# Patient Record
Sex: FEMALE | Race: BLACK OR AFRICAN AMERICAN | Employment: OTHER | URBAN - METROPOLITAN AREA
[De-identification: names, ages, dates, MRNs, and addresses within clinical notes are randomized per-mention and may not be internally consistent; named-entity substitution may affect disease eponyms.]

---

## 2017-02-28 ENCOUNTER — HOSPITAL ENCOUNTER (OUTPATIENT)
Dept: CT IMAGING | Age: 57
Discharge: HOME | End: 2017-02-28
Attending: NURSE PRACTITIONER | Admitting: NURSE PRACTITIONER

## 2017-02-28 DIAGNOSIS — C85.90 NON-HODGKIN LYMPHOMA (HCC): ICD-10-CM

## 2017-02-28 DIAGNOSIS — C83.36 DIFFUSE LARGE B-CELL LYMPHOMA OF INTRAPELVIC LYMPH NODES (HCC): ICD-10-CM

## 2017-04-13 PROBLEM — D80.1 HYPOGAMMAGLOBULINEMIA (HCC): Status: ACTIVE | Noted: 2017-04-13

## 2017-06-15 PROBLEM — T45.1X5A ANEMIA DUE TO ANTINEOPLASTIC CHEMOTHERAPY: Status: ACTIVE | Noted: 2017-06-15

## 2017-06-15 PROBLEM — D64.81 ANEMIA DUE TO ANTINEOPLASTIC CHEMOTHERAPY: Status: ACTIVE | Noted: 2017-06-15

## 2017-08-18 ENCOUNTER — HOSPITAL ENCOUNTER (OUTPATIENT)
Dept: CT IMAGING | Age: 57
Discharge: HOME | End: 2017-08-18
Attending: INTERNAL MEDICINE | Admitting: INTERNAL MEDICINE

## 2017-08-18 DIAGNOSIS — C85.90 NON-HODGKIN LYMPHOMA (HCC): ICD-10-CM

## 2017-08-18 DIAGNOSIS — C83.36 DIFFUSE LARGE B-CELL LYMPHOMA OF INTRAPELVIC LYMPH NODES (HCC): ICD-10-CM

## 2017-08-18 RX ORDER — HEPARIN SODIUM (PORCINE) LOCK FLUSH IV SOLN 100 UNIT/ML 100 UNIT/ML
500 SOLUTION INTRAVENOUS ONCE
Status: COMPLETED | OUTPATIENT
Start: 2017-08-18 | End: 2017-08-18

## 2017-08-18 RX ADMIN — HEPARIN SODIUM (PORCINE) LOCK FLUSH IV SOLN 100 UNIT/ML 500 UNITS: 100 SOLUTION at 16:23

## 2017-11-06 RX ORDER — FLUTICASONE PROPIONATE 50 MCG
SPRAY, SUSPENSION (ML) NASAL
Qty: 1 BOTTLE | Refills: 3 | Status: SHIPPED | OUTPATIENT
Start: 2017-11-06 | End: 2019-11-27 | Stop reason: SDUPTHER

## 2017-12-13 PROBLEM — R10.9 ABDOMINAL PAIN: Status: ACTIVE | Noted: 2017-12-13

## 2018-02-05 ENCOUNTER — HOSPITAL ENCOUNTER (OUTPATIENT)
Dept: NON INVASIVE DIAGNOSTICS | Age: 58
Discharge: HOME | End: 2018-02-05
Attending: INTERNAL MEDICINE | Admitting: INTERNAL MEDICINE

## 2018-02-05 DIAGNOSIS — R55 SYNCOPE AND COLLAPSE: ICD-10-CM

## 2018-02-05 LAB
LV EF: 50 %
LVEF MODALITY: NORMAL

## 2018-02-20 ENCOUNTER — HOSPITAL ENCOUNTER (OUTPATIENT)
Dept: OTHER | Age: 58
Discharge: HOME | End: 2018-02-20
Attending: PHYSICIAN ASSISTANT | Admitting: PHYSICIAN ASSISTANT

## 2018-02-20 DIAGNOSIS — R05.9 COUGH: ICD-10-CM

## 2018-02-22 ENCOUNTER — HOSPITAL ENCOUNTER (OUTPATIENT)
Dept: OTHER | Age: 58
Discharge: HOME | End: 2018-02-28
Attending: INTERNAL MEDICINE | Admitting: INTERNAL MEDICINE

## 2018-02-22 VITALS
DIASTOLIC BLOOD PRESSURE: 70 MMHG | HEART RATE: 95 BPM | RESPIRATION RATE: 16 BRPM | SYSTOLIC BLOOD PRESSURE: 96 MMHG | TEMPERATURE: 100 F

## 2018-02-22 LAB
RAPID INFLUENZA  B AGN: POSITIVE
RAPID INFLUENZA A AGN: NEGATIVE

## 2018-02-22 NOTE — PROGRESS NOTES
1010 - Rapid flu swab obtained per policy and sent to lab. Patient was educated and tolerated procedure well.

## 2018-02-22 NOTE — PROGRESS NOTES
Patient presented to outpatient clinic for rapid flu testing. Vital signs were taken; pt febrile. Respiratory performed flu swab. Results were negative and reported to SCAR MANUEL. Patient was notified that a prescription would be called into the pharmacy and educated on symptom management and supportive therapy. Discharge ambulatory to home with brother.

## 2018-03-01 ENCOUNTER — HOSPITAL ENCOUNTER (OUTPATIENT)
Dept: ONCOLOGY | Age: 58
Discharge: HOME | End: 2018-03-31
Attending: INTERNAL MEDICINE | Admitting: INTERNAL MEDICINE

## 2018-03-20 ENCOUNTER — TELEPHONE (OUTPATIENT)
Dept: PULMONOLOGY | Age: 58
End: 2018-03-20

## 2018-08-01 ENCOUNTER — OFFICE VISIT (OUTPATIENT)
Dept: PULMONOLOGY | Age: 58
End: 2018-08-01

## 2018-08-01 VITALS
OXYGEN SATURATION: 98 % | DIASTOLIC BLOOD PRESSURE: 78 MMHG | BODY MASS INDEX: 24.41 KG/M2 | SYSTOLIC BLOOD PRESSURE: 106 MMHG | HEIGHT: 64 IN | HEART RATE: 80 BPM | WEIGHT: 143 LBS

## 2018-08-01 DIAGNOSIS — Z94.81 BONE MARROW TRANSPLANT STATUS (HCC): ICD-10-CM

## 2018-08-01 DIAGNOSIS — R06.09 DOE (DYSPNEA ON EXERTION): Primary | ICD-10-CM

## 2018-08-01 DIAGNOSIS — R05.3 CHRONIC COUGH: ICD-10-CM

## 2018-08-01 PROCEDURE — 99214 OFFICE O/P EST MOD 30 MIN: CPT | Performed by: INTERNAL MEDICINE

## 2018-08-01 PROCEDURE — G8427 DOCREV CUR MEDS BY ELIG CLIN: HCPCS | Performed by: INTERNAL MEDICINE

## 2018-08-01 PROCEDURE — G8420 CALC BMI NORM PARAMETERS: HCPCS | Performed by: INTERNAL MEDICINE

## 2018-08-01 PROCEDURE — 1036F TOBACCO NON-USER: CPT | Performed by: INTERNAL MEDICINE

## 2018-08-01 PROCEDURE — 3017F COLORECTAL CA SCREEN DOC REV: CPT | Performed by: INTERNAL MEDICINE

## 2018-08-01 RX ORDER — ALBUTEROL SULFATE 90 UG/1
2 AEROSOL, METERED RESPIRATORY (INHALATION)
COMMUNITY
Start: 2018-04-11 | End: 2020-01-18

## 2018-08-01 NOTE — PROGRESS NOTES
[facility]    Outpatient Follow Up Note    Subjective:   CHIEF COMPLAINT / HPI:     The patient is 61 y.o. female with past medical history of lymphoma status post bone marrow transplant and UACS on Flonase and Zyrtec presents for evaluation of worsening dyspnea on exertion to the point she gets winded going up 1 flight of stairs. She has a cough that she describes as both dry but also bringing up some yellow phlegm. She has no associated fevers, chills, anorexia, chest pain, or weight loss. .     Past Medical History:    Past Medical History:   Diagnosis Date    Arthritis     DLBCL (diffuse large B cell lymphoma) ([facility] [address] 2/2010    non-hodgkins lymphoma    GERD (gastroesophageal reflux disease)     MDRO (multiple drug resistant organisms) resistance 5/15/16    E.coli MDRO in urine    Migraines     Peripheral neuropathy (HCC)     PONV (postoperative nausea and vomiting)        Social History:    History   Smoking Status    Never Smoker   Smokeless Tobacco    Never Used       Current Medications:  Current Outpatient Prescriptions on File Prior to Visit   Medication Sig Dispense Refill    cetirizine (ZYRTEC) 10 MG tablet TAKE 1 CAPSULE BY MOUTH DAILY 90 tablet 3    ondansetron (ZOFRAN) 4 MG tablet Take 4 mg by mouth every 8 hours as needed for Nausea or Vomiting      fluticasone (FLONASE) 50 MCG/ACT nasal spray SHAKE LIQUID AND USE 2 SPRAYS IN EACH NOSTRIL DAILY 1 Bottle 3    penicillin v potassium (VEETID) 500 MG tablet TAKE 1 TABLET BY MOUTH TWICE DAILY 60 tablet 2    conjugated estrogens (PREMARIN) 0.625 MG/GM vaginal cream insert . 5 gram vaginally three times a week at bedtime. 1 Tube 3    tacrolimus (PROTOPIC) 0.1 % ointment Apply topically 1times daily to affected area.  1 Tube 2    prochlorperazine (COMPAZINE) 10 MG tablet Take 1 tablet by mouth every 6 hours as needed (nausea) 60 tablet 3    zolpidem (AMBIEN) 5 MG tablet Take one tablet nightly as needed for

## 2018-08-21 ENCOUNTER — HOSPITAL ENCOUNTER (OUTPATIENT)
Dept: PULMONOLOGY | Age: 58
Discharge: HOME | End: 2018-08-21
Payer: MEDICARE

## 2018-08-21 ENCOUNTER — HOSPITAL ENCOUNTER (OUTPATIENT)
Dept: CT IMAGING | Age: 58
Discharge: HOME | End: 2018-08-21
Payer: MEDICARE

## 2018-08-21 DIAGNOSIS — R06.09 DOE (DYSPNEA ON EXERTION): ICD-10-CM

## 2018-08-21 DIAGNOSIS — R05.3 CHRONIC COUGH: ICD-10-CM

## 2018-08-21 DIAGNOSIS — Z94.81 BONE MARROW TRANSPLANT STATUS (HCC): ICD-10-CM

## 2018-08-21 PROCEDURE — 94640 AIRWAY INHALATION TREATMENT: CPT

## 2018-08-21 PROCEDURE — 94726 PLETHYSMOGRAPHY LUNG VOLUMES: CPT

## 2018-08-21 PROCEDURE — 94761 N-INVAS EAR/PLS OXIMETRY MLT: CPT

## 2018-08-21 PROCEDURE — 94729 DIFFUSING CAPACITY: CPT

## 2018-08-21 PROCEDURE — 94664 DEMO&/EVAL PT USE INHALER: CPT

## 2018-08-21 PROCEDURE — 94060 EVALUATION OF WHEEZING: CPT

## 2018-08-21 PROCEDURE — 6360000002 HC RX W HCPCS: Performed by: INTERNAL MEDICINE

## 2018-08-21 PROCEDURE — 94070 EVALUATION OF WHEEZING: CPT

## 2018-08-21 PROCEDURE — 71250 CT THORAX DX C-: CPT

## 2018-08-21 RX ORDER — ALBUTEROL SULFATE 2.5 MG/3ML
2.5 SOLUTION RESPIRATORY (INHALATION) ONCE
Status: COMPLETED | OUTPATIENT
Start: 2018-08-21 | End: 2018-08-21

## 2018-08-21 RX ADMIN — ALBUTEROL SULFATE 2.5 MG: 2.5 SOLUTION RESPIRATORY (INHALATION) at 13:40

## 2018-08-21 RX ADMIN — ALBUTEROL SULFATE 2.5 MG: 2.5 SOLUTION RESPIRATORY (INHALATION) at 08:01

## 2018-08-21 RX ADMIN — METHACHOLINE CHLORIDE 100 MG: 100 POWDER, FOR SOLUTION RESPIRATORY (INHALATION) at 13:29

## 2018-08-23 ENCOUNTER — OFFICE VISIT (OUTPATIENT)
Dept: PULMONOLOGY | Age: 58
End: 2018-08-23

## 2018-08-23 VITALS
SYSTOLIC BLOOD PRESSURE: 100 MMHG | HEIGHT: 64 IN | WEIGHT: 143.4 LBS | OXYGEN SATURATION: 97 % | DIASTOLIC BLOOD PRESSURE: 64 MMHG | RESPIRATION RATE: 18 BRPM | BODY MASS INDEX: 24.48 KG/M2 | HEART RATE: 80 BPM

## 2018-08-23 DIAGNOSIS — Z94.84 H/O STEM CELL TRANSPLANT (HCC): ICD-10-CM

## 2018-08-23 DIAGNOSIS — R06.09 DOE (DYSPNEA ON EXERTION): Primary | ICD-10-CM

## 2018-08-23 DIAGNOSIS — R93.89 ABNORMAL CT OF THE CHEST: ICD-10-CM

## 2018-08-23 DIAGNOSIS — D89.813 GVHD (GRAFT VERSUS HOST DISEASE) (HCC): ICD-10-CM

## 2018-08-23 DIAGNOSIS — R05.9 COUGH: ICD-10-CM

## 2018-08-23 PROCEDURE — G8427 DOCREV CUR MEDS BY ELIG CLIN: HCPCS | Performed by: INTERNAL MEDICINE

## 2018-08-23 PROCEDURE — 1036F TOBACCO NON-USER: CPT | Performed by: INTERNAL MEDICINE

## 2018-08-23 PROCEDURE — 3017F COLORECTAL CA SCREEN DOC REV: CPT | Performed by: INTERNAL MEDICINE

## 2018-08-23 PROCEDURE — G8420 CALC BMI NORM PARAMETERS: HCPCS | Performed by: INTERNAL MEDICINE

## 2018-08-23 PROCEDURE — 99214 OFFICE O/P EST MOD 30 MIN: CPT | Performed by: INTERNAL MEDICINE

## 2018-08-24 ENCOUNTER — TELEPHONE (OUTPATIENT)
Dept: PULMONOLOGY | Age: 58
End: 2018-08-24

## 2018-08-24 NOTE — TELEPHONE ENCOUNTER
PT has been scheduled for a bronch w/ Bal on 8/30/18. Pt has medicare so no precert is needed at this time. Pt has confirmed appt.

## 2018-08-25 NOTE — PROCEDURES
[address] [facility]                 [address]                                PULMONARY FUNCTION    PATIENT NAME: Aylin Goss                    :        1960  MED REC NO:   [id number]                          ROOM:  ACCOUNT NO:   [de-identified]                           ADMIT DATE: 2018  PROVIDER:     Vivian Cabrera MD    DATE OF PROCEDURE:  2018    Forced vital capacity and FEV1 normal, FEV1 to FVC ratio moderately  reduced. No change after bronchodilator. Lung volumes normal.   Single-breath diffusion capacity moderately reduced. IMPRESSION:  Moderate obstructive ventilatory defect. Bronchodilator  response not seen. Findings may be consistent with chronic obstructive  airflow disease with a component of emphysema.         Christina Pineda MD    D: 2018 19:23:22       T: 2018 19:24:29     [email or web address]  Job#: [id number]     Doc#: [id number]    CC:

## 2018-08-27 NOTE — PROGRESS NOTES
[facility] Pulmonary and Critical Care    Outpatient Follow Up Note    Subjective:   CHIEF COMPLAINT / HPI:     The patient is 61 y.o. female with past medical history of lymphoma status post bone marrow transplant and UACS ptesents for two-week follow-up of dyspnea on exertion and cough. Since last visit she's had a CT chest and PFTs. She has no change in her dyspnea on exertion and cough. She continues to have no fevers, chills, anorexia, chest pain, or weight loss    Previous visit 8/1/2018   The patient is 61 y.o. female with past medical history of lymphoma status post bone marrow transplant and UACS on Flonase and Zyrtec presents for evaluation of worsening dyspnea on exertion to the point she gets winded going up 1 flight of stairs. She has a cough that she describes as both dry but also bringing up some yellow phlegm. She has no associated fevers, chills, anorexia, chest pain, or weight loss. .     Past Medical History:    Past Medical History:   Diagnosis Date    Arthritis     DLBCL (diffuse large B cell lymphoma) ([facility] [address] 2/2010    non-hodgkins lymphoma    GERD (gastroesophageal reflux disease)     MDRO (multiple drug resistant organisms) resistance 5/15/16    E.coli MDRO in urine    Migraines     Peripheral neuropathy     PONV (postoperative nausea and vomiting)        Social History:    History   Smoking Status    Never Smoker   Smokeless Tobacco    Never Used       Current Medications:  Current Outpatient Prescriptions on File Prior to Visit   Medication Sig Dispense Refill    albuterol sulfate  (90 Base) MCG/ACT inhaler Inhale 2 puffs into the lungs      cetirizine (ZYRTEC) 10 MG tablet TAKE 1 CAPSULE BY MOUTH DAILY 90 tablet 3    ondansetron (ZOFRAN) 4 MG tablet Take 4 mg by mouth every 8 hours as needed for Nausea or Vomiting      famotidine (PEPCID) 20 MG tablet Take 1 tablet by mouth every morning (before breakfast) 60 tablet 1    fluticasone (FLONASE) 50 MCG/ACT

## 2018-08-30 ENCOUNTER — HOSPITAL ENCOUNTER (OUTPATIENT)
Age: 58
Setting detail: OUTPATIENT SURGERY
Discharge: HOME | End: 2018-08-30
Attending: INTERNAL MEDICINE | Admitting: INTERNAL MEDICINE
Payer: MEDICARE

## 2018-08-30 VITALS
BODY MASS INDEX: 24.41 KG/M2 | OXYGEN SATURATION: 100 % | DIASTOLIC BLOOD PRESSURE: 92 MMHG | HEART RATE: 77 BPM | WEIGHT: 143 LBS | RESPIRATION RATE: 17 BRPM | SYSTOLIC BLOOD PRESSURE: 113 MMHG | HEIGHT: 64 IN | TEMPERATURE: 98.1 F

## 2018-08-30 LAB
APPEARANCE BAL (LAVAGE): ABNORMAL
CLOT EVALUATION BAL: ABNORMAL
COLOR LAVAGE: YELLOW
EPITHELIAL CELLS FLUID: 3 %
LYMPHOCYTES, BAL: 6 % (ref 5–10)
MACROPHAGES, BAL: 6 % (ref 90–95)
MONOCYTES, BAL: 5 %
NUMBER OF CELLS COUNTED BAL (LAVAGE): 200
RBC, BAL: 8209 /CUMM
SEGMENTED NEUTROPHILS, BAL: 80 % (ref 5–10)
WBC/EPI CELLS BAL: 1364 /CUMM

## 2018-08-30 PROCEDURE — 87798 DETECT AGENT NOS DNA AMP: CPT

## 2018-08-30 PROCEDURE — 87116 MYCOBACTERIA CULTURE: CPT

## 2018-08-30 PROCEDURE — 2709999900 HC NON-CHARGEABLE SUPPLY: Performed by: INTERNAL MEDICINE

## 2018-08-30 PROCEDURE — 88312 SPECIAL STAINS GROUP 1: CPT

## 2018-08-30 PROCEDURE — 87206 SMEAR FLUORESCENT/ACID STAI: CPT

## 2018-08-30 PROCEDURE — 87541 LEGION PNEUMO DNA AMP PROB: CPT

## 2018-08-30 PROCEDURE — 99153 MOD SED SAME PHYS/QHP EA: CPT | Performed by: INTERNAL MEDICINE

## 2018-08-30 PROCEDURE — 87102 FUNGUS ISOLATION CULTURE: CPT

## 2018-08-30 PROCEDURE — 87253 VIRUS INOCULATE TISSUE ADDL: CPT

## 2018-08-30 PROCEDURE — 88112 CYTOPATH CELL ENHANCE TECH: CPT

## 2018-08-30 PROCEDURE — 99152 MOD SED SAME PHYS/QHP 5/>YRS: CPT | Performed by: INTERNAL MEDICINE

## 2018-08-30 PROCEDURE — 87503 INFLUENZA DNA AMP PROB ADDL: CPT

## 2018-08-30 PROCEDURE — 3609010800 HC BRONCHOSCOPY ALVEOLAR LAVAGE: Performed by: INTERNAL MEDICINE

## 2018-08-30 PROCEDURE — 87205 SMEAR GRAM STAIN: CPT

## 2018-08-30 PROCEDURE — 6360000002 HC RX W HCPCS: Performed by: INTERNAL MEDICINE

## 2018-08-30 PROCEDURE — 87015 SPECIMEN INFECT AGNT CONCNTJ: CPT

## 2018-08-30 PROCEDURE — 88305 TISSUE EXAM BY PATHOLOGIST: CPT

## 2018-08-30 PROCEDURE — 87081 CULTURE SCREEN ONLY: CPT

## 2018-08-30 PROCEDURE — 7100000011 HC PHASE II RECOVERY - ADDTL 15 MIN: Performed by: INTERNAL MEDICINE

## 2018-08-30 PROCEDURE — 87254 VIRUS INOCULATION SHELL VIA: CPT

## 2018-08-30 PROCEDURE — 87070 CULTURE OTHR SPECIMN AEROBIC: CPT

## 2018-08-30 PROCEDURE — 31645 BRNCHSC W/THER ASPIR 1ST: CPT | Performed by: INTERNAL MEDICINE

## 2018-08-30 PROCEDURE — 3609010900 HC BRONCHOSCOPY THERAPUTIC ASPIRATION INITIAL: Performed by: INTERNAL MEDICINE

## 2018-08-30 PROCEDURE — 31624 DX BRONCHOSCOPE/LAVAGE: CPT | Performed by: INTERNAL MEDICINE

## 2018-08-30 PROCEDURE — 87305 ASPERGILLUS AG IA: CPT

## 2018-08-30 PROCEDURE — 87252 VIRUS INOCULATION TISSUE: CPT

## 2018-08-30 PROCEDURE — 7100000010 HC PHASE II RECOVERY - FIRST 15 MIN: Performed by: INTERNAL MEDICINE

## 2018-08-30 PROCEDURE — 89051 BODY FLUID CELL COUNT: CPT

## 2018-08-30 RX ORDER — DIPHENHYDRAMINE HYDROCHLORIDE 50 MG/ML
INJECTION INTRAMUSCULAR; INTRAVENOUS PRN
Status: DISCONTINUED | OUTPATIENT
Start: 2018-08-30 | End: 2018-08-30 | Stop reason: HOSPADM

## 2018-08-30 RX ORDER — MIDAZOLAM HYDROCHLORIDE 1 MG/ML
INJECTION INTRAMUSCULAR; INTRAVENOUS PRN
Status: DISCONTINUED | OUTPATIENT
Start: 2018-08-30 | End: 2018-08-30 | Stop reason: HOSPADM

## 2018-08-30 RX ORDER — FENTANYL CITRATE 50 UG/ML
INJECTION, SOLUTION INTRAMUSCULAR; INTRAVENOUS PRN
Status: DISCONTINUED | OUTPATIENT
Start: 2018-08-30 | End: 2018-08-30 | Stop reason: HOSPADM

## 2018-08-30 ASSESSMENT — PAIN SCALES - GENERAL: PAINLEVEL_OUTOF10: 0

## 2018-08-30 NOTE — H&P
H&P  PROCEDURE:  BRONCHOSCOPY WITH BRONCHOALVEOLAR LAVAGE    HPI: The patient is 61 y. o. female with past medical history of lymphoma status post bone marrow transplant and UACS presents bronchoscopy with BAL for evaluation of abnormal CT chest in the setting of dyspnea on exertion and cough. She has no change in her dyspnea on exertion and cough. She continues to have no fevers, chills, anorexia, chest pain, or weight loss.       Previous visit 8/1/2018              The patient is 61 y.o. female with past medical history of lymphoma status post bone marrow transplant and UACS on Flonase and Zyrtec presents for evaluation of worsening dyspnea on exertion to the point she gets winded going up 1 flight of stairs. She has a cough that she describes as both dry but also bringing up some yellow phlegm. She has no associated fevers, chills, anorexia, chest pain, or weight loss. .     Allergies and medications have been reviewed    HENT: Airway patent and reviewed  Cardiovascular: Normal rate, regular rhythm, normal heart sounds. Pulmonary/Chest: No wheezes. No rhonchi. No rales. Abdominal: Soft. Bowel sounds are normal. No distension. ASA CLASS      II. Mild systemic disease    Sedation plan: Moderate       Post Procedure Plan   Return to same level of care     The risks and benefits as well as alternatives to the procedure have been discussed with the patient. The patient understands and agrees to proceed. A/P    Possible atypical lung infection in the setting of an immunocompromised patient.   I will proceed with bronchoscopy of BAL to obtain cultures to further evaluate abnormal CT chest

## 2018-09-01 LAB
ASPERGILLUS GALACTO AG: NEGATIVE
ASPERGILLUS GALACTO INDEX: 0.08
CULTURE, RESPIRATORY: NORMAL
CULTURE, RESPIRATORY: NORMAL
GRAM STAIN RESULT: NORMAL
GRAM STAIN RESULT: NORMAL

## 2018-09-02 LAB
FINAL REPORT: NORMAL
PRELIMINARY: NORMAL

## 2018-09-06 LAB
FINAL REPORT: NORMAL
Lab: NORMAL
PRELIMINARY: NORMAL
REPORT: NORMAL
THIS TEST SENT TO: NORMAL

## 2018-09-07 LAB
FINAL REPORT: NORMAL
PRELIMINARY: NORMAL

## 2018-09-10 LAB
FINAL REPORT: NORMAL
PRELIMINARY: NORMAL

## 2018-09-27 ENCOUNTER — OFFICE VISIT (OUTPATIENT)
Dept: PULMONOLOGY | Age: 58
End: 2018-09-27
Payer: MEDICARE

## 2018-09-27 VITALS
DIASTOLIC BLOOD PRESSURE: 74 MMHG | SYSTOLIC BLOOD PRESSURE: 112 MMHG | HEART RATE: 86 BPM | WEIGHT: 144 LBS | HEIGHT: 64 IN | RESPIRATION RATE: 16 BRPM | OXYGEN SATURATION: 99 % | BODY MASS INDEX: 24.59 KG/M2

## 2018-09-27 DIAGNOSIS — Z94.84 H/O STEM CELL TRANSPLANT (HCC): ICD-10-CM

## 2018-09-27 DIAGNOSIS — R05.9 COUGH: ICD-10-CM

## 2018-09-27 DIAGNOSIS — B49 FUNGAL PNEUMONIA: Primary | ICD-10-CM

## 2018-09-27 DIAGNOSIS — R06.09 DOE (DYSPNEA ON EXERTION): ICD-10-CM

## 2018-09-27 DIAGNOSIS — D89.813 GVHD (GRAFT VERSUS HOST DISEASE) (HCC): ICD-10-CM

## 2018-09-27 DIAGNOSIS — J16.8 FUNGAL PNEUMONIA: Primary | ICD-10-CM

## 2018-09-27 DIAGNOSIS — R93.89 ABNORMAL CT OF THE CHEST: ICD-10-CM

## 2018-09-27 PROCEDURE — 1036F TOBACCO NON-USER: CPT | Performed by: INTERNAL MEDICINE

## 2018-09-27 PROCEDURE — 3017F COLORECTAL CA SCREEN DOC REV: CPT | Performed by: INTERNAL MEDICINE

## 2018-09-27 PROCEDURE — G8420 CALC BMI NORM PARAMETERS: HCPCS | Performed by: INTERNAL MEDICINE

## 2018-09-27 PROCEDURE — G8427 DOCREV CUR MEDS BY ELIG CLIN: HCPCS | Performed by: INTERNAL MEDICINE

## 2018-09-27 PROCEDURE — 99214 OFFICE O/P EST MOD 30 MIN: CPT | Performed by: INTERNAL MEDICINE

## 2018-09-28 NOTE — PROGRESS NOTES
[facility] Pulmonary and Critical Care    Outpatient Follow Up Note    Subjective:   CHIEF COMPLAINT / HPI:     The patient is 61 y.o. female with past medical history of lymphoma status post bone marrow transplant and UACS Is here for follow-up of bronchoscopy performed August 30, 2018. The procedure showed thick purulent secretions with plugs seen in the medial basal segment of the right lower lobe and proximal segment of the left lower lobe. Therapeutic aspiration was performed and cultures were obtained. Mold has been isolated on fungus culture with identification still in progress. Patient states that she feels a little bit better after the bronchoscopy but overall does not have a significant change in cough and dyspnea on exertion    Previous visit August 23, 2018  Fernande Opitz is here for two-week follow-up of dyspnea on exertion and cough. Since last visit she's had a CT chest and PFTs. She has no change in her dyspnea on exertion and cough. She continues to have no fevers, chills, anorexia, chest pain, or weight loss    Previous visit 8/1/2018   The patient is 61 y.o. female with past medical history of lymphoma status post bone marrow transplant and UACS on Flonase and Zyrtec presents for evaluation of worsening dyspnea on exertion to the point she gets winded going up 1 flight of stairs. She has a cough that she describes as both dry but also bringing up some yellow phlegm. She has no associated fevers, chills, anorexia, chest pain, or weight loss. .     Past Medical History:    Past Medical History:   Diagnosis Date    Arthritis     DLBCL (diffuse large B cell lymphoma) ([facility] [address] 2/2010    non-hodgkins lymphoma    GERD (gastroesophageal reflux disease)     Hx of non-Hodgkin's lymphoma     MDRO (multiple drug resistant organisms) resistance 5/15/16    E.coli MDRO in urine    Migraines     Peripheral neuropathy     PONV (postoperative nausea and vomiting)        Social History:    History patient is infection including including bacterial pneumonia,    opportunistic infection including Mycobacterium avium complex or fungal pneumonia. Less likely considerations are drug induced pneumonitis or peopj-mjavaz-nvud reaction.       No findings of air trapping.       No adenopathy or mass in mediastinum, ari or axillae. Pulmonary function test - dated August 21, 2018  Mild obstructive defect without bronchodilator response  Positive bronchial challenge  Normal lung volumes  Moderate decrease in diffusion capacity    Assessment:      Diagnosis Orders   1. Fungal pneumonia  ASPERGILLUS GALACTOMANNAN AG ASSAY    MISCELLANEOUS LAB TEST #1    HISTOPLASMA ANTIGEN, SERUM    Histoplasma Antibodies by CF    Histoplasma Antigen, Urine    BLASTOMYCES ANTIBODIES    MISCELLANEOUS LAB TEST #1   2. MCDONALD (dyspnea on exertion)     3. Abnormal CT of the chest     4. H/O stem cell transplant ([facility] [address]     5. GVHD (graft versus host disease) ([address]     6. Cough         Plan:     1. Await identification of mold on fungal culture  2. Send Aspergillus, beta-1 3-D glucan, histoplasmosis serologies, blastomycosis serologies, and this serologies  3.   Follow-up in 4 weeks or sooner if needed

## 2018-09-29 LAB
HISTOPLASMA ANTIGEN URINE INTERP: NOT DETECTED
HISTOPLASMA ANTIGEN URINE: NOT DETECTED NG/ML

## 2018-10-08 LAB
FUNGUS (MYCOLOGY) CULTURE: ABNORMAL
FUNGUS STAIN: ABNORMAL
FUNGUS STAIN: ABNORMAL
ORGANISM: ABNORMAL
ORGANISM: ABNORMAL

## 2018-10-16 LAB
AFB CULTURE (MYCOBACTERIA): NORMAL
AFB SMEAR: NORMAL

## 2018-10-25 ENCOUNTER — OFFICE VISIT (OUTPATIENT)
Dept: PULMONOLOGY | Age: 58
End: 2018-10-25
Payer: MEDICARE

## 2018-10-25 VITALS
DIASTOLIC BLOOD PRESSURE: 70 MMHG | SYSTOLIC BLOOD PRESSURE: 90 MMHG | WEIGHT: 145.6 LBS | HEART RATE: 95 BPM | HEIGHT: 64 IN | OXYGEN SATURATION: 97 % | BODY MASS INDEX: 24.86 KG/M2

## 2018-10-25 DIAGNOSIS — Z85.72 HX OF LYMPHOMA: ICD-10-CM

## 2018-10-25 DIAGNOSIS — B49 FUNGAL PNEUMONIA: Primary | ICD-10-CM

## 2018-10-25 DIAGNOSIS — Z94.84 H/O STEM CELL TRANSPLANT (HCC): ICD-10-CM

## 2018-10-25 DIAGNOSIS — J16.8 FUNGAL PNEUMONIA: Primary | ICD-10-CM

## 2018-10-25 PROCEDURE — G8427 DOCREV CUR MEDS BY ELIG CLIN: HCPCS | Performed by: INTERNAL MEDICINE

## 2018-10-25 PROCEDURE — G8420 CALC BMI NORM PARAMETERS: HCPCS | Performed by: INTERNAL MEDICINE

## 2018-10-25 PROCEDURE — 3017F COLORECTAL CA SCREEN DOC REV: CPT | Performed by: INTERNAL MEDICINE

## 2018-10-25 PROCEDURE — 99214 OFFICE O/P EST MOD 30 MIN: CPT | Performed by: INTERNAL MEDICINE

## 2018-10-25 PROCEDURE — 1036F TOBACCO NON-USER: CPT | Performed by: INTERNAL MEDICINE

## 2018-10-25 PROCEDURE — G8484 FLU IMMUNIZE NO ADMIN: HCPCS | Performed by: INTERNAL MEDICINE

## 2018-10-25 NOTE — PROGRESS NOTES
[facility] Pulmonary and Critical Care    Outpatient Follow Up Note    Subjective:   CHIEF COMPLAINT / HPI:     The patient is 61 y.o. female with past medical history of lymphoma status post bone marrow transplant and UACS Is here for follow-up of bronchoscopy performed August 30, 2018. The procedure showed thick purulent secretions with plugs seen in the medial basal segment of the right lower lobe and proximal segment of the left lower lobe. Therapeutic aspiration was performed and cultures were obtained. 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 Patient continues to have a producitve cough and dyspnea on exertion but no fevers, chills, night sweats, malaise, anorexia or weight loss    Previous visit August 23, 2018  Geraldo Espinoza is here for two-week follow-up of dyspnea on exertion and cough. Since last visit she's had a CT chest and PFTs. She has no change in her dyspnea on exertion and cough. She continues to have no fevers, chills, anorexia, chest pain, or weight loss    Previous visit 8/1/2018   The patient is 61 y.o. female with past medical history of lymphoma status post bone marrow transplant and UACS on Flonase and Zyrtec presents for evaluation of worsening dyspnea on exertion to the point she gets winded going up 1 flight of stairs. She has a cough that she describes as both dry but also bringing up some yellow phlegm. She has no associated fevers, chills, anorexia, chest pain, or weight loss. .     Past Medical History:    Past Medical History:   Diagnosis Date    Arthritis     DLBCL (diffuse large B cell lymphoma) ([facility] [address] 2/2010    non-hodgkins lymphoma    GERD (gastroesophageal reflux disease)     Hx of non-Hodgkin's lymphoma     MDRO (multiple drug resistant organisms) resistance 5/15/16    E.coli MDRO in urine    Migraines     Peripheral neuropathy     PONV (postoperative nausea and vomiting)        Social History:    History   Smoking Status    Never Smoker groundglass opacity anterior segment left upper lobe (axial series 4 image 17). Minimal nodular opacity lateral    aspect left upper lobe (axial series 4 image 19. Mild diffuse groundglass opacity inferior aspect anterior segment left upper lobe and medial segment left upper lobe (both axial series 4 images 25-28). Mild groundglass opacity with tiny nodular opacities    superior segment left lower lobe (axial series 4 images 27-32). 1 mm x 1 mm tiny nodule periphery superior segment left lower lobe (axial series 4 image 34. Focal groundglass opacity-small consolidation medial left lower lobe (axial series 4 images    32-34). Moderate diffuse groundglass opacity posterior inferior left lower lobe (axial series 4 images 44-48). 2 mm x 2 mm nodular opacity left lateral costophrenic angle (axial series 4 image 45). All findings more prominent on expiratory phase    high-resolution images series 5       No pleural effusions. No findings of air trapping on expiratory phase high-resolution images.       No bony lesion or injury.           Impression       Most likely consideration for the multifocal groundglass opacities, very small nodules and nodular opacities and a few areas of consolidation in both lungs in apparent immune compromised patient is infection including including bacterial pneumonia,    opportunistic infection including Mycobacterium avium complex or fungal pneumonia. Less likely considerations are drug induced pneumonitis or [id number]-[id number] reaction.       No findings of air trapping.       No adenopathy or mass in mediastinum, ari or axillae. Pulmonary function test - dated August 21, 2018  Mild obstructive defect without bronchodilator response  Positive bronchial challenge  Normal lung volumes  Moderate decrease in diffusion capacity    Assessment:      Diagnosis Orders   1. Fungal pneumonia  Rubina Bob MD, Infectious Disease, [facility]   2. Hx of lymphoma     3.  H/O stem

## 2018-11-05 ENCOUNTER — OFFICE VISIT (OUTPATIENT)
Dept: INFECTIOUS DISEASES | Age: 58
End: 2018-11-05
Payer: MEDICARE

## 2018-11-05 VITALS
BODY MASS INDEX: 24.75 KG/M2 | DIASTOLIC BLOOD PRESSURE: 66 MMHG | WEIGHT: 145 LBS | SYSTOLIC BLOOD PRESSURE: 106 MMHG | TEMPERATURE: 98.5 F | HEIGHT: 64 IN

## 2018-11-05 DIAGNOSIS — Z94.81 S/P ALLOGENEIC BONE MARROW TRANSPLANT (HCC): ICD-10-CM

## 2018-11-05 DIAGNOSIS — C83.36 DIFFUSE LARGE B-CELL LYMPHOMA OF INTRAPELVIC LYMPH NODES (HCC): ICD-10-CM

## 2018-11-05 DIAGNOSIS — B49 FUNGAL INFECTION OF LUNG: Primary | ICD-10-CM

## 2018-11-05 PROCEDURE — 99205 OFFICE O/P NEW HI 60 MIN: CPT | Performed by: INTERNAL MEDICINE

## 2018-11-05 PROCEDURE — 1036F TOBACCO NON-USER: CPT | Performed by: INTERNAL MEDICINE

## 2018-11-05 PROCEDURE — 3017F COLORECTAL CA SCREEN DOC REV: CPT | Performed by: INTERNAL MEDICINE

## 2018-11-05 PROCEDURE — G8484 FLU IMMUNIZE NO ADMIN: HCPCS | Performed by: INTERNAL MEDICINE

## 2018-11-05 PROCEDURE — G8420 CALC BMI NORM PARAMETERS: HCPCS | Performed by: INTERNAL MEDICINE

## 2018-11-05 PROCEDURE — G8427 DOCREV CUR MEDS BY ELIG CLIN: HCPCS | Performed by: INTERNAL MEDICINE

## 2018-12-03 ENCOUNTER — OFFICE VISIT (OUTPATIENT)
Dept: INFECTIOUS DISEASES | Age: 58
End: 2018-12-03
Payer: MEDICARE

## 2018-12-03 VITALS
HEIGHT: 64 IN | TEMPERATURE: 98.4 F | BODY MASS INDEX: 24.92 KG/M2 | SYSTOLIC BLOOD PRESSURE: 104 MMHG | DIASTOLIC BLOOD PRESSURE: 66 MMHG | WEIGHT: 146 LBS

## 2018-12-03 DIAGNOSIS — B49 FUNGAL INFECTION OF LUNG: Primary | ICD-10-CM

## 2018-12-03 DIAGNOSIS — Z94.81 S/P ALLOGENEIC BONE MARROW TRANSPLANT (HCC): ICD-10-CM

## 2018-12-03 DIAGNOSIS — C83.36 DIFFUSE LARGE B-CELL LYMPHOMA OF INTRAPELVIC LYMPH NODES (HCC): ICD-10-CM

## 2018-12-03 PROCEDURE — G8484 FLU IMMUNIZE NO ADMIN: HCPCS | Performed by: INTERNAL MEDICINE

## 2018-12-03 PROCEDURE — 3017F COLORECTAL CA SCREEN DOC REV: CPT | Performed by: INTERNAL MEDICINE

## 2018-12-03 PROCEDURE — 99214 OFFICE O/P EST MOD 30 MIN: CPT | Performed by: INTERNAL MEDICINE

## 2018-12-03 PROCEDURE — G8419 CALC BMI OUT NRM PARAM NOF/U: HCPCS | Performed by: INTERNAL MEDICINE

## 2018-12-03 PROCEDURE — G8427 DOCREV CUR MEDS BY ELIG CLIN: HCPCS | Performed by: INTERNAL MEDICINE

## 2018-12-03 PROCEDURE — 1036F TOBACCO NON-USER: CPT | Performed by: INTERNAL MEDICINE

## 2018-12-03 NOTE — PROGRESS NOTES
weakness, sensory change or other neurologic symptom    Denies new / worse depression, psychiatric symptoms  Denies any Endocrine or Hematologic symptoms    PHYSICAL EXAM:      Vitals:    /66   Temp 98.4 °F (36.9 °C) (Oral)   Ht 5' 4\" (1.626 m)   Wt 146 lb (66.2 kg)   LMP 2008   BMI 25.06 kg/m²     GENERAL: No apparent distress. HEENT: Membranes moist, no oral lesion  NECK:  Supple  LUNGS: Clear b/l, no rales, no dullness  CARDIAC: RRR, no murmur appreciated  ABD:  + BS, soft / NT  EXT:  No rash, no edema, no lesions  NEURO: No focal neurologic findings  PSYCH: Orientation, sensorium, mood normal  LINES:  Peripheral iv    DATA:    Lab Results   Component Value Date    WBC 7.6 2018    HGB 14.7 2018    HCT 44.5 2018    MCV 84.7 2018     2018     Lab Results   Component Value Date    CREATININE 1.9 (H) 2018    BUN 26 (H) 2018     2018    K 3.6 2018     2018    CO2 25 2018       Hepatic Function Panel:   Lab Results   Component Value Date    ALKPHOS 93 12/15/2017    ALT 20 12/15/2017    AST 20 12/15/2017    PROT 5.7 12/15/2017    PROT 7.3 08/10/2017    BILITOT 0.3 12/15/2017    BILIDIR <0.2 12/15/2017    IBILI see below 12/15/2017    LABALBU 3.6 12/15/2017       Micro:   Bronch / BAL:  BS 2+WBC, 1+GNR; culture - normal resp josselin  Legionella culture negative  AFB cult - no growth at 6 weeks. Viral / HSV / CMV (by early antigen) culture negative  BAL galactomannan negative  Fungal culture - 'sterile mycelium' isolated. Non-sporulating isolate.  (BAL, 'plug')  Cytology: no maligant cells, GMS neg for fungal or Pneumocystis    Imagin/21 High resolution chest CT:  Most likely consideration for the multifocal groundglass opacities, very small nodules and nodular opacities and a few areas of consolidation in both lungs in apparent immune compromised patient is infection including including bacterial pneumonia, CT    Lower resp tract fungal culture with sterile mycelia - vegetative part of fungus, role in decomposing organic material.  Not human pathogen, no treatment indicated    RECOMMENDATIONS:    No antifungal rx  Repeat Chest CT - will have Pulmonary - Dr Iza Michaud order at pt's next visit with him  F/u Pul - 12/13  F/u with me as needed    Discussed with pt  Severa Graham, MD

## 2018-12-13 ENCOUNTER — OFFICE VISIT (OUTPATIENT)
Dept: PULMONOLOGY | Age: 58
End: 2018-12-13
Payer: MEDICARE

## 2018-12-13 VITALS
SYSTOLIC BLOOD PRESSURE: 100 MMHG | DIASTOLIC BLOOD PRESSURE: 68 MMHG | HEART RATE: 88 BPM | RESPIRATION RATE: 16 BRPM | BODY MASS INDEX: 25.1 KG/M2 | HEIGHT: 64 IN | WEIGHT: 147 LBS | OXYGEN SATURATION: 97 %

## 2018-12-13 DIAGNOSIS — Z85.72 HX OF LYMPHOMA: ICD-10-CM

## 2018-12-13 DIAGNOSIS — Z94.84 H/O STEM CELL TRANSPLANT (HCC): ICD-10-CM

## 2018-12-13 DIAGNOSIS — R93.89 ABNORMAL CT OF THE CHEST: Primary | ICD-10-CM

## 2018-12-13 PROCEDURE — 3017F COLORECTAL CA SCREEN DOC REV: CPT | Performed by: INTERNAL MEDICINE

## 2018-12-13 PROCEDURE — G8427 DOCREV CUR MEDS BY ELIG CLIN: HCPCS | Performed by: INTERNAL MEDICINE

## 2018-12-13 PROCEDURE — 1036F TOBACCO NON-USER: CPT | Performed by: INTERNAL MEDICINE

## 2018-12-13 PROCEDURE — G8419 CALC BMI OUT NRM PARAM NOF/U: HCPCS | Performed by: INTERNAL MEDICINE

## 2018-12-13 PROCEDURE — G8484 FLU IMMUNIZE NO ADMIN: HCPCS | Performed by: INTERNAL MEDICINE

## 2018-12-13 PROCEDURE — 99213 OFFICE O/P EST LOW 20 MIN: CPT | Performed by: INTERNAL MEDICINE

## 2018-12-13 NOTE — PROGRESS NOTES
express diagnostic    characters under the conditions provided.    No further workup. Serology  Results for Brandon Bolden (MRN [id number]) as of 10/25/2018 15:31   Ref. Range 9/27/2018 14:38   Aspergillus Galacto AG Latest Ref Range: Negative  Negative   Aspergillus Galacto Index Unknown 0.09   (1,3)-Beta-D-Glucan (Fungitell) Interpretation Latest Ref Range: Negative  Negative   (1,3)-Beta-D-Glucan (fungitell) Latest Units: pg/mL <31   Histoplasma Antigen Urine Latest Units: ng/mL Not Detected   Coccidioides Ab,ID Latest Ref Range: None Detected  None Detected   Histoplasma Ab Mycelial CF Latest Ref Range: <1:8  <1:8   Histoplasma Ab Yeast CF Latest Ref Range: <1:8  <1:8   Histoplasma Antigen, Serum Unknown See Note   Histoplasma Ag Interp Latest Ref Range: Not Detected  Not Detected   HISTOPLASMA INTERPETATION Unknown See Note       Radiology    CT chest 8/21/2018 - images and report personally reviewed by myself and the presence of the patient:  CT HIGH RESOLUTION CHEST (WITHOUT CONTRAST)       HISTORY: Dyspnea on exertion, chronic cough, diffuse large B-cell lymphoma, bone marrow transplant patient       Thin axial reconstructed high-resolution images from pulmonary apices through diaphragm without IV contrast and with both inspiratory and expiratory phase imaging performed.  Comparison is PA lateral chest 2/20/2018 and CT chest 2/28/2017       Left chest Port-A-Cath reservoir with left internal jugular vein Port-A-Cath extending into distal SVC.       No change in a few normal size superior mediastinal nodes. No adenopathy or mass in mediastinum, ari or axillae.       Right lung:   Minimal groundglass opacity and 2 mm x 4 mm nodular opacity anterior inferior aspect anterior segment right upper lobe (axial series 4 images 25-26). Mild groundglass opacity anterior medial basal right lower lobe (series 4 images 38-39).  Minimal    groundglass opacity posterior lateral right lower lobe (axial series 4 image

## 2019-01-16 ENCOUNTER — HOSPITAL ENCOUNTER (OUTPATIENT)
Dept: CT IMAGING | Age: 59
Discharge: HOME | End: 2019-01-16
Payer: COMMERCIAL

## 2019-01-16 DIAGNOSIS — R93.89 ABNORMAL CT OF THE CHEST: ICD-10-CM

## 2019-01-16 PROCEDURE — 71250 CT THORAX DX C-: CPT

## 2019-01-31 ENCOUNTER — OFFICE VISIT (OUTPATIENT)
Dept: PULMONOLOGY | Age: 59
End: 2019-01-31
Payer: COMMERCIAL

## 2019-01-31 VITALS
OXYGEN SATURATION: 96 % | RESPIRATION RATE: 16 BRPM | DIASTOLIC BLOOD PRESSURE: 60 MMHG | HEIGHT: 64 IN | HEART RATE: 82 BPM | SYSTOLIC BLOOD PRESSURE: 95 MMHG | WEIGHT: 147 LBS | BODY MASS INDEX: 25.1 KG/M2

## 2019-01-31 DIAGNOSIS — R93.89 ABNORMAL CT OF THE CHEST: Primary | ICD-10-CM

## 2019-01-31 DIAGNOSIS — Z85.72 HX OF LYMPHOMA: ICD-10-CM

## 2019-01-31 DIAGNOSIS — Z94.84 H/O STEM CELL TRANSPLANT (HCC): ICD-10-CM

## 2019-01-31 PROCEDURE — 99214 OFFICE O/P EST MOD 30 MIN: CPT | Performed by: INTERNAL MEDICINE

## 2019-02-21 ENCOUNTER — HOSPITAL ENCOUNTER (OUTPATIENT)
Dept: VASCULAR LAB | Age: 59
Discharge: HOME | End: 2019-02-21
Payer: COMMERCIAL

## 2019-02-21 PROCEDURE — 93971 EXTREMITY STUDY: CPT

## 2019-03-08 ENCOUNTER — HOSPITAL ENCOUNTER (OUTPATIENT)
Dept: PHYSICAL THERAPY | Age: 59
Setting detail: THERAPIES SERIES
Discharge: HOME | End: 2019-03-08
Payer: COMMERCIAL

## 2019-03-08 PROCEDURE — 97161 PT EVAL LOW COMPLEX 20 MIN: CPT

## 2019-03-08 PROCEDURE — 97110 THERAPEUTIC EXERCISES: CPT

## 2019-03-11 ENCOUNTER — HOSPITAL ENCOUNTER (OUTPATIENT)
Dept: ONCOLOGY | Age: 59
Setting detail: INFUSION SERIES
Discharge: HOME | End: 2019-03-11
Payer: COMMERCIAL

## 2019-03-11 VITALS — RESPIRATION RATE: 20 BRPM

## 2019-03-11 LAB
RAPID INFLUENZA  B AGN: NEGATIVE
RAPID INFLUENZA A AGN: POSITIVE

## 2019-03-11 PROCEDURE — 94760 N-INVAS EAR/PLS OXIMETRY 1: CPT

## 2019-03-11 PROCEDURE — 94770 HC ETCO2 MONITOR DAILY: CPT

## 2019-03-11 PROCEDURE — 89220 SPUTUM SPECIMEN COLLECTION: CPT

## 2019-03-11 PROCEDURE — 99211 OFF/OP EST MAY X REQ PHY/QHP: CPT | Performed by: NURSE PRACTITIONER

## 2019-03-11 PROCEDURE — 87804 INFLUENZA ASSAY W/OPTIC: CPT

## 2019-03-11 PROCEDURE — 94664 DEMO&/EVAL PT USE INHALER: CPT

## 2019-03-19 ENCOUNTER — HOSPITAL ENCOUNTER (OUTPATIENT)
Dept: PHYSICAL THERAPY | Age: 59
Setting detail: THERAPIES SERIES
Discharge: HOME | End: 2019-03-19
Payer: COMMERCIAL

## 2019-03-21 ENCOUNTER — HOSPITAL ENCOUNTER (OUTPATIENT)
Dept: PHYSICAL THERAPY | Age: 59
Setting detail: THERAPIES SERIES
Discharge: HOME | End: 2019-03-21
Payer: COMMERCIAL

## 2019-03-21 PROCEDURE — 97110 THERAPEUTIC EXERCISES: CPT

## 2019-04-01 ENCOUNTER — APPOINTMENT (OUTPATIENT)
Dept: CT IMAGING | Age: 59
End: 2019-04-01
Payer: COMMERCIAL

## 2019-04-01 ENCOUNTER — HOSPITAL ENCOUNTER (EMERGENCY)
Age: 59
Discharge: HOME | End: 2019-04-01
Attending: EMERGENCY MEDICINE
Payer: COMMERCIAL

## 2019-04-01 ENCOUNTER — APPOINTMENT (OUTPATIENT)
Dept: GENERAL RADIOLOGY | Age: 59
End: 2019-04-01
Payer: COMMERCIAL

## 2019-04-01 VITALS
RESPIRATION RATE: 20 BRPM | SYSTOLIC BLOOD PRESSURE: 114 MMHG | DIASTOLIC BLOOD PRESSURE: 79 MMHG | OXYGEN SATURATION: 96 % | HEART RATE: 101 BPM | TEMPERATURE: 99 F

## 2019-04-01 DIAGNOSIS — J01.00 ACUTE NON-RECURRENT MAXILLARY SINUSITIS: Primary | ICD-10-CM

## 2019-04-01 LAB
ALBUMIN SERPL-MCNC: 4.1 G/DL (ref 3.4–5)
ALP BLD-CCNC: 94 U/L (ref 40–129)
ALT SERPL-CCNC: 24 U/L (ref 10–40)
ANION GAP SERPL CALCULATED.3IONS-SCNC: 13 MMOL/L (ref 3–16)
AST SERPL-CCNC: 23 U/L (ref 15–37)
BASE EXCESS VENOUS: 1 (ref -3–3)
BASOPHILS ABSOLUTE: 0.1 K/UL (ref 0–0.2)
BASOPHILS RELATIVE PERCENT: 0.9 %
BILIRUB SERPL-MCNC: 0.7 MG/DL (ref 0–1)
BILIRUBIN DIRECT: <0.2 MG/DL (ref 0–0.3)
BILIRUBIN, INDIRECT: NORMAL MG/DL (ref 0–1)
BUN BLDV-MCNC: 23 MG/DL (ref 7–20)
CALCIUM SERPL-MCNC: 9.6 MG/DL (ref 8.3–10.6)
CHLORIDE BLD-SCNC: 102 MMOL/L (ref 99–110)
CO2: 23 MMOL/L (ref 21–32)
CREAT SERPL-MCNC: 2.1 MG/DL (ref 0.6–1.1)
EOSINOPHILS ABSOLUTE: 0 K/UL (ref 0–0.6)
EOSINOPHILS RELATIVE PERCENT: 0.4 %
GFR AFRICAN AMERICAN: 29
GFR NON-AFRICAN AMERICAN: 24
GLUCOSE BLD-MCNC: 117 MG/DL (ref 70–99)
HCO3 VENOUS: 24.5 MMOL/L (ref 23–29)
HCT VFR BLD CALC: 46.5 % (ref 36–48)
HEMOGLOBIN: 15.4 G/DL (ref 12–16)
LACTATE DEHYDROGENASE: 208 U/L (ref 100–190)
LACTATE: 1.3 MMOL/L (ref 0.4–2)
LYMPHOCYTES ABSOLUTE: 2.5 K/UL (ref 1–5.1)
LYMPHOCYTES RELATIVE PERCENT: 27.9 %
MAGNESIUM: 1.8 MG/DL (ref 1.8–2.4)
MCH RBC QN AUTO: 28.5 PG (ref 26–34)
MCHC RBC AUTO-ENTMCNC: 33.1 G/DL (ref 31–36)
MCV RBC AUTO: 86.1 FL (ref 80–100)
MONOCYTES ABSOLUTE: 0.8 K/UL (ref 0–1.3)
MONOCYTES RELATIVE PERCENT: 8.7 %
NEUTROPHILS ABSOLUTE: 5.6 K/UL (ref 1.7–7.7)
NEUTROPHILS RELATIVE PERCENT: 62.1 %
O2 SAT, VEN: 68 %
PCO2, VEN: 31.3 MM HG (ref 40–50)
PDW BLD-RTO: 16.6 % (ref 12.4–15.4)
PERFORMED ON: ABNORMAL
PH VENOUS: 7.5 (ref 7.35–7.45)
PLATELET # BLD: 136 K/UL (ref 135–450)
PMV BLD AUTO: 8.3 FL (ref 5–10.5)
PO2, VEN: 32 MM HG
POC SAMPLE TYPE: ABNORMAL
POTASSIUM SERPL-SCNC: 4.3 MMOL/L (ref 3.5–5.1)
RAPID INFLUENZA  B AGN: NEGATIVE
RAPID INFLUENZA A AGN: NEGATIVE
RBC # BLD: 5.4 M/UL (ref 4–5.2)
SODIUM BLD-SCNC: 138 MMOL/L (ref 136–145)
TCO2 CALC VENOUS: 25 MMOL/L
TOTAL PROTEIN: 6.8 G/DL (ref 6.4–8.2)
URIC ACID, SERUM: 6 MG/DL (ref 2.6–6)
WBC # BLD: 9 K/UL (ref 4–11)

## 2019-04-01 PROCEDURE — 2580000003 HC RX 258: Performed by: EMERGENCY MEDICINE

## 2019-04-01 PROCEDURE — 96366 THER/PROPH/DIAG IV INF ADDON: CPT

## 2019-04-01 PROCEDURE — 87205 SMEAR GRAM STAIN: CPT

## 2019-04-01 PROCEDURE — 83735 ASSAY OF MAGNESIUM: CPT

## 2019-04-01 PROCEDURE — 85025 COMPLETE CBC W/AUTO DIFF WBC: CPT

## 2019-04-01 PROCEDURE — 83615 LACTATE (LD) (LDH) ENZYME: CPT

## 2019-04-01 PROCEDURE — 99285 EMERGENCY DEPT VISIT HI MDM: CPT

## 2019-04-01 PROCEDURE — 96375 TX/PRO/DX INJ NEW DRUG ADDON: CPT

## 2019-04-01 PROCEDURE — 96365 THER/PROPH/DIAG IV INF INIT: CPT

## 2019-04-01 PROCEDURE — 71046 X-RAY EXAM CHEST 2 VIEWS: CPT

## 2019-04-01 PROCEDURE — 87102 FUNGUS ISOLATION CULTURE: CPT

## 2019-04-01 PROCEDURE — 6370000000 HC RX 637 (ALT 250 FOR IP): Performed by: EMERGENCY MEDICINE

## 2019-04-01 PROCEDURE — 80076 HEPATIC FUNCTION PANEL: CPT

## 2019-04-01 PROCEDURE — 87070 CULTURE OTHR SPECIMN AEROBIC: CPT

## 2019-04-01 PROCEDURE — 87253 VIRUS INOCULATE TISSUE ADDL: CPT

## 2019-04-01 PROCEDURE — 6360000002 HC RX W HCPCS: Performed by: EMERGENCY MEDICINE

## 2019-04-01 PROCEDURE — 70450 CT HEAD/BRAIN W/O DYE: CPT

## 2019-04-01 PROCEDURE — 87103 BLOOD FUNGUS CULTURE: CPT

## 2019-04-01 PROCEDURE — 36415 COLL VENOUS BLD VENIPUNCTURE: CPT

## 2019-04-01 PROCEDURE — 84550 ASSAY OF BLOOD/URIC ACID: CPT

## 2019-04-01 PROCEDURE — 82803 BLOOD GASES ANY COMBINATION: CPT

## 2019-04-01 PROCEDURE — 83605 ASSAY OF LACTIC ACID: CPT

## 2019-04-01 PROCEDURE — 87040 BLOOD CULTURE FOR BACTERIA: CPT

## 2019-04-01 PROCEDURE — 80048 BASIC METABOLIC PNL TOTAL CA: CPT

## 2019-04-01 PROCEDURE — 87252 VIRUS INOCULATION TISSUE: CPT

## 2019-04-01 PROCEDURE — 87804 INFLUENZA ASSAY W/OPTIC: CPT

## 2019-04-01 RX ORDER — DIPHENHYDRAMINE HYDROCHLORIDE 50 MG/ML
12.5 INJECTION INTRAMUSCULAR; INTRAVENOUS ONCE
Status: COMPLETED | OUTPATIENT
Start: 2019-04-01 | End: 2019-04-01

## 2019-04-01 RX ORDER — ACETAMINOPHEN 500 MG
1000 TABLET ORAL ONCE
Status: COMPLETED | OUTPATIENT
Start: 2019-04-01 | End: 2019-04-01

## 2019-04-01 RX ORDER — AMOXICILLIN AND CLAVULANATE POTASSIUM 875; 125 MG/1; MG/1
1 TABLET, FILM COATED ORAL ONCE
Status: COMPLETED | OUTPATIENT
Start: 2019-04-01 | End: 2019-04-01

## 2019-04-01 RX ORDER — AMOXICILLIN AND CLAVULANATE POTASSIUM 875; 125 MG/1; MG/1
1 TABLET, FILM COATED ORAL 2 TIMES DAILY
Qty: 28 TABLET | Refills: 0 | Status: SHIPPED | OUTPATIENT
Start: 2019-04-01 | End: 2019-04-15

## 2019-04-01 RX ORDER — 0.9 % SODIUM CHLORIDE 0.9 %
1000 INTRAVENOUS SOLUTION INTRAVENOUS ONCE
Status: COMPLETED | OUTPATIENT
Start: 2019-04-01 | End: 2019-04-01

## 2019-04-01 RX ADMIN — PIPERACILLIN SODIUM,TAZOBACTAM SODIUM 4.5 G: 4; .5 INJECTION, POWDER, FOR SOLUTION INTRAVENOUS at 05:06

## 2019-04-01 RX ADMIN — ACETAMINOPHEN 1000 MG: 500 TABLET ORAL at 08:37

## 2019-04-01 RX ADMIN — AMOXICILLIN AND CLAVULANATE POTASSIUM 1 TABLET: 875; 125 TABLET, FILM COATED ORAL at 08:37

## 2019-04-01 RX ADMIN — DIPHENHYDRAMINE HYDROCHLORIDE 12.5 MG: 50 INJECTION, SOLUTION INTRAMUSCULAR; INTRAVENOUS at 05:04

## 2019-04-01 RX ADMIN — PROCHLORPERAZINE EDISYLATE 10 MG: 5 INJECTION INTRAMUSCULAR; INTRAVENOUS at 05:05

## 2019-04-01 RX ADMIN — SODIUM CHLORIDE 1000 ML: 9 INJECTION, SOLUTION INTRAVENOUS at 05:04

## 2019-04-01 ASSESSMENT — ENCOUNTER SYMPTOMS
ABDOMINAL PAIN: 0
EYES NEGATIVE: 1
SHORTNESS OF BREATH: 1
NAUSEA: 1
VOMITING: 0
COUGH: 1

## 2019-04-01 ASSESSMENT — PAIN SCALES - GENERAL: PAINLEVEL_OUTOF10: 9

## 2019-04-01 NOTE — ED PROVIDER NOTES
[address]          ATTENDING PHYSICIAN NOTE       Date of evaluation: 4/1/2019    Chief Complaint     Headache and Neck Pain      History of Present Illness     Bhavna Harmon is a 60 y.o. female with history of diffuse large B-cell lymphoma status post autologous bone marrow transplant who presents to the emergency department complaining of headache, fever, and cough. The patient states that she was feeling in her normal state of health during the day 2 days ago but then yesterday woke up in the morning with headache. She describes it as a pounding headache located in the front of her head. She states she does have pain on the sides of her neck as well that she describes as a tension sensation. She states the headache is constant and does not note any inciting factors to it. She states she did take some Tylenol with some improvement of her headache but it did not resolve completely. She states she did have a fever during the day yesterday of 100.8. She did contact the on-call provider for her oncologist who recommended she come to the emergency department for evaluation. Patient does note nausea but denies any vomiting. She denies any shortness of breath. She denies any urinary symptoms. She does have a port but it has not been accessed in the last 6 weeks and she denies any pain to the site. That she was diagnosed with influenza 3 weeks ago. Review of Systems     Review of Systems   Constitutional: Positive for fever. HENT: Negative. Eyes: Negative. Respiratory: Positive for cough and shortness of breath. Gastrointestinal: Positive for nausea. Negative for abdominal pain and vomiting. Musculoskeletal: Positive for neck pain. Negative for neck stiffness. Neurological: Positive for headaches. All other systems reviewed and are negative.       Past Medical, Surgical, Family, and Social History     She has a past medical history of Arthritis, DLBCL (diffuse large B cell lymphoma) ([facility] [address] GERD (gastroesophageal reflux disease), Hx of non-Hodgkin's lymphoma, MDRO (multiple drug resistant organisms) resistance, Migraines, Peripheral neuropathy, and PONV (postoperative nausea and vomiting). She has a past surgical history that includes Tonsillectomy; Hysterectomy (2010); Colonoscopy; other surgical history (10/08/2012); lymph node biopsy (2011); lymph node biopsy (2012);  section; other surgical history (Right, 05/10/2016); bone marrow transplant (2016); pr [facility] w/brncl alveolar lavage (N/A, 2018); and bronchoscopy (2018). Her family history includes Arthritis in her sister and another family member; Birth Defects in her grandchild; Cancer in her brother and mother; Coronary Art Dis in her brother and father; Diabetes in her brother and sister; Heart Disease in her father; High Blood Pressure in her father; High Cholesterol in her brother; Hypertension in her brother, mother, and sister; Kidney Disease in an other family member; Rheum Arthritis in her brother and sister; Stroke in her brother. She reports that she has never smoked. She has never used smokeless tobacco. She reports that she drinks about 3.0 oz of alcohol per week. She reports that she does not use drugs. Medications     Previous Medications    ALBUTEROL SULFATE  (90 BASE) MCG/ACT INHALER    Inhale 2 puffs into the lungs    CETIRIZINE (ZYRTEC) 10 MG TABLET    TAKE 1 CAPSULE BY MOUTH DAILY    CONJUGATED ESTROGENS (PREMARIN) 0.625 MG/GM VAGINAL CREAM    insert . 5 gram vaginally three times a week at bedtime.     FAMOTIDINE (PEPCID) 20 MG TABLET    Take 1 tablet by mouth every morning (before breakfast)    FLUTICASONE (FLONASE) 50 MCG/ACT NASAL SPRAY    SHAKE LIQUID AND USE 2 SPRAYS IN EACH NOSTRIL DAILY    ONDANSETRON (ZOFRAN) 4 MG TABLET    Take 4 mg by mouth every 8 hours as needed for Nausea or Vomiting    PENICILLIN V POTASSIUM (VEETID) 500 MG ethmoid sinus disease. Fluid level within left maxillary    sinus. Correlate for acute sinusitis. XR CHEST STANDARD (2 VW)   Final Result     No acute findings.               LABS:   Results for orders placed or performed during the hospital encounter of 04/01/19   Rapid Flu Swab   Result Value Ref Range    Rapid Influenza A Ag Negative Negative    Rapid Influenza B Ag Negative Negative   CBC auto differential   Result Value Ref Range    WBC 9.0 4.0 - 11.0 K/uL    RBC 5.40 (H) 4.00 - 5.20 M/uL    Hemoglobin 15.4 12.0 - 16.0 g/dL    Hematocrit 46.5 36.0 - 48.0 %    MCV 86.1 80.0 - 100.0 fL    MCH 28.5 26.0 - 34.0 pg    MCHC 33.1 31.0 - 36.0 g/dL    RDW 16.6 (H) 12.4 - 15.4 %    Platelets 130 714 - 579 K/uL    MPV 8.3 5.0 - 10.5 fL    Neutrophils % 62.1 %    Lymphocytes % 27.9 %    Monocytes % 8.7 %    Eosinophils % 0.4 %    Basophils % 0.9 %    Neutrophils # 5.6 1.7 - 7.7 K/uL    Lymphocytes # 2.5 1.0 - 5.1 K/uL    Monocytes # 0.8 0.0 - 1.3 K/uL    Eosinophils # 0.0 0.0 - 0.6 K/uL    Basophils # 0.1 0.0 - 0.2 K/uL   Basic Metabolic Panel   Result Value Ref Range    Sodium 138 136 - 145 mmol/L    Potassium 4.3 3.5 - 5.1 mmol/L    Chloride 102 99 - 110 mmol/L    CO2 23 21 - 32 mmol/L    Anion Gap 13 3 - 16    Glucose 117 (H) 70 - 99 mg/dL    BUN 23 (H) 7 - 20 mg/dL    CREATININE 2.1 (H) 0.6 - 1.1 mg/dL    GFR Non-African American 24 (A) >60    GFR  29 (A) >60    Calcium 9.6 8.3 - 10.6 mg/dL   Hepatic function panel   Result Value Ref Range    Total Protein 6.8 6.4 - 8.2 g/dL    Alb 4.1 3.4 - 5.0 g/dL    Alkaline Phosphatase 94 40 - 129 U/L    ALT 24 10 - 40 U/L    AST 23 15 - 37 U/L    Total Bilirubin 0.7 0.0 - 1.0 mg/dL    Bilirubin, Direct <0.2 0.0 - 0.3 mg/dL    Bilirubin, Indirect see below 0.0 - 1.0 mg/dL   Magnesium   Result Value Ref Range    Magnesium 1.80 1.80 - 2.40 mg/dL   Lactate dehydrogenase   Result Value Ref Range     (H) 100 - 190 U/L   Uric acid   Result Value Ref Range Uric Acid, Serum 6.0 2.6 - 6.0 mg/dL   POCT Venous   Result Value Ref Range    pH, Bren 7.502 (H) 7.350 - 7.450    pCO2, Bren 31.3 (L) 40.0 - 50.0 mm Hg    pO2, Bren 32 Not Established mm Hg    HCO3, Venous 24.5 23.0 - 29.0 mmol/L    Base Excess, Bren 1 -3 - 3    O2 Sat, Bren 68 Not Established %    TC02 (Calc), Bren 25 Not Established mmol/L    Lactate 1.30 0.40 - 2.00 mmol/L    Sample Type BREN     Performed on SEE BELOW      RECENT VITALS:  BP: (!) 121/94,Temp: 99 °F (37.2 °C), Pulse: 86, Resp: 19, SpO2: 95 %     Procedures     N/A    ED Course     Nursing Notes, Past Medical Hx, Past Surgical Hx, Social Hx,Allergies, and Family Hx were reviewed. The patient was given the following medications:  Orders Placed This Encounter   Medications    0.9 % sodium chloride bolus    piperacillin-tazobactam (ZOSYN) 4.5 g in dextrose 5 % 100 mL IVPB (mini-bag)    diphenhydrAMINE (BENADRYL) injection 12.5 mg    prochlorperazine (COMPAZINE) injection 10 mg    amoxicillin-clavulanate (AUGMENTIN) 875-125 MG per tablet     Sig: Take 1 tablet by mouth 2 times daily for 14 days     Dispense:  28 tablet     Refill:  0    amoxicillin-clavulanate (AUGMENTIN) 875-125 MG per tablet 1 tablet       CONSULTS:  IP CONSULT TO ONCOLOGY    MEDICAL DECISIONMAKING / ASSESSMENT / Treva Leader is a 60 y.o. female with history of diffuse large B-cell lymphoma status post bone marrow transplant presents complaining of headache for the last 36 hours. Patient has no focal neurologic deficits on exam.  Patient did report fever at home but is afebrile here. Laboratory studies show normal white blood cell count and ANC. Renal panel shows a creatinine of 2.1 which is above the patient's baseline of 1.6 to 1.8. Rapid influenza swab was negative. Chest x-ray shows no evidence of pneumonia. CT scan shows no acute intracranial abnormalities but does show evidence of maxillary and ethmoid sinusitis.   I discussed the case with Dr. Evelene Galeazzi who was on call for the patient's oncologist Dr. Larry Aguilar who asked that the patient be started on Augmentin and they would follow the patient  In clinic. Clinical Impression     1.  Acute non-recurrent maxillary sinusitis        Disposition     PATIENT REFERRED TO:  Emmanuel Abarca MD  [address] [place] [address]  [phone]            DISCHARGE MEDICATIONS:  New Prescriptions    AMOXICILLIN-CLAVULANATE (AUGMENTIN) 875-125 MG PER TABLET    Take 1 tablet by mouth 2 times daily for 14 days       Maria Antonia Lamar MD  04/01/19 [id number]

## 2019-04-01 NOTE — ED TRIAGE NOTES
Pt started having headache around 0700 yesterday, at around 1800 she took tylenol and zyrtec with no relief. Pt called her pcp tonight and was told to come to ed. Pt c/o productive cough.  Denies chest pain or sob

## 2019-04-03 LAB
FINAL REPORT: NORMAL
PRELIMINARY: NORMAL

## 2019-04-04 LAB — THROAT CULTURE: NORMAL

## 2019-04-06 LAB
BLOOD CULTURE, ROUTINE: NORMAL
CULTURE, BLOOD 2: NORMAL

## 2019-04-10 ENCOUNTER — HOSPITAL ENCOUNTER (OUTPATIENT)
Dept: PHYSICAL THERAPY | Age: 59
Setting detail: THERAPIES SERIES
Discharge: HOME | End: 2019-04-10
Payer: COMMERCIAL

## 2019-04-10 PROCEDURE — 97110 THERAPEUTIC EXERCISES: CPT

## 2019-04-10 NOTE — PROGRESS NOTES
Outpatient Physical Therapy  Phone: [phone] Fax: [phone]    To: Dr. Gloria Mckay  From: Tucker Jones, PT, DPT [id number]   Date: 4/10/2019  Patient: Caryle Martens     : 1960 MRN: [id number]  Medical Diagnosis:  sciatica M54.31  Treatment Diagnosis:   LBP and R LE pain     Physical Therapy Progress Note    Time Period for Report:  19 - 04/10/19    Total Visits to date:  3  Cancels/No-shows to date:      Plan of Care/Treatment to date:  [x] Therapeutic Exercise (Review/Progress HEP and provide verbal/tactile cueing for activities related to strengthening, flexibility,  endurance, ROM.)       [] Therapeutic Activity (Provide verbal/tactile cueing for dynamic activities to promote functional tasks.)          [] Gait Training (Provide verbal/tactile/visual cueing for facilitation of normalized gait pattern without or with the least restrictive AD to decrease pain and/or risk for falling.)          [] Neuromuscular Re-education (Review/Progress HEP and provide verbal/tactile cueing for activities related to improving balance, coordination, kinesthetic sense, posture, motor skill, proprioception.)         [] Manual Therapy (Provide manual therapy to mobilize soft tissue/joints for the purpose of modulating pain, promoting relaxation, increasing ROM, reducing/eliminating soft tissue swelling/inflammation/tightness, improving soft tissue extensibility)                [] Modalities (For modulating pain/tenderness/paresthesias, reducing swelling/inflammation/tightness, improving soft tissue extensibility, and/or to increase muscle tone/strength):     [] Ultrasound  [] Electrical Stimulation        [] Cervical Traction [] Lumbar Traction    ? [] Cold/hotpack [] Iontophoresis   Other:      []          []     Significant Findings At Last Visit/Comments:    Pain currently 5-6/10. Has been ~50% better since injection.   Pain still present just not as intense, only 5-6/10 at worst.  R HS 90-90 (-) 38 deg, R PROM hip 32 deg ER. Progress towards goals:    Short term goals  Time Frame for Short term goals: 3 weeks  Short term goal 1: Pt will demo good understanding and knowledge of initial HEP MET  Short term goal 2: Decreased pain 5/10 at worst to allow for pt to sit evenly nearly met  Short term goal 3: R HS 90-90 (-) 30 deg, R hip ER 35 deg to allow for improved functional mobility nearly met    Frequency/Duration:  # Days per week: [] 1 day # Weeks: [] 1 week [] 4 weeks      [x] 2 days? [] 2 weeks [x] 5 weeks      [] 3 days   [] 3 weeks [] 6 weeks     Rehab Potential: [] Excellent [x] Good [] Fair  [] Poor     Goal Status:  [] Achieved [x] Partially Achieved  [] Not Achieved     Patient Status: [x] Continue per initial plan of Care, pt has 7 visits remaining on initial POC. Pt with improving pain levels and flexibility but still with pain present and flexibility deficits limiting N functional mobility. Further improvement expected with additional therapy progressing towards LTGs. [] Patient now discharged     [] Additional visits requested, Please re-certify for additional visits:      Requested frequency/duration:  X/week for weeks    Electronically signed by: Lorena Marr, PT, DPT [id number]    If you have any questions or concerns, please don't hesitate to call.   Thank you for your referral.    Physician Signature:________________________________Date:__________________  By signing above, therapists plan is approved by physician

## 2019-04-10 NOTE — FLOWSHEET NOTE
Physical Therapy Daily Treatment Note  Date:  4/10/2019    Patient Name:  Luda Segura    :  1960  MRN: [id number]  Restrictions/Precautions:  Hx non-hodgkins lymphoma s/p BMT 2016  Medical/Treatment Diagnosis Information:  · Diagnosis: sciatica M54.31  · Treatment Diagnosis: LBP and R LE pain  Insurance/Certification information:  PT Insurance Information: [employer], [address] Medicaid, $20 copay, BMN, PA required  Physician Information:  Referring Practitioner: Dr. Neisha Tarango MD Follow-up: 3 months  Plan of care signed (Y/N): Y  Visit# / total visits:  3/10, No PA required  Pain level: 6/10     Progress Note: []  Yes  [x]  No  Next due by: Visit #10      Subjective: 19: Pt reports about 19 started to feel pain in R posterior thigh without known cause. No prior history of thigh pain (only LBP ~ but got cortizone injection and then resolved). Pt reports pain to R posterior thigh from gluteal crease to knee and L LB 5-10/10, currently 7/10 that is aching, at times tingling/numbness. Pain is worse with sitting, driving (due to sitting), vacuuming and sleeping is affected. Pain is better with laying flat on back. Pt reports hasn't been able to do yoga since pain started. Did a round of steroids without relief. 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 PLOF: no pain or limitations      19: Pt got an injection in back on Tuesday which has helped a little. Feels stretches are helping her loosen up tight muscles and feels relief for about an hour after stretches. 04/10/19: Has been ~50% better since injection.   Pain still present just not as intense, only 5-6/10 at worst.         Objective:19  Observation:    Palpation: no tenderness with palpation  Observation: stands with pelvis symmetrical, mild antalgia with gait, tends to sit with weight off R buttock/thigh    Test measurements:     PROM RLE (degrees)  RLE General PROM: hip flexion WFLs, ER 25 deg with pain, IR 20 deg with pain  PROM LLE (degrees)  LLE General PROM: hip flexion WFLs, ER 45 deg, IR 25 deg  Spine  Lumbar: flexion 8 inches fingertips to floor with pain, R SB 20 deg, L SB 10 deg with L LBP, ext 50% with pain     Strength RLE  Comment: hip flexion 4-/5, knee 5/5, DF 5/5, hip abd 3+/5, hip ext 3+/5 with pain with hip MMT  Strength LLE  Comment: hip flexion/abd/ext, knee, DF 5/5  Additional Measures  Flexibility: L HS 90-90 (-) 30 deg, R (-) 45 deg  Special Tests: (-) L SLR, R (+) for pain in thigh  Other: modified oswestry = 30% impaired    04/10/19: R HS 90-90 (-) 38 deg, R PROM hip 32 deg ER      Exercises:  Exercise/Equipment Resistance/Repetitions Other comments        R sciatic n glide X 10 sitting EOB Cues for technique   SKT opp shoulder 20\" x 3 each    TrA with glut set 3-5\" x 10    Clamshell with TrA 3\" x 10 each    TrA with bridge 3\" x 10                               Other Therapeutic Activities:  NA    Home Exercise Program:   Pt. demonstrated good understanding and knowledge of HEP. Written instructions provided. 03/08/19: SKTC, supine HS and piriformis stretches   03/21/19:   TrA with glut set, sitting sciatic n glides R  04/10/19: SKT opp shoulder, bridges, clamshell    Manual Treatments: NA     Modalities:  NA    Timed Code Treatment Minutes: TE: 30    Total Treatment Minutes: 30     Treatment/Activity Tolerance:  [x] Patient tolerated treatment well [] Patient limited by fatigue  [] Patient limited by pain  [] Patient limited by other medical complications  [] Other:     Assessment: Good tolerance to treatment today with decreased pain today    Prognosis: [x] Good [] Fair  [] Poor    Patient Requires Follow-up: [x] Yes  [] No    Goals:  Short term goals  Time Frame for Short term goals: 3 weeks  Short term goal 1: Pt will demo good understanding and knowledge of initial HEP MET  Short term goal 2: Decreased pain 5/10 at worst to allow for pt to sit evenly nearly met  Short term goal 3: R HS 90-90 (-) 30 deg, R hip ER 35 deg to allow for improved functional mobility nearly met  Long term goals  Time Frame for Long term goals : 5 weeks  Long term goal 1: Pt will demo good understanding and knowledge of HEP progressions  Long term goal 2: Decreased pain 1/10 at worst to allow for sleep and sit/drive with rare complaints  Long term goal 3: Trunk AROM WFLs, R hip PROM = L and B HS 90-90 (-) 15 deg to allow for N gait pattern and pt to return to yoga as previous  Long term goal 4: Strength R hip 5/5 to allow for vacuuming without increased pain  Long term goal 5: Decreased impairment per modified oswestry <5% impaired    Plan:   [x] Continue per plan of care [] Alter current plan (see comments)  [] Plan of care initiated [] Hold pending MD visit [] Discharge    Plan for Next Session:  Review HEP, add exercises as tolerated    Electronically signed by:   Conor Rojas, EDERT [id number]

## 2019-04-12 ENCOUNTER — HOSPITAL ENCOUNTER (OUTPATIENT)
Dept: PHYSICAL THERAPY | Age: 59
Setting detail: THERAPIES SERIES
Discharge: HOME | End: 2019-04-12
Payer: COMMERCIAL

## 2019-04-12 PROCEDURE — 97110 THERAPEUTIC EXERCISES: CPT

## 2019-04-12 NOTE — FLOWSHEET NOTE
Physical Therapy Daily Treatment Note  Date:  2019    Patient Name:  Megan Fields    :  1960  MRN: [id number]  Restrictions/Precautions:  Hx non-hodgkins lymphoma s/p BMT 2016  Medical/Treatment Diagnosis Information:  · Diagnosis: sciatica M54.31  · Treatment Diagnosis: LBP and R LE pain  Insurance/Certification information:  PT Insurance Information: [employer], [address] Medicaid, $20 copay, BMN, PA required  Physician Information:  Referring Practitioner: Dr. Richard Zarate MD Follow-up: 3 months  Plan of care signed (Y/N): Y  Visit# / total visits:  4/10, No PA required  Pain level: 6/10     Progress Note: []  Yes  [x]  No  Next due by: Visit #10      Subjective: 19: Pt reports about 19 started to feel pain in R posterior thigh without known cause. No prior history of thigh pain (only LBP ~ but got cortizone injection and then resolved). Pt reports pain to R posterior thigh from gluteal crease to knee and L LB 5-10/10, currently 7/10 that is aching, at times tingling/numbness. Pain is worse with sitting, driving (due to sitting), vacuuming and sleeping is affected. Pain is better with laying flat on back. Pt reports hasn't been able to do yoga since pain started. Did a round of steroids without relief. 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 PLOF: no pain or limitations      19: Pt got an injection in back on Tuesday which has helped a little. Feels stretches are helping her loosen up tight muscles and feels relief for about an hour after stretches. 04/10/19: Has been ~50% better since injection. Pain still present just not as intense, only 5-6/10 at worst.      19: Similar pain R LE, does also feel soreness L LE from marcy horse she had after last visit.          Objective:19  Observation:    Palpation: no tenderness with palpation  Observation: stands with pelvis symmetrical, mild antalgia with gait, tends to sit with weight off R buttock/thigh    Test measurements:     PROM RLE (degrees)  RLE General PROM: hip flexion WFLs, ER 25 deg with pain, IR 20 deg with pain  PROM LLE (degrees)  LLE General PROM: hip flexion WFLs, ER 45 deg, IR 25 deg  Spine  Lumbar: flexion 8 inches fingertips to floor with pain, R SB 20 deg, L SB 10 deg with L LBP, ext 50% with pain     Strength RLE  Comment: hip flexion 4-/5, knee 5/5, DF 5/5, hip abd 3+/5, hip ext 3+/5 with pain with hip MMT  Strength LLE  Comment: hip flexion/abd/ext, knee, DF 5/5  Additional Measures  Flexibility: L HS 90-90 (-) 30 deg, R (-) 45 deg  Special Tests: (-) L SLR, R (+) for pain in thigh  Other: modified oswestry = 30% impaired    04/10/19: R HS 90-90 (-) 38 deg, R PROM hip 32 deg ER      Exercises:  Exercise/Equipment Resistance/Repetitions Other comments   Supine fig 4 piriformis stretch 20\" x 3 each Just get in position        R sciatic n glide X 10 sitting EOB    SKT opp shoulder 20\" x 3 each       Clamshell with TrA 3\" x 10 each    TrA with bridge 3-5\" x 10    TrA with SLR 3\" x 5 each                          Other Therapeutic Activities:  NA    Home Exercise Program:   Pt. demonstrated good understanding and knowledge of HEP. Written instructions provided. 03/08/19: SKTC, supine HS and piriformis stretches   03/21/19:   TrA with glut set, sitting sciatic n glides R  04/10/19: SKT opp shoulder, bridges, clamshell    Manual Treatments: gentle R LE caudal pull with good stretch noted     Modalities:  NA    Timed Code Treatment Minutes: TE: 24, MT: 4    Total Treatment Minutes: 28     Treatment/Activity Tolerance:  [x] Patient tolerated treatment well [] Patient limited by fatigue  [] Patient limited by pain  [] Patient limited by other medical complications  [] Other:     Assessment: decreased pain 3/10 after exercises and manual    Prognosis: [x] Good [] Fair  [] Poor    Patient Requires Follow-up: [x] Yes  []

## 2019-04-15 ENCOUNTER — HOSPITAL ENCOUNTER (OUTPATIENT)
Dept: PHYSICAL THERAPY | Age: 59
Setting detail: THERAPIES SERIES
Discharge: HOME | End: 2019-04-15
Payer: COMMERCIAL

## 2019-04-15 PROCEDURE — 97110 THERAPEUTIC EXERCISES: CPT

## 2019-04-15 NOTE — FLOWSHEET NOTE
Physical Therapy Daily Treatment Note  Date:  4/15/2019    Patient Name:  Pardeep Ambrose    :  1960  MRN: [id number]  Restrictions/Precautions:  Hx non-hodgkins lymphoma s/p BMT 2016  Medical/Treatment Diagnosis Information:  · Diagnosis: sciatica M54.31  · Treatment Diagnosis: LBP and R LE pain  Insurance/Certification information:  PT Insurance Information: [employer], [address] Medicaid, $20 copay, BMN, PA required  Physician Information:  Referring Practitioner: Dr. Maggie Peterson MD Follow-up: 3 months  Plan of care signed (Y/N): Y  Visit# / total visits:  5/10, No PA required  Pain level: 6/10     Progress Note: []  Yes  [x]  No  Next due by: Visit #10      Subjective: 19: Pt reports about 19 started to feel pain in R posterior thigh without known cause. No prior history of thigh pain (only LBP ~ but got cortizone injection and then resolved). Pt reports pain to R posterior thigh from gluteal crease to knee and L LB 5-10/10, currently 7/10 that is aching, at times tingling/numbness. Pain is worse with sitting, driving (due to sitting), vacuuming and sleeping is affected. Pain is better with laying flat on back. Pt reports hasn't been able to do yoga since pain started. Did a round of steroids without relief. 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 PLOF: no pain or limitations      19: Pt got an injection in back on Tuesday which has helped a little. Feels stretches are helping her loosen up tight muscles and feels relief for about an hour after stretches. 04/10/19: Has been ~50% better since injection. Pain still present just not as intense, only 5-6/10 at worst.      19: Similar pain R LE, does also feel soreness L LE from marcy horse she had after last visit. 04/15/19: Pt notes that this AM she was more sore, maybe from colder weather.          Objective:19  Observation: Palpation: no tenderness with palpation  Observation: stands with pelvis symmetrical, mild antalgia with gait, tends to sit with weight off R buttock/thigh    Test measurements:     PROM RLE (degrees)  RLE General PROM: hip flexion WFLs, ER 25 deg with pain, IR 20 deg with pain  PROM LLE (degrees)  LLE General PROM: hip flexion WFLs, ER 45 deg, IR 25 deg  Spine  Lumbar: flexion 8 inches fingertips to floor with pain, R SB 20 deg, L SB 10 deg with L LBP, ext 50% with pain     Strength RLE  Comment: hip flexion 4-/5, knee 5/5, DF 5/5, hip abd 3+/5, hip ext 3+/5 with pain with hip MMT  Strength LLE  Comment: hip flexion/abd/ext, knee, DF 5/5  Additional Measures  Flexibility: L HS 90-90 (-) 30 deg, R (-) 45 deg  Special Tests: (-) L SLR, R (+) for pain in thigh  Other: modified oswestry = 30% impaired    04/10/19: R HS 90-90 (-) 38 deg, R PROM hip 32 deg ER      Exercises:  Exercise/Equipment Resistance/Repetitions Other comments   Supine fig 4 piriformis stretch 20\" x 3 each Just get in position        R sciatic n glide X 10 sitting EOB    SKT opp shoulder 20\" x 3 each       Clamshell with TrA 3\" x 10 each    TrA with bridge 3-5\" x 10    TrA with SLR 3\" x 7 each                          Other Therapeutic Activities:  NA    Home Exercise Program:   Pt. demonstrated good understanding and knowledge of HEP. Written instructions provided. 03/08/19: SKTC, supine HS and piriformis stretches   03/21/19: TrA with glut set, sitting sciatic n glides R  04/10/19: SKT opp shoulder, bridges, clamshell  04/15/19:  TrA with SLR    Manual Treatments: gentle R LE caudal pull with good stretch noted     Modalities:  NA    Timed Code Treatment Minutes: TE: 24, MT: 4    Total Treatment Minutes: 28     Treatment/Activity Tolerance:  [x] Patient tolerated treatment well [] Patient limited by fatigue  [] Patient limited by pain  [] Patient limited by other medical complications  [] Other:     Assessment: good tolerance to exercises    Prognosis: [x] Good [] Fair  [] Poor    Patient Requires Follow-up: [x] Yes  [] No    Goals:  Short term goals  Time Frame for Short term goals: 3 weeks  Short term goal 1: Pt will demo good understanding and knowledge of initial HEP MET  Short term goal 2: Decreased pain 5/10 at worst to allow for pt to sit evenly nearly met  Short term goal 3: R HS 90-90 (-) 30 deg, R hip ER 35 deg to allow for improved functional mobility nearly met  Long term goals  Time Frame for Long term goals : 5 weeks  Long term goal 1: Pt will demo good understanding and knowledge of HEP progressions  Long term goal 2: Decreased pain 1/10 at worst to allow for sleep and sit/drive with rare complaints  Long term goal 3: Trunk AROM WFLs, R hip PROM = L and B HS 90-90 (-) 15 deg to allow for N gait pattern and pt to return to yoga as previous  Long term goal 4: Strength R hip 5/5 to allow for vacuuming without increased pain  Long term goal 5: Decreased impairment per modified oswestry <5% impaired    Plan:   [x] Continue per plan of care [] Alter current plan (see comments)  [] Plan of care initiated [] Hold pending MD visit [] Discharge    Plan for Next Session:  Review HEP, add exercises as tolerated    Electronically signed by:   Bhavna Mauricio DPT [id number]

## 2019-04-15 NOTE — PRE-PROCEDURE INSTRUCTIONS
Left voicemail regarding their procedure tomorrow. Patient given these instructions:    1. Arrive at 0730 for your 0900 procedure. 2.  DO NOT eat or drink anything after midnight the night before your procedure. 3.  Take all of your usual morning medications the day of the procedure with just a sip of water as directed by your physician. 4.  Bring a current list of all of your medications with you. 5. Please have a responsible adult drive you home after your procedure. Reviewed all pre-procedure instructions with patient/family member via telephone.       Angeline Henriquez RN

## 2019-04-16 ENCOUNTER — HOSPITAL ENCOUNTER (OUTPATIENT)
Dept: INTERVENTIONAL RADIOLOGY/VASCULAR | Age: 59
Discharge: HOME | End: 2019-04-16
Attending: RADIOLOGY | Admitting: RADIOLOGY
Payer: COMMERCIAL

## 2019-04-16 LAB
HCT VFR BLD CALC: 43.5 % (ref 36–48)
HEMOGLOBIN: 14 G/DL (ref 12–16)
INR BLD: 0.99 (ref 0.86–1.14)
MCH RBC QN AUTO: 28.4 PG (ref 26–34)
MCHC RBC AUTO-ENTMCNC: 32.2 G/DL (ref 31–36)
MCV RBC AUTO: 88.1 FL (ref 80–100)
PDW BLD-RTO: 17.3 % (ref 12.4–15.4)
PLATELET # BLD: 159 K/UL (ref 135–450)
PMV BLD AUTO: 7.9 FL (ref 5–10.5)
PROTHROMBIN TIME: 11.3 SEC (ref 9.8–13)
RBC # BLD: 4.94 M/UL (ref 4–5.2)
WBC # BLD: 4.3 K/UL (ref 4–11)

## 2019-04-16 PROCEDURE — 2709999900 HC NON-CHARGEABLE SUPPLY

## 2019-04-16 PROCEDURE — 6360000002 HC RX W HCPCS

## 2019-04-16 PROCEDURE — 85027 COMPLETE CBC AUTOMATED: CPT

## 2019-04-16 PROCEDURE — 36590 REMOVAL TUNNELED CV CATH: CPT | Performed by: RADIOLOGY

## 2019-04-16 PROCEDURE — 2500000003 HC RX 250 WO HCPCS

## 2019-04-16 PROCEDURE — 85610 PROTHROMBIN TIME: CPT

## 2019-04-16 NOTE — H&P
Patient:  Yamilet Lamas   :   1960      Relevant clinical history, particularly as it involves the pending procedure, was reviewed and discussed. The procedure including risks and benefits was discussed at length with the patient (or designated family member) and all questions were answered. Informed consent to proceed with the procedure was given. Vital signs were monitored and documented by the Radiology nurse. Targeted physical examination  Heart : regular rate and rhythm  Lungs : clear, breathing easily  Condition : stable    [facility] nurses notes reviewed and agreed. Past Medical History:        Diagnosis Date    Arthritis     DLBCL (diffuse large B cell lymphoma) ([facility] [address] 2010    non-hodgkins lymphoma    GERD (gastroesophageal reflux disease)     Hx of non-Hodgkin's lymphoma     MDRO (multiple drug resistant organisms) resistance 5/15/16    E.coli MDRO in urine    Migraines     Peripheral neuropathy     PONV (postoperative nausea and vomiting)        Past Surgical History:           Procedure Laterality Date    BONE MARROW TRANSPLANT  2016    BRONCHOSCOPY  2018    BRONCHOSCOPY THERAPUTIC ASPIRATION INITIAL WITH MUCUS PLUG SENT FOR BACTERIAL CULTURE performed by Mei Chandler MD at [address] [place] [address]      x 5    COLONOSCOPY      HYSTERECTOMY  2010    with oophorectomy    LYMPH NODE BIOPSY  2011    R external iliac node -non diagnostic    LYMPH NODE BIOPSY  2012    R external node - Dr Antonella Linton - [facility]    OTHER SURGICAL HISTORY  10/08/2012    INSERTION NEOSTAR CATHETER and REMOVAL        OTHER SURGICAL HISTORY Right 05/10/2016    INSERTION TRIPLE LUMEN DORANTES CENTRAL LINE    MD [address] W/BRNCL ALVEOLAR LAVAGE N/A 2018    BRONCHOSCOPY WITH BRONCHIAL WASHINGS AND LLL BAL.  performed by Mei Chandler MD at [facility]:  No known allergies    Medications:   Home Meds  No current facility-administered medications on file prior to encounter. Current Outpatient Medications on File Prior to Encounter   Medication Sig Dispense Refill    albuterol sulfate  (90 Base) MCG/ACT inhaler Inhale 2 puffs into the lungs      cetirizine (ZYRTEC) 10 MG tablet TAKE 1 CAPSULE BY MOUTH DAILY 90 tablet 3    ondansetron (ZOFRAN) 4 MG tablet Take 4 mg by mouth every 8 hours as needed for Nausea or Vomiting      fluticasone (FLONASE) 50 MCG/ACT nasal spray SHAKE LIQUID AND USE 2 SPRAYS IN EACH NOSTRIL DAILY 1 Bottle 3    penicillin v potassium (VEETID) 500 MG tablet TAKE 1 TABLET BY MOUTH TWICE DAILY 60 tablet 2    conjugated estrogens (PREMARIN) 0.625 MG/GM vaginal cream insert . 5 gram vaginally three times a week at bedtime. 1 Tube 3    tacrolimus (PROTOPIC) 0.1 % ointment Apply topically 1times daily to affected area. 1 Tube 2    triamcinolone (KENALOG) 0.1 % cream Apply topically 2 times daily Apply topically 2 times daily.       famotidine (PEPCID) 20 MG tablet Take 1 tablet by mouth every morning (before breakfast) 60 tablet 1    prochlorperazine (COMPAZINE) 10 MG tablet Take 1 tablet by mouth every 6 hours as needed (nausea) 60 tablet 3    zolpidem (AMBIEN) 5 MG tablet Take one tablet nightly as needed for sleep 30 tablet 0       Current Meds    ceFAZolin (ANCEF) 1 g in dextrose 5 % 50 mL IVPB (mini-bag) Once         ASA 2 - Patient with mild systemic disease with no functional limitations    II (soft palate, uvula, fauces visible)    Activity:  2 - Able to move 4 extremities voluntarily on command  Respiration:  2 - Able to breathe deeply and cough freely  Circulation:  2 - BP+/- 20mmHg of normal  Consciousness:  2 - Fully awake  Oxygen Saturation (color):  2 - Able to maintain oxygen saturation >92% on room air    Sedation : Moderate sedation planned

## 2019-04-16 NOTE — PROCEDURES
IR Brief Postoperative Note    Joseph Fischer  YOB: 1960  [id number]    Pre-operative Diagnosis: hx of cancer    Post-operative Diagnosis: Same    Procedure: left chest port removal    Anesthesia: moderate    Surgeons/Assistants: flor    Estimated Blood Loss: Minimal    Complications: none    Specimens: were not obtained    See full procedure dictation to follow      Db Larsen MD  4/16/2019

## 2019-04-17 ENCOUNTER — HOSPITAL ENCOUNTER (OUTPATIENT)
Dept: PHYSICAL THERAPY | Age: 59
Setting detail: THERAPIES SERIES
Discharge: HOME | End: 2019-04-17
Payer: COMMERCIAL

## 2019-04-17 NOTE — CARE COORDINATION
Physical Therapy  Cancellation/No-show Note  Patient Name:  Fred Ybarra  :  1960   Date:  2019  MRN: [id number]  Cancelled visits to date: 2  19  No-shows to date: 0    For today's appointment patient:  [x]  Cancelled  []  Rescheduled appointment  []  No-show     Reason given by patient:  []  Patient ill  []  Conflicting appointment  []  No transportation    []  Conflict with work  []  No reason given  [x]  Other:     Comments: had surgery yesterday and isn't feeling 100%    Electronically signed by:   Clayton Number, [id number] [facility], DPT [id number]

## 2019-04-22 ENCOUNTER — HOSPITAL ENCOUNTER (OUTPATIENT)
Dept: PHYSICAL THERAPY | Age: 59
Setting detail: THERAPIES SERIES
Discharge: HOME | End: 2019-04-22
Payer: COMMERCIAL

## 2019-04-22 PROCEDURE — 97110 THERAPEUTIC EXERCISES: CPT

## 2019-04-22 NOTE — FLOWSHEET NOTE
Physical Therapy Daily Treatment Note  Date:  2019    Patient Name:  Claudine Dos Santos    :  1960  MRN: [id number]  Restrictions/Precautions:  Hx non-hodgkins lymphoma s/p BMT 2016  Medical/Treatment Diagnosis Information:  · Diagnosis: sciatica M54.31  · Treatment Diagnosis: LBP and R LE pain  Insurance/Certification information:  PT Insurance Information: [employer], [address] Medicaid, $20 copay, BMN, PA required  Physician Information:  Referring Practitioner: Dr. Cheri Mendez MD Follow-up: 3 months  Plan of care signed (Y/N): Y  Visit# / total visits:  6/10, No PA required  Pain level: 4/10     Progress Note: []  Yes  [x]  No  Next due by: Visit #10      Subjective: 19: Pt reports about 19 started to feel pain in R posterior thigh without known cause. No prior history of thigh pain (only LBP ~ but got cortizone injection and then resolved). Pt reports pain to R posterior thigh from gluteal crease to knee and L LB 5-10/10, currently 7/10 that is aching, at times tingling/numbness. Pain is worse with sitting, driving (due to sitting), vacuuming and sleeping is affected. Pain is better with laying flat on back. Pt reports hasn't been able to do yoga since pain started. Did a round of steroids without relief. 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 PLOF: no pain or limitations      19: Pt got an injection in back on Tuesday which has helped a little. Feels stretches are helping her loosen up tight muscles and feels relief for about an hour after stretches. 04/10/19: Has been ~50% better since injection. Pain still present just not as intense, only 5-6/10 at worst.      19: Similar pain R LE, does also feel soreness L LE from marcy horse she had after last visit. 04/15/19: Pt notes that this AM she was more sore, maybe from colder weather.       19: Feels the stretches and therapy is relieving the tightness/pain. Objective:03/08/19  Observation:    Palpation: no tenderness with palpation  Observation: stands with pelvis symmetrical, mild antalgia with gait, tends to sit with weight off R buttock/thigh    Test measurements:     PROM RLE (degrees)  RLE General PROM: hip flexion WFLs, ER 25 deg with pain, IR 20 deg with pain  PROM LLE (degrees)  LLE General PROM: hip flexion WFLs, ER 45 deg, IR 25 deg  Spine  Lumbar: flexion 8 inches fingertips to floor with pain, R SB 20 deg, L SB 10 deg with L LBP, ext 50% with pain     Strength RLE  Comment: hip flexion 4-/5, knee 5/5, DF 5/5, hip abd 3+/5, hip ext 3+/5 with pain with hip MMT  Strength LLE  Comment: hip flexion/abd/ext, knee, DF 5/5  Additional Measures  Flexibility: L HS 90-90 (-) 30 deg, R (-) 45 deg  Special Tests: (-) L SLR, R (+) for pain in thigh  Other: modified oswestry = 30% impaired    04/10/19: R HS 90-90 (-) 38 deg, R PROM hip 32 deg ER      Exercises:  Exercise/Equipment Resistance/Repetitions Other comments   Supine fig 4 piriformis stretch 20\" x 3 each Just get in position        R sciatic n glide X 10 sitting EOB    SKT opp shoulder 20\" x 3 each       Clamshell with TrA 3\" x 10 each, yellow    TrA with bridge 3-5\" x 10    TrA with SLR 3\" x 10 each                          Other Therapeutic Activities:  NA    Home Exercise Program:   Pt. demonstrated good understanding and knowledge of HEP. Written instructions provided. 03/08/19: SKTC, supine HS and piriformis stretches   03/21/19: TrA with glut set, sitting sciatic n glides R  04/10/19: SKT opp shoulder, bridges, clamshell  04/15/19:  TrA with SLR  04/22/19: yellow band with clamshell    Manual Treatments: gentle R LE caudal pull with good stretch noted     Modalities:  NA    Timed Code Treatment Minutes: TE: 23, MT: 5    Total Treatment Minutes: 28     Treatment/Activity Tolerance:  [x] Patient tolerated treatment well [] Patient limited by fatigue  [] Patient limited by pain  [] Patient limited by other medical complications  [] Other:     Assessment: good tolerance to exercises with less overall pain    Prognosis: [x] Good [] Fair  [] Poor    Patient Requires Follow-up: [x] Yes  [] No    Goals:  Short term goals  Time Frame for Short term goals: 3 weeks  Short term goal 1: Pt will demo good understanding and knowledge of initial HEP MET  Short term goal 2: Decreased pain 5/10 at worst to allow for pt to sit evenly nearly met  Short term goal 3: R HS 90-90 (-) 30 deg, R hip ER 35 deg to allow for improved functional mobility nearly met  Long term goals  Time Frame for Long term goals : 5 weeks  Long term goal 1: Pt will demo good understanding and knowledge of HEP progressions  Long term goal 2: Decreased pain 1/10 at worst to allow for sleep and sit/drive with rare complaints  Long term goal 3: Trunk AROM WFLs, R hip PROM = L and B HS 90-90 (-) 15 deg to allow for N gait pattern and pt to return to yoga as previous  Long term goal 4: Strength R hip 5/5 to allow for vacuuming without increased pain  Long term goal 5: Decreased impairment per modified oswestry <5% impaired    Plan:   [x] Continue per plan of care [] Alter current plan (see comments)  [] Plan of care initiated [] Hold pending MD visit [] Discharge    Plan for Next Session:  Review HEP, add exercises as tolerated    Electronically signed by:   Betzy Martin DPT [id number]

## 2019-04-24 ENCOUNTER — HOSPITAL ENCOUNTER (OUTPATIENT)
Dept: PHYSICAL THERAPY | Age: 59
Setting detail: THERAPIES SERIES
End: 2019-04-24
Payer: COMMERCIAL

## 2019-05-06 ENCOUNTER — HOSPITAL ENCOUNTER (OUTPATIENT)
Dept: PHYSICAL THERAPY | Age: 59
Setting detail: THERAPIES SERIES
Discharge: HOME | End: 2019-05-06
Payer: COMMERCIAL

## 2019-05-06 LAB
CULTURE, FUNGUS BLOOD: NORMAL
CULTURE, FUNGUS BLOOD: NORMAL
FUNGUS (MYCOLOGY) CULTURE: NORMAL
FUNGUS STAIN: NORMAL

## 2019-05-06 PROCEDURE — 97110 THERAPEUTIC EXERCISES: CPT

## 2019-05-06 NOTE — FLOWSHEET NOTE
Physical Therapy Daily Treatment Note  Date:  2019    Patient Name:  Omar Trejo    :  1960  MRN: [id number]  Restrictions/Precautions:  Hx non-hodgkins lymphoma s/p BMT   Medical/Treatment Diagnosis Information:  · Diagnosis: sciatica M54.31  · Treatment Diagnosis: LBP and R LE pain  Insurance/Certification information:  PT Insurance Information: [employer], [address] Medicaid, $20 copay, BMN, PA required  Physician Information:  Referring Practitioner: Dr. Minta Nageotte MD Follow-up: 3 months  Plan of care signed (Y/N): Y  Visit# / total visits:  7/10, No PA required  Pain level: 4/10     Progress Note: []  Yes  [x]  No  Next due by: Visit #10      Subjective: 19: Pt reports about 19 started to feel pain in R posterior thigh without known cause. No prior history of thigh pain (only LBP ~ but got cortizone injection and then resolved). Pt reports pain to R posterior thigh from gluteal crease to knee and L LB 5-10/10, currently 7/10 that is aching, at times tingling/numbness. Pain is worse with sitting, driving (due to sitting), vacuuming and sleeping is affected. Pain is better with laying flat on back. Pt reports hasn't been able to do yoga since pain started. Did a round of steroids without relief. 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 PLOF: no pain or limitations      19: Pt got an injection in back on Tuesday which has helped a little. Feels stretches are helping her loosen up tight muscles and feels relief for about an hour after stretches. 04/10/19: Has been ~50% better since injection. Pain still present just not as intense, only 5-6/10 at worst.      19: Similar pain R LE, does also feel soreness L LE from marcy horse she had after last visit. 04/15/19: Pt notes that this AM she was more sore, maybe from colder weather.       19: Feels the stretches and therapy is relieving the tightness/pain. 05/06/19: Pt reports good tolerance after last visit. Has not had high pain levels in awhile, 5/10 at most recently. Objective:03/08/19  Observation:    Palpation: no tenderness with palpation  Observation: stands with pelvis symmetrical, mild antalgia with gait, tends to sit with weight off R buttock/thigh    Test measurements:     PROM RLE (degrees)  RLE General PROM: hip flexion WFLs, ER 25 deg with pain, IR 20 deg with pain  PROM LLE (degrees)  LLE General PROM: hip flexion WFLs, ER 45 deg, IR 25 deg  Spine  Lumbar: flexion 8 inches fingertips to floor with pain, R SB 20 deg, L SB 10 deg with L LBP, ext 50% with pain     Strength RLE  Comment: hip flexion 4-/5, knee 5/5, DF 5/5, hip abd 3+/5, hip ext 3+/5 with pain with hip MMT  Strength LLE  Comment: hip flexion/abd/ext, knee, DF 5/5  Additional Measures  Flexibility: L HS 90-90 (-) 30 deg, R (-) 45 deg  Special Tests: (-) L SLR, R (+) for pain in thigh  Other: modified oswestry = 30% impaired    04/10/19: R HS 90-90 (-) 38 deg, R PROM hip 32 deg ER      Exercises:  Exercise/Equipment Resistance/Repetitions Other comments   Supine fig 4 piriformis stretch 20\" x 3 each Just get in position        R sciatic n glide X 10 sitting EOB          Clamshell with TrA 3\" x 10 each, yellow       TrA with SLR 3\" x 10 each    Quadruped with neutral spine UE reach x 5 each  LE x 5 each                     Other Therapeutic Activities:  NA    Home Exercise Program:   Pt. demonstrated good understanding and knowledge of HEP. Written instructions provided. 03/08/19: SKTC, supine HS and piriformis stretches   03/21/19: TrA with glut set, sitting sciatic n glides R  04/10/19: SKT opp shoulder, bridges, clamshell  04/15/19:  TrA with SLR  04/22/19: yellow band with clamshell  05/06/19: quadruped UE, LE    Manual Treatments: gentle R LE caudal pull with good stretch noted     Modalities:  NA    Timed Code Treatment Minutes: TE: 23, MT: 5    Total Treatment Minutes: 28     Treatment/Activity Tolerance:  [x] Patient tolerated treatment well [] Patient limited by fatigue  [] Patient limited by pain  [] Patient limited by other medical complications  [] Other:     Assessment: good tolerance to new exercises    Prognosis: [x] Good [] Fair  [] Poor    Patient Requires Follow-up: [x] Yes  [] No    Goals:  Short term goals  Time Frame for Short term goals: 3 weeks  Short term goal 1: Pt will demo good understanding and knowledge of initial HEP MET  Short term goal 2: Decreased pain 5/10 at worst to allow for pt to sit evenly nearly met  Short term goal 3: R HS 90-90 (-) 30 deg, R hip ER 35 deg to allow for improved functional mobility nearly met  Long term goals  Time Frame for Long term goals : 5 weeks  Long term goal 1: Pt will demo good understanding and knowledge of HEP progressions  Long term goal 2: Decreased pain 1/10 at worst to allow for sleep and sit/drive with rare complaints  Long term goal 3: Trunk AROM WFLs, R hip PROM = L and B HS 90-90 (-) 15 deg to allow for N gait pattern and pt to return to yoga as previous  Long term goal 4: Strength R hip 5/5 to allow for vacuuming without increased pain  Long term goal 5: Decreased impairment per modified oswestry <5% impaired    Plan:   [x] Continue per plan of care [] Alter current plan (see comments)  [] Plan of care initiated [] Hold pending MD visit [] Discharge    Plan for Next Session:  Review HEP, add exercises as tolerated    Electronically signed by:   Doreen Chavez DPT [id number]

## 2019-06-04 ENCOUNTER — HOSPITAL ENCOUNTER (OUTPATIENT)
Dept: PHYSICAL THERAPY | Age: 59
Setting detail: THERAPIES SERIES
Discharge: HOME | End: 2019-06-04
Payer: COMMERCIAL

## 2019-06-06 ENCOUNTER — CARE COORDINATION (OUTPATIENT)
Dept: CARE COORDINATION | Age: 59
End: 2019-06-06

## 2019-06-06 ENCOUNTER — HOSPITAL ENCOUNTER (OUTPATIENT)
Dept: PHYSICAL THERAPY | Age: 59
Setting detail: THERAPIES SERIES
Discharge: HOME | End: 2019-06-06
Payer: COMMERCIAL

## 2019-06-06 PROCEDURE — 97110 THERAPEUTIC EXERCISES: CPT

## 2019-06-06 NOTE — FLOWSHEET NOTE
Physical Therapy Daily Treatment Note  Date:  2019    Patient Name:  Fanny Nissen    :  1960  MRN: [id number]  Restrictions/Precautions:  Hx non-hodgkins lymphoma s/p BMT 2016  Medical/Treatment Diagnosis Information:  · Diagnosis: sciatica M54.31  · Treatment Diagnosis: LBP and R LE pain  Insurance/Certification information:  PT Insurance Information: [facility] Medicare Advantage, [employer] [facility] Medicaid, $20 copay, BMN, PA required  Physician Information:  Referring Practitioner: Dr. Chano Kowalski MD Follow-up: 3 months  Plan of care signed (Y/N): Y  Visit# / total visits:  8/10, No PA required  Pain level: 5/10     Progress Note: []  Yes  [x]  No  Next due by: Visit #10      Subjective: 19: Pt reports about 19 started to feel pain in R posterior thigh without known cause. No prior history of thigh pain (only LBP ~ but got cortizone injection and then resolved). Pt reports pain to R posterior thigh from gluteal crease to knee and L LB 5-10/10, currently 7/10 that is aching, at times tingling/numbness. Pain is worse with sitting, driving (due to sitting), vacuuming and sleeping is affected. Pain is better with laying flat on back. Pt reports hasn't been able to do yoga since pain started. Did a round of steroids without relief. 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 PLOF: no pain or limitations      19: Pt got an injection in back on Tuesday which has helped a little. Feels stretches are helping her loosen up tight muscles and feels relief for about an hour after stretches. 04/10/19: Has been ~50% better since injection. Pain still present just not as intense, only 5-6/10 at worst.      19: Similar pain R LE, does also feel soreness L LE from marcy horse she had after last visit. 04/15/19: Pt notes that this AM she was more sore, maybe from colder weather.       19: Feels the stretches and therapy is relieving the tightness/pain. 05/06/19: Pt reports good tolerance after last visit. Has not had high pain levels in awhile, 5/10 at most recently. 06/06/19: Overall pain has been 2/10 on average but after flight back from [place] pain was increased but did improve with HEP. She may decide to get another injection. Objective:03/08/19  Observation:    Palpation: no tenderness with palpation  Observation: stands with pelvis symmetrical, mild antalgia with gait, tends to sit with weight off R buttock/thigh    Test measurements:     PROM RLE (degrees)  RLE General PROM: hip flexion WFLs, ER 25 deg with pain, IR 20 deg with pain  PROM LLE (degrees)  LLE General PROM: hip flexion WFLs, ER 45 deg, IR 25 deg  Spine  Lumbar: flexion 8 inches fingertips to floor with pain, R SB 20 deg, L SB 10 deg with L LBP, ext 50% with pain     Strength RLE  Comment: hip flexion 4-/5, knee 5/5, DF 5/5, hip abd 3+/5, hip ext 3+/5 with pain with hip MMT  Strength LLE  Comment: hip flexion/abd/ext, knee, DF 5/5  Additional Measures  Flexibility: L HS 90-90 (-) 30 deg, R (-) 45 deg  Special Tests: (-) L SLR, R (+) for pain in thigh  Other: modified oswestry = 30% impaired    04/10/19: R HS 90-90 (-) 38 deg, R PROM hip 32 deg ER      Exercises:  Exercise/Equipment Resistance/Repetitions Other comments   Supine fig 4 piriformis stretch 20\" x 3 each Just get in position        R sciatic n glide X 10 sitting EOB          Clamshell with TrA 3\" x 10 each, yellow Cues for hold time      TrA with SLR 3\" x 10 each Cues for technique   Quadruped with neutral spine UE reach 2 x 5 each  LE 2 x 5 each Cues for alternating                    Other Therapeutic Activities:  NA    Home Exercise Program:   Pt. demonstrated good understanding and knowledge of HEP. Written instructions provided. 03/08/19: SKTC, supine HS and piriformis stretches   03/21/19:   TrA with glut set, sitting sciatic n glides R  04/10/19: SKT opp shoulder,

## 2019-06-06 NOTE — CARE COORDINATION
Care Coordination call placed to patient. Unable to reach patient by phone. VM full. [facility]. Call placed to discuss.

## 2019-06-11 ENCOUNTER — CARE COORDINATION (OUTPATIENT)
Dept: CARE COORDINATION | Age: 59
End: 2019-06-11

## 2019-06-11 NOTE — CARE COORDINATION
Unsuccessful attempt to reach pt. VM is full and unable to leave message. Call placed to set up assigned PCP- [place]. Will attempt at a later date.

## 2019-06-13 ENCOUNTER — CARE COORDINATION (OUTPATIENT)
Dept: CARE COORDINATION | Age: 59
End: 2019-06-13

## 2019-07-23 ENCOUNTER — OFFICE VISIT (OUTPATIENT)
Dept: INTERNAL MEDICINE CLINIC | Age: 59
End: 2019-07-23
Payer: COMMERCIAL

## 2019-07-23 VITALS
BODY MASS INDEX: 25.37 KG/M2 | DIASTOLIC BLOOD PRESSURE: 70 MMHG | HEART RATE: 90 BPM | OXYGEN SATURATION: 94 % | WEIGHT: 148.6 LBS | TEMPERATURE: 98.3 F | SYSTOLIC BLOOD PRESSURE: 110 MMHG | HEIGHT: 64 IN

## 2019-07-23 DIAGNOSIS — R05.3 PERSISTENT COUGH: ICD-10-CM

## 2019-07-23 DIAGNOSIS — R31.29 MICROSCOPIC HEMATURIA: ICD-10-CM

## 2019-07-23 DIAGNOSIS — N18.9 CHRONIC KIDNEY DISEASE, UNSPECIFIED CKD STAGE: ICD-10-CM

## 2019-07-23 DIAGNOSIS — J30.89 OTHER ALLERGIC RHINITIS: ICD-10-CM

## 2019-07-23 DIAGNOSIS — C85.80 OTHER SPECIFIED TYPE OF NON-HODGKIN LYMPHOMA, UNSPECIFIED BODY REGION (HCC): ICD-10-CM

## 2019-07-23 DIAGNOSIS — K21.9 GASTROESOPHAGEAL REFLUX DISEASE WITHOUT ESOPHAGITIS: ICD-10-CM

## 2019-07-23 DIAGNOSIS — M54.50 LEFT-SIDED LOW BACK PAIN WITHOUT SCIATICA, UNSPECIFIED CHRONICITY: Primary | ICD-10-CM

## 2019-07-23 DIAGNOSIS — Z13.9 SCREENING FOR CONDITION: ICD-10-CM

## 2019-07-23 LAB
BILIRUBIN URINE: NEGATIVE
BILIRUBIN, POC: NORMAL
BLOOD URINE, POC: NORMAL
BLOOD, URINE: NEGATIVE
CLARITY, POC: NORMAL
CLARITY: CLEAR
COLOR, POC: YELLOW
COLOR: YELLOW
CREATININE URINE: 90.7 MG/DL (ref 28–259)
EPITHELIAL CELLS, UA: NORMAL /HPF
GLUCOSE URINE, POC: NORMAL
GLUCOSE URINE: NEGATIVE MG/DL
KETONES, POC: NORMAL
KETONES, URINE: NEGATIVE MG/DL
LEUKOCYTE EST, POC: NORMAL
LEUKOCYTE ESTERASE, URINE: NEGATIVE
MICROALBUMIN UR-MCNC: 3.1 MG/DL
MICROALBUMIN/CREAT UR-RTO: 34.2 MG/G (ref 0–30)
MICROSCOPIC EXAMINATION: YES
NITRITE, POC: NORMAL
NITRITE, URINE: NEGATIVE
PH UA: 6 (ref 5–8)
PH, POC: 5
PROTEIN UA: ABNORMAL MG/DL
PROTEIN, POC: 30
RBC UA: NORMAL /HPF (ref 0–2)
SPECIFIC GRAVITY UA: 1.02 (ref 1–1.03)
SPECIFIC GRAVITY, POC: 1.02
URINE TYPE: ABNORMAL
UROBILINOGEN, POC: 0.2
UROBILINOGEN, URINE: 0.2 E.U./DL
WBC UA: NORMAL /HPF (ref 0–5)

## 2019-07-23 PROCEDURE — 81002 URINALYSIS NONAUTO W/O SCOPE: CPT | Performed by: INTERNAL MEDICINE

## 2019-07-23 PROCEDURE — 99204 OFFICE O/P NEW MOD 45 MIN: CPT | Performed by: INTERNAL MEDICINE

## 2019-07-23 RX ORDER — LIDOCAINE 50 MG/G
1 PATCH TOPICAL DAILY PRN
Qty: 30 PATCH | Refills: 1 | Status: SHIPPED | OUTPATIENT
Start: 2019-07-23 | End: 2019-09-24 | Stop reason: CLARIF

## 2019-07-23 ASSESSMENT — PATIENT HEALTH QUESTIONNAIRE - PHQ9
SUM OF ALL RESPONSES TO PHQ9 QUESTIONS 1 & 2: 0
1. LITTLE INTEREST OR PLEASURE IN DOING THINGS: 0
2. FEELING DOWN, DEPRESSED OR HOPELESS: 0
SUM OF ALL RESPONSES TO PHQ QUESTIONS 1-9: 0
SUM OF ALL RESPONSES TO PHQ QUESTIONS 1-9: 0

## 2019-07-26 ENCOUNTER — TELEPHONE (OUTPATIENT)
Dept: INTERNAL MEDICINE CLINIC | Age: 59
End: 2019-07-26

## 2019-07-26 DIAGNOSIS — C85.80 OTHER SPECIFIED TYPE OF NON-HODGKIN LYMPHOMA, UNSPECIFIED BODY REGION (HCC): ICD-10-CM

## 2019-07-26 DIAGNOSIS — K21.9 GASTROESOPHAGEAL REFLUX DISEASE WITHOUT ESOPHAGITIS: ICD-10-CM

## 2019-07-26 DIAGNOSIS — Z13.9 SCREENING FOR CONDITION: ICD-10-CM

## 2019-07-26 DIAGNOSIS — N18.9 CHRONIC KIDNEY DISEASE, UNSPECIFIED CKD STAGE: ICD-10-CM

## 2019-07-26 LAB
A/G RATIO: 2.2 (ref 1.1–2.2)
ALBUMIN SERPL-MCNC: 4.6 G/DL (ref 3.4–5)
ALP BLD-CCNC: 92 U/L (ref 40–129)
ALT SERPL-CCNC: 17 U/L (ref 10–40)
ANION GAP SERPL CALCULATED.3IONS-SCNC: 14 MMOL/L (ref 3–16)
AST SERPL-CCNC: 21 U/L (ref 15–37)
BASOPHILS ABSOLUTE: 0 K/UL (ref 0–0.2)
BASOPHILS RELATIVE PERCENT: 0.9 %
BILIRUB SERPL-MCNC: 0.3 MG/DL (ref 0–1)
BUN BLDV-MCNC: 26 MG/DL (ref 7–20)
CALCIUM SERPL-MCNC: 9.9 MG/DL (ref 8.3–10.6)
CHLORIDE BLD-SCNC: 106 MMOL/L (ref 99–110)
CHOLESTEROL, TOTAL: 234 MG/DL (ref 0–199)
CO2: 23 MMOL/L (ref 21–32)
CREAT SERPL-MCNC: 2 MG/DL (ref 0.6–1.1)
EOSINOPHILS ABSOLUTE: 0.1 K/UL (ref 0–0.6)
EOSINOPHILS RELATIVE PERCENT: 3.2 %
GFR AFRICAN AMERICAN: 31
GFR NON-AFRICAN AMERICAN: 25
GLOBULIN: 2.1 G/DL
GLUCOSE BLD-MCNC: 103 MG/DL (ref 70–99)
HCT VFR BLD CALC: 47.3 % (ref 36–48)
HDLC SERPL-MCNC: 97 MG/DL (ref 40–60)
HEMOGLOBIN: 15.4 G/DL (ref 12–16)
LDL CHOLESTEROL CALCULATED: 124 MG/DL
LYMPHOCYTES ABSOLUTE: 2.1 K/UL (ref 1–5.1)
LYMPHOCYTES RELATIVE PERCENT: 47.4 %
MAGNESIUM: 1.9 MG/DL (ref 1.8–2.4)
MCH RBC QN AUTO: 29.1 PG (ref 26–34)
MCHC RBC AUTO-ENTMCNC: 32.6 G/DL (ref 31–36)
MCV RBC AUTO: 89.2 FL (ref 80–100)
MONOCYTES ABSOLUTE: 0.4 K/UL (ref 0–1.3)
MONOCYTES RELATIVE PERCENT: 9.7 %
NEUTROPHILS ABSOLUTE: 1.7 K/UL (ref 1.7–7.7)
NEUTROPHILS RELATIVE PERCENT: 38.8 %
PDW BLD-RTO: 14.2 % (ref 12.4–15.4)
PLATELET # BLD: 137 K/UL (ref 135–450)
PMV BLD AUTO: 9.5 FL (ref 5–10.5)
POTASSIUM SERPL-SCNC: 4.1 MMOL/L (ref 3.5–5.1)
RBC # BLD: 5.31 M/UL (ref 4–5.2)
SODIUM BLD-SCNC: 143 MMOL/L (ref 136–145)
T4 FREE: 0.9 NG/DL (ref 0.9–1.8)
TOTAL PROTEIN: 6.7 G/DL (ref 6.4–8.2)
TRIGL SERPL-MCNC: 67 MG/DL (ref 0–150)
TSH REFLEX: 7.26 UIU/ML (ref 0.27–4.2)
VITAMIN D 25-HYDROXY: 24.6 NG/ML
VLDLC SERPL CALC-MCNC: 13 MG/DL
WBC # BLD: 4.5 K/UL (ref 4–11)

## 2019-07-26 NOTE — TELEPHONE ENCOUNTER
CALLED PT AND DISCUSSED ABNORMAL LABS WITH PT      HYPOTHYROIDISM - NEEDS MED - READDRESS  HLP- ADVISED DIET / EXERCISE  VIT D DEF - NEEDS SUPPLEMENT - READDRESS  CKD - AVOID NSAIDS     ADVISED MAKE APPT  PT VERBALIZED UNDERSTANDING

## 2019-07-27 LAB
ESTIMATED AVERAGE GLUCOSE: 119.8 MG/DL
HBA1C MFR BLD: 5.8 %

## 2019-07-29 ENCOUNTER — HOSPITAL ENCOUNTER (OUTPATIENT)
Dept: PHYSICAL THERAPY | Age: 59
Setting detail: THERAPIES SERIES
Discharge: HOME | End: 2019-07-29
Payer: COMMERCIAL

## 2019-07-30 ENCOUNTER — HOSPITAL ENCOUNTER (OUTPATIENT)
Dept: GENERAL RADIOLOGY | Age: 59
Discharge: HOME | End: 2019-07-30
Payer: COMMERCIAL

## 2019-07-30 ENCOUNTER — HOSPITAL ENCOUNTER (OUTPATIENT)
Age: 59
Discharge: HOME | End: 2019-07-30
Payer: COMMERCIAL

## 2019-07-30 DIAGNOSIS — M54.50 LEFT-SIDED LOW BACK PAIN WITHOUT SCIATICA, UNSPECIFIED CHRONICITY: ICD-10-CM

## 2019-07-30 PROCEDURE — 72114 X-RAY EXAM L-S SPINE BENDING: CPT

## 2019-08-02 ENCOUNTER — TELEPHONE (OUTPATIENT)
Dept: INTERNAL MEDICINE CLINIC | Age: 59
End: 2019-08-02

## 2019-08-08 ENCOUNTER — TELEPHONE (OUTPATIENT)
Dept: PAIN MANAGEMENT | Age: 59
End: 2019-08-08

## 2019-08-09 ENCOUNTER — TELEPHONE (OUTPATIENT)
Dept: INTERNAL MEDICINE CLINIC | Age: 59
End: 2019-08-09

## 2019-08-16 ENCOUNTER — HOSPITAL ENCOUNTER (OUTPATIENT)
Dept: ONCOLOGY | Age: 59
Setting detail: INFUSION SERIES
Discharge: HOME | End: 2019-08-16
Payer: COMMERCIAL

## 2019-08-16 VITALS
DIASTOLIC BLOOD PRESSURE: 75 MMHG | HEART RATE: 90 BPM | RESPIRATION RATE: 16 BRPM | TEMPERATURE: 98.8 F | SYSTOLIC BLOOD PRESSURE: 101 MMHG

## 2019-08-16 LAB
RAPID INFLUENZA  B AGN: NEGATIVE
RAPID INFLUENZA A AGN: NEGATIVE

## 2019-08-16 PROCEDURE — 89220 SPUTUM SPECIMEN COLLECTION: CPT

## 2019-08-16 PROCEDURE — 99213 OFFICE O/P EST LOW 20 MIN: CPT | Performed by: NURSE PRACTITIONER

## 2019-08-16 PROCEDURE — 87804 INFLUENZA ASSAY W/OPTIC: CPT

## 2019-08-16 NOTE — PROGRESS NOTES
Patient seen in OPO for rapid flu swab, results came back negative, MD Gerald Bucio aware, into see patient. Pt DC'ed, ambulatory, without questions or concerns.

## 2019-08-20 ENCOUNTER — HOSPITAL ENCOUNTER (OUTPATIENT)
Age: 59
Discharge: HOME | End: 2019-08-20
Payer: COMMERCIAL

## 2019-08-20 ENCOUNTER — HOSPITAL ENCOUNTER (OUTPATIENT)
Dept: ONCOLOGY | Age: 59
Setting detail: INFUSION SERIES
Discharge: HOME | End: 2019-08-20
Payer: COMMERCIAL

## 2019-08-20 ENCOUNTER — HOSPITAL ENCOUNTER (OUTPATIENT)
Dept: GENERAL RADIOLOGY | Age: 59
Discharge: HOME | End: 2019-08-20
Payer: COMMERCIAL

## 2019-08-20 DIAGNOSIS — R05.9 COUGH: ICD-10-CM

## 2019-08-20 LAB
REPORT: NORMAL
RESPIRATORY PANEL PCR: NORMAL

## 2019-08-20 PROCEDURE — 87798 DETECT AGENT NOS DNA AMP: CPT

## 2019-08-20 PROCEDURE — 87486 CHLMYD PNEUM DNA AMP PROBE: CPT

## 2019-08-20 PROCEDURE — 87633 RESP VIRUS 12-25 TARGETS: CPT

## 2019-08-20 PROCEDURE — 99211 OFF/OP EST MAY X REQ PHY/QHP: CPT

## 2019-08-20 PROCEDURE — 87581 M.PNEUMON DNA AMP PROBE: CPT

## 2019-08-20 PROCEDURE — 71046 X-RAY EXAM CHEST 2 VIEWS: CPT

## 2019-08-21 NOTE — PROGRESS NOTES
Patient seen in OPO for Respiratory Viral panel per MD order. Test completed by RT. Results to be forwarded to [place], [place]. No new orders received. Patient verbalized understanding of d/c instructions. D/C'd ambulatory.

## 2019-09-06 ENCOUNTER — HOSPITAL ENCOUNTER (OUTPATIENT)
Dept: CT IMAGING | Age: 59
Discharge: HOME | End: 2019-09-06
Payer: COMMERCIAL

## 2019-09-06 DIAGNOSIS — C83.36 DIFFUSE LARGE B-CELL LYMPHOMA OF INTRAPELVIC LYMPH NODES (HCC): ICD-10-CM

## 2019-09-06 DIAGNOSIS — R05.9 COUGH: ICD-10-CM

## 2019-09-06 PROCEDURE — 70486 CT MAXILLOFACIAL W/O DYE: CPT

## 2019-09-06 PROCEDURE — 71250 CT THORAX DX C-: CPT

## 2019-09-24 ENCOUNTER — OFFICE VISIT (OUTPATIENT)
Dept: INTERNAL MEDICINE CLINIC | Age: 59
End: 2019-09-24
Payer: COMMERCIAL

## 2019-09-24 VITALS
DIASTOLIC BLOOD PRESSURE: 70 MMHG | OXYGEN SATURATION: 97 % | SYSTOLIC BLOOD PRESSURE: 110 MMHG | TEMPERATURE: 97.5 F | HEART RATE: 84 BPM | WEIGHT: 146 LBS | BODY MASS INDEX: 24.92 KG/M2 | HEIGHT: 64 IN

## 2019-09-24 DIAGNOSIS — R73.03 PREDIABETES: ICD-10-CM

## 2019-09-24 DIAGNOSIS — C85.80 OTHER SPECIFIED TYPE OF NON-HODGKIN LYMPHOMA, UNSPECIFIED BODY REGION (HCC): ICD-10-CM

## 2019-09-24 DIAGNOSIS — Z87.01 HISTORY OF RECENT PNEUMONIA: ICD-10-CM

## 2019-09-24 DIAGNOSIS — E03.9 ACQUIRED HYPOTHYROIDISM: Primary | ICD-10-CM

## 2019-09-24 DIAGNOSIS — E55.9 VITAMIN D DEFICIENCY: ICD-10-CM

## 2019-09-24 DIAGNOSIS — N18.30 CKD (CHRONIC KIDNEY DISEASE) STAGE 3, GFR 30-59 ML/MIN (HCC): ICD-10-CM

## 2019-09-24 DIAGNOSIS — E78.2 MIXED HYPERLIPIDEMIA: ICD-10-CM

## 2019-09-24 DIAGNOSIS — M54.50 LEFT-SIDED LOW BACK PAIN WITHOUT SCIATICA, UNSPECIFIED CHRONICITY: ICD-10-CM

## 2019-09-24 DIAGNOSIS — J30.89 OTHER ALLERGIC RHINITIS: ICD-10-CM

## 2019-09-24 DIAGNOSIS — K21.9 GASTROESOPHAGEAL REFLUX DISEASE WITHOUT ESOPHAGITIS: ICD-10-CM

## 2019-09-24 LAB
T4 FREE: 1.1 NG/DL (ref 0.9–1.8)
TSH REFLEX: 6.03 UIU/ML (ref 0.27–4.2)

## 2019-09-24 PROCEDURE — 99214 OFFICE O/P EST MOD 30 MIN: CPT | Performed by: INTERNAL MEDICINE

## 2019-09-24 RX ORDER — LIDOCAINE 50 MG/G
OINTMENT TOPICAL
Qty: 1 TUBE | Refills: 0 | Status: SHIPPED | OUTPATIENT
Start: 2019-09-24 | End: 2019-12-31 | Stop reason: CLARIF

## 2019-09-24 RX ORDER — LEVOTHYROXINE SODIUM 0.05 MG/1
50 TABLET ORAL EVERY MORNING
Qty: 30 TABLET | Refills: 3 | Status: SHIPPED | OUTPATIENT
Start: 2019-09-24 | End: 2019-12-17 | Stop reason: SDUPTHER

## 2019-09-24 RX ORDER — CHOLECALCIFEROL (VITAMIN D3) 25 MCG
1 CAPSULE ORAL DAILY
Qty: 30 CAPSULE | Refills: 3 | Status: SHIPPED | OUTPATIENT
Start: 2019-09-24 | End: 2019-12-31 | Stop reason: SDUPTHER

## 2019-09-24 ASSESSMENT — PATIENT HEALTH QUESTIONNAIRE - PHQ9
SUM OF ALL RESPONSES TO PHQ QUESTIONS 1-9: 0
1. LITTLE INTEREST OR PLEASURE IN DOING THINGS: 0
2. FEELING DOWN, DEPRESSED OR HOPELESS: 0
SUM OF ALL RESPONSES TO PHQ9 QUESTIONS 1 & 2: 0
SUM OF ALL RESPONSES TO PHQ QUESTIONS 1-9: 0

## 2019-09-26 ENCOUNTER — TELEPHONE (OUTPATIENT)
Dept: PAIN MANAGEMENT | Age: 59
End: 2019-09-26

## 2019-09-26 NOTE — TELEPHONE ENCOUNTER
Submitted PA for Lidocaine 5% ointment, Key: [id number] - PA Case ID: [id number] - Rx #: [id number]  Via CMM STATUS: PENDING

## 2019-09-27 ENCOUNTER — TELEPHONE (OUTPATIENT)
Dept: INTERNAL MEDICINE CLINIC | Age: 59
End: 2019-09-27

## 2019-09-27 NOTE — TELEPHONE ENCOUNTER
I SPOKE TO PT AND LET HER KNOW PA WAS DENIED AND SHE WOULD NEED TO CALL HER INSURANCE TO SEE WHY IT WAS DENIED

## 2019-10-14 ENCOUNTER — TELEPHONE (OUTPATIENT)
Dept: INTERNAL MEDICINE CLINIC | Age: 59
End: 2019-10-14

## 2019-10-16 ENCOUNTER — OFFICE VISIT (OUTPATIENT)
Dept: PULMONOLOGY | Age: 59
End: 2019-10-16
Payer: COMMERCIAL

## 2019-10-16 VITALS
SYSTOLIC BLOOD PRESSURE: 110 MMHG | WEIGHT: 152 LBS | HEART RATE: 93 BPM | HEIGHT: 64 IN | BODY MASS INDEX: 25.95 KG/M2 | DIASTOLIC BLOOD PRESSURE: 80 MMHG | RESPIRATION RATE: 16 BRPM | OXYGEN SATURATION: 96 %

## 2019-10-16 DIAGNOSIS — Z94.84 H/O STEM CELL TRANSPLANT (HCC): ICD-10-CM

## 2019-10-16 DIAGNOSIS — J18.9 PNEUMONIA OF LEFT LOWER LOBE DUE TO INFECTIOUS ORGANISM: Primary | ICD-10-CM

## 2019-10-16 DIAGNOSIS — Z85.72 HX OF LYMPHOMA: ICD-10-CM

## 2019-10-16 PROCEDURE — 99214 OFFICE O/P EST MOD 30 MIN: CPT | Performed by: INTERNAL MEDICINE

## 2019-10-16 RX ORDER — LEVOFLOXACIN 500 MG/1
250 TABLET, FILM COATED ORAL DAILY
Qty: 14 TABLET | Refills: 0 | Status: SHIPPED | OUTPATIENT
Start: 2019-10-16 | End: 2019-10-30

## 2019-10-16 RX ORDER — PROMETHAZINE HYDROCHLORIDE AND CODEINE PHOSPHATE 6.25; 1 MG/5ML; MG/5ML
5 SYRUP ORAL NIGHTLY PRN
Qty: 180 ML | Refills: 0 | Status: SHIPPED | OUTPATIENT
Start: 2019-10-16 | End: 2020-10-15

## 2019-11-20 ENCOUNTER — HOSPITAL ENCOUNTER (OUTPATIENT)
Age: 59
Discharge: HOME | End: 2019-11-20
Payer: COMMERCIAL

## 2019-11-20 ENCOUNTER — HOSPITAL ENCOUNTER (OUTPATIENT)
Dept: CT IMAGING | Age: 59
Discharge: HOME | End: 2019-11-20
Payer: COMMERCIAL

## 2019-11-20 DIAGNOSIS — J18.9 PNEUMONIA OF LEFT LOWER LOBE DUE TO INFECTIOUS ORGANISM: ICD-10-CM

## 2019-11-20 PROCEDURE — 71250 CT THORAX DX C-: CPT

## 2019-11-26 ENCOUNTER — OFFICE VISIT (OUTPATIENT)
Dept: PULMONOLOGY | Age: 59
End: 2019-11-26
Payer: COMMERCIAL

## 2019-11-26 VITALS
DIASTOLIC BLOOD PRESSURE: 66 MMHG | HEIGHT: 64 IN | HEART RATE: 90 BPM | SYSTOLIC BLOOD PRESSURE: 100 MMHG | BODY MASS INDEX: 25.88 KG/M2 | WEIGHT: 151.6 LBS | OXYGEN SATURATION: 96 %

## 2019-11-26 DIAGNOSIS — J18.9 PNEUMONIA OF LEFT LOWER LOBE DUE TO INFECTIOUS ORGANISM: Primary | ICD-10-CM

## 2019-11-26 DIAGNOSIS — Z94.84 H/O STEM CELL TRANSPLANT (HCC): ICD-10-CM

## 2019-11-26 DIAGNOSIS — Z85.72 HX OF LYMPHOMA: ICD-10-CM

## 2019-11-26 PROCEDURE — 99214 OFFICE O/P EST MOD 30 MIN: CPT | Performed by: INTERNAL MEDICINE

## 2019-11-27 RX ORDER — FLUTICASONE PROPIONATE 50 MCG
SPRAY, SUSPENSION (ML) NASAL
Qty: 1 BOTTLE | Refills: 0 | Status: SHIPPED | OUTPATIENT
Start: 2019-11-27 | End: 2020-02-06 | Stop reason: SDUPTHER

## 2019-12-17 DIAGNOSIS — E03.9 ACQUIRED HYPOTHYROIDISM: ICD-10-CM

## 2019-12-18 RX ORDER — LEVOTHYROXINE SODIUM 0.05 MG/1
TABLET ORAL
Qty: 30 TABLET | Refills: 0 | Status: SHIPPED | OUTPATIENT
Start: 2019-12-18 | End: 2019-12-31 | Stop reason: SDUPTHER

## 2019-12-31 ENCOUNTER — OFFICE VISIT (OUTPATIENT)
Dept: INTERNAL MEDICINE CLINIC | Age: 59
End: 2019-12-31
Payer: COMMERCIAL

## 2019-12-31 VITALS
BODY MASS INDEX: 24.41 KG/M2 | OXYGEN SATURATION: 98 % | SYSTOLIC BLOOD PRESSURE: 110 MMHG | DIASTOLIC BLOOD PRESSURE: 80 MMHG | HEART RATE: 84 BPM | TEMPERATURE: 97.9 F | WEIGHT: 143 LBS | HEIGHT: 64 IN

## 2019-12-31 DIAGNOSIS — N18.30 CKD (CHRONIC KIDNEY DISEASE) STAGE 3, GFR 30-59 ML/MIN (HCC): ICD-10-CM

## 2019-12-31 DIAGNOSIS — M54.50 LEFT-SIDED LOW BACK PAIN WITHOUT SCIATICA, UNSPECIFIED CHRONICITY: ICD-10-CM

## 2019-12-31 DIAGNOSIS — E03.9 ACQUIRED HYPOTHYROIDISM: ICD-10-CM

## 2019-12-31 DIAGNOSIS — E55.9 VITAMIN D DEFICIENCY: ICD-10-CM

## 2019-12-31 DIAGNOSIS — Z23 NEED FOR IMMUNIZATION AGAINST INFLUENZA: ICD-10-CM

## 2019-12-31 DIAGNOSIS — C85.80 OTHER SPECIFIED TYPE OF NON-HODGKIN LYMPHOMA, UNSPECIFIED BODY REGION (HCC): ICD-10-CM

## 2019-12-31 DIAGNOSIS — K21.9 GASTROESOPHAGEAL REFLUX DISEASE WITHOUT ESOPHAGITIS: ICD-10-CM

## 2019-12-31 DIAGNOSIS — J30.89 OTHER ALLERGIC RHINITIS: ICD-10-CM

## 2019-12-31 DIAGNOSIS — R73.03 PREDIABETES: ICD-10-CM

## 2019-12-31 DIAGNOSIS — Z23 NEED FOR VACCINATION FOR PNEUMOCOCCUS: ICD-10-CM

## 2019-12-31 DIAGNOSIS — E78.2 MIXED HYPERLIPIDEMIA: Primary | ICD-10-CM

## 2019-12-31 PROCEDURE — G0008 ADMIN INFLUENZA VIRUS VAC: HCPCS | Performed by: INTERNAL MEDICINE

## 2019-12-31 PROCEDURE — 90686 IIV4 VACC NO PRSV 0.5 ML IM: CPT | Performed by: INTERNAL MEDICINE

## 2019-12-31 PROCEDURE — G0009 ADMIN PNEUMOCOCCAL VACCINE: HCPCS | Performed by: INTERNAL MEDICINE

## 2019-12-31 PROCEDURE — 99214 OFFICE O/P EST MOD 30 MIN: CPT | Performed by: INTERNAL MEDICINE

## 2019-12-31 PROCEDURE — 90670 PCV13 VACCINE IM: CPT | Performed by: INTERNAL MEDICINE

## 2019-12-31 RX ORDER — CHOLECALCIFEROL (VITAMIN D3) 25 MCG
1 CAPSULE ORAL DAILY
Qty: 30 CAPSULE | Refills: 3 | Status: SHIPPED | OUTPATIENT
Start: 2019-12-31 | End: 2020-02-06 | Stop reason: SDUPTHER

## 2019-12-31 RX ORDER — LEVOTHYROXINE SODIUM 0.05 MG/1
TABLET ORAL
Qty: 30 TABLET | Refills: 1 | Status: SHIPPED | OUTPATIENT
Start: 2019-12-31 | End: 2020-02-06 | Stop reason: SDUPTHER

## 2019-12-31 RX ORDER — BACLOFEN 10 MG/1
10 TABLET ORAL 3 TIMES DAILY PRN
Qty: 30 TABLET | Refills: 0 | Status: SHIPPED | OUTPATIENT
Start: 2019-12-31 | End: 2021-01-14 | Stop reason: SDUPTHER

## 2020-01-03 DIAGNOSIS — E55.9 VITAMIN D DEFICIENCY: ICD-10-CM

## 2020-01-03 DIAGNOSIS — N18.30 CKD (CHRONIC KIDNEY DISEASE) STAGE 3, GFR 30-59 ML/MIN (HCC): ICD-10-CM

## 2020-01-03 DIAGNOSIS — E03.9 ACQUIRED HYPOTHYROIDISM: ICD-10-CM

## 2020-01-03 DIAGNOSIS — R73.03 PREDIABETES: ICD-10-CM

## 2020-01-03 DIAGNOSIS — E78.2 MIXED HYPERLIPIDEMIA: ICD-10-CM

## 2020-01-03 LAB
A/G RATIO: 1.6 (ref 1.1–2.2)
ALBUMIN SERPL-MCNC: 4 G/DL (ref 3.4–5)
ALP BLD-CCNC: 88 U/L (ref 40–129)
ALT SERPL-CCNC: 14 U/L (ref 10–40)
ANION GAP SERPL CALCULATED.3IONS-SCNC: 16 MMOL/L (ref 3–16)
AST SERPL-CCNC: 16 U/L (ref 15–37)
BILIRUB SERPL-MCNC: 0.3 MG/DL (ref 0–1)
BUN BLDV-MCNC: 30 MG/DL (ref 7–20)
CALCIUM SERPL-MCNC: 9.9 MG/DL (ref 8.3–10.6)
CHLORIDE BLD-SCNC: 103 MMOL/L (ref 99–110)
CHOLESTEROL, TOTAL: 221 MG/DL (ref 0–199)
CO2: 21 MMOL/L (ref 21–32)
CREAT SERPL-MCNC: 2.4 MG/DL (ref 0.6–1.2)
GFR AFRICAN AMERICAN: 25
GFR NON-AFRICAN AMERICAN: 21
GLOBULIN: 2.5 G/DL
GLUCOSE BLD-MCNC: 101 MG/DL (ref 70–99)
HDLC SERPL-MCNC: 87 MG/DL (ref 40–60)
LDL CHOLESTEROL CALCULATED: 119 MG/DL
POTASSIUM SERPL-SCNC: 4 MMOL/L (ref 3.5–5.1)
SODIUM BLD-SCNC: 140 MMOL/L (ref 136–145)
TOTAL PROTEIN: 6.5 G/DL (ref 6.4–8.2)
TRIGL SERPL-MCNC: 76 MG/DL (ref 0–150)
TSH REFLEX: 2.34 UIU/ML (ref 0.27–4.2)
VITAMIN D 25-HYDROXY: 43.7 NG/ML
VLDLC SERPL CALC-MCNC: 15 MG/DL

## 2020-01-04 LAB
ESTIMATED AVERAGE GLUCOSE: 116.9 MG/DL
HBA1C MFR BLD: 5.7 %

## 2020-01-17 ENCOUNTER — TELEPHONE (OUTPATIENT)
Dept: INTERNAL MEDICINE CLINIC | Age: 60
End: 2020-01-17

## 2020-01-18 ENCOUNTER — APPOINTMENT (OUTPATIENT)
Dept: GENERAL RADIOLOGY | Age: 60
End: 2020-01-18
Payer: COMMERCIAL

## 2020-01-18 ENCOUNTER — HOSPITAL ENCOUNTER (EMERGENCY)
Age: 60
Discharge: HOME | End: 2020-01-18
Attending: EMERGENCY MEDICINE
Payer: COMMERCIAL

## 2020-01-18 VITALS
OXYGEN SATURATION: 100 % | TEMPERATURE: 98 F | HEART RATE: 111 BPM | HEIGHT: 64 IN | WEIGHT: 152.8 LBS | BODY MASS INDEX: 26.09 KG/M2 | DIASTOLIC BLOOD PRESSURE: 72 MMHG | RESPIRATION RATE: 16 BRPM | SYSTOLIC BLOOD PRESSURE: 123 MMHG

## 2020-01-18 LAB
RAPID INFLUENZA  B AGN: NEGATIVE
RAPID INFLUENZA A AGN: NEGATIVE

## 2020-01-18 PROCEDURE — 6370000000 HC RX 637 (ALT 250 FOR IP): Performed by: EMERGENCY MEDICINE

## 2020-01-18 PROCEDURE — 87804 INFLUENZA ASSAY W/OPTIC: CPT

## 2020-01-18 PROCEDURE — 99283 EMERGENCY DEPT VISIT LOW MDM: CPT

## 2020-01-18 PROCEDURE — 94640 AIRWAY INHALATION TREATMENT: CPT

## 2020-01-18 PROCEDURE — 94761 N-INVAS EAR/PLS OXIMETRY MLT: CPT

## 2020-01-18 PROCEDURE — 71046 X-RAY EXAM CHEST 2 VIEWS: CPT

## 2020-01-18 RX ORDER — PREDNISONE 20 MG/1
40 TABLET ORAL ONCE
Status: COMPLETED | OUTPATIENT
Start: 2020-01-18 | End: 2020-01-18

## 2020-01-18 RX ORDER — PSEUDOEPHEDRINE HCL 120 MG/1
120 TABLET, FILM COATED, EXTENDED RELEASE ORAL EVERY 12 HOURS PRN
Qty: 20 TABLET | Refills: 1 | Status: SHIPPED | OUTPATIENT
Start: 2020-01-18 | End: 2020-01-25

## 2020-01-18 RX ORDER — PREDNISONE 20 MG/1
40 TABLET ORAL DAILY
Qty: 10 TABLET | Refills: 0 | Status: SHIPPED | OUTPATIENT
Start: 2020-01-18 | End: 2020-01-23

## 2020-01-18 RX ORDER — IPRATROPIUM BROMIDE AND ALBUTEROL SULFATE 2.5; .5 MG/3ML; MG/3ML
1 SOLUTION RESPIRATORY (INHALATION) ONCE
Status: COMPLETED | OUTPATIENT
Start: 2020-01-18 | End: 2020-01-18

## 2020-01-18 RX ORDER — ALBUTEROL SULFATE 90 UG/1
2 AEROSOL, METERED RESPIRATORY (INHALATION) EVERY 4 HOURS PRN
Qty: 1 INHALER | Refills: 0 | Status: SHIPPED | OUTPATIENT
Start: 2020-01-18 | End: 2021-10-27 | Stop reason: SDUPTHER

## 2020-01-18 RX ORDER — GUAIFENESIN AND DEXTROMETHORPHAN HYDROBROMIDE 1200; 60 MG/1; MG/1
1 TABLET, EXTENDED RELEASE ORAL EVERY 12 HOURS PRN
Qty: 20 TABLET | Refills: 0 | Status: SHIPPED | OUTPATIENT
Start: 2020-01-18 | End: 2020-10-07 | Stop reason: ALTCHOICE

## 2020-01-18 RX ORDER — OXYMETAZOLINE HYDROCHLORIDE 0.05 G/100ML
2 SPRAY NASAL 2 TIMES DAILY PRN
Qty: 15 ML | Refills: 0 | Status: SHIPPED | OUTPATIENT
Start: 2020-01-18 | End: 2020-02-17

## 2020-01-18 RX ADMIN — PREDNISONE 40 MG: 20 TABLET ORAL at 12:58

## 2020-01-18 RX ADMIN — IPRATROPIUM BROMIDE AND ALBUTEROL SULFATE 1 AMPULE: .5; 3 SOLUTION RESPIRATORY (INHALATION) at 12:28

## 2020-01-18 NOTE — ED PROVIDER NOTES
discharge  Pharynx shows normal non-significantly enlarged tonsils without exudates. No pharyngeal lesions or uvular deviation  Eyes: EOM  Neck: No tracheal deviation or stridor  Lungs: mild bilateral wheezes,  No sob  Cardiac: Regular rate and rhythm without gallops, murmurs, or rubs  Skin: no pallor, erythema, lesions or other abnormalities on exposed skin of face and arms  Extremities: Normal ROM of bilateral upper extremities at shoulders, elbows, wrists; normal ROM of bilateral LE at hips and knees. Neurologic: Alert, oriented x 3. No focal deficits upon moving arms and legs  Psychiatric: Appropriate demeanor without agitation or internal stimulation      MEDICAL DECISION MAKING  Well appearing patient here with viral appearing upper respiratory complaints of sore throat, nasal symptoms, and cough. Throat exam is reassuring without evidence of invasive disease, Lungs mildly wheezy, sats 93%  Patient is well appearing. Admin duoneb, steroids    CXR neg  Flu neg    Plan for dc with supportive therapies, and return instructions given for worsening symptoms. DISPOSITION  Home    IMPRESSION:  Viral upper respiratory illness  wheezing  Pharyngitis  Cough  Bronchitis      This medical chart used with aid of transcription software. As such, there may be inadvertent errors in transcription of spellings and words despite physician's attempts to correct all possible errors.          Kirt Mera MD  01/18/20 [id number]

## 2020-01-18 NOTE — ED NOTES
Patient given prescription, discharge instructions verbal and written, patient verbalized understanding. Alert/oriented X4, Clear speech.   Patient exhibits no distress, ambulates with steady gait per self leaving unit, no further request.     Saw Johnston RN  01/18/20 [id number]

## 2020-01-21 ENCOUNTER — CARE COORDINATION (OUTPATIENT)
Dept: CARE COORDINATION | Age: 60
End: 2020-01-21

## 2020-01-21 NOTE — CARE COORDINATION
Care Coordination call placed to patient. Unable to reach patient by phone. Message left regarding the purpose of the call. Requested call back. Provided call back number.

## 2020-01-27 ENCOUNTER — TELEPHONE (OUTPATIENT)
Dept: ADMINISTRATIVE | Age: 60
End: 2020-01-27

## 2020-01-27 NOTE — TELEPHONE ENCOUNTER
Pt is requesting an MRI or CT scan of her right shoulder/arm. She states it has been hurting her for a few months now and is not getting better. She has not had an x-ray of it. Please contact pt with further instructions.

## 2020-02-06 ENCOUNTER — OFFICE VISIT (OUTPATIENT)
Dept: INTERNAL MEDICINE CLINIC | Age: 60
End: 2020-02-06
Payer: COMMERCIAL

## 2020-02-06 VITALS
BODY MASS INDEX: 24.41 KG/M2 | WEIGHT: 143 LBS | TEMPERATURE: 97.9 F | OXYGEN SATURATION: 93 % | DIASTOLIC BLOOD PRESSURE: 80 MMHG | HEIGHT: 64 IN | SYSTOLIC BLOOD PRESSURE: 118 MMHG | HEART RATE: 90 BPM

## 2020-02-06 LAB
CHP ED QC CHECK: NORMAL
GLUCOSE BLD-MCNC: 71 MG/DL

## 2020-02-06 PROCEDURE — 99214 OFFICE O/P EST MOD 30 MIN: CPT | Performed by: INTERNAL MEDICINE

## 2020-02-06 PROCEDURE — 82962 GLUCOSE BLOOD TEST: CPT | Performed by: INTERNAL MEDICINE

## 2020-02-06 PROCEDURE — 96372 THER/PROPH/DIAG INJ SC/IM: CPT | Performed by: INTERNAL MEDICINE

## 2020-02-06 RX ORDER — CETIRIZINE HYDROCHLORIDE 10 MG/1
TABLET ORAL
Qty: 90 TABLET | Refills: 0 | Status: SHIPPED | OUTPATIENT
Start: 2020-02-06 | End: 2020-05-01 | Stop reason: SDUPTHER

## 2020-02-06 RX ORDER — FLUTICASONE PROPIONATE 50 MCG
SPRAY, SUSPENSION (ML) NASAL
Qty: 1 BOTTLE | Refills: 1 | Status: SHIPPED | OUTPATIENT
Start: 2020-02-06 | End: 2020-05-01 | Stop reason: SDUPTHER

## 2020-02-06 RX ORDER — LEVOTHYROXINE SODIUM 0.05 MG/1
TABLET ORAL
Qty: 90 TABLET | Refills: 0 | Status: SHIPPED | OUTPATIENT
Start: 2020-02-06 | End: 2020-05-01 | Stop reason: SDUPTHER

## 2020-02-06 RX ORDER — CHOLECALCIFEROL (VITAMIN D3) 25 MCG
1 CAPSULE ORAL DAILY
Qty: 90 CAPSULE | Refills: 0 | Status: SHIPPED | OUTPATIENT
Start: 2020-02-06 | End: 2020-05-01 | Stop reason: SDUPTHER

## 2020-02-06 RX ORDER — TRIAMCINOLONE ACETONIDE 40 MG/ML
40 INJECTION, SUSPENSION INTRA-ARTICULAR; INTRAMUSCULAR ONCE
Status: COMPLETED | OUTPATIENT
Start: 2020-02-06 | End: 2020-02-06

## 2020-02-06 RX ADMIN — TRIAMCINOLONE ACETONIDE 40 MG: 40 INJECTION, SUSPENSION INTRA-ARTICULAR; INTRAMUSCULAR at 14:10

## 2020-02-06 NOTE — PROGRESS NOTES
SUBJECTIVE:  Albert Kirby is a 60 y.o. female [address] [place]   Chief Complaint   Patient presents with    Follow-up    Hyperlipidemia      PT HERE FOR EVAL    C/O RT SHOULDER PAIN  PAST 3 MONTHS, ? SHARP PAIN. INCREASED WITH MVT, + RAD TO RT ARM,  PAIN SCALE 6/10. DENIES TRAUMA, NO NUMBNESS / NO TINGLING, NO RASH    HLP - ? DIET / EXERCISE COMPLIANCE. RECENT LAB D/W PT  HYPOTHYROIDISM - TAKING MED. DENIES FATIGUE. NO COLD / NO HEAT  INTOLERANCE. IMPROVED - RECENT LAB D/W PT  VIT D DEF - TAKING MED. RECENT LAB D/W PT  PREDIABETES - LAB D/W PT. DIET / EXERCISE REVIEWED  CKD - LABS D/W PT.  AVOIDING NSAIDS  GERD - ON NEXIUM. NO HEARTBURN, NO BELCHING                      NON HODGKIN'S LYMPHOMA -  S/P SURGERY, S/P CHEMOX. PT S/P BONE MARROW TRANSPLANT.  FOLLOWING WITH HEM/ONC  ALLERGIC RHINITIS - OCC NASAL CONGESTION, OCC POSTNASAL DRAINAGE , NO SINUS PRESSURE, NO HA, + SNEEZING, + OCC WATERY ITCHY EYES. DENIES CP, ? SOB, No PALPITATIONS, OCC COUGH - ? COLOR OF PHLEGM, NO HEMOPTYSIS  No ABD PAIN, No N/V, No DIARRHEA, No CONSTIPATION, No MELENA, No HEMATOCHEZIA. No DYSURIA, No FREQ, No URGENCY, No HEMATURIA    PMH: REVIEWED AND UPDATED TODAY    PSH: REVIEWED AND UPDATED TODAY    SOCIAL HX: REVIEWED AND UPDATED TODAY    FAMILY HX: REVIEWED AND UPDATED TODAY    ALLERGY:  No known allergies    MEDS: REVIEWED  Prior to Visit Medications    Medication Sig Taking? Authorizing Provider   albuterol sulfate HFA (PROVENTIL HFA) 108 (90 Base) MCG/ACT inhaler Inhale 2 puffs into the lungs every 4 hours as needed for Wheezing or Shortness of Breath (Space out to every 6 hours as symptoms improve) Space out to every 6 hours as symptoms improve.  Yes Dorothy Blevins MD   Dextromethorphan-guaiFENesin (MUCINEX DM MAXIMUM STRENGTH)  MG TB12 Take 1 tablet by mouth every 12 hours as needed (for cough) Yes Dorothy Blevins MD   oxymetazoline (12 HOUR NASAL SPRAY) 0.05 % nasal spray 2 sprays by Nasal route 2 times daily as needed for SUPPLE, TRACHEA MIDLINE, NT, NO JVD, NO CB, NO LA, NO TM, NO STIFFNESS  CHEST: RESPY EFFORT NL, GOOD AE, NO W/R/C - CTA  HEART: S1S2+ REG, NO M/G/R  ABD: SOFT, NT, NO HSM, BS+  EXT: NO EDEMA, NT, PULSES +. JIMMY'S -VE  NEURO: ALERT AND ORIENTED X 3, NO MENINGEAL SIGNS, NO TREMORS, NL GAIT, NO FOCAL DEFICITS  PSYCH: FAIRLY GOOD AFFECT  BACK: NT, NO ROM, NO CVA TENDERNESS  SHOULDER: + TENDERNESS RT, + PAIN WITH MVT - RT, + ROM - RT      PREVIOUS LABS / X RAY REVIEWED AND D/W PT     ACCUCHECK: 71    ASSESSMENT / PLAN:     Diagnosis Orders   1. Acute pain of right shoulder  COUNSELLED. ? STRAIN. R/O OA. KENALOG 40 MG  IM GIVEN FOR ACUTE PAIN - PT TOLERATED. EXERCISES. ANALGESICS PRN. MONITOR. TYLENOL PRN  X RAY TO EVAL  ADVISED ON HOME EXERCISES  MAKE CHANGES AS NEEDED. 2. Mixed hyperlipidemia  COUNSELLED. ADVISED LOW FAT / CHOL DIET/ EXERCISE.  MONITOR. GOALS D/W PT.  MAKE CHANGES AS NEEDED. 3. Acquired hypothyroidism  COUNSELLED. STABLE. CONTINUE SYNTHROID 50 MCG. MONITOR  MAKE CHANGES AS NEEDED. 4. Vitamin D deficiency  COUNSELLED. ADVISED CONTINUE VIT D 1000 U DAILY. MONITOR AND MAKE CHANGES AS NEEDED. 5. Prediabetes  COUNSELLED. ADVISED ON DIET / EXERCISE  ADVISED RISK FACTOR MODIFICATION. MONITOR  MAKE CHANGES AS NEEDED. 6. CKD (chronic kidney disease) stage 3, GFR 30-59 ml/min ([facility])  COUNSELLED. AVOID NSAIDS. MONITOR. MAINTAIN HYDRATION. MONITOR. MAKE CHANGES AS NEEDED. 7. Gastroesophageal reflux disease without esophagitis  COUNSELLED. CONTINUE MGT. ANTIREFLUX PRECAUTIONS ADVISED. MONITOR. MAKE CHANGES AS NEEDED. 8. Other specified type of non-Hodgkin lymphoma, unspecified body region [facility])  COUNSELLED. F/U ONCOLOGY. MONITOR  MAKE CHANGES AS NEEDED. 9. Other allergic rhinitis  COUNSELLED. ADVISED ZYRTEC 10 MG PRN, FLONASE PRN. MONITOR  MAKE CHANGES AS NEEDED.              Andreina Seymour received counseling on the following healthy behaviors: nutrition, exercise

## 2020-02-10 ENCOUNTER — HOSPITAL ENCOUNTER (OUTPATIENT)
Age: 60
End: 2020-02-10
Payer: COMMERCIAL

## 2020-02-10 ENCOUNTER — HOSPITAL ENCOUNTER (OUTPATIENT)
Dept: GENERAL RADIOLOGY | Age: 60
Discharge: HOME | End: 2020-02-10
Payer: COMMERCIAL

## 2020-02-10 PROCEDURE — 73030 X-RAY EXAM OF SHOULDER: CPT

## 2020-02-12 ENCOUNTER — OFFICE VISIT (OUTPATIENT)
Dept: PULMONOLOGY | Age: 60
End: 2020-02-12
Payer: COMMERCIAL

## 2020-02-12 VITALS
BODY MASS INDEX: 25.95 KG/M2 | HEART RATE: 109 BPM | HEIGHT: 64 IN | DIASTOLIC BLOOD PRESSURE: 84 MMHG | OXYGEN SATURATION: 94 % | WEIGHT: 152 LBS | SYSTOLIC BLOOD PRESSURE: 110 MMHG | RESPIRATION RATE: 16 BRPM

## 2020-02-12 PROCEDURE — 99214 OFFICE O/P EST MOD 30 MIN: CPT | Performed by: INTERNAL MEDICINE

## 2020-02-12 RX ORDER — AZELASTINE 1 MG/ML
2 SPRAY, METERED NASAL 2 TIMES DAILY
Qty: 2 BOTTLE | Refills: 5 | Status: SHIPPED | OUTPATIENT
Start: 2020-02-12 | End: 2020-06-01 | Stop reason: SDUPTHER

## 2020-02-12 RX ORDER — BENZONATATE 100 MG/1
100 CAPSULE ORAL 3 TIMES DAILY PRN
Qty: 90 CAPSULE | Refills: 3 | Status: SHIPPED | OUTPATIENT
Start: 2020-02-12 | End: 2020-02-19

## 2020-02-12 RX ORDER — PREDNISONE 20 MG/1
40 TABLET ORAL DAILY
Qty: 14 TABLET | Refills: 0 | Status: SHIPPED | OUTPATIENT
Start: 2020-02-12 | End: 2020-08-21 | Stop reason: ALTCHOICE

## 2020-02-13 ENCOUNTER — TELEPHONE (OUTPATIENT)
Dept: INTERNAL MEDICINE CLINIC | Age: 60
End: 2020-02-13

## 2020-02-13 NOTE — PROGRESS NOTES
[facility]    Outpatient Follow Up Note    Subjective:   CHIEF COMPLAINT / HPI:     The patient is 61 y.o. female with history of lymphoma status post bone marrow transplant here for follow-up of chronic cough thought to be secondary to upper airway cough syndrome. Last visited I gave her Zyrtec and if needed Flonase which she did start. Cough is some better but she still has a daily dry cough with postnasal drip and a tickle in her throat. No fevers, chills, night sweats, anorexia, weight loss, hemoptysis, dyspnea, wheezing, or chest tightness. November 26, 2019  Scott Gibson is here for follow-up of a cough with purulent sputum. When I saw her in October I gave her 2 weeks of Levaquin and repeat his CT chest approximately a week ago. Left lower lobe pneumonia has resolved. Her fatigue and dyspnea on exertion have resolved as well. Her cough while improving is still present. She has postnasal drip and throat clearing. No fevers, chills, anorexia, or weight loss. Phlegm production is clear to yellow. October 16, 2019  Scott Gibson has a past medical history of lymphoma status post bone marrow transplant is here for evaluation of a cough productive of purulent sputum and abnormal CT chest since mid August.  She was initially given a Z-Tim without improvement. Her oncologist Dr Otis Meraz ordered a CT chest September 6 and then gave her a week course of Levaquin. She states that her fatigue I will bit better but her cough with purulent sputum persists. She has no fevers, chills, anorexia, night sweats, weight loss, hemoptysis, or chest pain. She has some mild dyspnea on occasion    January 2019  Scott Gibsno is here for follow-up of abnormal CT chest.  She is doing well and denies any fevers, chills, night sweats, weight loss, anorexia, malaise, cough, wheezing, or shortness of breath.      12/13/2018  Scott Gibson Is here for follow-up of abnormal CT chest with BAL cultures positive for sterile mycelium. Initially she had dyspnea on exertion and cough which prompted a CT chest which showed groundglass opacities that prompted a bronchoscopy. Since last visit she saw Dr. Cari Feldman from infectious disease and his opinion was that this did not represent a pathogen. She was not treated and has done very well. Her cough and dyspnea on exertion are much improved. His no fevers, chills, night sweats, anorexia, weight loss, or malaise. Previous Visit 10/25/2018  Rick Rahman has a  past medical history of lymphoma status post bone marrow transplant and [facility] Is here for follow-up of bronchoscopy performed August 30, 2018. The procedure showed thick purulent secretions with plugs seen in the medial basal segment of the right lower lobe and proximal segment of the left lower lobe. Therapeutic aspiration was performed and cultures were obtained. 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 Patient continues to have a producitve cough and dyspnea on exertion but no fevers, chills, night sweats, malaise, anorexia or weight loss    Previous visit August 23, 2018  Rick Rahman is here for two-week follow-up of dyspnea on exertion and cough. Since last visit she's had a CT chest and PFTs. She has no change in her dyspnea on exertion and cough. She continues to have no fevers, chills, anorexia, chest pain, or weight loss    Previous visit 8/1/2018   The patient is 60 y.o. female with past medical history of lymphoma status post bone marrow transplant and UACS on Flonase and Zyrtec presents for evaluation of worsening dyspnea on exertion to the point she gets winded going up 1 flight of stairs. She has a cough that she describes as both dry but also bringing up some yellow phlegm. She has no associated fevers, chills, anorexia, chest pain, or weight loss. .     Past Medical History:    Past Medical History:   Diagnosis Date    Allergic rhinitis     Arthritis     CKD (chronic kidney disease)     DLBCL (diffuse large (VEETID) 500 MG tablet TAKE 1 TABLET BY MOUTH TWICE DAILY 60 tablet 2    conjugated estrogens (PREMARIN) 0.625 MG/GM vaginal cream insert . 5 gram vaginally three times a week at bedtime. 1 Tube 3    zolpidem (AMBIEN) 5 MG tablet Take one tablet nightly as needed for sleep 30 tablet 0     No current facility-administered medications on file prior to visit. REVIEW OF SYSTEMS:    CONSTITUTIONAL: Negative for fevers and chills  HEENT: Negative for oropharyngeal exudate, post nasal drip, sinus pain / pressure, nasal congestion, ear pain  RESPIRATORY:  See HPI  CARDIOVASCULAR: Negative for chest pain, palpitations, edema  GASTROINTESTINAL: Negative for nausea, vomiting, diarrhea, constipation and abdominal pain  HEMATOLOGICAL: Negative for adenopathy  SKIN: Negative for clubbing, cyanosis, skin lesions  EXTREMITIES: Negative for weakness, decreased ROM  NEUROLOGICAL: Negative for unilateral weakness, speech or gait abnormalities    Objective:   PHYSICAL EXAM:        VITALS:    /84 (Site: Left Upper Arm, Position: Sitting, Cuff Size: Medium Adult)   Pulse 109   Resp 16   Ht 5' 4\" (1.626 m)   Wt 152 lb (68.9 kg)   LMP 12/26/2008   SpO2 94%   Breastfeeding No   BMI 26.09 kg/m²     CONSTITUTIONAL:  Awake, alert, cooperative, no apparent distress, and appears stated age  HEENT: No oropharyngeal exudate, PERRL, no cervical adenopathy, no tracheal deviation, thyroid size normal  LUNGS:  No increased work of breathing and clear to auscultation, no crackles or wheezing  CARDIOVASCULAR:  normal S1 and S2 and no JVD  ABDOMEN:  Normal bowel sounds, non-distended and non-tender to palpation  EXT: No edema, no calf tenderness. Pulses are present bilaterally. NEUROLOGIC:  Mental Status Exam:  Level of Alertness:   awake  Orientation:   person, place, time.   SKIN:  normal skin color, texture, turgor, no redness, warmth, or swelling     Microbiology  Fungus (Mycology) Culture 08/30/2018  8:00 AM [facility]   Fungus Stain 08/30/2018  8:00 AM [facility]   No Fungal elements seen    Organism  (Abnormal) 08/30/2018  8:00 AM [facility]   Sterile Mycelium     Fungus (Mycology) Culture 08/30/2018  8:00 AM [facility]   Isolated    This is a non-sporulating isolate.    These isolates have the inability to express diagnostic    characters under the conditions provided.    No further workup. Serology  Results for Contreras Bell (MRN [id number]) as of 10/25/2018 15:31   Ref. Range 9/27/2018 14:38   Aspergillus Galacto AG Latest Ref Range: Negative  Negative   Aspergillus Galacto Index Unknown 0.09   (1,3)-Beta-D-Glucan (Fungitell) Interpretation Latest Ref Range: Negative  Negative   (1,3)-Beta-D-Glucan (fungitell) Latest Units: pg/mL <31   Histoplasma Antigen Urine Latest Units: ng/mL Not Detected   Coccidioides Ab,ID Latest Ref Range: None Detected  None Detected   Histoplasma Ab Mycelial CF Latest Ref Range: <1:8  <1:8   Histoplasma Ab Yeast CF Latest Ref Range: <1:8  <1:8   Histoplasma Antigen, Serum Unknown See Note   Histoplasma Ag Interp Latest Ref Range: Not Detected  Not Detected   HISTOPLASMA INTERPETATION Unknown See Note       Radiology    CT chest 11/20/2019 - images and report personally reviewed by myself and the presence of the patient:     COMMENTS:        Mild degenerative changes in the spine with scoliosis. Upper abdomen is unremarkable. Mediastinal structures are unremarkable without lymphadenopathy. Previously seen consolidation in the left lower lobe has resolved. 2 mm right upper lobe pulmonary    nodule series 604 image 35 is stable since 8/18/2017.  The lungs are otherwise clear.           Impression       Resolution of left lower lobe pneumonia.         Pulmonary function test - dated August 21, 2018  Mild obstructive defect without bronchodilator response  Positive bronchial challenge  Normal lung volumes  Moderate decrease in diffusion

## 2020-02-14 NOTE — TELEPHONE ENCOUNTER
FINDINGS:       No radiographic evidence of acute fracture or subluxation.  Mild degenerative changes at the right glenohumeral joint.         MILD DJD - X RAY D/W PT  PT VERBALIZED UNDERSTANDING

## 2020-05-01 ENCOUNTER — VIRTUAL VISIT (OUTPATIENT)
Dept: INTERNAL MEDICINE CLINIC | Age: 60
End: 2020-05-01
Payer: COMMERCIAL

## 2020-05-01 PROCEDURE — 99214 OFFICE O/P EST MOD 30 MIN: CPT | Performed by: INTERNAL MEDICINE

## 2020-05-01 RX ORDER — CETIRIZINE HYDROCHLORIDE 10 MG/1
TABLET ORAL
Qty: 90 TABLET | Refills: 0 | Status: SHIPPED | OUTPATIENT
Start: 2020-05-01 | End: 2020-06-01

## 2020-05-01 RX ORDER — LIDOCAINE 50 MG/G
OINTMENT TOPICAL
Qty: 1 TUBE | Refills: 0 | Status: SHIPPED | OUTPATIENT
Start: 2020-05-01 | End: 2020-08-21 | Stop reason: SDUPTHER

## 2020-05-01 RX ORDER — LEVOTHYROXINE SODIUM 0.05 MG/1
TABLET ORAL
Qty: 90 TABLET | Refills: 0 | Status: SHIPPED | OUTPATIENT
Start: 2020-05-01 | End: 2020-08-04

## 2020-05-01 RX ORDER — CHOLECALCIFEROL (VITAMIN D3) 25 MCG
1 CAPSULE ORAL DAILY
Qty: 90 CAPSULE | Refills: 0 | Status: SHIPPED | OUTPATIENT
Start: 2020-05-01 | End: 2020-08-21 | Stop reason: SDUPTHER

## 2020-05-01 RX ORDER — FLUTICASONE PROPIONATE 50 MCG
SPRAY, SUSPENSION (ML) NASAL
Qty: 1 BOTTLE | Refills: 1 | Status: SHIPPED | OUTPATIENT
Start: 2020-05-01 | End: 2021-04-19 | Stop reason: SDUPTHER

## 2020-05-05 ENCOUNTER — CARE COORDINATION (OUTPATIENT)
Dept: CARE COORDINATION | Age: 60
End: 2020-05-05

## 2020-06-01 ENCOUNTER — VIRTUAL VISIT (OUTPATIENT)
Dept: PULMONOLOGY | Age: 60
End: 2020-06-01
Payer: COMMERCIAL

## 2020-06-01 PROCEDURE — 99214 OFFICE O/P EST MOD 30 MIN: CPT | Performed by: INTERNAL MEDICINE

## 2020-06-01 RX ORDER — FLUTICASONE FUROATE AND VILANTEROL TRIFENATATE 100; 25 UG/1; UG/1
1 POWDER RESPIRATORY (INHALATION) DAILY
Qty: 1 EACH | Refills: 11 | Status: SHIPPED | OUTPATIENT
Start: 2020-06-01 | End: 2021-08-18

## 2020-06-01 RX ORDER — CETIRIZINE HYDROCHLORIDE 10 MG/1
TABLET ORAL
Qty: 90 TABLET | Refills: 3 | Status: SHIPPED | OUTPATIENT
Start: 2020-06-01 | End: 2021-09-21

## 2020-06-01 RX ORDER — BENZONATATE 100 MG/1
100 CAPSULE ORAL 3 TIMES DAILY PRN
Qty: 60 CAPSULE | Refills: 11 | Status: SHIPPED | OUTPATIENT
Start: 2020-06-01 | End: 2020-06-08

## 2020-06-01 RX ORDER — AZELASTINE 1 MG/ML
2 SPRAY, METERED NASAL 2 TIMES DAILY
Qty: 3 BOTTLE | Refills: 3 | Status: SHIPPED | OUTPATIENT
Start: 2020-06-01 | End: 2021-11-29 | Stop reason: SDUPTHER

## 2020-06-01 NOTE — PROGRESS NOTES
[facility]    Outpatient Follow Up Note    Subjective:   CHIEF COMPLAINT / HPI:     The patient is 61 y.o. female with has requested a virtual visit for follow-up of chronic cough due to upper airway cough syndrome and asthma. She has a history of lymphoma status post a bone marrow transplant. Last visit I added Astelin to her chronic regimen of Flonase, Zyrtec, and Breo. She states with that she is doing quite well and has no significant cough, wheezing, or chest tightness    Feb 13,2020  Saw Cummings has a history of lymphoma status post bone marrow transplant here for follow-up of chronic cough thought to be secondary to upper airway cough syndrome. Last visited I gave her Zyrtec and if needed Flonase which she did start. Cough is some better but she still has a daily dry cough with postnasal drip and a tickle in her throat. No fevers, chills, night sweats, anorexia, weight loss, hemoptysis, dyspnea, wheezing, or chest tightness. November 26, 2019  Saw Cummings is here for follow-up of a cough with purulent sputum. When I saw her in October I gave her 2 weeks of Levaquin and repeat his CT chest approximately a week ago. Left lower lobe pneumonia has resolved. Her fatigue and dyspnea on exertion have resolved as well. Her cough while improving is still present. She has postnasal drip and throat clearing. No fevers, chills, anorexia, or weight loss. Phlegm production is clear to yellow. October 16, 2019  Saw Cummings has a past medical history of lymphoma status post bone marrow transplant is here for evaluation of a cough productive of purulent sputum and abnormal CT chest since mid August.  She was initially given a Z-Tim without improvement. Her oncologist Dr Xiao Kelly ordered a CT chest September 6 and then gave her a week course of Levaquin. She states that her fatigue I will bit better but her cough with purulent sputum persists.   She has no fevers, chills, anorexia, night color, texture, turgor, no redness, warmth, or swelling     Microbiology  Fungus (Mycology) Culture 08/30/2018  8:00 AM [facility]   Fungus Stain 08/30/2018  8:00 AM [facility]   No Fungal elements seen    Organism  (Abnormal) 08/30/2018  8:00 AM [facility]   Sterile Mycelium     Fungus (Mycology) Culture 08/30/2018  8:00 AM [facility]   Isolated    This is a non-sporulating isolate.    These isolates have the inability to express diagnostic    characters under the conditions provided.    No further workup. Serology  Results for Crystal Daugherty (MRN [id number]) as of 10/25/2018 15:31   Ref. Range 9/27/2018 14:38   Aspergillus Galacto AG Latest Ref Range: Negative  Negative   Aspergillus Galacto Index Unknown 0.09   (1,3)-Beta-D-Glucan (Fungitell) Interpretation Latest Ref Range: Negative  Negative   (1,3)-Beta-D-Glucan (fungitell) Latest Units: pg/mL <31   Histoplasma Antigen Urine Latest Units: ng/mL Not Detected   Coccidioides Ab,ID Latest Ref Range: None Detected  None Detected   Histoplasma Ab Mycelial CF Latest Ref Range: <1:8  <1:8   Histoplasma Ab Yeast CF Latest Ref Range: <1:8  <1:8   Histoplasma Antigen, Serum Unknown See Note   Histoplasma Ag Interp Latest Ref Range: Not Detected  Not Detected   HISTOPLASMA INTERPETATION Unknown See Note       Radiology    CT chest 11/20/2019 - images and report personally reviewed by myself and the presence of the patient:     COMMENTS:        Mild degenerative changes in the spine with scoliosis. Upper abdomen is unremarkable. Mediastinal structures are unremarkable without lymphadenopathy. Previously seen consolidation in the left lower lobe has resolved. 2 mm right upper lobe pulmonary    nodule series 604 image 35 is stable since 8/18/2017.  The lungs are otherwise clear.           Impression       Resolution of left lower lobe pneumonia.         Pulmonary function test - dated August 21, 2018  Mild obstructive defect without bronchodilator response  Positive bronchial challenge  Normal lung volumes  Moderate decrease in diffusion capacity    Assessment:      Diagnosis Orders   1. Chronic cough     2. Other allergic rhinitis  cetirizine (ZYRTEC) 10 MG tablet   3. Hx of lymphoma     4. [facility]         Plan:     1. Cough is well controlled. Due to upper airway cough syndrome/asthma. Continue Zyrtec, Flonase, Breo 100, Astelin,  and Tessalon Perles as needed. Refills sent to pharmacy  2.   Return to my office for reevaluation in 6 months or sooner if needed

## 2020-08-04 RX ORDER — LEVOTHYROXINE SODIUM 0.05 MG/1
TABLET ORAL
Qty: 90 TABLET | Refills: 0 | Status: SHIPPED | OUTPATIENT
Start: 2020-08-04 | End: 2020-10-07 | Stop reason: SDUPTHER

## 2020-08-20 ENCOUNTER — TELEPHONE (OUTPATIENT)
Dept: INTERNAL MEDICINE CLINIC | Age: 60
End: 2020-08-20

## 2020-08-20 NOTE — TELEPHONE ENCOUNTER
----- Message from Zeny Obrien sent at 8/20/2020  9:15 AM EDT -----  Subject: Message to Provider    QUESTIONS  Information for Provider? Pt right arm and shoulder is not getting any   better. She wants to get a scan   MRI or be referred to a specialist.   ---------------------------------------------------------------------------  --------------  [address]  What is the best way for the office to contact you? OK to leave message on   voicemail  Preferred Call Back Phone Number? [phone]  ---------------------------------------------------------------------------  --------------  SCRIPT ANSWERS  Relationship to Patient?  Self

## 2020-08-21 ENCOUNTER — VIRTUAL VISIT (OUTPATIENT)
Dept: INTERNAL MEDICINE CLINIC | Age: 60
End: 2020-08-21
Payer: COMMERCIAL

## 2020-08-21 PROCEDURE — 99214 OFFICE O/P EST MOD 30 MIN: CPT | Performed by: INTERNAL MEDICINE

## 2020-08-21 RX ORDER — LIDOCAINE 50 MG/G
OINTMENT TOPICAL
Qty: 1 TUBE | Refills: 0 | Status: SHIPPED | OUTPATIENT
Start: 2020-08-21 | End: 2021-01-14 | Stop reason: ALTCHOICE

## 2020-08-21 RX ORDER — CHOLECALCIFEROL (VITAMIN D3) 25 MCG
1 CAPSULE ORAL DAILY
Qty: 90 CAPSULE | Refills: 0 | Status: SHIPPED | OUTPATIENT
Start: 2020-08-21 | End: 2020-10-07 | Stop reason: SDUPTHER

## 2020-08-21 NOTE — PROGRESS NOTES
2020    TELEHEALTH EVALUATION -- Audio/Visual (During MMYHZ-46 public health emergency)    PLACE OF SERVICE: PATIENT'S HOME    HPI: SEE BELOW    Gold Barr (:  1960) has requested an audio/video evaluation for the following concern(s):    RT SHOULDER PAIN  - PERSISTENT,  SHARP PAIN. INCREASED WITH MVT, + RAD TO RT ARM,  PAIN SCALE 10/10 @ MAX. Any [employer]aggers DENIES TRAUMA, NO NUMBNESS / NO TINGLING,  HYPOTHYROIDISM - TAKING MED. Volney Londonderry. NO COLD / NO HEAT INTOLERANCE    HLP - ? DIET / Sharren Mon D/W PT  VIT D DEF - TAKING MED.  RECENT LAB D/W PT  PREDIABETES -  DIET / EXERCISE REVIEWED. GERD - ON NEXIUM. NO HEARTBURN, NO BELCHING              CKD - AVOIDING NSAIDS. LAB D/W PT.  ALLERGIC RHINITIS - + NASAL CONGESTION, OCC POSTNASAL DRAINAGE , NO SINUS PRESSURE, NO HA, ? SNEEZING, + OCC WATERY ITCHY EYES.         NON HODGKIN'S LYMPHOMA -  S/P SURGERY, S/P CHEMOX. PT S/P BONE MARROW TRANSPLANT.  FOLLOWING WITH HEM/ONC    DENIES CP, NO SOB, No PALPITATIONS, [address] - IMPROVED, NO HEMOPTYSIS, NO F/C  No ABD PAIN, No N/V, No DIARRHEA, No CONSTIPATION, No MELENA, No HEMATOCHEZIA. No DYSURIA, No FREQ, No URGENCY, No HEMATURIA        Allergies   Allergen Reactions    No Known Allergies          Prior to Visit Medications    Medication Sig Taking? Authorizing Provider   levothyroxine (SYNTHROID) 50 MCG tablet TAKE 1 TABLET BY MOUTH EVERY MORNING FOR THYROID Yes Augusta Koch MD   azelastine (ASTELIN) 0.1 % nasal spray 2 sprays by Nasal route 2 times daily Use in each nostril as directed Yes Daisy Baez MD   fluticasone-vilanterol (BREO ELLIPTA) 100-25 MCG/INH AEPB inhaler Inhale 1 puff into the lungs daily Yes Daisy Baez MD   cetirizine (ZYRTEC) 10 MG tablet TAKE 1 CAPSULE BY MOUTH DAILY Yes Daisy Baez MD   lidocaine (XYLOCAINE) 5 % ointment Apply topically a TID/ s needed.  Yes Augusta Koch MD   fluticasone (FLONASE) 50 MCG/ACT nasal spray INSTILL 1 SPRAY IN [facility] NSOTRIL DAILY Yes Stephanie Childers MD   Cholecalciferol (VITAMIN D-3) 25 MCG (1000 UT) CAPS Take 1 capsule by mouth daily Yes Stephanie Childers MD   predniSONE (DELTASONE) 20 MG tablet Take 2 tablets by mouth daily Yes Latasha Ryan MD   albuterol sulfate HFA (PROVENTIL HFA) 108 (90 Base) MCG/ACT inhaler Inhale 2 puffs into the lungs every 4 hours as needed for Wheezing or Shortness of Breath (Space out to every 6 hours as symptoms improve) Space out to every 6 hours as symptoms improve. Yes Ivone Mackey MD   Dextromethorphan-guaiFENesin (MUCINEX DM MAXIMUM STRENGTH)  MG TB12 Take 1 tablet by mouth every 12 hours as needed (for cough) Yes vIone Mackey MD   baclofen (LIORESAL) 10 MG tablet Take 1 tablet by mouth 3 times daily as needed (PRN) Yes Stephanie Childers MD   promethazine-codeine (PHENERGAN WITH CODEINE) 6.25-10 MG/5ML syrup Take 5 mLs by mouth nightly as needed for Cough. Yes Latasha Ryan MD   Esomeprazole Magnesium (NEXIUM PO) Take by mouth Yes Historical Provider, MD   ondansetron (ZOFRAN) 4 MG tablet Take 4 mg by mouth every 8 hours as needed for Nausea or Vomiting Yes Historical Provider, MD   penicillin v potassium (VEETID) 500 MG tablet TAKE 1 TABLET BY MOUTH TWICE DAILY Yes DWIGHT Gonzalez CNP   conjugated estrogens (PREMARIN) 0.625 MG/GM vaginal cream insert . 5 gram vaginally three times a week at bedtime. Yes DWIGHT Hammond CNP   zolpidem (AMBIEN) 5 MG tablet Take one tablet nightly as needed for sleep Yes DWIGHT Gonzalez CNP       Social History     Tobacco Use    Smoking status: Never Smoker    Smokeless tobacco: Never Used   Substance Use Topics    Alcohol use:  Yes     Alcohol/week: 5.0 standard drinks     Types: 2 Glasses of wine, 3 Shots of liquor per week     Comment: DAILY    Drug use: No        ROS: COMPREHENSIVE ROS AS IN HX, REST -VE  History obtained from chart review and the patient    PHYSICAL EXAMINATION:  [ INSTRUCTIONS:  \"[x]\" Indicates a positive item  \"[]\" Indicates a negative item  -- DELETE ALL ITEMS NOT EXAMINED]  Vital Signs: (As obtained by patient/caregiver or practitioner observation)    Blood pressure-  Heart rate-    Respiratory rate-    Temperature-  Pulse oximetry-   PT UNABLE TO CHECK BP / VITALS    Constitutional: [x] Appears well-developed and well-nourished [x] No apparent distress      [] Abnormal-   Mental status  [x] Alert and awake  [x] Oriented to person/place/time [x]Able to follow commands      Eyes:  EOM    [x]  Normal  [] Abnormal-  Sclera  [x]  Normal  [] Abnormal -         Discharge [x]  None visible  [] Abnormal -    HENT:   [x] Normocephalic, atraumatic. [] Abnormal   [x] Mouth/Throat: Mucous membranes are moist.     External Ears [x] Normal  [] Abnormal-     Neck: [x] No visualized mass     Pulmonary/Chest: [x] Respiratory effort normal.  [x] No visualized signs of difficulty breathing or respiratory distress        [] Abnormal-      Musculoskeletal:   [x] Normal gait with no signs of ataxia         [x] Normal range of motion of neck        [] Abnormal-       Neurological:        [x] No Facial Asymmetry (Cranial nerve 7 motor function) (limited exam to video visit)          [x] No gaze palsy        [] Abnormal-         Skin:        [x] No significant exanthematous lesions or discoloration noted on facial skin         [] Abnormal-            Psychiatric:       [x] Normal Affect [x] No Hallucinations        [] Abnormal-     Other pertinent observable physical exam findings-  SHOULDER: + TENDERNESS RT, + PAIN WITH MVT - RT, NO ROM      PREVIOUS LABS / X RAY REVIEWED AND D/W PT     ASSESSMENT/PLAN:     Diagnosis Orders   1. Radicular pain of shoulder  COUNSELLED. PERSISTENT. EXERCISES. ANALGESICS PRN. MONITOR. LIDOCAINE OINT PRN  MRI TO EVAL  PT DEFERRED ORTHO EVAL  MAKE CHANGES AS NEEDED. 2. Acquired hypothyroidism  COUNSELLED. MONITOR ON SYNTHROID. TSH TO EVAL  MAKE CHANGES AS NEEDED       3.  Mixed hyperlipidemia  COUNSELLED. ADVISED LOW FAT / CHOL DIET/ EXERCISE.  MONITOR. F/U LABS  GOALS D/W PT.  MAKE CHANGES AS NEEDED. 4. Vitamin D deficiency  COUNSELLED. MONITOR ON VIT D SUPPLEMENT. MAKE CHANGES AS NEEDED. 5. Prediabetes  COUNSELLED. ADVISED ON DIET / EXERCISE  ADVISED RISK FACTOR MODIFICATION. F/U LABS TO [facility]. MONITOR  MAKE CHANGES AS NEEDED. 6. Gastroesophageal reflux disease without esophagitis  COUNSELLED. CONTINUE MGT. ANTIREFLUX PRECAUTIONS ADVISED. MONITOR MAG LEVEL. MAKE CHANGES AS NEEDED. 7. CKD (chronic kidney disease) stage 3, GFR 30-59 ml/min ([facility])  COUNSELLED. ADVISED AVOID NSAIDS. MAINTAIN HYDRATION. MONITOR. MAKE CHANGES AS NEEDED. 8. Other allergic rhinitis  COUNSELLED. MED PRN. MONITOR  MAKE CHANGES AS NEEDED. 9. Other specified type of non-Hodgkin lymphoma, unspecified body region [facility])  COUNSELLED. F/U  ONCOLOGY. MONITOR  MAKE CHANGES AS NEEDED. MEDICATION SIDE EFFECTS D/W PATIENT     PT STABLE AT TIME OF D/C.     RETURN TO CLINIC WITHIN  6 WEEKS / PRN    FOLLOW UP FOR FASTING LABS, MRI     Due to this being a TeleHealth encounter (During [facility] public health emergency), evaluation of the following organ systems was limited: Vitals/Constitutional/EENT/Resp/CV/GI//MS/Neuro/Skin/Heme-Lymph-Imm. Pursuant to the emergency declaration under the [facility], [address] [place] [address] authority and the [employer] Act, this Virtual Visit was conducted with patient's (and/or legal guardian's) consent, to reduce the patient's risk of exposure to COVID-19 and provide necessary medical care. The patient (and/or legal guardian) has also been advised to contact this office for worsening conditions or problems, and seek emergency medical treatment and/or call 911 if deemed necessary.      Patient identification was verified at the start of the visit: Yes      Services were provided through a video synchronous discussion virtually to substitute for in-person clinic visit. Patient and provider were located at their individual homes. --Veronika Rojas MD on 8/21/2020 at 1:52 PM    An electronic signature was used to authenticate this note.

## 2020-08-25 ENCOUNTER — TELEPHONE (OUTPATIENT)
Dept: INTERNAL MEDICINE CLINIC | Age: 60
End: 2020-08-25

## 2020-08-29 ENCOUNTER — HOSPITAL ENCOUNTER (OUTPATIENT)
Dept: MRI IMAGING | Age: 60
Discharge: HOME | End: 2020-08-29
Payer: COMMERCIAL

## 2020-08-29 PROCEDURE — 73221 MRI JOINT UPR EXTREM W/O DYE: CPT

## 2020-09-08 ENCOUNTER — TELEPHONE (OUTPATIENT)
Dept: INTERNAL MEDICINE CLINIC | Age: 60
End: 2020-09-08

## 2020-09-08 RX ORDER — ESOMEPRAZOLE MAGNESIUM 40 MG/1
40 CAPSULE, DELAYED RELEASE ORAL DAILY
Qty: 30 CAPSULE | Refills: 2 | Status: SHIPPED | OUTPATIENT
Start: 2020-09-08 | End: 2021-02-08

## 2020-09-08 NOTE — TELEPHONE ENCOUNTER
Impression    1. Glenohumeral osteoarthritis with chondral malacia and osteophyte formation. 2. Diffuse labral degeneration with tear of the posterior inferior labrum and tear of the superior labrum extending anteriorly to posteriorly. 3. Mild supraspinatus tendinopathy. 3. Trace fluid in the subacromial subdeltoid bursa which could reflect a mild bursitis in the proper clinical setting.      ABN MRI D/W PT  REFER ORTHO    PT REQUESTING REFILL OF NEXIUM 40 MG FOR GERD - MED REFILLED    F/U APPT  PT VERBALIZED UNDERSTANDING

## 2020-09-08 NOTE — TELEPHONE ENCOUNTER
Patient is requesting MRI results along a medication refill. Please refill esometrazole magnesium 40 mg, 1 caps. a day.

## 2020-09-14 NOTE — CARE COORDINATION
Physical Therapy  Cancellation/No-show Note  Patient Name:  Collins Rangel  :  1960   Date:  2019  MRN: [id number]  Cancelled visits to date: 3  06/04/19  04/17/19  03/19/19  No-shows to date: 19    For today's appointment patient:  [x]  Cancelled  []  Rescheduled appointment  []  No-show     Reason given by patient:  []  Patient ill  [x]  Conflicting appointment  []  No transportation    []  Conflict with work  []  No reason given  []  Other:     Comments:     Electronically signed by:   Cecilio Cabrera, [address] [facility], DPT [id number] within normal limits

## 2020-09-21 DIAGNOSIS — M54.12 RADICULAR PAIN OF SHOULDER: ICD-10-CM

## 2020-09-21 DIAGNOSIS — N18.30 CKD (CHRONIC KIDNEY DISEASE) STAGE 3, GFR 30-59 ML/MIN (HCC): ICD-10-CM

## 2020-09-21 DIAGNOSIS — E55.9 VITAMIN D DEFICIENCY: ICD-10-CM

## 2020-09-21 DIAGNOSIS — R73.03 PREDIABETES: ICD-10-CM

## 2020-09-21 DIAGNOSIS — C85.80 OTHER SPECIFIED TYPE OF NON-HODGKIN LYMPHOMA, UNSPECIFIED BODY REGION (HCC): ICD-10-CM

## 2020-09-21 DIAGNOSIS — E03.9 ACQUIRED HYPOTHYROIDISM: ICD-10-CM

## 2020-09-21 DIAGNOSIS — K21.9 GASTROESOPHAGEAL REFLUX DISEASE WITHOUT ESOPHAGITIS: ICD-10-CM

## 2020-09-21 DIAGNOSIS — E78.2 MIXED HYPERLIPIDEMIA: ICD-10-CM

## 2020-09-21 LAB
A/G RATIO: 2 (ref 1.1–2.2)
ALBUMIN SERPL-MCNC: 4.4 G/DL (ref 3.4–5)
ALP BLD-CCNC: 132 U/L (ref 40–129)
ALT SERPL-CCNC: 24 U/L (ref 10–40)
ANION GAP SERPL CALCULATED.3IONS-SCNC: 12 MMOL/L (ref 3–16)
AST SERPL-CCNC: 30 U/L (ref 15–37)
BASOPHILS ABSOLUTE: 0.1 K/UL (ref 0–0.2)
BASOPHILS RELATIVE PERCENT: 0.7 %
BILIRUB SERPL-MCNC: 0.3 MG/DL (ref 0–1)
BUN BLDV-MCNC: 28 MG/DL (ref 7–20)
CALCIUM SERPL-MCNC: 9.9 MG/DL (ref 8.3–10.6)
CHLORIDE BLD-SCNC: 105 MMOL/L (ref 99–110)
CHOLESTEROL, TOTAL: 218 MG/DL (ref 0–199)
CO2: 25 MMOL/L (ref 21–32)
CREAT SERPL-MCNC: 2.4 MG/DL (ref 0.6–1.2)
EOSINOPHILS ABSOLUTE: 0.1 K/UL (ref 0–0.6)
EOSINOPHILS RELATIVE PERCENT: 2.1 %
GFR AFRICAN AMERICAN: 25
GFR NON-AFRICAN AMERICAN: 21
GLOBULIN: 2.2 G/DL
GLUCOSE BLD-MCNC: 112 MG/DL (ref 70–99)
HCT VFR BLD CALC: 49.5 % (ref 36–48)
HDLC SERPL-MCNC: 92 MG/DL (ref 40–60)
HEMOGLOBIN: 15.9 G/DL (ref 12–16)
LDL CHOLESTEROL CALCULATED: 103 MG/DL
LYMPHOCYTES ABSOLUTE: 2.1 K/UL (ref 1–5.1)
LYMPHOCYTES RELATIVE PERCENT: 30.3 %
MAGNESIUM: 2 MG/DL (ref 1.8–2.4)
MCH RBC QN AUTO: 27.2 PG (ref 26–34)
MCHC RBC AUTO-ENTMCNC: 32.2 G/DL (ref 31–36)
MCV RBC AUTO: 84.5 FL (ref 80–100)
MONOCYTES ABSOLUTE: 0.6 K/UL (ref 0–1.3)
MONOCYTES RELATIVE PERCENT: 8.6 %
NEUTROPHILS ABSOLUTE: 4.1 K/UL (ref 1.7–7.7)
NEUTROPHILS RELATIVE PERCENT: 58.3 %
PDW BLD-RTO: 16.6 % (ref 12.4–15.4)
PLATELET # BLD: 210 K/UL (ref 135–450)
PLATELET SLIDE REVIEW: ADEQUATE
PMV BLD AUTO: 8.1 FL (ref 5–10.5)
POTASSIUM SERPL-SCNC: 4.4 MMOL/L (ref 3.5–5.1)
RBC # BLD: 5.87 M/UL (ref 4–5.2)
SODIUM BLD-SCNC: 142 MMOL/L (ref 136–145)
TOTAL PROTEIN: 6.6 G/DL (ref 6.4–8.2)
TRIGL SERPL-MCNC: 115 MG/DL (ref 0–150)
TSH REFLEX: 1.72 UIU/ML (ref 0.27–4.2)
VITAMIN D 25-HYDROXY: 55.8 NG/ML
VLDLC SERPL CALC-MCNC: 23 MG/DL
WBC # BLD: 7.1 K/UL (ref 4–11)

## 2020-09-22 LAB
ALBUMIN SERPL-MCNC: 4.5 G/DL (ref 3.4–5)
ANION GAP SERPL CALCULATED.3IONS-SCNC: 20 MMOL/L (ref 3–16)
BUN BLDV-MCNC: 27 MG/DL (ref 7–20)
CALCIUM SERPL-MCNC: 9.9 MG/DL (ref 8.3–10.6)
CHLORIDE BLD-SCNC: 101 MMOL/L (ref 99–110)
CO2: 18 MMOL/L (ref 21–32)
CREAT SERPL-MCNC: 2.3 MG/DL (ref 0.6–1.2)
ESTIMATED AVERAGE GLUCOSE: 122.6 MG/DL
GFR AFRICAN AMERICAN: 26
GFR NON-AFRICAN AMERICAN: 22
GLUCOSE BLD-MCNC: 80 MG/DL (ref 70–99)
HBA1C MFR BLD: 5.9 %
PARATHYROID HORMONE INTACT: 41.9 PG/ML (ref 14–72)
PHOSPHORUS: 2.4 MG/DL (ref 2.5–4.9)
POTASSIUM SERPL-SCNC: 7.3 MMOL/L (ref 3.5–5.1)
SODIUM BLD-SCNC: 139 MMOL/L (ref 136–145)
TOTAL CK: 334 U/L (ref 26–192)
URIC ACID, SERUM: 6.1 MG/DL (ref 2.6–6)

## 2020-09-30 ENCOUNTER — OFFICE VISIT (OUTPATIENT)
Dept: ORTHOPEDIC SURGERY | Age: 60
End: 2020-09-30
Payer: COMMERCIAL

## 2020-09-30 VITALS — BODY MASS INDEX: 25.93 KG/M2 | TEMPERATURE: 97.2 F | HEIGHT: 64 IN | WEIGHT: 151.9 LBS

## 2020-09-30 PROCEDURE — 20610 DRAIN/INJ JOINT/BURSA W/O US: CPT | Performed by: ORTHOPAEDIC SURGERY

## 2020-09-30 PROCEDURE — 99203 OFFICE O/P NEW LOW 30 MIN: CPT | Performed by: ORTHOPAEDIC SURGERY

## 2020-09-30 RX ORDER — METHYLPREDNISOLONE ACETATE 40 MG/ML
40 INJECTION, SUSPENSION INTRA-ARTICULAR; INTRALESIONAL; INTRAMUSCULAR; SOFT TISSUE ONCE
Status: COMPLETED | OUTPATIENT
Start: 2020-09-30 | End: 2020-09-30

## 2020-09-30 RX ORDER — LIDOCAINE HYDROCHLORIDE 10 MG/ML
20 INJECTION, SOLUTION INFILTRATION; PERINEURAL ONCE
Status: COMPLETED | OUTPATIENT
Start: 2020-09-30 | End: 2020-09-30

## 2020-09-30 RX ADMIN — METHYLPREDNISOLONE ACETATE 40 MG: 40 INJECTION, SUSPENSION INTRA-ARTICULAR; INTRALESIONAL; INTRAMUSCULAR; SOFT TISSUE at 11:51

## 2020-09-30 RX ADMIN — LIDOCAINE HYDROCHLORIDE 20 ML: 10 INJECTION, SOLUTION INFILTRATION; PERINEURAL at 11:50

## 2020-09-30 ASSESSMENT — ENCOUNTER SYMPTOMS: BACK PAIN: 1

## 2020-09-30 NOTE — PROGRESS NOTES
Review of Systems   HENT:        Thyroid disease    Genitourinary: Positive for frequency. Kidney stones / kidney disease    Musculoskeletal: Positive for back pain and neck pain. Neurological:        Neuropathy    All other systems reviewed and are negative.

## 2020-09-30 NOTE — PROGRESS NOTES
[facility] [address] [facility]  Office Visit  New Patient  Date:  2020    Name:  Jaclyn Huff  Address:  [address] [address]    :  1960      Age:   60 y.o.    SSN:  [id number]      Medical Record Number:  <[id number]>    Chief Complaint:    Right shoulder pain    HPI:   Jaclyn Huff is a 60 y.o. female who presents for evaluation of right shoulder pain. Patient states she has had pain for 1 year. Denies injury at the onset. States is an aching pain she notices at the lateral shoulder that can radiate down towards the elbow region. States she saw her primary care physician who recommended exercises. She is unable to take anti-inflammatory medication secondary to kidney disease. She did not have much more in the way of treatment as the COVID-19 pandemic did begin. She recently did have an MRI performed and she was referred to our office today for further evaluation. She states the pain is aching and that she has difficulty with range of motion. She has to use her other arm to help with certain activities. 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 She has been recently doing Pilates and effort to work the shoulder. She denies any new numbness, tingling, fevers, chills, chest pain, shortness of breath, or any other new significant symptoms. Pain Assessment  Location of Pain: Shoulder  Location Modifiers: Right  Severity of Pain: 8  Quality of Pain: Aching  Duration of Pain: Persistent  Frequency of Pain: Constant  Aggravating Factors: Other (Comment)(reaching up)  Relieving Factors:  Other (Comment)(nothing)  Result of Injury: No  Work-Related Injury: No  Are there other pain locations you wish to document?: No    Past History:  Past Medical History:   Diagnosis Date    Allergic rhinitis     Arthritis     CKD (chronic kidney disease)     [facility] 2/2010    non-hodgkins lymphoma    GERD (gastroesophageal reflux disease)     Hx of non-Hodgkin's lymphoma     MDRO (multiple drug resistant organisms) resistance 5/15/16    E.coli MDRO in urine    Migraines     Non Hodgkin's lymphoma ([facility]     Peripheral neuropathy     PONV (postoperative nausea and vomiting)        Past Surgical History:   Procedure Laterality Date    BONE MARROW TRANSPLANT  05/16/2016    BONE MARROW TRANSPLANT      BRONCHOSCOPY  8/30/2018    BRONCHOSCOPY THERAPUTIC ASPIRATION INITIAL WITH MUCUS PLUG SENT FOR BACTERIAL CULTURE performed by Leilani Ibrahim MD at [address] [place] [address]      x 5    COLONOSCOPY      HYSTERECTOMY  2/2010    with oophorectomy    LYMPH NODE BIOPSY  6/2011    R external iliac node -non diagnostic    LYMPH NODE BIOPSY  5/2012    R external node - Dr Annie Moore - [facility]    OTHER SURGICAL HISTORY  10/08/2012    INSERTION NEOSTAR CATHETER and REMOVAL        OTHER SURGICAL HISTORY Right 05/10/2016    INSERTION TRIPLE LUMEN DORANTES CENTRAL LINE    MD [address] W/BRNCL ALVEOLAR LAVAGE N/A 8/30/2018    BRONCHOSCOPY WITH BRONCHIAL WASHINGS AND LLL BAL. performed by Leilani Ibrahim MD at [place] [address]  2010    TONSILLECTOMY         Social History     Tobacco Use    Smoking status: Never Smoker    Smokeless tobacco: Never Used   Substance Use Topics    Alcohol use: Yes     Alcohol/week: 5.0 standard drinks     Types: 2 Glasses of wine, 3 Shots of liquor per week     Comment: DAILY    Drug use: No        Family History:  family history includes Arthritis in her sister and another family member; Birth Defects in her grandchild; Cancer in her brother and mother; Coronary Art Dis in her brother and father; Diabetes in her brother and sister;  Heart Disease in her father; High Blood Pressure in her father; High Cholesterol in her brother; Hypertension in her brother, mother, and sister; Kidney Disease in an other family member; Rheum Arthritis in her brother and sister; Stroke in her brother. Current Outpatient Medications:     esomeprazole (NEXIUM) 40 MG delayed release capsule, Take 1 capsule by mouth daily, Disp: 30 capsule, Rfl: 2    Cholecalciferol (VITAMIN D-3) 25 MCG (1000 UT) CAPS, Take 1 capsule by mouth daily, Disp: 90 capsule, Rfl: 0    lidocaine (XYLOCAINE) 5 % ointment, Apply topically a TID/ s needed. , Disp: 1 Tube, Rfl: 0    levothyroxine (SYNTHROID) 50 MCG tablet, TAKE 1 TABLET BY MOUTH EVERY MORNING FOR THYROID, Disp: 90 tablet, Rfl: 0    azelastine (ASTELIN) 0.1 % nasal spray, 2 sprays by Nasal route 2 times daily Use in each nostril as directed, Disp: 3 Bottle, Rfl: 3    fluticasone-vilanterol (BREO ELLIPTA) 100-25 MCG/INH AEPB inhaler, Inhale 1 puff into the lungs daily, Disp: 1 each, Rfl: 11    cetirizine (ZYRTEC) 10 MG tablet, TAKE 1 CAPSULE BY MOUTH DAILY, Disp: 90 tablet, Rfl: 3    fluticasone (FLONASE) 50 MCG/ACT nasal spray, INSTILL 1 SPRAY IN EACH NSOTRIL DAILY, Disp: 1 Bottle, Rfl: 1    albuterol sulfate HFA (PROVENTIL HFA) 108 (90 Base) MCG/ACT inhaler, Inhale 2 puffs into the lungs every 4 hours as needed for Wheezing or Shortness of Breath (Space out to every 6 hours as symptoms improve) Space out to every 6 hours as symptoms improve., Disp: 1 Inhaler, Rfl: 0    Dextromethorphan-guaiFENesin (MUCINEX DM MAXIMUM STRENGTH)  MG TB12, Take 1 tablet by mouth every 12 hours as needed (for cough), Disp: 20 tablet, Rfl: 0    baclofen (LIORESAL) 10 MG tablet, Take 1 tablet by mouth 3 times daily as needed (PRN), Disp: 30 tablet, Rfl: 0    promethazine-codeine (PHENERGAN WITH CODEINE) 6.25-10 MG/5ML syrup, Take 5 mLs by mouth nightly as needed for Cough. , Disp: 180 mL, Rfl: 0    ondansetron (ZOFRAN) 4 MG tablet, Take 4 mg by mouth every 8 hours as needed for Nausea or Vomiting, Disp: , Rfl:     penicillin v potassium (VEETID) 500 MG tablet, TAKE 1 TABLET BY MOUTH TWICE DAILY, Disp: 60 tablet, Rfl: 2    conjugated estrogens (PREMARIN) 0.625 MG/GM vaginal cream, insert . 5 gram vaginally three times a week at bedtime. , Disp: 1 Tube, Rfl: 3    zolpidem (AMBIEN) 5 MG tablet, Take one tablet nightly as needed for sleep, Disp: 30 tablet, Rfl: 0      Allergies   Allergen Reactions    No Known Allergies          Review of Systems: A 14 point review of systems available in the scanned medical record as documented by the patient on 9/30/20. Today's review pertinent items are noted in HPI. Review of Systems   HENT:        Thyroid disease    Genitourinary: Positive for frequency. Kidney stones / kidney disease    Musculoskeletal: Positive for back pain and neck pain. Neurological:        Neuropathy    All other systems reviewed and are negative. Physical Exam:  Temp 97.2 °F (36.2 °C) (Infrared)   Ht 5' 4.02\" (1.626 m)   Wt 151 lb 14.4 oz (68.9 kg)   LMP 12/26/2008   BMI 26.06 kg/m²         General: No acute distress, well nourished  CV: No obvious peripheral edema. Normal peripheral pulses  Neuro: Alert & oriented x 3  Psych: Normal mood and affect    Right Shoulder Exam    Patient has active forward flexion to approximately 100 degrees, 170 on the left  Active shoulder abduction to approximately 90 degrees, 160 on the left  Passive range of motion with forward flexion and abduction on the right to 160 degrees, 130 degrees respectively  Internal rotation to lumbar spine, T7 on the left  Tenderness palpation over the anterior rotator cuff, minimal tenderness palpation of the biceps region  4-/5 strength with shoulder abduction, external rotation. Internal rotation 5/5  Pain with Shyanne Rubio toast  Negative crossarm  Positive Neer's, positive Blount      Radiographic:  No new imaging today however prior x-rays were reviewed.   Patient does have a well aligned glenohumeral joint, she does have some inferior osteophytes indicative of glenohumeral joint arthritis and some cystic regions. MRI was reviewed does show rotator cuff tendinosis and glenohumeral joint arthritis. Assessment:  Jo-Ann Ureña is a 60 y.o. female with:  1. Right rotator cuff tendinitis  2. Right glenohumeral joint arthritis    Impression:  Encounter Diagnoses   Name Primary?  Tendinitis of right rotator cuff Yes    Arthritis of right glenohumeral joint        Office Procedures:  Orders Placed This Encounter   Procedures    [facility]     Referral Priority:   Routine     Referral Type:   Eval and Treat     Referral Reason:   Specialty Services Required     Requested Specialty:   Physical Therapy     Number of Visits Requested:   1    [place] ARTHROCENTESIS ASPIR&/INJ MAJOR JT/BURSA W/O US       Plan:     We discussed with the patient that given her imaging and history and physical exam we feel she has rotator cuff tendinitis as well as glenohumeral joint arthritis. She does have stiffness in her shoulder today along with significant weakness. We would like her to begin physical therapy at this time to work on these issues. This referral was given to the patient. She is unable to take anti-inflammatory medication secondary to chronic kidney disease but we did recommend taking topical Voltaren as long as okay with her primary care physician. We also recommended an intra-articular and subacromial steroid injection today to help with her pain. Patient was agreeable to this, see procedure note. We like to see her back in 4 weeks for repeat clinical exam    After discussing the risks and benefits of a corticosteroid injection, Jenna Way did state an understanding and gave verbal consent to proceed. After cleansing the injection site with Chlora-prep and using aseptic techniques,  2 CC of Depo Medrol 40mg/ml and 8 CC of 1% lidocaine were injected in the right subacromial/intra-articular region of the shoulder.  She  tolerated the procedure well with no immediate adverse sequelae after

## 2020-09-30 NOTE — LETTER
Shoulder Elbow Rehabilitation Referral    Patient Name: Maren Riojas      YOB: 1960    Diagnosis:   1. Tendinitis of right rotator cuff    2. Arthritis of right glenohumeral joint        Precautions: N/a    Post Op Instructions:  [] Continuous passive motion (CPM)  [] Elbow range of motion  [] Exercise in plane of scapula   []  Strengthening     [] Pulley and instruction    [x] Home exercise program (copy to patient)   [] Sling when arm at risk  [] Sling or brace at all times   [] AAROM: Forward elevation to             [] AAROM: External rotation to     [] Isometric external rotator strengthening [] AAROM: internal rotation: up the back  [] Isometric abductor strengthening  [] AAROM: Internal abduction     [] Isometric internal rotator strengthening [] AAROM: cross-body adduction             Stretching:     Strengthening:  [x] Four quadrant (FE, ER, IR, CBA)  [x] Rotator cuff (ER, IR, Abd)  [x] Forward Elevation    [x] External Rotators     [x] External Rotation    [x] Internal Rotators  [x] Internal Rotation: up/back   [x] Abductors     [x] Internal Rotation: supine in abduction  [x] Flexors  [x] Cross-body abduction    [x] Extensors  [x] Pendulum (FE, Abd/Add, cw/ccw)  [x] Scapular Stabilizers   [x] Wall-walking (FE, Abd)    [x] Shoulder shrugs     [x] Table slides      [x] Rhomboid pinch  [] Elbow (flex, ext, pron, sup)    [] Lat.  Pull downs     [] Medial epicondylitis program    [] Forward punch   [] Lateral epicondylitis program    [] Internal rotators     [] Progressive resistive exercises  [] Bench Press        [] Bench press plus  Activities:     [] Lateral pull-downs  [] Rowing     [] Progressive two-hand supine press  [] Stepper/Exercise bike   [] Biceps: curls/supination  [] Swimming  [] Water exercises    Modalities: PRN    Return to Sport:  [] Ultrasound     [] Plyometrics  [] Iontophoresis     [] Rhythmic stabilization  [] Moist heat     [] Core strengthening [] Massage     [] Sports specific program:   [x] Cryotherapy      [] Electrical stimulation     [] Paraffin  [] Whirlpool  [] TENS    [x] Home exercise program (copy to patient). Perform exercises for:   15     minutes    2-3      times/day  [x] Supervised physical therapy  Frequency: [x]  1x week  [x] 2x week  [] 3x week  [] Other:   Duration: [] 2 weeks   [x] 4 weeks  [x] 6 weeks  [] Other:     Additional Instructions:           Derick English MD, PhD

## 2020-10-01 ENCOUNTER — HOSPITAL ENCOUNTER (OUTPATIENT)
Dept: PHYSICAL THERAPY | Age: 60
Setting detail: THERAPIES SERIES
Discharge: HOME | End: 2020-10-01
Payer: COMMERCIAL

## 2020-10-01 PROCEDURE — 97140 MANUAL THERAPY 1/> REGIONS: CPT | Performed by: PHYSICAL THERAPIST

## 2020-10-01 PROCEDURE — 97110 THERAPEUTIC EXERCISES: CPT | Performed by: PHYSICAL THERAPIST

## 2020-10-01 PROCEDURE — 97161 PT EVAL LOW COMPLEX 20 MIN: CPT | Performed by: PHYSICAL THERAPIST

## 2020-10-01 NOTE — PLAN OF CARE
The [facility] and [facility]  [address], [place], [address] [facility]  Phone: [phone]   Fax:     [phone]                                                       Physical Therapy Certification    Dear Referring Practitioner: Dr Melina Gutierrez,    We had the pleasure of evaluating the following patient for physical therapy services at [address] [place] [address]. A summary of our findings can be found in the initial assessment below. This includes our plan of care. If you have any questions or concerns regarding these findings, please do not hesitate to contact me at the office phone number checked above. Thank you for the referral.       Physician Signature:_______________________________Date:__________________  By signing above (or electronic signature), therapists plan is approved by physician      Patient: Rita Shelley   : 1960   MRN: [id number]  Referring Physician: Referring Practitioner: Dr Melina Gutierrez      Evaluation Date: 10/1/2020      Medical Diagnosis Information:  Diagnosis: Right shoulder tendonitis, G-H arthritis  M75.81   Treatment Diagnosis: PT treatment diagnosis:  right shoulder pain  M25.511                                         Insurance information: PT Insurance Information: [facility]  visits BMN, $20 copay    Precautions/ Contra-indications: Hx of non-Hodgkins lymphoma  Latex Allergy:  [x]NO      []YES  Preferred Language for Healthcare:   [x]English       []other:    SUBJECTIVE: Patient stated complaint:  Right shoulder pain that has been ongoing for one year. No known MACY. 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 Right hand dominant. MRI: (+) G-H arthritis, RC tendonitis. Difficulty with reaching overhead, behind the back. Cortisone injection on 20 which has helped a little.       Relevant Medical History:see above  Functional Disability Index:Quickdash: 40%      Pain Scale: 8/10  Easing factors: Tyelnol  Provocative factors:  Cleaning, washing back     Type: []Constant   []Intermittent  []Radiating []Localized []other:     Numbness/Tingling: none    Functional Limitations/Impairments: [x]Lifting/reaching []Grooming [x]Carrying    [x]ADL's []Driving []Sports/Recreations   []Other:    Occupation/School: retired    Living Status/Prior Level of Function: Independent with ADLs and IADLs,  (insert highest prior level of function)      OBJECTIVE:     CERV ROM     Cervical Flexion     Cervical Extension     Cervical SB     Cervical rotation          ROM Left Right  Act/pass   Shoulder Flex 160 100/130   Shoulder Abd 160 75/110   Shoulder ER 90/T4 C7/60   Shoulder IR 50/T7 L4/40   Elbow Flex     Elbow Ext      Strength (Dynomomiter)           Strength  Left Right   Shoulder Flex  3+   Shoulder Abd  3   Shoulder ER  4-   Shoulder IR  4   Shoulder Prone Scap     Shoulder Prone Ext      Shoulder Prone HAB       Reflexes/Sensation (myotomes/dermatomes):   [x]Dermatomes/Myotomes intact    [x]Reflexes equal and normal bilaterally   []Other:    Joint mobility: G-H   []Normal    [x]Hypo   []Hyper    Palpation: supraspinatus, infraspinatus, sub-acromial space    Functional Mobility/Transfers: WNL    Posture: mild rounded shoulders    Bandages/Dressings/Incisions: n/a    Gait: (include devices/WB status)WNL     Orthopedic Special Tests: (-)drop arm                       [x] Patient history, allergies, meds reviewed. Medical chart reviewed. See intake form. Review Of Systems (ROS):  [x]Performed Review of systems (Integumentary, CardioPulmonary, Neurological) by intake and observation. Intake form has been scanned into medical record. Patient has been instructed to contact their primary care physician regarding ROS issues if not already being addressed at this time.         Co-morbidities/Complexities (which will affect course of rehabilitation):   []None           Arthritic conditions   []Rheumatoid arthritis (M05.9)  [x]Osteoarthritis (M19.91)   Cardiovascular conditions   []Hypertension (I10)  []Hyperlipidemia (E78.5)  []Angina pectoris (I20)  []Atherosclerosis (I70)   Musculoskeletal conditions   []Disc pathology   []Congenital spine pathologies   []Prior surgical intervention  []Osteoporosis (M81.8)  []Osteopenia (M85.8)   Endocrine conditions   []Hypothyroid (E03.9)  []Hyperthyroid Gastrointestinal conditions   []Constipation (M45.04)   Metabolic conditions   []Morbid obesity (E66.01)  []Diabetes type 1(E10.65) or 2 (E11.65)   []Neuropathy (G60.9)     Pulmonary conditions   []Asthma (J45)  []Coughing   []COPD (J44.9)   Psychological Disorders  []Anxiety (F41.9)  []Depression (F32.9)   []Other:   [x]Other:   Hx of cancer         Barriers to/and or personal factors that will affect rehab potential:              []Age  []Sex              []Motivation/Lack of Motivation                        [x]Co-Morbidities              []Cognitive Function, education/learning barriers              []Environmental, home barriers              []profession/work barriers  []past PT/medical experience  []other:  Justification: OA is degenerative    PACEMAKER:  - Denied having a pacemaker that would contraindicate the use of electrical modalities. METAL IMPLANTS:  - Denied metal implants that would contraindicate the use of thermal modalities. CANCER HISTORY:  - Has a history of cancer that would make the use of thermal modalities contraindicated. Falls Risk Assessment (30 days):   [x] Falls Risk assessed and no intervention required.   [] Falls Risk assessed and Patient requires intervention due to being higher risk   TUG score (>12s at risk):     [] Falls education provided, including           ASSESSMENT:   Functional Impairments   [x]Noted spinal or UE joint hypomobility   []Noted spinal or UE joint hypermobility   [x]Decreased UE functional ROM   [x]Decreased UE functional of evaluation/treatment:    []Excellent   [x]Good    []Fair   []Poor    Physical Therapy Evaluation Complexity Justification  [x] A history of present problem with:  [] no personal factors and/or comorbidities that impact the plan of care;  [x]1-2 personal factors and/or comorbidities that impact the plan of care  []3 personal factors and/or comorbidities that impact the plan of care  [x] An examination of body systems using standardized tests and measures addressing any of the following: body structures and functions (impairments), activity limitations, and/or participation restrictions;:  [] a total of 1-2 or more elements   [] a total of 3 or more elements   [x] a total of 4 or more elements   [x] A clinical presentation with:  [x] stable and/or uncomplicated characteristics   [] evolving clinical presentation with changing characteristics  [] unstable and unpredictable characteristics;   [x] Clinical decision making of [x] low, [] moderate, [] high complexity using standardized patient assessment instrument and/or measurable assessment of functional outcome. [x] EVAL (LOW) 32319 (typically 20 minutes face-to-face)  [] EVAL (MOD) 08217 (typically 30 minutes face-to-face)  [] EVAL (HIGH) 88610 (typically 45 minutes face-to-face)  [] RE-EVAL     PLAN:  Frequency/Duration:  2 days per week for 6 Weeks:  INTERVENTIONS:  1. Therapeutic exercise including: strength training, ROM, NMR and proprioception for the scapula, core and Upper extremity  2. Manual therapy as indicated including Dry Needling/IASTM, STM, PROM, Gr I-IV mobilizations, spinal mobilization/manipulation. 3. Modalities as needed including: thermal agents, E-stim, US, iontophoresis as indicated. 4. Patient education on joint protection, activity modification, progression of HEP. HEP instruction: See Medial File (see scanned forms)    GOALS:  Patient stated goal: decrease pain      Therapist goals for Patient:   Short Term Goals:  To be achieved in: 2 weeks  1. Independent in HEP and progression per patient tolerance, in order to prevent re-injury. [] Progressing: [] Met: [] Not Met: [] Adjusted  2. Patient will have a decrease in pain to facilitate improvement in movement, function, and ADLs as indicated by Functional Deficits. [] Progressing: [] Met: [] Not Met: [] Adjusted    Long Term Goals: To be achieved in: 6 weeks  1. Disability index score of 20% or less for the Quickdash to assist with reaching prior level of function. [] Progressing: [] Met: [] Not Met: [] Adjusted  2. Patient will demonstrate increased AROM to 160 R shoulder flex/abd, 90 ER, 50 IR to allow for proper joint functioning as indicated by patients Functional Deficits. [] Progressing: [] Met: [] Not Met: [] Adjusted  3. Patient will demonstrate an increase in Strength to 4+/5 R shoulder flex/abd/ER to allow for proper functional mobility as indicated by patients Functional Deficits. [] Progressing: [] Met: [] Not Met: [] Adjusted  4. Patient will return to reaching behind back to wash without increased symptoms or restriction.    [] Progressing: [] Met: [] Not Met: [] Adjusted      Electronically signed by: Neeta Mott PT, OMT-C

## 2020-10-01 NOTE — FLOWSHEET NOTE
[facility] - [facility], [place]    Physical Therapy Treatment Note/ Progress Report:   Date:  10/1/2020    Patient Name:  Celeste Hoover    :  1960  MRN: [id number]  Restrictions/Precautions:    Medical/Treatment Diagnosis Information:  · Diagnosis: Right shoulder tendonitis, G-H arthritis  M75.81  · Treatment Diagnosis: PT treatment diagnosis:  right shoulder pain  R95.640  Insurance/Certification information:  PT Insurance Information: [facility]  visits BMN, $20 copay  Physician Information:  Referring Practitioner: Dr Dimple Habermann of care signed (Y/N):     Date of Patient follow up with Physician:     Is this a Progress Report:     []  Yes  [x]  No        If Yes:  Date Range for reporting period:  Beginning  Ending    Progress report will be due (10 Rx or 30 days whichever is less): 07/3/10       Recertification will be due (POC Duration  / 90 days whichever is less): 11/15/20     Date of Surgery:        Visit # Insurance Allowable Auth Required   1 BMN []  Yes []  No        Functional Scale:     Date assessed:        Latex Allergy:  [x]NO      []YES  Preferred Language for Healthcare:   [x]English       []other    Pain level:  8/10     SUBJECTIVE:  See eval    Type: []Constant   []Intermitment  []Radiating []Localized  []other:     Functional Limitations: []Lifting/reaching []Grooming  []Carrying     []ADL's  []Driving []Sports/Recreations   []Other:      OBJECTIVE:     ROM Current (R) Current (L)                       Strength                           Gait:     Joint mobility:    []Normal    []Hypo   []Hyper    Palpation:     Orthopedic Tests:     RESTRICTIONS/PRECAUTIONS: Hx of non-Hodgkins-lymphoma, OA    Exercises/Interventions:       Stretching/AAROM  Repetitions/Resistance Notes/Last Progression   Pulley/Wallslides     Cane Flex/ ER-seated 10x10''    IR Strap     Sleeper Stretch     Tableslide Flex/ ER/ Abd 10x10''    Bear Hug Stretch     Cross Arm Stretch Upper Trap Stretch     Levator Scapula Stretch     Corner Stretch                  Exercises     Shrugs/ Shoulder Blade Squeeze     Shoulder Isometrics 10x10'' each Flex, IR, ER   SA Punch/ ABC     Supine Short Lever Flex     Supine Flexion     SL ER/ SL Abd     Prone Row/ Ext/ HAB/ Scap     TB Row/ Ext/ LTD     TB ER/ IR     No Money/ HAB     Finisher                      Manual       Oscillations-Mobs:  G-I, II, III, IV (PA's, Inf., Post.) 5'    PROM 10'    Cervical PA's Grade-     Thoracic PA's Grade-     STM-               Access Code: [address]   URL: [email or web address]   Date: 10/01/2020   Prepared by: Arleen Devoid     Exercises   Seated Shoulder Flexion Towel Slide at Table Top - 10 reps - 10 seconds hold - 2x daily - 7x weekly   Seated Shoulder External Rotation AAROM with Cane and Hand in Neutral - 10 reps - 10 seconds hold - 2x daily - 7x weekly   Isometric Shoulder Flexion at Wall - 10 reps - 10 seconds hold - 2x daily - 7x weekly   Standing Isometric Shoulder External Rotation with Doorway - 10 reps - 10 seconds hold - 2x daily - 7x weekly   Standing Isometric Shoulder Internal Rotation at Doorway - 10 reps - 10 seconds hold - 2x daily - 7x weekly       Therapeutic Exercise and NMR EXR  [] (19780) Provided verbal/tactile cueing for activities related to strengthening, flexibility, endurance, ROM  for improvements in scapular, scapulothoracic and UE control with self care, reaching, carrying, lifting, house/yardwork, driving/computer work.    [] (64232) Provided verbal/tactile cueing for activities related to improving balance, coordination, kinesthetic sense, posture, motor skill, proprioception  to assist with  scapular, scapulothoracic and UE control with self care, reaching, carrying, lifting, house/yardwork, driving/computer work.     Therapeutic Activities:    [] (19827 or 32162) Provided verbal/tactile cueing for activities related to improving balance, coordination, kinesthetic sense, pain to facilitate improvement in movement, function, and ADLs as indicated by Functional Deficits. [] Progressing: [] Met: [] Not Met: [] Adjusted    Long Term Goals: To be achieved in: 6 weeks  1. Disability index score of 20% or less for the Quickdash to assist with reaching prior level of function. [] Progressing: [] Met: [] Not Met: [] Adjusted  2. Patient will demonstrate increased AROM to 160 R shoulder flex/abd, 90 ER, 50 IR to allow for proper joint functioning as indicated by patients Functional Deficits. [] Progressing: [] Met: [] Not Met: [] Adjusted  3. Patient will demonstrate an increase in Strength to 4+/5 R shoulder flex/abd/ER to allow for proper functional mobility as indicated by patients Functional Deficits. [] Progressing: [] Met: [] Not Met: [] Adjusted  4. Patient will return to reaching behind back to wash without increased symptoms or restriction. [] Progressing: [] Met: [] Not Met: [] Adjusted      Overall Progression Towards Functional goals/ Treatment Progress Update:  [] Patient is progressing as expected towards functional goals listed. [] Progression is slowed due to complexities/Impairments listed. [] Progression has been slowed due to co-morbidities.   [x] Plan just implemented, too soon to assess goals progression <30days   [] Goals require adjustment due to lack of progress  [] Patient is not progressing as expected and requires additional follow up with physician  [] Other    ASSESSMENT:  See eval    Treatment/Activity Tolerance:  [x] Patient tolerated treatment well [] Patient limited by fatique  [] Patient limited by pain  [] Patient limited by other medical complications  [] Other:     Prognosis: [x] Good [] Fair  [] Poor    Patient Requires Follow-up: [x] Yes  [] No    PLAN: See eval  [] Continue per plan of care [] Alter current plan (see comments)  [x] Plan of care initiated [] Hold pending MD visit [] Discharge        Electronically signed by: Kelly Amin PT, OMT-C        Note: If patient does not return for scheduled/ recommended follow up visits, this note will serve as a discharge from care along with most recent update on progress.

## 2020-10-02 ENCOUNTER — CARE COORDINATION (OUTPATIENT)
Dept: CARE COORDINATION | Age: 60
End: 2020-10-02

## 2020-10-05 ENCOUNTER — HOSPITAL ENCOUNTER (OUTPATIENT)
Dept: PHYSICAL THERAPY | Age: 60
Setting detail: THERAPIES SERIES
Discharge: HOME | End: 2020-10-05
Payer: COMMERCIAL

## 2020-10-05 PROCEDURE — 97110 THERAPEUTIC EXERCISES: CPT | Performed by: PHYSICAL THERAPIST

## 2020-10-05 PROCEDURE — 97140 MANUAL THERAPY 1/> REGIONS: CPT | Performed by: PHYSICAL THERAPIST

## 2020-10-05 NOTE — FLOWSHEET NOTE
[facility] - [facility]opa[facility]ics and [facility] Rehabilitation, [place]    Physical Therapy Treatment Note/ Progress Report:   Date:  10/5/2020    Patient Name:  Rosa M Mccord    :  1960  MRN: [id number]  Restrictions/Precautions:    Medical/Treatment Diagnosis Information:  · Diagnosis: Right shoulder tendonitis, G-H arthritis  M75.81  · Treatment Diagnosis: PT treatment diagnosis:  right shoulder pain  S34.351  Insurance/Certification information:  PT Insurance Information: [facility]  visits BMN, $20 copay  Physician Information:  Referring Practitioner: Dr China Villalobos of care signed (Y/N):     Date of Patient follow up with Physician:     Is this a Progress Report:     []  Yes  [x]  No        If Yes:  Date Range for reporting period:  Beginning  Ending    Progress report will be due (10 Rx or 30 days whichever is less): 57/3/33       Recertification will be due (POC Duration  / 90 days whichever is less): 11/15/20     Date of Surgery:        Visit # Insurance Allowable Auth Required   2 BMN []  Yes []  No        Functional Scale:     Date assessed:        Latex Allergy:  [x]NO      []YES  Preferred Language for Healthcare:   [x]English       []other    Pain level:  8/10     SUBJECTIVE:  Reports using Voltaren gel on the shoulder a couple of times per day which has helped with pain symptoms.      Type: []Constant   []Intermitment  []Radiating []Localized  []other:     Functional Limitations: []Lifting/reaching []Grooming  []Carrying     []ADL's  []Driving []Sports/Recreations   []Other:      OBJECTIVE:     ROM Current (R) Current (L)                       Strength                           Gait:     Joint mobility:    []Normal    []Hypo   []Hyper    Palpation:     Orthopedic Tests:     RESTRICTIONS/PRECAUTIONS: Hx of non-Hodgkins-lymphoma, OA    Exercises/Interventions:       Stretching/AAROM  Repetitions/Resistance Notes/Last Progression   Pulley/Wallslides 3'/--    Cane Flex/ ER-seated 10x10''    IR Strap     Sleeper Stretch     Tableslide Flex/ ER/ Abd 10x10''    Bear Hug Stretch     Cross Arm Stretch     Upper Trap Stretch     Levator Scapula Stretch     Corner Stretch                  Exercises     Shrugs/ Shoulder Blade Squeeze     Shoulder Isometrics 10x10'' each Flex, IR, ER   SA Punch/ ABC 2x15/1x    Supine Short Lever Flex 3x10    Supine Flexion     SL ER/ SL Abd 3x10/--    Prone Row/ Ext/ HAB/ Scap 3x10    TB Row/ Ext/ LTD     TB ER/ IR     No Money/ HAB     Finisher                      Manual       Oscillations-Mobs:  G-I, II, III, IV (PA's, Inf., Post.) 5'    PROM 10'    Cervical PA's Grade-     Thoracic PA's Grade-     STM-               Access Code: [address]   URL: [email or web address]   Date: 10/01/2020   Prepared by: Carmel Garcia     Exercises   Seated Shoulder Flexion Towel Slide at Table Top - 10 reps - 10 seconds hold - 2x daily - 7x weekly   Seated Shoulder External Rotation AAROM with Cane and Hand in Neutral - 10 reps - 10 seconds hold - 2x daily - 7x weekly   Isometric Shoulder Flexion at Wall - 10 reps - 10 seconds hold - 2x daily - 7x weekly   Standing Isometric Shoulder External Rotation with Doorway - 10 reps - 10 seconds hold - 2x daily - 7x weekly   Standing Isometric Shoulder Internal Rotation at Doorway - 10 reps - 10 seconds hold - 2x daily - 7x weekly       Therapeutic Exercise and NMR EXR  [x] (98388) Provided verbal/tactile cueing for activities related to strengthening, flexibility, endurance, ROM  for improvements in scapular, scapulothoracic and UE control with self care, reaching, carrying, lifting, house/yardwork, driving/computer work.    [] (74406) Provided verbal/tactile cueing for activities related to improving balance, coordination, kinesthetic sense, posture, motor skill, proprioception  to assist with  scapular, scapulothoracic and UE control with self care, reaching, carrying, lifting, house/yardwork, driving/computer work.     Therapeutic Activities: [] (21118 or 83888) Provided verbal/tactile cueing for activities related to improving balance, coordination, kinesthetic sense, posture, motor skill, proprioception and motor activation to allow for proper function of scapular, scapulothoracic and UE control with self care, carrying, lifting, driving/computer work. Home Exercise Program:    [x] (25284) Reviewed/Progressed HEP activities related to strengthening, flexibility, endurance, ROM of scapular, scapulothoracic and UE control with self care, reaching, carrying, lifting, house/yardwork, driving/computer work  [] (42810) Reviewed/Progressed HEP activities related to improving balance, coordination, kinesthetic sense, posture, motor skill, proprioception of scapular, scapulothoracic and UE control with self care, reaching, carrying, lifting, house/yardwork, driving/computer work      Manual Treatments:  PROM / STM / Oscillations-Mobs:  G-I, II, III, IV (PA's, Inf., Post.)  [x] (14844) Provided manual therapy to mobilize soft tissue/joints of cervical/CT, scapular GHJ and UE for the purpose of modulating pain, promoting relaxation,  increasing ROM, reducing/eliminating soft tissue swelling/inflammation/restriction, improving soft tissue extensibility and allowing for proper ROM for normal function with self care, reaching, carrying, lifting, house/yardwork, driving/computer work    Modalities:  CP x 10'    Charges:  Timed Code Treatment Minutes: 45   Total Treatment Minutes: 55     [] EVAL (LOW) 25146 (typically 20 minutes face-to-face)  [] EVAL (MOD) 92207 (typically 30 minutes face-to-face)  [] EVAL (HIGH) 71649 (typically 45 minutes face-to-face)  [] RE-EVAL     [x] QP(29041) x 2     [] IONTO  [] NMR (59200) x     [] VASO  [x] Manual (96409) x 1      [] Other:  [] TA x      [] Mech Traction (67530)  [] ES(attended) (98229)      [] ES (un) (01695):     GOALS:  Short Term Goals: To be achieved in: 2 weeks  1.  Independent in HEP and progression per patient tolerance, in order to prevent re-injury. [] Progressing: [] Met: [] Not Met: [] Adjusted  2. Patient will have a decrease in pain to facilitate improvement in movement, function, and ADLs as indicated by Functional Deficits. [] Progressing: [] Met: [] Not Met: [] Adjusted    Long Term Goals: To be achieved in: 6 weeks  1. Disability index score of 20% or less for the Quickdash to assist with reaching prior level of function. [] Progressing: [] Met: [] Not Met: [] Adjusted  2. Patient will demonstrate increased AROM to 160 R shoulder flex/abd, 90 ER, 50 IR to allow for proper joint functioning as indicated by patients Functional Deficits. [] Progressing: [] Met: [] Not Met: [] Adjusted  3. Patient will demonstrate an increase in Strength to 4+/5 R shoulder flex/abd/ER to allow for proper functional mobility as indicated by patients Functional Deficits. [] Progressing: [] Met: [] Not Met: [] Adjusted  4. Patient will return to reaching behind back to wash without increased symptoms or restriction. [] Progressing: [] Met: [] Not Met: [] Adjusted      Overall Progression Towards Functional goals/ Treatment Progress Update:  [] Patient is progressing as expected towards functional goals listed. [] Progression is slowed due to complexities/Impairments listed. [] Progression has been slowed due to co-morbidities. [x] Plan just implemented, too soon to assess goals progression <30days   [] Goals require adjustment due to lack of progress  [] Patient is not progressing as expected and requires additional follow up with physician  [] Other    ASSESSMENT:  Tolerated progression to AROM well with no increase in pain. Did fatigue easily with progression.      Treatment/Activity Tolerance:  [x] Patient tolerated treatment well [] Patient limited by fatique  [] Patient limited by pain  [] Patient limited by other medical complications  [] Other:     Prognosis: [x] Good [] Fair  [] Poor    Patient Requires Follow-up: [x] Yes  [] No    PLAN: See eval  [x] Continue per plan of care [] Alter current plan (see comments)  [] Plan of care initiated [] Hold pending MD visit [] Discharge        Electronically signed by: Neeta Mott PT, OMT-C        Note: If patient does not return for scheduled/ recommended follow up visits, this note will serve as a discharge from care along with most recent update on progress.

## 2020-10-07 ENCOUNTER — OFFICE VISIT (OUTPATIENT)
Dept: INTERNAL MEDICINE CLINIC | Age: 60
End: 2020-10-07
Payer: COMMERCIAL

## 2020-10-07 ENCOUNTER — HOSPITAL ENCOUNTER (OUTPATIENT)
Dept: PHYSICAL THERAPY | Age: 60
Setting detail: THERAPIES SERIES
Discharge: HOME | End: 2020-10-07
Payer: COMMERCIAL

## 2020-10-07 VITALS
DIASTOLIC BLOOD PRESSURE: 78 MMHG | HEIGHT: 64 IN | OXYGEN SATURATION: 96 % | SYSTOLIC BLOOD PRESSURE: 110 MMHG | TEMPERATURE: 97.7 F | HEART RATE: 87 BPM | BODY MASS INDEX: 26.63 KG/M2 | WEIGHT: 156 LBS

## 2020-10-07 PROCEDURE — 97110 THERAPEUTIC EXERCISES: CPT | Performed by: PHYSICAL THERAPIST

## 2020-10-07 PROCEDURE — G0438 PPPS, INITIAL VISIT: HCPCS | Performed by: INTERNAL MEDICINE

## 2020-10-07 PROCEDURE — G0008 ADMIN INFLUENZA VIRUS VAC: HCPCS | Performed by: INTERNAL MEDICINE

## 2020-10-07 PROCEDURE — 90686 IIV4 VACC NO PRSV 0.5 ML IM: CPT | Performed by: INTERNAL MEDICINE

## 2020-10-07 PROCEDURE — 97140 MANUAL THERAPY 1/> REGIONS: CPT | Performed by: PHYSICAL THERAPIST

## 2020-10-07 RX ORDER — CHOLECALCIFEROL (VITAMIN D3) 25 MCG
1 CAPSULE ORAL DAILY
Qty: 90 CAPSULE | Refills: 0 | Status: SHIPPED | OUTPATIENT
Start: 2020-10-07 | End: 2021-04-06

## 2020-10-07 RX ORDER — BROMPHENIRAMINE MALEATE, PSEUDOEPHEDRINE HYDROCHLORIDE, AND DEXTROMETHORPHAN HYDROBROMIDE 2; 30; 10 MG/5ML; MG/5ML; MG/5ML
5 SYRUP ORAL 4 TIMES DAILY PRN
Qty: 240 ML | Refills: 0 | Status: SHIPPED | OUTPATIENT
Start: 2020-10-07 | End: 2020-12-22 | Stop reason: SDUPTHER

## 2020-10-07 RX ORDER — LEVOTHYROXINE SODIUM 0.05 MG/1
50 TABLET ORAL EVERY MORNING
Qty: 90 TABLET | Refills: 0 | Status: SHIPPED | OUTPATIENT
Start: 2020-10-07 | End: 2020-12-22 | Stop reason: SDUPTHER

## 2020-10-07 ASSESSMENT — PATIENT HEALTH QUESTIONNAIRE - PHQ9
2. FEELING DOWN, DEPRESSED OR HOPELESS: 0
SUM OF ALL RESPONSES TO PHQ9 QUESTIONS 1 & 2: 0
SUM OF ALL RESPONSES TO PHQ QUESTIONS 1-9: 0
1. LITTLE INTEREST OR PLEASURE IN DOING THINGS: 0
SUM OF ALL RESPONSES TO PHQ QUESTIONS 1-9: 0

## 2020-10-07 ASSESSMENT — LIFESTYLE VARIABLES
HOW OFTEN DO YOU HAVE A DRINK CONTAINING ALCOHOL: 2
AUDIT TOTAL SCORE: 3
HAVE YOU OR SOMEONE ELSE BEEN INJURED AS A RESULT OF YOUR DRINKING: 0
HAS A RELATIVE, FRIEND, DOCTOR, OR ANOTHER HEALTH PROFESSIONAL EXPRESSED CONCERN ABOUT YOUR DRINKING OR SUGGESTED YOU CUT DOWN: 0
HOW OFTEN DURING THE LAST YEAR HAVE YOU FAILED TO DO WHAT WAS NORMALLY EXPECTED FROM YOU BECAUSE OF DRINKING: 0
HOW OFTEN DURING THE LAST YEAR HAVE YOU BEEN UNABLE TO REMEMBER WHAT HAPPENED THE NIGHT BEFORE BECAUSE YOU HAD BEEN DRINKING: 0
HOW OFTEN DURING THE LAST YEAR HAVE YOU HAD A FEELING OF GUILT OR REMORSE AFTER DRINKING: 0
HOW OFTEN DURING THE LAST YEAR HAVE YOU FOUND THAT YOU WERE NOT ABLE TO STOP DRINKING ONCE YOU HAD STARTED: 0
HOW MANY STANDARD DRINKS CONTAINING ALCOHOL DO YOU HAVE ON A TYPICAL DAY: 0
HOW OFTEN DURING THE LAST YEAR HAVE YOU NEEDED AN ALCOHOLIC DRINK FIRST THING IN THE MORNING TO GET YOURSELF GOING AFTER A NIGHT OF HEAVY DRINKING: 0
AUDIT-C TOTAL SCORE: 3
HOW OFTEN DO YOU HAVE SIX OR MORE DRINKS ON ONE OCCASION: 1

## 2020-10-07 NOTE — FLOWSHEET NOTE
[facility] - Orthopaedics and Sports Rehabilitation, [facility]    Physical Therapy Treatment Note/ Progress Report:   Date:  10/7/2020    Patient Name:  Michelle Dial    :  1960  MRN: [id number]  Restrictions/Precautions:    Medical/Treatment Diagnosis Information:  · Diagnosis: Right shoulder tendonitis, G-H arthritis  M75.81  · Treatment Diagnosis: PT treatment diagnosis:  right shoulder pain  W33.258  Insurance/Certification information:  PT Insurance Information: [facility]  visits BMN, $20 copay  Physician Information:  Referring Practitioner: Dr Neel Conner of care signed (Y/N):     Date of Patient follow up with Physician:     Is this a Progress Report:     []  Yes  [x]  No        If Yes:  Date Range for reporting period:  Beginning  Ending    Progress report will be due (10 Rx or 30 days whichever is less):        Recertification will be due (POC Duration  / 90 days whichever is less): 11/15/20     Date of Surgery:        Visit # Insurance Allowable Auth Required   3 BMN []  Yes []  No        Functional Scale:     Date assessed:        Latex Allergy:  [x]NO      []YES  Preferred Language for Healthcare:   [x]English       []other    Pain level:  8/10     SUBJECTIVE:  States the shoulder is feeling a little better overall. Had some increased muscular soreness after the last visit but no sharp pain.       Type: []Constant   []Intermitment  []Radiating []Localized  []other:     Functional Limitations: []Lifting/reaching []Grooming  []Carrying     []ADL's  []Driving []Sports/Recreations   []Other:      OBJECTIVE:     ROM Current (R) Current (L)                       Strength                           Gait:     Joint mobility:    []Normal    []Hypo   []Hyper    Palpation:     Orthopedic Tests:     RESTRICTIONS/PRECAUTIONS: Hx of non-Hodgkins-lymphoma, OA    Exercises/Interventions:       Stretching/AAROM  Repetitions/Resistance Notes/Last Progression   Pulley/Sara 3'/--    Dustin Yee reaching, carrying, lifting, house/yardwork, driving/computer work.    [] (32544) Provided verbal/tactile cueing for activities related to improving balance, coordination, kinesthetic sense, posture, motor skill, proprioception  to assist with  scapular, scapulothoracic and UE control with self care, reaching, carrying, lifting, house/yardwork, driving/computer work. Therapeutic Activities:    [] (49190 or 19455) Provided verbal/tactile cueing for activities related to improving balance, coordination, kinesthetic sense, posture, motor skill, proprioception and motor activation to allow for proper function of scapular, scapulothoracic and UE control with self care, carrying, lifting, driving/computer work.      Home Exercise Program:    [x] (52849) Reviewed/Progressed HEP activities related to strengthening, flexibility, endurance, ROM of scapular, scapulothoracic and UE control with self care, reaching, carrying, lifting, house/yardwork, driving/computer work  [] (10098) Reviewed/Progressed HEP activities related to improving balance, coordination, kinesthetic sense, posture, motor skill, proprioception of scapular, scapulothoracic and UE control with self care, reaching, carrying, lifting, house/yardwork, driving/computer work      Manual Treatments:  PROM / STM / Oscillations-Mobs:  G-I, II, III, IV (PA's, Inf., Post.)  [x] (10999) Provided manual therapy to mobilize soft tissue/joints of cervical/CT, scapular GHJ and UE for the purpose of modulating pain, promoting relaxation,  increasing ROM, reducing/eliminating soft tissue swelling/inflammation/restriction, improving soft tissue extensibility and allowing for proper ROM for normal function with self care, reaching, carrying, lifting, house/yardwork, driving/computer work    Modalities:  CP x 10'    Charges:  Timed Code Treatment Minutes: 45   Total Treatment Minutes: 55     [] EVAL (LOW) 71972 (typically 20 minutes face-to-face)  [] EVAL (MOD) 57815 (typically 30 minutes face-to-face)  [] EVAL (HIGH) 12529 (typically 45 minutes face-to-face)  [] RE-EVAL     [x] PH(15130) x 2     [] IONTO  [] NMR (59736) x     [] VASO  [x] Manual (67194) x 1      [] Other:  [] TA x      [] Mech Traction (68556)  [] ES(attended) (26637)      [] ES (un) (89290):     GOALS:  Short Term Goals: To be achieved in: 2 weeks  1. Independent in HEP and progression per patient tolerance, in order to prevent re-injury. [] Progressing: [x] Met: [] Not Met: [] Adjusted  2. Patient will have a decrease in pain to facilitate improvement in movement, function, and ADLs as indicated by Functional Deficits. [x] Progressing: [] Met: [] Not Met: [] Adjusted    Long Term Goals: To be achieved in: 6 weeks  1. Disability index score of 20% or less for the Quickdash to assist with reaching prior level of function. [] Progressing: [] Met: [] Not Met: [] Adjusted  2. Patient will demonstrate increased AROM to 160 R shoulder flex/abd, 90 ER, 50 IR to allow for proper joint functioning as indicated by patients Functional Deficits. [] Progressing: [] Met: [] Not Met: [] Adjusted  3. Patient will demonstrate an increase in Strength to 4+/5 R shoulder flex/abd/ER to allow for proper functional mobility as indicated by patients Functional Deficits. [] Progressing: [] Met: [] Not Met: [] Adjusted  4. Patient will return to reaching behind back to wash without increased symptoms or restriction. [] Progressing: [] Met: [] Not Met: [] Adjusted      Overall Progression Towards Functional goals/ Treatment Progress Update:  [] Patient is progressing as expected towards functional goals listed. [] Progression is slowed due to complexities/Impairments listed. [] Progression has been slowed due to co-morbidities.   [x] Plan just implemented, too soon to assess goals progression <30days   [] Goals require adjustment due to lack of progress  [] Patient is not progressing as expected and requires additional follow up with physician  [] Other    ASSESSMENT:  Moderate discomfort noted with passive forward flexion. No increase in symptoms with passive abd or ER. Fatigued easily with progression of shoulder strengthening. Treatment/Activity Tolerance:  [x] Patient tolerated treatment well [] Patient limited by fatique  [] Patient limited by pain  [] Patient limited by other medical complications  [] Other:     Prognosis: [x] Good [] Fair  [] Poor    Patient Requires Follow-up: [x] Yes  [] No    PLAN: See eval  [x] Continue per plan of care [] Alter current plan (see comments)  [] Plan of care initiated [] Hold pending MD visit [] Discharge        Electronically signed by: Doroteo Garcia PT, OMT-C        Note: If patient does not return for scheduled/ recommended follow up visits, this note will serve as a discharge from care along with most recent update on progress.

## 2020-10-07 NOTE — PATIENT INSTRUCTIONS
TAKE MED AS ADVISED    DIET/ EXERCISE. FOLLOW UP WITHIN 3 MONTHS / AS NEEDED    Personalized Preventive Plan for Rita Shelley - 10/7/2020  Medicare offers a range of preventive health benefits. Some of the tests and screenings are paid in full while other may be subject to a deductible, co-insurance, and/or copay. Some of these benefits include a comprehensive review of your medical history including lifestyle, illnesses that may run in your family, and various assessments and screenings as appropriate. After reviewing your medical record and screening and assessments performed today your provider may have ordered immunizations, labs, imaging, and/or referrals for you. A list of these orders (if applicable) as well as your Preventive Care list are included within your After Visit Summary for your review. Other Preventive Recommendations:    · A preventive eye exam performed by an eye specialist is recommended every 1-2 years to screen for glaucoma; cataracts, macular degeneration, and other eye disorders. · A preventive dental visit is recommended every 6 months. · Try to get at least 150 minutes of exercise per week or 10,000 steps per day on a pedometer . · Order or download the FREE \"Exercise & Physical Activity: Your Everyday Guide\" from [employer]. Call 9-630.776.7394 or search [employer] online. · You need 4665-9815 mg of calcium and 0876-6152 IU of vitamin D per day. It is possible to meet your calcium requirement with diet alone, but a vitamin D supplement is usually necessary to meet this goal.  · When exposed to the sun, use a sunscreen that protects against both UVA and UVB radiation with an SPF of 30 or greater. Reapply every 2 to 3 hours or after sweating, drying off with a towel, or swimming. · Always wear a seat belt when traveling in a car. Always wear a helmet when riding a bicycle or motorcycle.

## 2020-10-07 NOTE — PROGRESS NOTES
Medicare Annual Wellness Visit  Name: Jessica Cortez Date: 10/7/2020   MRN: <[id number]> Sex: Female   Age: 60 y.o. Ethnicity: Non-/Non    : 1960 Race: Black      [place] Pineda is here for Medicare AWV and Other    LAST PHYSICAL > 1 YR  HYPOTHYROIDISM - TAKING MED. Iam Mata. NO COLD / NO HEAT INTOLERANCE    [facility] - ? DIET / Margarita Savoy D/W PT  RT RADICULAR  SHOULDER PAIN  -   SHARP PAIN. INCREASED WITH MVT, + RAD TO RT ARM,  PAIN SCALE 6/10 @ [facility]. DENIES TRAUMA, NO NUMBNESS / NO TINGLING. ABN MRI. S/P ORTHO EVAL. S/P INJECTION AND PT IN PHYSICAL THERAPY - HELPING SOME  VIT D DEF - TAKING MED.  RECENT LAB D/W PT  PREDIABETES -  DIET / EXERCISE REVIEWED. LABS D/W PT  GERD - ON NEXIUM. NO HEARTBURN, NO BELCHING              CKD - AVOIDING NSAIDS.  RECENT LAB D/W PT.  ALLERGIC RHINITIS - + NASAL CONGESTION, OCC POSTNASAL DRAINAGE , NO SINUS PRESSURE, NO HA, ? SNEEZING, + OCC WATERY ITCHY EYES.         NON HODGKIN'S LYMPHOMA -  S/P SURGERY, S/P CHEMOX. S/P BONE MARROW TRANSPLANT.  FOLLOWING WITH HEM/ONC   NEEDS FLU VACCINE      DENIES CP, NO SOB, No PALPITATIONS,  COUGH - OLD - OCC CLEAR PHLEGM, NO HEMOPTYSIS, NO F/C  No ABD PAIN, No N/V, No DIARRHEA, No CONSTIPATION, No MELENA, No HEMATOCHEZIA. No DYSURIA, No FREQ, No URGENCY, No HEMATURIA       Screenings for behavioral, psychosocial and functional/safety risks, and cognitive dysfunction are all negative except as indicated below. These results, as well as other patient data from the [address] form, are documented in Flowsheets linked to this Encounter. Allergies   Allergen Reactions    No Known Allergies          Prior to Visit Medications    Medication Sig Taking?  Authorizing Provider   esomeprazole (NEXIUM) 40 MG delayed release capsule Take 1 capsule by mouth daily  Kristy Goins MD   Cholecalciferol (VITAMIN D-3) 25 MCG (1000 UT) CAPS Take 1 capsule by mouth daily  Kristy Goins MD 04/13/2017, 06/15/2017    Hepatitis B 06/03/2009    Hepatitis B (Engerix-B) 11/10/2016, 01/19/2017, 04/13/2017    Hepatitis B Ped/Adol (Engerix-B, Recombivax HB) 06/03/2009    Influenza Vaccine, unspecified formulation 12/01/2017    Influenza Whole 12/13/2010, 09/21/2011    Influenza, Quadv, IM, PF (6 mo and older Fluzone, Flulaval, Fluarix, and 3 yrs and older Afluria) 12/31/2019, 10/07/2020    Pneumococcal Conjugate 13-valent ([id number]) 12/31/2019    Pneumococcal Polysaccharide ([id number]) 11/10/2016, 01/19/2017, 04/13/2017    Polio IPV (IPOL) 01/19/2017, 04/13/2017, 06/15/2017    Tdap (Boostrix, Adacel) 11/10/2016, 01/19/2017, 04/13/2017        Health Maintenance   Topic Date Due    Colon cancer screen colonoscopy  12/07/2009    Annual Wellness Visit (AWV)  06/21/2019    Breast cancer screen  06/26/2021    A1C test (Diabetic or Prediabetic)  09/21/2021    TSH testing  09/21/2021    Lipid screen  09/21/2025    DTaP/Tdap/Td vaccine (4 - Td) 04/13/2027    Flu vaccine  Completed    Pneumococcal 0-64 years Vaccine  Completed    Hepatitis C screen  Completed    HIV screen  Completed    Hepatitis A vaccine  Aged Out    Hepatitis B vaccine  Aged Out    Hib vaccine  Aged Out    Meningococcal (ACWY) vaccine  Aged Out     Recommendations for [place] Due: see orders and patient instructions/AVS.  . Recommended screening schedule for the next 5-10 years is provided to the patient in written form: see Patient Instructions/AVS.    Zaria Fair was seen today for medicare awv and other.           ROS: COMPREHENSIVE ROS AS IN HX, REST -VE  History obtained from chart review and the patient    OBJECTIVE:   NURSING NOTE AND VITALS REVIEWED  /68 (Site: Left Upper Arm, Position: Sitting, Cuff Size: Medium Adult)   Pulse 87   Temp 97.7 °F (36.5 °C)   Ht 5' 4\" (1.626 m)   Wt 156 lb (70.8 kg)   LMP 12/26/2008   SpO2 96%   BMI 26.78 kg/m²     NO ACUTE DISTRESS    REPEAT BP: 110/78 (LT), NO ORTHOSTASIS     Body mass index is 26.78 kg/m². HEENT: NO PALLOR, ANICTERIC, PERRLA, EOMI, NO CONJUNCTIVAL ERYTHEMA,                 NO SINUS TENDERNESS  NECK:  SUPPLE, TRACHEA MIDLINE, NT, NO JVD, NO CB, NO LA, NO TM, NO STIFFNESS  CHEST: RESPY EFFORT NL, GOOD AE, NO W/R/C  HEART: S1S2+ REG, NO M/G/R  ABD: SOFT, NT, NO HSM, BS+  EXT: NO EDEMA, NT, PULSES +. JIMMY'S -VE  NEURO: ALERT AND ORIENTED X 3, NO MENINGEAL SIGNS, NO TREMORS, NL GAIT, NO FOCAL DEFICITS  PSYCH: FAIRLY GOOD AFFECT  BACK: NT, NO ROM, NO CVA TENDERNESS  SHOULDER: ? TENDERNESS RT, + PAIN WITH MVT RT, + ROM RT      PREVIOUS LABS / MRI REVIEWED AND D/W PT       ASSESSMENT / PLAN:     Diagnosis Orders   1. Routine general medical examination at a health care facility  COUNSELLED. HEALTH / SAFETY EDUCATION REVIEWED. HEALTH MAINTENANCE UPDATED  IMMUNIZATION REVIEWED AND UPDATED  MAKE CHANGES AS NEEDED. 2. Acquired hypothyroidism  COUNSELLED. STABLE. MONITOR ON SYNTHROID.  MAKE CHANGES AS NEEDED       3. Mixed hyperlipidemia  COUNSELLED. ADVISED LOW FAT / CHOL DIET/ EXERCISE.  MONITOR. GOALS D/W PT.  MAKE CHANGES AS NEEDED. 4. Radicular pain of shoulder  COUNSELLED. Farhana Ni [address] PHY. THERAPY  F/U ORTHO  EXERCISES. ANALGESICS PRN. MONITOR. AVOID NSAIDS  MAKE CHANGES AS NEEDED. 5. Vitamin D deficiency  COUNSELLED. MONITOR ON VIT D SUPPLEMENT. MAKE CHANGES AS NEEDED. 6. Prediabetes  COUNSELLED. ADVISED ON DIET / EXERCISE  ADVISED RISK FACTOR MODIFICATION. MONITOR  MAKE CHANGES AS NEEDED. 7. Gastroesophageal reflux disease without esophagitis  COUNSELLED. CONTINUE MGT. ANTIREFLUX PRECAUTIONS ADVISED. MONITOR. MAKE CHANGES AS NEEDED. 8. CKD (chronic kidney disease) stage 4, GFR 15-29 ml/min ([facility])  COUNSELLED. AVOID NSAIDS. MONITOR. F/U RENAL  MAKE CHANGES AS NEEDED. 9. Other allergic rhinitis  COUNSELLED. MED PRN. MONITOR  MAKE CHANGES AS NEEDED.        10. Other specified type of non-Hodgkin lymphoma, unspecified body region [facility])  COUNSELLED. CONTINUE MONITORING/ MGT PER ONCOLOGY  MAKE CHANGES AS NEEDED. 11. Cough  COUNSELLED. SYMPTOMATIC RX  BROMFED DM PRN. MAKE CHANGES AS NEEDED. 12. Need for immunization against influenza  COUNSELLED. S/E D/W PT. FLU VACCINE GIVEN. PT TOLERATED.                          MEDICATION SIDE EFFECTS D/W PATIENT    PT STABLE AT TIME OF D/C.    RETURN TO CLINIC WITHIN 3 MONTHS / PRN

## 2020-10-07 NOTE — PROGRESS NOTES
Vaccine Information Sheet, \"Influenza - Inactivated\"  given to Shanae Aj, or parent/legal guardian of  Shanae Aj and verbalized understanding. Patient responses:    Have you ever had a reaction to a flu vaccine? No  Do you have any current illness? No  Have you ever had [place] Syndrome? No  Do you have a serious allergy to any of the follow: Neomycin, Polymyxin, Thimerosal, eggs or egg products? No    Flu vaccine given per order. Please see immunization tab. Risks and benefits explained. Current VIS given.       Immunizations Administered     Name Date Dose Route    Influenza, Quadv, IM, PF (6 mo and older Fluzone, Flulaval, Fluarix, and 3 yrs and older Afluria) 10/7/2020 0.5 mL Intramuscular    Site: Deltoid- Left    Lot: [id number]    NDC: 62194-225-09

## 2020-10-12 ENCOUNTER — HOSPITAL ENCOUNTER (OUTPATIENT)
Dept: PHYSICAL THERAPY | Age: 60
Setting detail: THERAPIES SERIES
Discharge: HOME | End: 2020-10-12
Payer: COMMERCIAL

## 2020-10-12 PROCEDURE — 97110 THERAPEUTIC EXERCISES: CPT | Performed by: PHYSICAL THERAPIST

## 2020-10-12 PROCEDURE — 97140 MANUAL THERAPY 1/> REGIONS: CPT | Performed by: PHYSICAL THERAPIST

## 2020-10-12 NOTE — FLOWSHEET NOTE
[facility]    Physical Therapy Treatment Note/ Progress Report:   Date:  10/12/2020    Patient Name:  Zoltan Wellington    :  1960  MRN: [id number]  Restrictions/Precautions:    Medical/Treatment Diagnosis Information:  · Diagnosis: Right shoulder tendonitis, G-H arthritis  M75.81  · Treatment Diagnosis: PT treatment diagnosis:  right shoulder pain  J01.434  Insurance/Certification information:  PT Insurance Information: [facility]  visits BMN, $20 copay  Physician Information:  Referring Practitioner: Dr Kelsey Gutierrez of care signed (Y/N):     Date of Patient follow up with Physician:     Is this a Progress Report:     []  Yes  [x]  No        If Yes:  Date Range for reporting period:  Beginning  Ending    Progress report will be due (10 Rx or 30 days whichever is less):        Recertification will be due (POC Duration  / 90 days whichever is less): 11/15/20     Date of Surgery:        Visit # Insurance Allowable Auth Required   4 BMN []  Yes []  No        Functional Scale:     Date assessed:        Latex Allergy:  [x]NO      []YES  Preferred Language for Healthcare:   [x]English       []other    Pain level:  8/10     SUBJECTIVE:   Reports the shoulder has been achy over the past 24 hours. Slept on right side over the weekend one night which may have aggravated the shoulder.      Type: []Constant   []Intermitment  []Radiating []Localized  []other:     Functional Limitations: []Lifting/reaching []Grooming  []Carrying     []ADL's  []Driving []Sports/Recreations   []Other:      OBJECTIVE:     ROM Current (R) Current (L)                       Strength                           Gait:     Joint mobility:    []Normal    []Hypo   []Hyper    Palpation:     Orthopedic Tests:     RESTRICTIONS/PRECAUTIONS: Hx of non-Hodgkins-lymphoma, OA    Exercises/Interventions:       Stretching/AAROM  Repetitions/Resistance Notes/Last Progression   Pulley/Sara 3'/--    [place] Flex/ ER-seated 10x10''    IR Strap     Sleeper Stretch 4x20''    Tableslide Flex/ ER/ Abd 10x10''    Bear Hug Stretch     Cross Arm Stretch     Upper Trap Stretch     Levator Scapula Stretch     Corner Stretch                  Exercises     Shrugs/ Shoulder Blade Squeeze     Shoulder Isometrics 10x10'' each Flex, IR, ER   SA Punch/ ABC 2x15/1x    Supine Short Lever Flex 3x10    Supine Flexion     SL ER/ SL Abd 3x10/3x10    Prone Row/ Ext/ HAB/ Scap 3x10/ 3x10    TB Row/ Ext Green 3x10 each    TB ER/ IR     No Money/ HAB     Finisher                      Manual       Oscillations-Mobs:  G-I, II, III, IV (PA's, Inf., Post.) 5'    PROM 10'    Cervical PA's Grade-     Thoracic PA's Grade-     STM-               Access Code: [address]   URL: [employer]/   Date: 10/01/2020   Prepared by: Alana Miranda     Exercises   Seated Shoulder Flexion Towel Slide at Table Top - 10 reps - 10 seconds hold - 2x daily - 7x weekly   Seated Shoulder External Rotation AAROM with Cane and Hand in Neutral - 10 reps - 10 seconds hold - 2x daily - 7x weekly   Isometric Shoulder Flexion at Wall - 10 reps - 10 seconds hold - 2x daily - 7x weekly   Standing Isometric Shoulder External Rotation with Doorway - 10 reps - 10 seconds hold - 2x daily - 7x weekly   Standing Isometric Shoulder Internal Rotation at Doorway - 10 reps - 10 seconds hold - 2x daily - 7x weekly     Access Code: [facility]   URL: [employer]/   Date: 10/07/2020   Prepared by: Alana Miranda    Exercises   Sleeper Stretch - 3 reps - 20 seconds hold - 1x daily - 7x weekly   Supine Single Arm Shoulder Protraction - 10 reps - 3 sets - 1x daily - 7x weekly   Sidelying Shoulder External Rotation - 10 reps - 3 sets - 1x daily - 7x weekly     Therapeutic Exercise and NMR EXR  [x] (18596) Provided verbal/tactile cueing for activities related to strengthening, flexibility, endurance, ROM  for improvements in scapular, scapulothoracic and UE control with self care, reaching, carrying, lifting, house/yardwork, driving/computer work.    [] (77025) Provided verbal/tactile cueing for activities related to improving balance, coordination, kinesthetic sense, posture, motor skill, proprioception  to assist with  scapular, scapulothoracic and UE control with self care, reaching, carrying, lifting, house/yardwork, driving/computer work. Therapeutic Activities:    [] (74200 or 74012) Provided verbal/tactile cueing for activities related to improving balance, coordination, kinesthetic sense, posture, motor skill, proprioception and motor activation to allow for proper function of scapular, scapulothoracic and UE control with self care, carrying, lifting, driving/computer work.      Home Exercise Program:    [x] (73281) Reviewed/Progressed HEP activities related to strengthening, flexibility, endurance, ROM of scapular, scapulothoracic and UE control with self care, reaching, carrying, lifting, house/yardwork, driving/computer work  [] (89185) Reviewed/Progressed HEP activities related to improving balance, coordination, kinesthetic sense, posture, motor skill, proprioception of scapular, scapulothoracic and UE control with self care, reaching, carrying, lifting, house/yardwork, driving/computer work      Manual Treatments:  PROM / STM / Oscillations-Mobs:  G-I, II, III, IV (PA's, Inf., Post.)  [x] (12859) Provided manual therapy to mobilize soft tissue/joints of cervical/CT, scapular GHJ and UE for the purpose of modulating pain, promoting relaxation,  increasing ROM, reducing/eliminating soft tissue swelling/inflammation/restriction, improving soft tissue extensibility and allowing for proper ROM for normal function with self care, reaching, carrying, lifting, house/yardwork, driving/computer work    Modalities:  CP x 10'    Charges:  Timed Code Treatment Minutes: 45   Total Treatment Minutes: 55     [] EVAL (LOW) 89307 (typically 20 minutes face-to-face)  [] EVAL (MOD) 81556 (typically 30 minutes face-to-face)  [] EVAL (HIGH) 45059 (typically 45 minutes face-to-face)  [] RE-EVAL     [x] RO(35956) x 2     [] IONTO  [] NMR (05936) x     [] VASO  [x] Manual (54835) x 1      [] Other:  [] TA x      [] Mech Traction (78825)  [] ES(attended) (25832)      [] ES (un) (49131):     GOALS:  Short Term Goals: To be achieved in: 2 weeks  1. Independent in HEP and progression per patient tolerance, in order to prevent re-injury. [] Progressing: [x] Met: [] Not Met: [] Adjusted  2. Patient will have a decrease in pain to facilitate improvement in movement, function, and ADLs as indicated by Functional Deficits. [x] Progressing: [] Met: [] Not Met: [] Adjusted    Long Term Goals: To be achieved in: 6 weeks  1. Disability index score of 20% or less for the Quickdash to assist with reaching prior level of function. [] Progressing: [] Met: [] Not Met: [] Adjusted  2. Patient will demonstrate increased AROM to 160 R shoulder flex/abd, 90 ER, 50 IR to allow for proper joint functioning as indicated by patients Functional Deficits. [] Progressing: [] Met: [] Not Met: [] Adjusted  3. Patient will demonstrate an increase in Strength to 4+/5 R shoulder flex/abd/ER to allow for proper functional mobility as indicated by patients Functional Deficits. [] Progressing: [] Met: [] Not Met: [] Adjusted  4. Patient will return to reaching behind back to wash without increased symptoms or restriction. [] Progressing: [] Met: [] Not Met: [] Adjusted      Overall Progression Towards Functional goals/ Treatment Progress Update:  [] Patient is progressing as expected towards functional goals listed. [] Progression is slowed due to complexities/Impairments listed. [] Progression has been slowed due to co-morbidities.   [x] Plan just implemented, too soon to assess goals progression <30days   [] Goals require adjustment due to lack of progress  [] Patient is not progressing as expected and

## 2020-10-14 ENCOUNTER — HOSPITAL ENCOUNTER (OUTPATIENT)
Dept: PHYSICAL THERAPY | Age: 60
Setting detail: THERAPIES SERIES
Discharge: HOME | End: 2020-10-14
Payer: COMMERCIAL

## 2020-10-14 PROCEDURE — 97110 THERAPEUTIC EXERCISES: CPT | Performed by: PHYSICAL THERAPIST

## 2020-10-14 PROCEDURE — 97140 MANUAL THERAPY 1/> REGIONS: CPT | Performed by: PHYSICAL THERAPIST

## 2020-10-14 NOTE — FLOWSHEET NOTE
[facility] - Orthopaedics and Sports Rehabilitation, [facility]    Physical Therapy Treatment Note/ Progress Report:   Date:  10/14/2020    Patient Name:  General Chiquita KUMARB:  1960  MRN: [id number]  Restrictions/Precautions:    Medical/Treatment Diagnosis Information:  · Diagnosis: Right shoulder tendonitis, G-H arthritis  M75.81  · Treatment Diagnosis: PT treatment diagnosis:  right shoulder pain  D10.770  Insurance/Certification information:  PT Insurance Information: [facility]  visits BMN, $20 copay  Physician Information:  Referring Practitioner: Dr Ratna Jackson of care signed (Y/N):     Date of Patient follow up with Physician:     Is this a Progress Report:     []  Yes  [x]  No        If Yes:  Date Range for reporting period:  Beginning  Ending    Progress report will be due (10 Rx or 30 days whichever is less):        Recertification will be due (POC Duration  / 90 days whichever is less): 11/15/20     Date of Surgery:        Visit # Insurance Allowable Auth Required   5 BMN []  Yes []  No        Functional Scale:     Date assessed:        Latex Allergy:  [x]NO      []YES  Preferred Language for Healthcare:   [x]English       []other    Pain level:  810     SUBJECTIVE:   States the shoulder is feeling better overall since starting PT but continues to have pain radiating into the bicep with certain movements.      Type: []Constant   []Intermitment  []Radiating []Localized  []other:     Functional Limitations: []Lifting/reaching []Grooming  []Carrying     []ADL's  []Driving []Sports/Recreations   []Other:      OBJECTIVE:     ROM Current (R) Current (L)                       Strength                           Gait:     Joint mobility:    []Normal    []Hypo   []Hyper    Palpation:     Orthopedic Tests:     RESTRICTIONS/PRECAUTIONS: Hx of non-Hodgkins-lymphoma, OA    Exercises/Interventions:       Stretching/AAROM  Repetitions/Resistance Notes/Last Progression   Pulley/Sara 3'/-- Cane Flex/ ER-seated 10x10''    IR Strap     Sleeper Stretch 4x20''    Tableslide Flex/ ER/ Abd 10x10''    Bear Hug Stretch     Cross Arm Stretch     Upper Trap Stretch     Levator Scapula Stretch     Corner Stretch                  Exercises     Shrugs/ Shoulder Blade Squeeze     Rhythmic stabs 3x30''    SA Punch/ ABC 2x15/1# 1x    Supine Long Lever Flex 3x10    Supine Flexion     SL ER/ SL Abd 1# 3x10/3x10    Prone Row/ Ext/ HAB/ Scap 1# 3x10/ 3x10    TB Row/ Ext Green 3x10 each    TB ER/ IR     No Money/ HAB     Finisher                      Manual       Oscillations-Mobs:  G-I, II, III, IV (PA's, Inf., Post.) 5'    PROM 10'    Cervical PA's Grade-     Thoracic PA's Grade-     STM-               Access Code: [address]   URL: [employer]/   Date: 10/01/2020   Prepared by: Jonybrooke Akins     Exercises   Seated Shoulder Flexion Towel Slide at Table Top - 10 reps - 10 seconds hold - 2x daily - 7x weekly   Seated Shoulder External Rotation AAROM with Cane and Hand in Neutral - 10 reps - 10 seconds hold - 2x daily - 7x weekly   Isometric Shoulder Flexion at Wall - 10 reps - 10 seconds hold - 2x daily - 7x weekly   Standing Isometric Shoulder External Rotation with Doorway - 10 reps - 10 seconds hold - 2x daily - 7x weekly   Standing Isometric Shoulder Internal Rotation at Doorway - 10 reps - 10 seconds hold - 2x daily - 7x weekly     Access Code: [facility]   URL: [employer]/   Date: 10/07/2020   Prepared by: Jony Tashi    Exercises   Sleeper Stretch - 3 reps - 20 seconds hold - 1x daily - 7x weekly   Supine Single Arm Shoulder Protraction - 10 reps - 3 sets - 1x daily - 7x weekly   Sidelying Shoulder External Rotation - 10 reps - 3 sets - 1x daily - 7x weekly     Therapeutic Exercise and NMR EXR  [x] (49626) Provided verbal/tactile cueing for activities related to strengthening, flexibility, endurance, ROM  for improvements in scapular, scapulothoracic and UE control with self care, reaching, carrying, lifting, house/yardwork, driving/computer work.    [] (73314) Provided verbal/tactile cueing for activities related to improving balance, coordination, kinesthetic sense, posture, motor skill, proprioception  to assist with  scapular, scapulothoracic and UE control with self care, reaching, carrying, lifting, house/yardwork, driving/computer work. Therapeutic Activities:    [] (23718 or 62594) Provided verbal/tactile cueing for activities related to improving balance, coordination, kinesthetic sense, posture, motor skill, proprioception and motor activation to allow for proper function of scapular, scapulothoracic and UE control with self care, carrying, lifting, driving/computer work.      Home Exercise Program:    [x] (16648) Reviewed/Progressed HEP activities related to strengthening, flexibility, endurance, ROM of scapular, scapulothoracic and UE control with self care, reaching, carrying, lifting, house/yardwork, driving/computer work  [] (23753) Reviewed/Progressed HEP activities related to improving balance, coordination, kinesthetic sense, posture, motor skill, proprioception of scapular, scapulothoracic and UE control with self care, reaching, carrying, lifting, house/yardwork, driving/computer work      Manual Treatments:  PROM / STM / Oscillations-Mobs:  G-I, II, III, IV (PA's, Inf., Post.)  [x] (47561) Provided manual therapy to mobilize soft tissue/joints of cervical/CT, scapular GHJ and UE for the purpose of modulating pain, promoting relaxation,  increasing ROM, reducing/eliminating soft tissue swelling/inflammation/restriction, improving soft tissue extensibility and allowing for proper ROM for normal function with self care, reaching, carrying, lifting, house/yardwork, driving/computer work    Modalities:  CP x 10'    Charges:  Timed Code Treatment Minutes: 45   Total Treatment Minutes: 55     [] EVAL (LOW) 92814 (typically 20 minutes face-to-face)  [] EVAL (MOD) 48418 (typically 30 minutes face-to-face)  [] EVAL (HIGH) 15284 (typically 45 minutes face-to-face)  [] RE-EVAL     [x] CN(76426) x 2     [] IONTO  [] NMR (94736) x     [] VASO  [x] Manual (42195) x 1      [] Other:  [] TA x      [] Mech Traction (40946)  [] ES(attended) (39889)      [] ES (un) (12887):     GOALS:  Short Term Goals: To be achieved in: 2 weeks  1. Independent in HEP and progression per patient tolerance, in order to prevent re-injury. [] Progressing: [x] Met: [] Not Met: [] Adjusted  2. Patient will have a decrease in pain to facilitate improvement in movement, function, and ADLs as indicated by Functional Deficits. [x] Progressing: [] Met: [] Not Met: [] Adjusted    Long Term Goals: To be achieved in: 6 weeks  1. Disability index score of 20% or less for the Quickdash to assist with reaching prior level of function. [] Progressing: [] Met: [] Not Met: [] Adjusted  2. Patient will demonstrate increased AROM to 160 R shoulder flex/abd, 90 ER, 50 IR to allow for proper joint functioning as indicated by patients Functional Deficits. [] Progressing: [] Met: [] Not Met: [] Adjusted  3. Patient will demonstrate an increase in Strength to 4+/5 R shoulder flex/abd/ER to allow for proper functional mobility as indicated by patients Functional Deficits. [] Progressing: [] Met: [] Not Met: [] Adjusted  4. Patient will return to reaching behind back to wash without increased symptoms or restriction. [] Progressing: [] Met: [] Not Met: [] Adjusted      Overall Progression Towards Functional goals/ Treatment Progress Update:  [] Patient is progressing as expected towards functional goals listed. [] Progression is slowed due to complexities/Impairments listed. [] Progression has been slowed due to co-morbidities.   [x] Plan just implemented, too soon to assess goals progression <30days   [] Goals require adjustment due to lack of progress  [] Patient is not progressing as expected and requires additional follow up with physician  [] Other    ASSESSMENT:  Tolerated progression of AROM well but does fatigue easily. Treatment/Activity Tolerance:  [x] Patient tolerated treatment well [] Patient limited by fatique  [] Patient limited by pain  [] Patient limited by other medical complications  [] Other:     Prognosis: [x] Good [] Fair  [] Poor    Patient Requires Follow-up: [x] Yes  [] No    PLAN: See eval  [x] Continue per plan of care [] Alter current plan (see comments)  [] Plan of care initiated [] Hold pending MD visit [] Discharge        Electronically signed by: Alana Miranda PT, OMT-C        Note: If patient does not return for scheduled/ recommended follow up visits, this note will serve as a discharge from care along with most recent update on progress.

## 2020-10-21 ENCOUNTER — HOSPITAL ENCOUNTER (OUTPATIENT)
Dept: PHYSICAL THERAPY | Age: 60
Setting detail: THERAPIES SERIES
Discharge: HOME | End: 2020-10-21
Payer: COMMERCIAL

## 2020-10-21 NOTE — FLOWSHEET NOTE
The [address] [facility] [address], [place]      Physical Therapy  Cancellation/No-show Note  Patient Name:  Lesly Meraz  :  1960   Date:  10/21/2020  Cancelled visits to date: 1  No-shows to date: 0    For today's appointment patient:  [x]  Cancelled  []  Rescheduled appointment  []  No-show     Reason given by patient:  []  Patient ill  []  Conflicting appointment  []  No transportation    []  Conflict with work  [x]  No reason given  []  Other:     Comments:      Electronically signed by: Gianni Beyer PT, OMT-C

## 2020-10-26 ENCOUNTER — HOSPITAL ENCOUNTER (OUTPATIENT)
Dept: PHYSICAL THERAPY | Age: 60
Setting detail: THERAPIES SERIES
Discharge: HOME | End: 2020-10-26
Payer: COMMERCIAL

## 2020-10-26 PROCEDURE — 97140 MANUAL THERAPY 1/> REGIONS: CPT | Performed by: PHYSICAL THERAPIST

## 2020-10-26 PROCEDURE — 97110 THERAPEUTIC EXERCISES: CPT | Performed by: PHYSICAL THERAPIST

## 2020-10-26 NOTE — FLOWSHEET NOTE
[facility], Renaye Aase    Physical Therapy Treatment Note/ Progress Report:   Date:  10/26/2020    Patient Name:  Yesenia Martin    :  1960  MRN: [id number]  Restrictions/Precautions:    Medical/Treatment Diagnosis Information:  · Diagnosis: Right shoulder tendonitis, G-H arthritis  M75.81  · Treatment Diagnosis: PT treatment diagnosis:  right shoulder pain  Y34.831  Insurance/Certification information:  PT Insurance Information: [address] [facility]  visits BMN, $20 copay  Physician Information:  Referring Practitioner: Dr Kermitt Eisenmenger of care signed (Y/N):     Date of Patient follow up with Physician:     Is this a Progress Report:     []  Yes  [x]  No        If Yes:  Date Range for reporting period:  Beginning  Ending    Progress report will be due (10 Rx or 30 days whichever is less): 74/3/40       Recertification will be due (POC Duration  / 90 days whichever is less): 11/15/20     Date of Surgery:        Visit # Insurance Allowable Auth Required   6 BMN []  Yes []  No        Functional Scale:     Date assessed:        Latex Allergy:  [x]NO      []YES  Preferred Language for Healthcare:   [x]English       []other    Pain level:  810     SUBJECTIVE:   Reports the shoulder has been feeling a little better over the past couple of weeks.       Type: []Constant   []Intermitment  []Radiating []Localized  []other:     Functional Limitations: []Lifting/reaching []Grooming  []Carrying     []ADL's  []Driving []Sports/Recreations   []Other:      OBJECTIVE:     ROM Current (R) Current (L)                       Strength                           Gait:     Joint mobility:    []Normal    []Hypo   []Hyper    Palpation:     Orthopedic Tests:     RESTRICTIONS/PRECAUTIONS: Hx of non-Hodgkins-lymphoma, OA    Exercises/Interventions:       Stretching/AAROM  Repetitions/Resistance Notes/Last Progression   Pulley/Wallslides 3'/--    [place] Flex/ ER-seated 10x10''    IR Strap     Sleeper Stretch 4x20''    Tableslide Flex/ ER/ Abd 10x10''    Bear Hug Stretch     Cross Arm Stretch     Upper Trap Stretch     Levator Scapula Stretch     Corner Stretch                  Exercises     Shrugs/ Shoulder Blade Squeeze     Rhythmic stabs 3x30''    SA Punch/ ABC 2# 2x15/2# 1x    Supine Long Lever Flex 1# 3x10    Supine Flexion     SL ER/ SL Abd 1# 3x10/3x10    Prone Row/ Ext/ HAB/ Scap 1# 3x10/ 1# 3x10    TB Row/ Ext Green 3x10 each    TB ER/ IR     No Money/ HAB     Finisher                      Manual       Oscillations-Mobs:  G-I, II, III, IV (PA's, Inf., Post.) 5'    PROM 10'    Cervical PA's Grade-     Thoracic PA's Grade-     STM-               Access Code: [address]   URL: [employer]/   Date: 10/01/2020   Prepared by: Arleen Devoid     Exercises   Seated Shoulder Flexion Towel Slide at Table Top - 10 reps - 10 seconds hold - 2x daily - 7x weekly   Seated Shoulder External Rotation AAROM with Cane and Hand in Neutral - 10 reps - 10 seconds hold - 2x daily - 7x weekly   Isometric Shoulder Flexion at Wall - 10 reps - 10 seconds hold - 2x daily - 7x weekly   Standing Isometric Shoulder External Rotation with Doorway - 10 reps - 10 seconds hold - 2x daily - 7x weekly   Standing Isometric Shoulder Internal Rotation at Doorway - 10 reps - 10 seconds hold - 2x daily - 7x weekly     Access Code: [facility]   URL: [employer]/   Date: 10/07/2020   Prepared by: Arleen Devoid    Exercises   Sleeper Stretch - 3 reps - 20 seconds hold - 1x daily - 7x weekly   Supine Single Arm Shoulder Protraction - 10 reps - 3 sets - 1x daily - 7x weekly   Sidelying Shoulder External Rotation - 10 reps - 3 sets - 1x daily - 7x weekly     Therapeutic Exercise and NMR EXR  [x] (68792) Provided verbal/tactile cueing for activities related to strengthening, flexibility, endurance, ROM  for improvements in scapular, scapulothoracic and UE control with self care, reaching, carrying, lifting, house/yardwork, driving/computer work.    [] (89878) Provided verbal/tactile cueing for activities related to improving balance, coordination, kinesthetic sense, posture, motor skill, proprioception  to assist with  scapular, scapulothoracic and UE control with self care, reaching, carrying, lifting, house/yardwork, driving/computer work. Therapeutic Activities:    [] (92335 or 42970) Provided verbal/tactile cueing for activities related to improving balance, coordination, kinesthetic sense, posture, motor skill, proprioception and motor activation to allow for proper function of scapular, scapulothoracic and UE control with self care, carrying, lifting, driving/computer work.      Home Exercise Program:    [x] (20491) Reviewed/Progressed HEP activities related to strengthening, flexibility, endurance, ROM of scapular, scapulothoracic and UE control with self care, reaching, carrying, lifting, house/yardwork, driving/computer work  [] (40133) Reviewed/Progressed HEP activities related to improving balance, coordination, kinesthetic sense, posture, motor skill, proprioception of scapular, scapulothoracic and UE control with self care, reaching, carrying, lifting, house/yardwork, driving/computer work      Manual Treatments:  PROM / STM / Oscillations-Mobs:  G-I, II, III, IV (PA's, Inf., Post.)  [x] (15682) Provided manual therapy to mobilize soft tissue/joints of cervical/CT, scapular GHJ and UE for the purpose of modulating pain, promoting relaxation,  increasing ROM, reducing/eliminating soft tissue swelling/inflammation/restriction, improving soft tissue extensibility and allowing for proper ROM for normal function with self care, reaching, carrying, lifting, house/yardwork, driving/computer work    Modalities:  CP x 10'    Charges:  Timed Code Treatment Minutes: 45   Total Treatment Minutes: 55     [] EVAL (LOW) 06111 (typically 20 minutes face-to-face)  [] EVAL (MOD) 83266 (typically 30 minutes face-to-face)  [] EVAL (HIGH) 50208 (typically 45 minutes face-to-face)  [] RE-EVAL     [x] IO(82170) x 2     [] IONTO  [] NMR (53846) x     [] VASO  [x] Manual (96748) x 1      [] Other:  [] TA x      [] Mech Traction (38661)  [] ES(attended) (90198)      [] ES (un) (25351):     GOALS:  Short Term Goals: To be achieved in: 2 weeks  1. Independent in HEP and progression per patient tolerance, in order to prevent re-injury. [] Progressing: [x] Met: [] Not Met: [] Adjusted  2. Patient will have a decrease in pain to facilitate improvement in movement, function, and ADLs as indicated by Functional Deficits. [x] Progressing: [] Met: [] Not Met: [] Adjusted    Long Term Goals: To be achieved in: 6 weeks  1. Disability index score of 20% or less for the Quickdash to assist with reaching prior level of function. [] Progressing: [] Met: [] Not Met: [] Adjusted  2. Patient will demonstrate increased AROM to 160 R shoulder flex/abd, 90 ER, 50 IR to allow for proper joint functioning as indicated by patients Functional Deficits. [] Progressing: [] Met: [] Not Met: [] Adjusted  3. Patient will demonstrate an increase in Strength to 4+/5 R shoulder flex/abd/ER to allow for proper functional mobility as indicated by patients Functional Deficits. [] Progressing: [] Met: [] Not Met: [] Adjusted  4. Patient will return to reaching behind back to wash without increased symptoms or restriction. [] Progressing: [] Met: [] Not Met: [] Adjusted      Overall Progression Towards Functional goals/ Treatment Progress Update:  [] Patient is progressing as expected towards functional goals listed. [] Progression is slowed due to complexities/Impairments listed. [] Progression has been slowed due to co-morbidities.   [x] Plan just implemented, too soon to assess goals progression <30days   [] Goals require adjustment due to lack of progress  [] Patient is not progressing as expected and requires additional follow up with physician  [] Other    ASSESSMENT: Good joint mobility/PROM. No pain increase with Rx but RC does fatigue easily with strengthening activities. Treatment/Activity Tolerance:  [x] Patient tolerated treatment well [] Patient limited by fatique  [] Patient limited by pain  [] Patient limited by other medical complications  [] Other:     Prognosis: [x] Good [] Fair  [] Poor    Patient Requires Follow-up: [x] Yes  [] No    PLAN: See eval  [x] Continue per plan of care [] Alter current plan (see comments)  [] Plan of care initiated [] Hold pending MD visit [] Discharge        Electronically signed by: Michelle Gresham PT, OMT-C        Note: If patient does not return for scheduled/ recommended follow up visits, this note will serve as a discharge from care along with most recent update on progress.

## 2020-10-28 ENCOUNTER — HOSPITAL ENCOUNTER (OUTPATIENT)
Dept: PHYSICAL THERAPY | Age: 60
Setting detail: THERAPIES SERIES
Discharge: HOME | End: 2020-10-28
Payer: COMMERCIAL

## 2020-10-28 PROCEDURE — 97140 MANUAL THERAPY 1/> REGIONS: CPT | Performed by: PHYSICAL THERAPIST

## 2020-10-28 PROCEDURE — 97110 THERAPEUTIC EXERCISES: CPT | Performed by: PHYSICAL THERAPIST

## 2020-10-28 NOTE — FLOWSHEET NOTE
[facility]    Physical Therapy Treatment Note/ Progress Report:   Date:  10/28/2020    Patient Name:  Lesly Meraz    :  1960  MRN: [id number]  Restrictions/Precautions:    Medical/Treatment Diagnosis Information:  · Diagnosis: Right shoulder tendonitis, G-H arthritis  M75.81  · Treatment Diagnosis: PT treatment diagnosis:  right shoulder pain  X21.834  Insurance/Certification information:  PT Insurance Information: [address] [facility]  visits BMN, $20 copay  Physician Information:  Referring Practitioner: Dr Sindhu Mckeon of care signed (Y/N):     Date of Patient follow up with Physician:     Is this a Progress Report:     []  Yes  [x]  No        If Yes:  Date Range for reporting period:  Beginning  Ending    Progress report will be due (10 Rx or 30 days whichever is less): 65/3/27       Recertification will be due (POC Duration  / 90 days whichever is less): 11/15/20     Date of Surgery:        Visit # Insurance Allowable Auth Required   7 BMN []  Yes []  No        Functional Scale:     Date assessed:        Latex Allergy:  [x]NO      []YES  Preferred Language for Healthcare:   [x]English       []other    Pain level:  810     SUBJECTIVE:   States the shoulder is a little sore today but overall is doing well.        Type: []Constant   []Intermitment  []Radiating []Localized  []other:     Functional Limitations: []Lifting/reaching []Grooming  []Carrying     []ADL's  []Driving []Sports/Recreations   []Other:      OBJECTIVE:     ROM Current (R) Current (L)                       Strength                           Gait:     Joint mobility:    []Normal    []Hypo   []Hyper    Palpation:     Orthopedic Tests:     RESTRICTIONS/PRECAUTIONS: Hx of non-Hodgkins-lymphoma, OA    Exercises/Interventions:       Stretching/AAROM  Repetitions/Resistance Notes/Last Progression   Pulley/Wallslides 3'/--    [employer] Flex/ ER-seated 10x10''    IR Strap     Sleeper Stretch 4x20'' Tableslide Flex/ ER/ Abd 10x10''    Bear Hug Stretch     Cross Arm Stretch     Upper Trap Stretch     Levator Scapula Stretch     Corner Stretch                  Exercises     Shrugs/ Shoulder Blade Squeeze     Rhythmic stabs 3x30''    SA Punch/ ABC 2# 2x15/2# 1x       Standing Flexion/ Abduction 15x each    SL ER/ SL Abd 1# 3x10/1# 3x10    Prone Row/ Ext/ HAB/ Scap 1# 3x10/ 1# 3x10    TB Row/ Ext Green 3x10 each    TB ER/ IR     No Money/ HAB     Lift off: in/out, up/down Yellow 10x each    Finisher                      Manual       Oscillations-Mobs:  G-I, II, III, IV (PA's, Inf., Post.) 5'    PROM 10'    Cervical PA's Grade-     Thoracic PA's Grade-     STM-               Access Code: [address]   URL: [employer]/   Date: 10/01/2020   Prepared by: Carmel Garcia     Exercises   Seated Shoulder Flexion Towel Slide at Table Top - 10 reps - 10 seconds hold - 2x daily - 7x weekly   Seated Shoulder External Rotation AAROM with Cane and Hand in Neutral - 10 reps - 10 seconds hold - 2x daily - 7x weekly   Isometric Shoulder Flexion at Wall - 10 reps - 10 seconds hold - 2x daily - 7x weekly   Standing Isometric Shoulder External Rotation with Doorway - 10 reps - 10 seconds hold - 2x daily - 7x weekly   Standing Isometric Shoulder Internal Rotation at Doorway - 10 reps - 10 seconds hold - 2x daily - 7x weekly     Access Code: [facility]   URL: [employer]/   Date: 10/07/2020   Prepared by: Carmel Garcia    Exercises   Sleeper Stretch - 3 reps - 20 seconds hold - 1x daily - 7x weekly   Supine Single Arm Shoulder Protraction - 10 reps - 3 sets - 1x daily - 7x weekly   Sidelying Shoulder External Rotation - 10 reps - 3 sets - 1x daily - 7x weekly     Therapeutic Exercise and NMR EXR  [x] (86252) Provided verbal/tactile cueing for activities related to strengthening, flexibility, endurance, ROM  for improvements in scapular, scapulothoracic and UE control with self care, reaching, carrying, lifting, house/yardwork, driving/computer work.    [] (52398) Provided verbal/tactile cueing for activities related to improving balance, coordination, kinesthetic sense, posture, motor skill, proprioception  to assist with  scapular, scapulothoracic and UE control with self care, reaching, carrying, lifting, house/yardwork, driving/computer work. Therapeutic Activities:    [] (23598 or 29193) Provided verbal/tactile cueing for activities related to improving balance, coordination, kinesthetic sense, posture, motor skill, proprioception and motor activation to allow for proper function of scapular, scapulothoracic and UE control with self care, carrying, lifting, driving/computer work.      Home Exercise Program:    [x] (91680) Reviewed/Progressed HEP activities related to strengthening, flexibility, endurance, ROM of scapular, scapulothoracic and UE control with self care, reaching, carrying, lifting, house/yardwork, driving/computer work  [] (08101) Reviewed/Progressed HEP activities related to improving balance, coordination, kinesthetic sense, posture, motor skill, proprioception of scapular, scapulothoracic and UE control with self care, reaching, carrying, lifting, house/yardwork, driving/computer work      Manual Treatments:  PROM / STM / Oscillations-Mobs:  G-I, II, III, IV (PA's, Inf., Post.)  [x] (49834) Provided manual therapy to mobilize soft tissue/joints of cervical/CT, scapular GHJ and UE for the purpose of modulating pain, promoting relaxation,  increasing ROM, reducing/eliminating soft tissue swelling/inflammation/restriction, improving soft tissue extensibility and allowing for proper ROM for normal function with self care, reaching, carrying, lifting, house/yardwork, driving/computer work    Modalities:  CP x 10'    Charges:  Timed Code Treatment Minutes: 45   Total Treatment Minutes: 55     [] EVAL (LOW) 96162 (typically 20 minutes face-to-face)  [] EVAL (MOD) 02973 (typically 30 minutes face-to-face)  [] EVAL (HIGH) 77411 (typically 45 minutes face-to-face)  [] RE-EVAL     [x] GV(28429) x 2     [] IONTO  [] NMR (63785) x     [] VASO  [x] Manual (50629) x 1      [] Other:  [] TA x      [] Mech Traction (54998)  [] ES(attended) (09119)      [] ES (un) (74487):     GOALS:  Short Term Goals: To be achieved in: 2 weeks  1. Independent in HEP and progression per patient tolerance, in order to prevent re-injury. [] Progressing: [x] Met: [] Not Met: [] Adjusted  2. Patient will have a decrease in pain to facilitate improvement in movement, function, and ADLs as indicated by Functional Deficits. [x] Progressing: [] Met: [] Not Met: [] Adjusted    Long Term Goals: To be achieved in: 6 weeks  1. Disability index score of 20% or less for the Quickdash to assist with reaching prior level of function. [] Progressing: [] Met: [] Not Met: [] Adjusted  2. Patient will demonstrate increased AROM to 160 R shoulder flex/abd, 90 ER, 50 IR to allow for proper joint functioning as indicated by patients Functional Deficits. [] Progressing: [] Met: [] Not Met: [] Adjusted  3. Patient will demonstrate an increase in Strength to 4+/5 R shoulder flex/abd/ER to allow for proper functional mobility as indicated by patients Functional Deficits. [] Progressing: [] Met: [] Not Met: [] Adjusted  4. Patient will return to reaching behind back to wash without increased symptoms or restriction. [] Progressing: [] Met: [] Not Met: [] Adjusted      Overall Progression Towards Functional goals/ Treatment Progress Update:  [x] Patient is progressing as expected towards functional goals listed. [] Progression is slowed due to complexities/Impairments listed. [] Progression has been slowed due to co-morbidities.   [] Plan just implemented, too soon to assess goals progression <30days   [] Goals require adjustment due to lack of progress  [] Patient is not progressing as expected and requires additional follow up with physician  [] Other    ASSESSMENT:   Fatigued easily with progression of shoulder strengthening activities. Treatment/Activity Tolerance:  [x] Patient tolerated treatment well [] Patient limited by fatique  [] Patient limited by pain  [] Patient limited by other medical complications  [] Other:     Prognosis: [x] Good [] Fair  [] Poor    Patient Requires Follow-up: [x] Yes  [] No    PLAN: See eval  [x] Continue per plan of care [] Alter current plan (see comments)  [] Plan of care initiated [] Hold pending MD visit [] Discharge        Electronically signed by: Albina Valentin PT, OMT-C        Note: If patient does not return for scheduled/ recommended follow up visits, this note will serve as a discharge from care along with most recent update on progress.

## 2020-11-03 ENCOUNTER — HOSPITAL ENCOUNTER (OUTPATIENT)
Dept: PHYSICAL THERAPY | Age: 60
Setting detail: THERAPIES SERIES
Discharge: HOME | End: 2020-11-03
Payer: COMMERCIAL

## 2020-11-03 ENCOUNTER — OFFICE VISIT (OUTPATIENT)
Dept: ORTHOPEDIC SURGERY | Age: 60
End: 2020-11-03
Payer: COMMERCIAL

## 2020-11-03 VITALS — WEIGHT: 156 LBS | HEIGHT: 64 IN | BODY MASS INDEX: 26.63 KG/M2

## 2020-11-03 PROCEDURE — 97110 THERAPEUTIC EXERCISES: CPT | Performed by: PHYSICAL THERAPIST

## 2020-11-03 PROCEDURE — 97140 MANUAL THERAPY 1/> REGIONS: CPT | Performed by: PHYSICAL THERAPIST

## 2020-11-03 PROCEDURE — 99213 OFFICE O/P EST LOW 20 MIN: CPT | Performed by: ORTHOPAEDIC SURGERY

## 2020-11-03 RX ORDER — BENZONATATE 100 MG/1
CAPSULE ORAL
COMMUNITY
Start: 2020-10-16 | End: 2020-12-15

## 2020-11-03 RX ORDER — FLUOROMETHOLONE 0.1 %
SUSPENSION, DROPS(FINAL DOSAGE FORM)(ML) OPHTHALMIC (EYE)
COMMUNITY
Start: 2020-08-26 | End: 2021-01-14 | Stop reason: ALTCHOICE

## 2020-11-03 RX ORDER — POLYETHYLENE GLYCOL 3350, SODIUM CHLORIDE, SODIUM BICARBONATE, POTASSIUM CHLORIDE 420; 11.2; 5.72; 1.48 G/4L; G/4L; G/4L; G/4L
POWDER, FOR SOLUTION ORAL
COMMUNITY
Start: 2020-09-04 | End: 2021-03-29

## 2020-11-03 RX ORDER — VALACYCLOVIR HYDROCHLORIDE 500 MG/1
TABLET, FILM COATED ORAL
COMMUNITY
Start: 2020-10-08

## 2020-11-03 RX ORDER — KETOTIFEN FUMARATE 0.35 MG/ML
SOLUTION/ DROPS OPHTHALMIC 2 TIMES DAILY
COMMUNITY
Start: 2020-10-08

## 2020-11-03 NOTE — PROGRESS NOTES
Shoulder Elbow Rehabilitation Referral    Patient Name: Mary Haq      YOB: 1960    Diagnosis: Right rotator cuff tendinitis/tendinosis  Precautions: NA  Date of Prescription: 11/3/2020    [x] Evaluate and Treat    Post Op Instructions:  [] Continuous passive motion (CPM)  [] Elbow range of motion  [] Exercise in plane of scapula   []  Strengthening     [] Pulley and instruction    [] Home exercise program (copy to patient)   [] Sling when arm at risk  [] Sling or brace at all times   [] AAROM: Forward elevation to              [] AAROM: External rotation  To      [] Isometric external rotator strengthening [] AAROM: internal rotation: up the back  [] Isometric abductor strengthening  [] AAROM: Internal abduction     [] Isometric internal rotator strengthening [] AAROM: cross-body adduction             Stretching:     Strengthening:  [x] Four quadrant (FE, ER, IR, CBA)  [x] Sleeper stretch    [x] Rotator cuff (ER, IR, Abd)  [x] Forward Elevation    [x] External Rotators     [x] External Rotation    [x] Internal Rotators  [x] Internal Rotation: up/back   [x] Abductors     [x] Internal Rotation: supine in abduction  [] Flexors  [] Cross-body abduction    [x] Extensors  [] Pendulum (FE, Abd/Add, cw/ccw)  [x] Scapular Stabilizers   [] Wall-walking (FE, Abd)    [] Shoulder shrugs     [] Table slides      [] Rhomboid pinch  [] Elbow (flex, ext, pron, sup)    [] Lat.  Pull downs     [] Medial epicondylitis program    [] Forward punch   [] Lateral epicondylitis program    [] Internal rotators     [] Progressive resistive exercises  [] Bench Press        [] Bench press plus  Activities:     [] Lateral pull-downs  [] Rowing     [] Progressive two-hand supine press  [] Stepper/Exercise bike   [] Biceps: curls/supination  [] Swimming  [] Water exercises    Modalities:     Return to Sport:  [] Ultrasound     [] Plyometrics  [] Iontophoresis     [] Rhythmic stabilization  [] Moist heat     [] Core strengthening   [] Massage     [] Sports specific program:      [] Cryotherapy      [] Electrical stimulation     [] Paraffin  [] Whirlpool  [] TENS    [] Home exercise program (copy to patient).    Perform exercises for:        minutes          times/day  [] Supervised physical therapy  Frequency: []  1x week  [x] 2x week  [] 3x week  [] Other:   Duration: [] 2 weeks   [] 4 weeks  [] 6 weeks  [] Other:     Additional Instructions:  Continue progression in rotator cuff protocol      Андрей Ochoa DO  [facility] Clinical Fellow

## 2020-11-03 NOTE — LETTER
Shoulder Elbow Rehabilitation Referral    Patient Name: Racquel Schultz      YOB: 1960    Diagnosis: Right rotator cuff tendinitis/tendinosis  Precautions: NA  Date of Prescription: 11/3/2020    [x] Evaluate and Treat    Post Op Instructions:  [] Continuous passive motion (CPM)  [] Elbow range of motion  [] Exercise in plane of scapula   []  Strengthening     [] Pulley and instruction    [] Home exercise program (copy to patient)   [] Sling when arm at risk  [] Sling or brace at all times   [] AAROM: Forward elevation to              [] AAROM: External rotation  To      [] Isometric external rotator strengthening [] AAROM: internal rotation: up the back  [] Isometric abductor strengthening  [] AAROM: Internal abduction     [] Isometric internal rotator strengthening [] AAROM: cross-body adduction             Stretching:     Strengthening:  [x] Four quadrant (FE, ER, IR, CBA)  [x] Sleeper stretch    [x] Rotator cuff (ER, IR, Abd)  [x] Forward Elevation    [x] External Rotators     [x] External Rotation    [x] Internal Rotators  [x] Internal Rotation: up/back   [x] Abductors     [x] Internal Rotation: supine in abduction  [] Flexors  [] Cross-body abduction    [x] Extensors  [] Pendulum (FE, Abd/Add, cw/ccw)  [x] Scapular Stabilizers   [] Wall-walking (FE, Abd)    [] Shoulder shrugs     [] Table slides      [] Rhomboid pinch  [] Elbow (flex, ext, pron, sup)    [] Lat.  Pull downs     [] Medial epicondylitis program    [] Forward punch   [] Lateral epicondylitis program    [] Internal rotators     [] Progressive resistive exercises  [] Bench Press        [] Bench press plus  Activities:     [] Lateral pull-downs  [] Rowing     [] Progressive two-hand supine press  [] Stepper/Exercise bike   [] Biceps: curls/supination  [] Swimming  [] Water exercises    Modalities:     Return to Sport:  [] Ultrasound     [] Plyometrics  [] Iontophoresis     [] Rhythmic stabilization [] Moist heat     [] Core strengthening   [] Massage     [] Sports specific program:      [] Cryotherapy      [] Electrical stimulation     [] Paraffin  [] Whirlpool  [] TENS    [] Home exercise program (copy to patient).    Perform exercises for:        minutes          times/day  [] Supervised physical therapy  Frequency: []  1x week  [x] 2x week  [] 3x week  [] Other:   Duration: [] 2 weeks   [] 4 weeks  [] 6 weeks  [] Other:     Additional Instructions:  Continue progression in rotator cuff protocol      Pedro Pablo Cedeno DO  [facility] Clinical Fellow

## 2020-11-03 NOTE — PLAN OF CARE
[facility] - Orthopaedics and Sports Rehabilitation, [facility]             Physical Therapy Re-Certification Plan of Alverto Kathleen        Dear Dr Kwabena Tong,    We had the pleasure of treating the following patient for physical therapy services at [address] [place] [address]. A summary of our findings can be found in the updated assessment below. This includes our plan of care. If you have any questions or concerns regarding these findings, please do not hesitate to contact me at the office phone number checked above.   Thank you for the referral.     Physician Signature:________________________________Date:__________________  By signing above (or electronic signature), therapists plan is approved by physician    Date Range Of Visits: 10/1/20-11/3/20  Total Visits to Date: 8  Overall Response to Treatment:   [x]Patient is responding well to treatment and improvement is noted with regards  to goals   []Patient should continue to improve in reasonable time if they continue HEP   []Patient has plateaued and is no longer responding to skilled PT intervention    []Patient is getting worse and would benefit from return to referring MD   []Patient unable to adhere to initial POC   []Other:   Plan: continue 2x wk/4wks    Physical Therapy Treatment Note/ Progress Report:   Date:  11/3/2020    Patient Name:  Kimmy Pena    :  1960  MRN: [id number]  Restrictions/Precautions:    Medical/Treatment Diagnosis Information:  · Diagnosis: Right shoulder tendonitis, G-H arthritis  M75.81  · Treatment Diagnosis: PT treatment diagnosis:  right shoulder pain  V74.128  Insurance/Certification information:  PT Insurance Information: [address] [facility]  visits BMN, $20 copay  Physician Information:  Referring Practitioner: Dr Maureen Katz of care signed (Y/N):     Date of Patient follow up with Physician:     Is this a Progress Report:     [x]  Yes  []  No        If Yes:  Date Range for reporting period:  Beginning 10/1/20  Ending 11/3/20    Progress report will be due (10 Rx or 30 days whichever is less): 61/0/53       Recertification will be due (POC Duration  / 90 days whichever is less): 12/3/20     Date of Surgery:        Visit # Insurance Allowable Auth Required   8 BMN []  Yes []  No        Functional Scale:  UEFI: 25%  Date assessed:   11/3/20     Latex Allergy:  [x]NO      []YES  Preferred Language for Healthcare:   [x]English       []other    Pain level:  4/10     SUBJECTIVE:   States the shoulder is achy this morning but has noticed good improvement with mobility.       Type: []Constant   [x]Intermitment  []Radiating []Localized  []other:     Functional Limitations: [x]Lifting/reaching []Grooming  []Carrying     []ADL's  []Driving []Sports/Recreations   []Other:      OBJECTIVE:     ROM Current (R) Current (L)   flex 145    abd 115    IR L1    ER T2    Strength     flex 4+    abd 4-    IR 4+    ER 4+      Gait: n/a    Joint mobility: G-H joint   []Normal    [x]Hypo   []Hyper    Palpation: supraspinatus tendon    Orthopedic Tests: n/a    RESTRICTIONS/PRECAUTIONS: Hx of non-Hodgkins-lymphoma, OA    Exercises/Interventions:       Stretching/AAROM  Repetitions/Resistance Notes/Last Progression   Pulley/Wallslides 3'/--    Cane Flex/ ER-seated 10x10''    IR Strap     Sleeper Stretch 4x20''    Tableslide Flex/ ER/ Abd 10x10''    Bear Hug Stretch     Cross Arm Stretch     Upper Trap Stretch     Levator Scapula Stretch     Corner Stretch                  Exercises     Shrugs/ Shoulder Blade Squeeze     Rhythmic stabs 3x30''    SA Punch/ ABC 2# 2x15/2# 1x       Standing Flexion/ Abduction 2x15 each    SL ER/ SL Abd 1# 3x10/1# 3x10    Prone Row/ Ext/ HAB/ Scap 1# 3x10/ 1# 3x10    TB Row/ Ext Green 3x10 each    TB ER/ IR     No Money/ HAB     Lift off: in/out, up/down Yellow 10x each    Finisher                      Manual       Oscillations-Mobs:  G-I, II, III, IV (PA's, Inf., Post.) 5'    PROM 10'    Cervical PA's Grade- Thoracic PA's Grade-     STM-               Access Code: [address]   URL: [email or web address]   Date: 10/01/2020   Prepared by: Jony Newcomer     Exercises   Seated Shoulder Flexion Towel Slide at Table Top - 10 reps - 10 seconds hold - 2x daily - 7x weekly   Seated Shoulder External Rotation AAROM with Cane and Hand in Neutral - 10 reps - 10 seconds hold - 2x daily - 7x weekly   Isometric Shoulder Flexion at Wall - 10 reps - 10 seconds hold - 2x daily - 7x weekly   Standing Isometric Shoulder External Rotation with Doorway - 10 reps - 10 seconds hold - 2x daily - 7x weekly   Standing Isometric Shoulder Internal Rotation at Doorway - 10 reps - 10 seconds hold - 2x daily - 7x weekly     Access Code: Located within [facility]   URL: [employer]!/   Date: 10/07/2020   Prepared by: Jony Akins    Exercises   Sleeper Stretch - 3 reps - 20 seconds hold - 1x daily - 7x weekly   Supine Single Arm Shoulder Protraction - 10 reps - 3 sets - 1x daily - 7x weekly   Sidelying Shoulder External Rotation - 10 reps - 3 sets - 1x daily - 7x weekly     Therapeutic Exercise and NMR EXR  [x] (21447) Provided verbal/tactile cueing for activities related to strengthening, flexibility, endurance, ROM  for improvements in scapular, scapulothoracic and UE control with self care, reaching, carrying, lifting, house/yardwork, driving/computer work.    [] (20193) Provided verbal/tactile cueing for activities related to improving balance, coordination, kinesthetic sense, posture, motor skill, proprioception  to assist with  scapular, scapulothoracic and UE control with self care, reaching, carrying, lifting, house/yardwork, driving/computer work.     Therapeutic Activities:    [] (01843 or 69661) Provided verbal/tactile cueing for activities related to improving balance, coordination, kinesthetic sense, posture, motor skill, proprioception and motor activation to allow for proper function of scapular, scapulothoracic and UE control with self care, carrying, lifting, driving/computer work. Home Exercise Program:    [x] (21713) Reviewed/Progressed HEP activities related to strengthening, flexibility, endurance, ROM of scapular, scapulothoracic and UE control with self care, reaching, carrying, lifting, house/yardwork, driving/computer work  [] (39375) Reviewed/Progressed HEP activities related to improving balance, coordination, kinesthetic sense, posture, motor skill, proprioception of scapular, scapulothoracic and UE control with self care, reaching, carrying, lifting, house/yardwork, driving/computer work      Manual Treatments:  PROM / STM / Oscillations-Mobs:  G-I, II, III, IV (PA's, Inf., Post.)  [x] (68241) Provided manual therapy to mobilize soft tissue/joints of cervical/CT, scapular GHJ and UE for the purpose of modulating pain, promoting relaxation,  increasing ROM, reducing/eliminating soft tissue swelling/inflammation/restriction, improving soft tissue extensibility and allowing for proper ROM for normal function with self care, reaching, carrying, lifting, house/yardwork, driving/computer work    Modalities:  '  Pt declined    Charges:  Timed Code Treatment Minutes: 45   Total Treatment Minutes: 45     [] EVAL (LOW) 28825 (typically 20 minutes face-to-face)  [] EVAL (MOD) 35048 (typically 30 minutes face-to-face)  [] EVAL (HIGH) 08647 (typically 45 minutes face-to-face)  [] RE-EVAL     [x] WP(37383) x 2     [] IONTO  [] NMR (20218) x     [] VASO  [x] Manual (23499) x 1      [] Other:  [] TA x      [] Mech Traction (89091)  [] ES(attended) (61475)      [] ES (un) (83448):     GOALS:  Short Term Goals: To be achieved in: 2 weeks  1. Independent in HEP and progression per patient tolerance, in order to prevent re-injury. [] Progressing: [x] Met: [] Not Met: [] Adjusted  2. Patient will have a decrease in pain to facilitate improvement in movement, function, and ADLs as indicated by Functional Deficits.   [x] Progressing: [] Met: [] Not Met: [] Adjusted    Long Term Goals: To be achieved in: 6 weeks  1. Disability index score of 20% or less for the Quickdash to assist with reaching prior level of function. [] Progressing: [] Met: [] Not Met: [] Adjusted  2. Patient will demonstrate increased AROM to 160 R shoulder flex/abd, 90 ER, 50 IR to allow for proper joint functioning as indicated by patients Functional Deficits. [] Progressing: [] Met: [] Not Met: [] Adjusted  3. Patient will demonstrate an increase in Strength to 4+/5 R shoulder flex/abd/ER to allow for proper functional mobility as indicated by patients Functional Deficits. [] Progressing: [] Met: [] Not Met: [] Adjusted  4. Patient will return to reaching behind back to wash without increased symptoms or restriction. [] Progressing: [] Met: [] Not Met: [] Adjusted      Overall Progression Towards Functional goals/ Treatment Progress Update:  [x] Patient is progressing as expected towards functional goals listed. [] Progression is slowed due to complexities/Impairments listed. [] Progression has been slowed due to co-morbidities. [] Plan just implemented, too soon to assess goals progression <30days   [] Goals require adjustment due to lack of progress  [] Patient is not progressing as expected and requires additional follow up with physician  [] Other    ASSESSMENT:  Steadily progressing with G-H joint mobility/AROM. Continues to have limitation with functional IR reach. Strength is gradually improving but does fatigue easily.      Treatment/Activity Tolerance:  [x] Patient tolerated treatment well [] Patient limited by fatique  [] Patient limited by pain  [] Patient limited by other medical complications  [] Other:     Prognosis: [x] Good [] Fair  [] Poor    Patient Requires Follow-up: [x] Yes  [] No    PLAN: See eval  [x] Continue per plan of care [] Alter current plan (see comments)  [] Plan of care initiated [] Hold pending MD visit [] Discharge        Electronically signed by: Michelle Gresham PT, OMT-C        Note: If patient does not return for scheduled/ recommended follow up visits, this note will serve as a discharge from care along with most recent update on progress.

## 2020-11-03 NOTE — PROGRESS NOTES
[facility] [address] [facility]  Office Visit  Shoulder Pain  Date:  11/3/2020    Name:  Elissa Medina  Address:  [address] [address]    :  1960      Age:   60 y.o.    SSN:  [id number]      Medical Record Number:  <[id number]>    Chief Complaint:    Right Shoulder Pain    HPI:   Elissa Medina is a 60 y.o. right hand dominant female who presents today for evaluation of the right shoulder. The patient was seen a little over a month ago for right shoulder pain and diagnosed with right rotator cuff tendinosis. The patient has been doing therapy and received an injection at that time. She is noting improvement in her motion and pain. She continues to have some pain at night. But she is noting some improvement. She denies any change in symptoms for the worse. Prior HPI 2020: Patient states she has had pain for 1 year. Denies injury at the onset. States is an aching pain she notices at the lateral shoulder that can radiate down towards the elbow region. States she saw her primary care physician who recommended exercises. She is unable to take anti-inflammatory medication secondary to kidney disease. She did not have much more in the way of treatment as the COVID-19 pandemic did begin. She recently did have an MRI performed and she was referred to our office today for further evaluation.     She states the pain is aching and that she has difficulty with range of motion. She has to use her other arm to help with certain activities. 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 She has been recently doing Pilates and effort to work the shoulder.         She denies any new numbness, tingling, fevers, chills, chest pain, shortness of breath, or any other new significant symptoms.     Pain Assessment  Location of Pain: Shoulder  Location Modifiers: Right  Severity of Pain: 3  Quality of Comment: DAILY    Drug use: No        Family History:  family history includes Arthritis in her sister and another family member; Birth Defects in her grandchild; Cancer in her brother and mother; Coronary Art Dis in her brother and father; Diabetes in her brother and sister; Heart Disease in her father; High Blood Pressure in her father; High Cholesterol in her brother; Hypertension in her brother, mother, and sister; Kidney Disease in an other family member; Rheum Arthritis in her brother and sister; Stroke in her brother. Current Outpatient Medications:     valACYclovir (VALTREX) 500 MG tablet, TK 1 T PO Q 12 H. TAKE BID FOR 3 DAYS AT FIRST ONSET OF SYMPTOMS, Disp: , Rfl:     fluorometholone (FML) 0.1 % ophthalmic suspension, SHAKE LQ AND INT 1 GTT IN OU QID, Disp: , Rfl:     benzonatate (TESSALON) 100 MG capsule, TK 1 C PO TID PRF COUGH, Disp: , Rfl:     ketotifen (ZADITOR) 0.025 % ophthalmic solution, , Disp: , Rfl:     MAGNESIUM-OXIDE 400 (241.3 Mg) MG TABS tablet, , Disp: , Rfl:     polyethylene glycol-electrolytes (NULYTELY) 420 g solution, MIX AND DRINK UTD, Disp: , Rfl:     levothyroxine (SYNTHROID) 50 MCG tablet, Take 1 tablet by mouth every morning, Disp: 90 tablet, Rfl: 0    Cholecalciferol (VITAMIN D-3) 25 MCG (1000 UT) CAPS, Take 1 capsule by mouth daily, Disp: 90 capsule, Rfl: 0    brompheniramine-pseudoephedrine-DM (BROMFED DM) 2-30-10 MG/5ML syrup, Take 5 mLs by mouth 4 times daily as needed for Cough, Disp: 240 mL, Rfl: 0    esomeprazole (NEXIUM) 40 MG delayed release capsule, Take 1 capsule by mouth daily, Disp: 30 capsule, Rfl: 2    lidocaine (XYLOCAINE) 5 % ointment, Apply topically a TID/ s needed. , Disp: 1 Tube, Rfl: 0    azelastine (ASTELIN) 0.1 % nasal spray, 2 sprays by Nasal route 2 times daily Use in each nostril as directed, Disp: 3 Bottle, Rfl: 3    fluticasone-vilanterol (BREO ELLIPTA) 100-25 MCG/INH AEPB inhaler, Inhale 1 puff into the lungs daily, Disp: 1 each, Rfl: 11    cetirizine (ZYRTEC) 10 MG tablet, TAKE 1 CAPSULE BY MOUTH DAILY, Disp: 90 tablet, Rfl: 3    fluticasone (FLONASE) 50 MCG/ACT nasal spray, INSTILL 1 SPRAY IN EACH NSOTRIL DAILY, Disp: 1 Bottle, Rfl: 1    albuterol sulfate HFA (PROVENTIL HFA) 108 (90 Base) MCG/ACT inhaler, Inhale 2 puffs into the lungs every 4 hours as needed for Wheezing or Shortness of Breath (Space out to every 6 hours as symptoms improve) Space out to every 6 hours as symptoms improve., Disp: 1 Inhaler, Rfl: 0    baclofen (LIORESAL) 10 MG tablet, Take 1 tablet by mouth 3 times daily as needed (PRN), Disp: 30 tablet, Rfl: 0    ondansetron (ZOFRAN) 4 MG tablet, Take 4 mg by mouth every 8 hours as needed for Nausea or Vomiting, Disp: , Rfl:     penicillin v potassium (VEETID) 500 MG tablet, TAKE 1 TABLET BY MOUTH TWICE DAILY, Disp: 60 tablet, Rfl: 2    conjugated estrogens (PREMARIN) 0.625 MG/GM vaginal cream, insert . 5 gram vaginally three times a week at bedtime. , Disp: 1 Tube, Rfl: 3    zolpidem (AMBIEN) 5 MG tablet, Take one tablet nightly as needed for sleep, Disp: 30 tablet, Rfl: 0    Allergies   Allergen Reactions    No Known Allergies          Review of Systems: A 14 point review of systems available in the scanned medical record as documented by the patient on 11/3/20. Today's review pertinent items are noted in HPI. Patient has had no medical changes since last evaluated      Physical Exam:  Ht 5' 4\" (1.626 m)   Wt 156 lb (70.8 kg)   LMP 12/26/2008   BMI 26.78 kg/m²     General: No acute distress, well nourished  CV: No obvious peripheral edema.  Normal peripheral pulses  Neuro: Alert & oriented x 3  Psych: Normal mood and affect      Right Upper Extremity Exam:      FF Abd ER IR   Active   45 T12   Passive   45 T12   Strength (x/5) 4/5 4/5 Jobes  - Champagne toast 4+ 4+     Skin:  No skin rashes or lesions, warm, well perfused  Inspection: No deformity  Palpation: Slight tenderness to to my attention for an addendum. All efforts were made to ensure that this office note is accurate.   ________________  I was physically present and personally supervised the Orthopaedic Sports Medicine Fellow in the evaluation and development of a treatment plan for this patient. I personally interviewed the patient and performed a physical examination. In addition, I discussed the patient's condition and treatment options with them. I have also reviewed and agree with the past medical, family and social history unless otherwise noted. All of the patient's questions were answered. Derick Todd MD, PhD  11/3/2020

## 2020-11-10 ENCOUNTER — HOSPITAL ENCOUNTER (OUTPATIENT)
Dept: PHYSICAL THERAPY | Age: 60
Setting detail: THERAPIES SERIES
Discharge: HOME | End: 2020-11-10
Payer: COMMERCIAL

## 2020-11-10 PROCEDURE — 97110 THERAPEUTIC EXERCISES: CPT | Performed by: PHYSICAL THERAPIST

## 2020-11-10 PROCEDURE — 97140 MANUAL THERAPY 1/> REGIONS: CPT | Performed by: PHYSICAL THERAPIST

## 2020-11-10 NOTE — FLOWSHEET NOTE
[facility] Orthopaedics and Sports Rehabilitation, [facility] [place]      Physical Therapy Treatment Note/ Progress Report:   Date:  11/10/2020    Patient Name:  Jammie Jean    :  1960  MRN: [id number]  Restrictions/Precautions:    Medical/Treatment Diagnosis Information:  · Diagnosis: Right shoulder tendonitis, G-H arthritis  M75.81  · Treatment Diagnosis: PT treatment diagnosis:  right shoulder pain  N17.948  Insurance/Certification information:  PT Insurance Information: [address] [place]  visits BMN, $20 copay  Physician Information:  Referring Practitioner: Dr Marily Brittle of care signed (Y/N):     Date of Patient follow up with Physician:     Is this a Progress Report:     [x]  Yes  []  No        If Yes:  Date Range for reporting period:  Beginning 10/1/20  Ending 11/3/20    Progress report will be due (10 Rx or 30 days whichever is less):        Recertification will be due (POC Duration  / 90 days whichever is less): 12/3/20     Date of Surgery:        Visit # Insurance Allowable Auth Required   9 BMN []  Yes []  No        Functional Scale:  UEFI: 25%  Date assessed:   11/3/20     Latex Allergy:  [x]NO      []YES  Preferred Language for Healthcare:   [x]English       []other    Pain level:  4/10     SUBJECTIVE:   Has had increased discomfort into the biceps over the past few days.       Type: []Constant   [x]Intermitment  []Radiating []Localized  []other:     Functional Limitations: [x]Lifting/reaching []Grooming  []Carrying     []ADL's  []Driving []Sports/Recreations   []Other:      OBJECTIVE:     ROM Current (R) Current (L)   flex 145    abd 115    IR L1    ER T2    Strength     flex 4+    abd 4-    IR 4+    ER 4+      Gait: n/a    Joint mobility: G-H joint   []Normal    [x]Hypo   []Hyper    Palpation: supraspinatus tendon    Orthopedic Tests: n/a    RESTRICTIONS/PRECAUTIONS: Hx of non-Hodgkins-lymphoma, OA    Exercises/Interventions:       Stretching/AAROM  Repetitions/Resistance Notes/Last strengthening, flexibility, endurance, ROM  for improvements in scapular, scapulothoracic and UE control with self care, reaching, carrying, lifting, house/yardwork, driving/computer work.    [] (38636) Provided verbal/tactile cueing for activities related to improving balance, coordination, kinesthetic sense, posture, motor skill, proprioception  to assist with  scapular, scapulothoracic and UE control with self care, reaching, carrying, lifting, house/yardwork, driving/computer work. Therapeutic Activities:    [] (18857 or 20555) Provided verbal/tactile cueing for activities related to improving balance, coordination, kinesthetic sense, posture, motor skill, proprioception and motor activation to allow for proper function of scapular, scapulothoracic and UE control with self care, carrying, lifting, driving/computer work.      Home Exercise Program:    [x] (14535) Reviewed/Progressed HEP activities related to strengthening, flexibility, endurance, ROM of scapular, scapulothoracic and UE control with self care, reaching, carrying, lifting, house/yardwork, driving/computer work  [] (43804) Reviewed/Progressed HEP activities related to improving balance, coordination, kinesthetic sense, posture, motor skill, proprioception of scapular, scapulothoracic and UE control with self care, reaching, carrying, lifting, house/yardwork, driving/computer work      Manual Treatments:  PROM / STM / Oscillations-Mobs:  G-I, II, III, IV (PA's, Inf., Post.)  [x] (46081) Provided manual therapy to mobilize soft tissue/joints of cervical/CT, scapular GHJ and UE for the purpose of modulating pain, promoting relaxation,  increasing ROM, reducing/eliminating soft tissue swelling/inflammation/restriction, improving soft tissue extensibility and allowing for proper ROM for normal function with self care, reaching, carrying, lifting, house/yardwork, driving/computer work    Modalities:  CP x 10'      Charges:  Timed Code Treatment Minutes: 45   Total Treatment Minutes: 45     [] EVAL (LOW) 85129 (typically 20 minutes face-to-face)  [] EVAL (MOD) 20885 (typically 30 minutes face-to-face)  [] EVAL (HIGH) 09254 (typically 45 minutes face-to-face)  [] RE-EVAL     [x] KK(68006) x 2     [] IONTO  [] NMR (65677) x     [] VASO  [x] Manual (15102) x 1      [] Other:  [] TA x      [] Mech Traction (53458)  [] ES(attended) (59849)      [] ES (un) (37355):     GOALS:  Short Term Goals: To be achieved in: 2 weeks  1. Independent in HEP and progression per patient tolerance, in order to prevent re-injury. [] Progressing: [x] Met: [] Not Met: [] Adjusted  2. Patient will have a decrease in pain to facilitate improvement in movement, function, and ADLs as indicated by Functional Deficits. [x] Progressing: [] Met: [] Not Met: [] Adjusted    Long Term Goals: To be achieved in: 6 weeks  1. Disability index score of 20% or less for the Quickdash to assist with reaching prior level of function. [] Progressing: [] Met: [] Not Met: [] Adjusted  2. Patient will demonstrate increased AROM to 160 R shoulder flex/abd, 90 ER, 50 IR to allow for proper joint functioning as indicated by patients Functional Deficits. [] Progressing: [] Met: [] Not Met: [] Adjusted  3. Patient will demonstrate an increase in Strength to 4+/5 R shoulder flex/abd/ER to allow for proper functional mobility as indicated by patients Functional Deficits. [] Progressing: [] Met: [] Not Met: [] Adjusted  4. Patient will return to reaching behind back to wash without increased symptoms or restriction. [] Progressing: [] Met: [] Not Met: [] Adjusted      Overall Progression Towards Functional goals/ Treatment Progress Update:  [x] Patient is progressing as expected towards functional goals listed. [] Progression is slowed due to complexities/Impairments listed. [] Progression has been slowed due to co-morbidities.   [] Plan just implemented, too soon to assess goals progression <30days   [] Goals require adjustment due to lack of progress  [] Patient is not progressing as expected and requires additional follow up with physician  [] Other    ASSESSMENT:  Tender to palpation and some tightness noted in the right bicep. Decreased c/o's symptoms following passive bicep stretch. Crepitus which is painful at times with R shoulder AROM. Treatment/Activity Tolerance:  [x] Patient tolerated treatment well [] Patient limited by fatique  [] Patient limited by pain  [] Patient limited by other medical complications  [] Other:     Prognosis: [x] Good [] Fair  [] Poor    Patient Requires Follow-up: [x] Yes  [] No    PLAN: See eval  [x] Continue per plan of care [] Alter current plan (see comments)  [] Plan of care initiated [] Hold pending MD visit [] Discharge        Electronically signed by: Alana Miranda PT, OMT-C        Note: If patient does not return for scheduled/ recommended follow up visits, this note will serve as a discharge from care along with most recent update on progress.

## 2020-11-16 ENCOUNTER — HOSPITAL ENCOUNTER (OUTPATIENT)
Dept: PHYSICAL THERAPY | Age: 60
Setting detail: THERAPIES SERIES
Discharge: HOME | End: 2020-11-16
Payer: COMMERCIAL

## 2020-11-16 PROCEDURE — 97140 MANUAL THERAPY 1/> REGIONS: CPT | Performed by: PHYSICAL THERAPIST

## 2020-11-16 PROCEDURE — 97110 THERAPEUTIC EXERCISES: CPT | Performed by: PHYSICAL THERAPIST

## 2020-11-16 NOTE — FLOWSHEET NOTE
[facility], [facility], [facility]      Physical Therapy Treatment Note/ Progress Report:   Date:  2020    Patient Name:  Zoltan Wellington    :  1960  MRN: [id number]  Restrictions/Precautions:    Medical/Treatment Diagnosis Information:  · Diagnosis: Right shoulder tendonitis, G-H arthritis  M75.81  · Treatment Diagnosis: PT treatment diagnosis:  right shoulder pain  G60.254  Insurance/Certification information:  PT Insurance Information: [address] [place]  visits BMN, $20 copay  Physician Information:  Referring Practitioner: Dr Kelsey Gutierrez of care signed (Y/N):     Date of Patient follow up with Physician:     Is this a Progress Report:     [x]  Yes  []  No        If Yes:  Date Range for reporting period:  Beginning 10/1/20  Ending 11/3/20    Progress report will be due (10 Rx or 30 days whichever is less):        Recertification will be due (POC Duration  / 90 days whichever is less): 12/3/20     Date of Surgery:        Visit # Insurance Allowable Auth Required   10 BMN []  Yes []  No        Functional Scale:  UEFI: 25%  Date assessed:   11/3/20     Latex Allergy:  [x]NO      []YES  Preferred Language for Healthcare:   [x]English       []other    Pain level:  4/10     SUBJECTIVE:   Bicep is mildly sore but no longer have sharp pain. Went to a pilates class over the weekend and had no increase with shoulder symptoms.         Type: []Constant   [x]Intermitment  []Radiating []Localized  []other:     Functional Limitations: [x]Lifting/reaching []Grooming  []Carrying     []ADL's  []Driving []Sports/Recreations   []Other:      OBJECTIVE:     ROM Current (R) Current (L)   flex 145    abd 115    IR L1    ER T2    Strength     flex 4+    abd 4-    IR 4+    ER 4+      Gait: n/a    Joint mobility: G-H joint   []Normal    [x]Hypo   []Hyper    Palpation: supraspinatus tendon    Orthopedic Tests: n/a    RESTRICTIONS/PRECAUTIONS: Hx of non-Hodgkins-lymphoma, OA    Exercises/Interventions:       Stretching/AAROM  Repetitions/Resistance Notes/Last Progression   Pulley/Wallslides 3'/--    Cane Flex/ ER-seated 10x10''    IR Strap     Sleeper Stretch 4x20''    Tableslide Flex/ ER/ Abd 10x10''    Bear Hug Stretch     Cross Arm Stretch     Upper Trap Stretch     Levator Scapula Stretch     Bicep stretch @ wall 3x30''    Corner Stretch                  Exercises     Shrugs/ Shoulder Blade Squeeze     Rhythmic stabs 3x30''    SA Punch/ ABC 2# 2x15/2# 1x       Standing Flexion/ Abduction 2x15 each    SL ER/ SL Abd 1# 3x10/1# 3x10    Prone Row/ Ext/ HAB/ Scap 1# 3x10/ 1# 3x10    TB Row/ Ext Green 3x10 each    TB ER/ IR     No Money/ HAB     Lift off: in/out, up/down Yellow 10x each    Finisher                      Manual       Oscillations-Mobs:  G-I, II, III, IV (PA's, Inf., Post.) 5'    PROM 10'    Cervical PA's Grade-     Thoracic PA's Grade-     STM-               Access Code: [address]   URL: [employer]Ronin/   Date: 10/01/2020   Prepared by: Iona Ran     Exercises   Seated Shoulder Flexion Towel Slide at Table Top - 10 reps - 10 seconds hold - 2x daily - 7x weekly   Seated Shoulder External Rotation AAROM with Cane and Hand in Neutral - 10 reps - 10 seconds hold - 2x daily - 7x weekly   Isometric Shoulder Flexion at Wall - 10 reps - 10 seconds hold - 2x daily - 7x weekly   Standing Isometric Shoulder External Rotation with Doorway - 10 reps - 10 seconds hold - 2x daily - 7x weekly   Standing Isometric Shoulder Internal Rotation at Doorway - 10 reps - 10 seconds hold - 2x daily - 7x weekly     Access Code: [facility]   URL: [employer]/   Date: 10/07/2020   Prepared by: Iona Ran    Exercises   Sleeper Stretch - 3 reps - 20 seconds hold - 1x daily - 7x weekly   Supine Single Arm Shoulder Protraction - 10 reps - 3 sets - 1x daily - 7x weekly   Sidelying Shoulder External Rotation - 10 reps - 3 sets - 1x daily - 7x weekly     Therapeutic Exercise and NMR EXR  [x] (67297) Provided verbal/tactile cueing for activities related to strengthening, flexibility, endurance, ROM  for improvements in scapular, scapulothoracic and UE control with self care, reaching, carrying, lifting, house/yardwork, driving/computer work.    [] (49157) Provided verbal/tactile cueing for activities related to improving balance, coordination, kinesthetic sense, posture, motor skill, proprioception  to assist with  scapular, scapulothoracic and UE control with self care, reaching, carrying, lifting, house/yardwork, driving/computer work. Therapeutic Activities:    [] (23819 or 54373) Provided verbal/tactile cueing for activities related to improving balance, coordination, kinesthetic sense, posture, motor skill, proprioception and motor activation to allow for proper function of scapular, scapulothoracic and UE control with self care, carrying, lifting, driving/computer work.      Home Exercise Program:    [x] (26314) Reviewed/Progressed HEP activities related to strengthening, flexibility, endurance, ROM of scapular, scapulothoracic and UE control with self care, reaching, carrying, lifting, house/yardwork, driving/computer work  [] (42012) Reviewed/Progressed HEP activities related to improving balance, coordination, kinesthetic sense, posture, motor skill, proprioception of scapular, scapulothoracic and UE control with self care, reaching, carrying, lifting, house/yardwork, driving/computer work      Manual Treatments:  PROM / STM / Oscillations-Mobs:  G-I, II, III, IV (PA's, Inf., Post.)  [x] (74121) Provided manual therapy to mobilize soft tissue/joints of cervical/CT, scapular GHJ and UE for the purpose of modulating pain, promoting relaxation,  increasing ROM, reducing/eliminating soft tissue swelling/inflammation/restriction, improving soft tissue extensibility and allowing for proper ROM for normal function with self care, reaching, carrying, lifting, house/yardwork, driving/computer work    Modalities:  CP x 10'      Charges:  Timed Code Treatment Minutes: 45   Total Treatment Minutes: 45     [] EVAL (LOW) 79058 (typically 20 minutes face-to-face)  [] EVAL (MOD) 72928 (typically 30 minutes face-to-face)  [] EVAL (HIGH) 47432 (typically 45 minutes face-to-face)  [] RE-EVAL     [x] AH(78568) x 2     [] IONTO  [] NMR (74333) x     [] VASO  [x] Manual (80517) x 1      [] Other:  [] TA x      [] Mech Traction (74864)  [] ES(attended) (42404)      [] ES (un) (28106):     GOALS:  Short Term Goals: To be achieved in: 2 weeks  1. Independent in HEP and progression per patient tolerance, in order to prevent re-injury. [] Progressing: [x] Met: [] Not Met: [] Adjusted  2. Patient will have a decrease in pain to facilitate improvement in movement, function, and ADLs as indicated by Functional Deficits. [x] Progressing: [] Met: [] Not Met: [] Adjusted    Long Term Goals: To be achieved in: 6 weeks  1. Disability index score of 20% or less for the [employer] to assist with reaching prior level of function. [] Progressing: [] Met: [] Not Met: [] Adjusted  2. Patient will demonstrate increased AROM to 160 R shoulder flex/abd, 90 ER, 50 IR to allow for proper joint functioning as indicated by patients Functional Deficits. [] Progressing: [] Met: [] Not Met: [] Adjusted  3. Patient will demonstrate an increase in Strength to 4+/5 R shoulder flex/abd/ER to allow for proper functional mobility as indicated by patients Functional Deficits. [] Progressing: [] Met: [] Not Met: [] Adjusted  4. Patient will return to reaching behind back to wash without increased symptoms or restriction. [] Progressing: [] Met: [] Not Met: [] Adjusted      Overall Progression Towards Functional goals/ Treatment Progress Update:  [x] Patient is progressing as expected towards functional goals listed. [] Progression is slowed due to complexities/Impairments listed.   [] Progression has been slowed due to co-morbidities. [] Plan just implemented, too soon to assess goals progression <30days   [] Goals require adjustment due to lack of progress  [] Patient is not progressing as expected and requires additional follow up with physician  [] Other    ASSESSMENT:  Tolerated Rx well without increase in symptoms. Continues to fatigue easily with RC strengthening. Treatment/Activity Tolerance:  [x] Patient tolerated treatment well [] Patient limited by fatique  [] Patient limited by pain  [] Patient limited by other medical complications  [] Other:     Prognosis: [x] Good [] Fair  [] Poor    Patient Requires Follow-up: [x] Yes  [] No    PLAN: See eval  [x] Continue per plan of care [] Alter current plan (see comments)  [] Plan of care initiated [] Hold pending MD visit [] Discharge        Electronically signed by: Mary Cazares PT, OMT-C        Note: If patient does not return for scheduled/ recommended follow up visits, this note will serve as a discharge from care along with most recent update on progress.

## 2020-11-18 ENCOUNTER — HOSPITAL ENCOUNTER (OUTPATIENT)
Dept: PHYSICAL THERAPY | Age: 60
Setting detail: THERAPIES SERIES
Discharge: HOME | End: 2020-11-18
Payer: COMMERCIAL

## 2020-11-18 PROCEDURE — 97110 THERAPEUTIC EXERCISES: CPT | Performed by: PHYSICAL THERAPIST

## 2020-11-18 PROCEDURE — 97140 MANUAL THERAPY 1/> REGIONS: CPT | Performed by: PHYSICAL THERAPIST

## 2020-11-18 NOTE — FLOWSHEET NOTE
[facility], [facility]      Physical Therapy Treatment Note/ Progress Report:   Date:  2020    Patient Name:  Silver Pacheco    :  1960  MRN: [id number]  Restrictions/Precautions:    Medical/Treatment Diagnosis Information:  · Diagnosis: Right shoulder tendonitis, G-H arthritis  M75.81  · Treatment Diagnosis: PT treatment diagnosis:  right shoulder pain  E10.965  Insurance/Certification information:  PT Insurance Information: [address] [place]  visits BMN, $20 copay  Physician Information:  Referring Practitioner: Dr Carla Vale of care signed (Y/N):     Date of Patient follow up with Physician:     Is this a Progress Report:     [x]  Yes  []  No        If Yes:  Date Range for reporting period:  Beginning 10/1/20  Ending 11/3/20    Progress report will be due (10 Rx or 30 days whichever is less):        Recertification will be due (POC Duration  / 90 days whichever is less): 12/3/20     Date of Surgery:        Visit # Insurance Allowable Auth Required   11 BMN []  Yes []  No        Functional Scale:  UEFI: 25%  Date assessed:   11/3/20     Latex Allergy:  [x]NO      []YES  Preferred Language for Healthcare:   [x]English       []other    Pain level:  10     SUBJECTIVE:   Reports having increased muscular soreness after the last visit but overall is doing well.          Type: []Constant   [x]Intermitment  []Radiating []Localized  []other:     Functional Limitations: [x]Lifting/reaching []Grooming  []Carrying     []ADL's  []Driving []Sports/Recreations   []Other:      OBJECTIVE:     ROM Current (R) Current (L)   flex 145    abd 115    IR L1    ER T2    Strength     flex 4+    abd 4-    IR 4+    ER 4+      Gait: n/a    Joint mobility: G-H joint   []Normal    [x]Hypo   []Hyper    Palpation: supraspinatus tendon    Orthopedic Tests: n/a    RESTRICTIONS/PRECAUTIONS: Hx of non-Hodgkins-lymphoma, OA    Exercises/Interventions:       Stretching/AAROM Repetitions/Resistance Notes/Last Progression   Pulley/Wallslides 3'/--    [employer] Flex/ ER-seated    IR Strap    Sleeper Stretch    Tableslide Flex/ ER/ Abd    Bear Hug Stretch     Cross Arm Stretch     Upper Trap Stretch     Levator Scapula Stretch     Bicep stretch @ wall 3x30''    Corner Stretch                  Exercises     Shrugs/ Shoulder Blade Squeeze     Rhythmic stabs 3x30''    SA Punch/ ABC 2# 2x15/2# 1x       Standing Flexion/ Abduction 2x15 each    SL ER/ SL Abd 1# 3x10/1# 3x10    Prone Row/ Ext/ HAB/ Scap 1# 3x10/ 1# 3x10    TB Row/ Ext Green 3x10 each    TB ER/ IR     No Money/ HAB     Lift off: in/out, up/down Yellow 10x each    Finisher                      Manual       Oscillations-Mobs:  G-I, II, III, IV (PA's, Inf., Post.) 5'    PROM 10'    Cervical PA's Grade-     Thoracic PA's Grade-     STM-               Access Code: [address]   URL: [email or web address]   Date: 10/01/2020   Prepared by: Luca [place]     Exercises   Seated Shoulder Flexion Towel Slide at Table Top - 10 reps - 10 seconds hold - 2x daily - 7x weekly   Seated Shoulder External Rotation AAROM with Cane and Hand in Neutral - 10 reps - 10 seconds hold - 2x daily - 7x weekly   Isometric Shoulder Flexion at Wall - 10 reps - 10 seconds hold - 2x daily - 7x weekly   Standing Isometric Shoulder External Rotation with Doorway - 10 reps - 10 seconds hold - 2x daily - 7x weekly   Standing Isometric Shoulder Internal Rotation at Doorway - 10 reps - 10 seconds hold - 2x daily - 7x weekly     Access Code: [facility]   URL: [email or web address]   Date: 10/07/2020   Prepared by: Empire Ivan    Exercises   Sleeper Stretch - 3 reps - 20 seconds hold - 1x daily - 7x weekly   Supine Single Arm Shoulder Protraction - 10 reps - 3 sets - 1x daily - 7x weekly   Sidelying Shoulder External Rotation - 10 reps - 3 sets - 1x daily - 7x weekly     Therapeutic Exercise and NMR EXR  [x] (86877) Provided verbal/tactile cueing for activities related

## 2020-11-23 ENCOUNTER — HOSPITAL ENCOUNTER (OUTPATIENT)
Dept: PHYSICAL THERAPY | Age: 60
Setting detail: THERAPIES SERIES
Discharge: HOME | End: 2020-11-23
Payer: COMMERCIAL

## 2020-11-23 PROCEDURE — 97110 THERAPEUTIC EXERCISES: CPT | Performed by: PHYSICAL THERAPIST

## 2020-11-23 PROCEDURE — 97140 MANUAL THERAPY 1/> REGIONS: CPT | Performed by: PHYSICAL THERAPIST

## 2020-11-23 NOTE — FLOWSHEET NOTE
[facility], [facility]      Physical Therapy Treatment Note/ Progress Report:   Date:  2020    Patient Name:  Lesly Meraz    :  1960  MRN: [id number]  Restrictions/Precautions:    Medical/Treatment Diagnosis Information:  · Diagnosis: Right shoulder tendonitis, G-H arthritis  M75.81  · Treatment Diagnosis: PT treatment diagnosis:  right shoulder pain  J13.084  Insurance/Certification information:  PT Insurance Information: [address] [place]  visits BMN, $20 copay  Physician Information:  Referring Practitioner: Dr Sindhu Mckeon of care signed (Y/N):     Date of Patient follow up with Physician:     Is this a Progress Report:     []  Yes  [x]  No        If Yes:  Date Range for reporting period:  Beginning 10/1/20  Ending 11/3/20    Progress report will be due (10 Rx or 30 days whichever is less):        Recertification will be due (POC Duration  / 90 days whichever is less): 12/3/20     Date of Surgery:        Visit # Insurance Allowable Auth Required   12 BMN []  Yes []  No        Functional Scale:  UEFI: 25%  Date assessed:   11/3/20     Latex Allergy:  [x]NO      []YES  Preferred Language for Healthcare:   [x]English       []other    Pain level:  10     SUBJECTIVE:   Patient states that her R shoulder is sore after shopping and organizing this weekend.      Type: []Constant   [x]Intermitment  []Radiating []Localized  []other:     Functional Limitations: [x]Lifting/reaching []Grooming  []Carrying     []ADL's  []Driving []Sports/Recreations   []Other:      OBJECTIVE:     ROM Current (R) Current (L)   flex 145    abd 115    IR L1    ER T2    Strength     flex 4+    abd 4-    IR 4+    ER 4+      Gait: n/a    Joint mobility: G-H joint   []Normal    [x]Hypo   []Hyper    Palpation: supraspinatus tendon    Orthopedic Tests: n/a    RESTRICTIONS/PRECAUTIONS: Hx of non-Hodgkins-lymphoma, OA    Exercises/Interventions:       Stretching/AAROM Repetitions/Resistance Notes/Last Progression   [place]/Wallslides 3'/--    [address] Flex/ ER-seated    IR Strap    Sleeper Stretch    Tableslide Flex/ ER/ Abd    Bear Hug Stretch     Cross Arm Stretch     Upper Trap Stretch     Levator Scapula Stretch     Bicep stretch @ wall     Corner Stretch                  Exercises     Shrugs/ Shoulder Blade Squeeze     Rhythmic stabs 3x30''    SA Punch/ ABC 2# 2x15/2# 1x       Standing Flexion/ Abduction 2x15 each    SL ER/ SL Abd 1# 3x10/1# 3x10    Prone Row/ Ext/ HAB/ Scap 1# 3x10/ 1# 3x10    TB Row/ Ext Green 3x10 each    TB ER/ IR     No Money/ HAB     Lift off: in/out, up/down Yellow 10x each    Finisher                      Manual       Oscillations-Mobs:  G-I, II, III, IV (PA's, Inf., Post.) 5'    PROM 10'    Cervical PA's Grade-     Thoracic PA's Grade-     STM-               Access Code: [address]   URL: [email or web address]   Date: 10/01/2020   Prepared by: Jabari Man     Exercises   Seated Shoulder Flexion Towel Slide at Table Top - 10 reps - 10 seconds hold - 2x daily - 7x weekly   Seated Shoulder External Rotation AAROM with Cane and Hand in Neutral - 10 reps - 10 seconds hold - 2x daily - 7x weekly   Isometric Shoulder Flexion at Wall - 10 reps - 10 seconds hold - 2x daily - 7x weekly   Standing Isometric Shoulder External Rotation with Doorway - 10 reps - 10 seconds hold - 2x daily - 7x weekly   Standing Isometric Shoulder Internal Rotation at Doorway - 10 reps - 10 seconds hold - 2x daily - 7x weekly     Access Code: [facility]   URL: [email or web address]   Date: 10/07/2020   Prepared by: Jabari Man    Exercises   Sleeper Stretch - 3 reps - 20 seconds hold - 1x daily - 7x weekly   Supine Single Arm Shoulder Protraction - 10 reps - 3 sets - 1x daily - 7x weekly   Sidelying Shoulder External Rotation - 10 reps - 3 sets - 1x daily - 7x weekly     Therapeutic Exercise and NMR EXR  [x] (77465) Provided verbal/tactile cueing for activities related to strengthening, flexibility, endurance, ROM  for improvements in scapular, scapulothoracic and UE control with self care, reaching, carrying, lifting, house/yardwork, driving/computer work.    [] (81054) Provided verbal/tactile cueing for activities related to improving balance, coordination, kinesthetic sense, posture, motor skill, proprioception  to assist with  scapular, scapulothoracic and UE control with self care, reaching, carrying, lifting, house/yardwork, driving/computer work. Therapeutic Activities:    [] (78427 or 69271) Provided verbal/tactile cueing for activities related to improving balance, coordination, kinesthetic sense, posture, motor skill, proprioception and motor activation to allow for proper function of scapular, scapulothoracic and UE control with self care, carrying, lifting, driving/computer work.      Home Exercise Program:    [x] (60615) Reviewed/Progressed HEP activities related to strengthening, flexibility, endurance, ROM of scapular, scapulothoracic and UE control with self care, reaching, carrying, lifting, house/yardwork, driving/computer work  [] (30716) Reviewed/Progressed HEP activities related to improving balance, coordination, kinesthetic sense, posture, motor skill, proprioception of scapular, scapulothoracic and UE control with self care, reaching, carrying, lifting, house/yardwork, driving/computer work      Manual Treatments:  PROM / STM / Oscillations-Mobs:  G-I, II, III, IV (PA's, Inf., Post.)  [x] (62464) Provided manual therapy to mobilize soft tissue/joints of cervical/CT, scapular GHJ and UE for the purpose of modulating pain, promoting relaxation,  increasing ROM, reducing/eliminating soft tissue swelling/inflammation/restriction, improving soft tissue extensibility and allowing for proper ROM for normal function with self care, reaching, carrying, lifting, house/yardwork, driving/computer work    Modalities:  '  Pt declined    Charges:  Timed Code Treatment <30days   [] Goals require adjustment due to lack of progress  [] Patient is not progressing as expected and requires additional follow up with physician  [] Other    ASSESSMENT:  Patient is showing small improvements in strength and range of motion, however, fatigues easily with exercises. Treatment/Activity Tolerance:  [x] Patient tolerated treatment well [] Patient limited by fatique  [] Patient limited by pain  [] Patient limited by other medical complications  [] Other:     Prognosis: [x] Good [] Fair  [] Poor    Patient Requires Follow-up: [x] Yes  [] No    PLAN: See eval  [x] Continue per plan of care [] Alter current plan (see comments)  [] Plan of care initiated [] Hold pending MD visit [] Discharge        Electronically signed by: Alba Tejada SPT, Annie Winn PT, OMT-C        Note: If patient does not return for scheduled/ recommended follow up visits, this note will serve as a discharge from care along with most recent update on progress.

## 2020-11-25 ENCOUNTER — APPOINTMENT (OUTPATIENT)
Dept: PHYSICAL THERAPY | Age: 60
End: 2020-11-25
Payer: COMMERCIAL

## 2020-12-02 ENCOUNTER — HOSPITAL ENCOUNTER (OUTPATIENT)
Dept: PHYSICAL THERAPY | Age: 60
Setting detail: THERAPIES SERIES
Discharge: HOME | End: 2020-12-02
Payer: COMMERCIAL

## 2020-12-02 PROCEDURE — 97110 THERAPEUTIC EXERCISES: CPT | Performed by: PHYSICAL THERAPIST

## 2020-12-02 PROCEDURE — 97140 MANUAL THERAPY 1/> REGIONS: CPT | Performed by: PHYSICAL THERAPIST

## 2020-12-02 NOTE — PLAN OF CARE
[facility] - Orthopaedics and Sports Rehabilitation, [facility]             Physical Therapy Re-Certification Plan of Mel Louise        Dear Dr. Brandon Truong,    We had the pleasure of treating the following patient for physical therapy services at [address] [place] [address]. A summary of our findings can be found in the updated assessment below. This includes our plan of care. If you have any questions or concerns regarding these findings, please do not hesitate to contact me at the office phone number checked above.   Thank you for the referral.     Physician Signature:________________________________Date:__________________  By signing above (or electronic signature), therapists plan is approved by physician    Date Range Of Visits: 10/1/20-20  Total Visits to Date: 15  Overall Response to Treatment:   [x]Patient is responding well to treatment and improvement is noted with regards  to goals   []Patient should continue to improve in reasonable time if they continue HEP   []Patient has plateaued and is no longer responding to skilled PT intervention    []Patient is getting worse and would benefit from return to referring MD   []Patient unable to adhere to initial POC   []Other:   Plan: continue 1x/wk for 2 weeks    Physical Therapy Treatment Note/ Progress Report:   Date:  2020    Patient Name:  Lorraine Jones    :  1960  MRN: [id number]  Restrictions/Precautions:    Medical/Treatment Diagnosis Information:  · Diagnosis: Right shoulder tendonitis, G-H arthritis  M75.81  · Treatment Diagnosis: PT treatment diagnosis:  right shoulder pain  K87.862  Insurance/Certification information:  PT Insurance Information: [address] [employer]  visits BMN, $20 copay  Physician Information:  Referring Practitioner: Dr Mendoza [facility] of care signed (Y/N):     Date of Patient follow up with Physician: 20    Is this a Progress Report:     []  Yes  [x]  No        If Yes:  Date Range for reporting period:  Beginning 11/3/20  Ending 12/2/20    Progress report will be due (10 Rx or 30 days whichever is less): 8/6/78       Recertification will be due (POC Duration  / 90 days whichever is less): 12/16/20     Date of Surgery:        Visit # Insurance Allowable Auth Required   13 BMN []  Yes []  No        Functional Scale:  UEFI: 4%  Date assessed:   12/2/20     Latex Allergy:  [x]NO      []YES  Preferred Language for Healthcare:   [x]English       []other    Pain level:  3/10     SUBJECTIVE:   Patient states that her shoulder is a little sore after helping during the holiday and \"doing too much. \"    Type: []Constant   [x]Intermitment  []Radiating []Localized  []other:     Functional Limitations: [x]Lifting/reaching []Grooming  []Carrying     []ADL's  []Driving []Sports/Recreations   []Other:      OBJECTIVE: 12/2/20    ROM Current (R) Current (L)   flex 166    abd 163    IR T8    ER T3    Strength     flex 5/5    abd 4+/5    IR 5/5    ER 5/5      Gait: n/a    Joint mobility: G-H joint   []Normal    [x]Hypo   []Hyper    Palpation: supraspinatus tendon    Orthopedic Tests: n/a    RESTRICTIONS/PRECAUTIONS: Hx of non-Hodgkins-lymphoma, OA    Exercises/Interventions:       Stretching/AAROM  Repetitions/Resistance Notes/Last Progression   Pulley/Wallslides 3'/--    Cane Flex/ ER-seated    IR Strap    Sleeper Stretch    Tableslide Flex/ ER/ Abd    Bear Hug Stretch     Cross Arm Stretch     Upper Trap Stretch     Levator Scapula Stretch     Bicep stretch @ wall     Corner Stretch                  Exercises     Shrugs/ Shoulder Blade Squeeze     Rhythmic stabs 3x30''    SA Punch/ ABC 2# 2x15/2# 1x       Standing Flexion/ Abduction 2x15 each    SL ER/ SL Abd 1# 3x10/1# 3x10    Prone Row/ Ext/ HAB/ Scap 1# 3x10/ 1# 3x10    TB Row/ Ext Green 3x10 each    TB ER/ IR     No Money/ HAB     Lift off: in/out, up/down Yellow 10x each    Finisher                      Manual       Oscillations-Mobs:  G-I, II, III, IV (PA's, Inf., Post.) 5'    PROM 10'    Cervical PA's Grade-     Thoracic PA's Grade-     STM-               Access Code: [address]   URL: [email or web address]   Date: 10/01/2020   Prepared by: Owen Garibay     Exercises   Seated Shoulder Flexion Towel Slide at Table Top - 10 reps - 10 seconds hold - 2x daily - 7x weekly   Seated Shoulder External Rotation AAROM with Cane and Hand in Neutral - 10 reps - 10 seconds hold - 2x daily - 7x weekly   Isometric Shoulder Flexion at Wall - 10 reps - 10 seconds hold - 2x daily - 7x weekly   Standing Isometric Shoulder External Rotation with Doorway - 10 reps - 10 seconds hold - 2x daily - 7x weekly   Standing Isometric Shoulder Internal Rotation at Doorway - 10 reps - 10 seconds hold - 2x daily - 7x weekly     Access Code: [facility]   URL: [email or web address]   Date: 10/07/2020   Prepared by: Owen Garibay    Exercises   Sleeper Stretch - 3 reps - 20 seconds hold - 1x daily - 7x weekly   Supine Single Arm Shoulder Protraction - 10 reps - 3 sets - 1x daily - 7x weekly   Sidelying Shoulder External Rotation - 10 reps - 3 sets - 1x daily - 7x weekly     Therapeutic Exercise and NMR EXR  [x] (53866) Provided verbal/tactile cueing for activities related to strengthening, flexibility, endurance, ROM  for improvements in scapular, scapulothoracic and UE control with self care, reaching, carrying, lifting, house/yardwork, driving/computer work.    [] (94192) Provided verbal/tactile cueing for activities related to improving balance, coordination, kinesthetic sense, posture, motor skill, proprioception  to assist with  scapular, scapulothoracic and UE control with self care, reaching, carrying, lifting, house/yardwork, driving/computer work.     Therapeutic Activities:    [] (84985 or 64112) Provided verbal/tactile cueing for activities related to improving balance, coordination, kinesthetic sense, posture, motor skill, proprioception and motor activation to allow for proper function of scapular, scapulothoracic and UE control with self care, carrying, lifting, driving/computer work. Home Exercise Program:    [x] (28746) Reviewed/Progressed HEP activities related to strengthening, flexibility, endurance, ROM of scapular, scapulothoracic and UE control with self care, reaching, carrying, lifting, house/yardwork, driving/computer work  [] (81533) Reviewed/Progressed HEP activities related to improving balance, coordination, kinesthetic sense, posture, motor skill, proprioception of scapular, scapulothoracic and UE control with self care, reaching, carrying, lifting, house/yardwork, driving/computer work      Manual Treatments:  PROM / STM / Oscillations-Mobs:  G-I, II, III, IV (PA's, Inf., Post.)  [x] (55394) Provided manual therapy to mobilize soft tissue/joints of cervical/CT, scapular GHJ and UE for the purpose of modulating pain, promoting relaxation,  increasing ROM, reducing/eliminating soft tissue swelling/inflammation/restriction, improving soft tissue extensibility and allowing for proper ROM for normal function with self care, reaching, carrying, lifting, house/yardwork, driving/computer work    Modalities:  '  Pt declined    Charges:  Timed Code Treatment Minutes: 45   Total Treatment Minutes: 45     [] EVAL (LOW) 42533 (typically 20 minutes face-to-face)  [] EVAL (MOD) 82477 (typically 30 minutes face-to-face)  [] EVAL (HIGH) 73229 (typically 45 minutes face-to-face)  [] RE-EVAL     [x] LX(90376) x 2     [] IONTO  [] NMR (77751) x     [] VASO  [x] Manual (72479) x 1      [] Other:  [] TA x      [] Mech Traction (64410)  [] ES(attended) (62936)      [] ES (un) (23009):     GOALS:  Short Term Goals: To be achieved in: 2 weeks  1. Independent in HEP and progression per patient tolerance, in order to prevent re-injury. [] Progressing: [x] Met: [] Not Met: [] Adjusted  2.  Patient will have a decrease in pain to facilitate improvement in movement, function, and ADLs as indicated by Lashanda Vogel PT, OMT-C        Note: If patient does not return for scheduled/ recommended follow up visits, this note will serve as a discharge from care along with most recent update on progress.

## 2020-12-07 ENCOUNTER — APPOINTMENT (OUTPATIENT)
Dept: PHYSICAL THERAPY | Age: 60
End: 2020-12-07
Payer: COMMERCIAL

## 2020-12-09 ENCOUNTER — HOSPITAL ENCOUNTER (OUTPATIENT)
Dept: PHYSICAL THERAPY | Age: 60
Setting detail: THERAPIES SERIES
Discharge: HOME | End: 2020-12-09
Payer: COMMERCIAL

## 2020-12-09 PROCEDURE — 97140 MANUAL THERAPY 1/> REGIONS: CPT | Performed by: PHYSICAL THERAPIST

## 2020-12-09 PROCEDURE — 97110 THERAPEUTIC EXERCISES: CPT | Performed by: PHYSICAL THERAPIST

## 2020-12-09 NOTE — FLOWSHEET NOTE
[facility]      Physical Therapy Treatment Note/ Progress Report:   Date:  2020    Patient Name:  Kimmy Pena    :  1960  MRN: [id number]  Restrictions/Precautions:    Medical/Treatment Diagnosis Information:  · Diagnosis: Right shoulder tendonitis, G-H arthritis  M75.81  · Treatment Diagnosis: PT treatment diagnosis:  right shoulder pain  E49.675  Insurance/Certification information:  PT Insurance Information: [address] [employer]  visits BMN, $20 copay  Physician Information:  Referring Practitioner: Dr Maureen Katz of care signed (Y/N):     Date of Patient follow up with Physician: 20    Is this a Progress Report:     []  Yes  [x]  No        If Yes:  Date Range for reporting period:  Beginning 11/3/20  Ending 20    Progress report will be due (10 Rx or 30 days whichever is less):        Recertification will be due (POC Duration  / 90 days whichever is less): 20     Date of Surgery:        Visit # Insurance Allowable Auth Required   14 BMN []  Yes []  No        Functional Scale:  UEFI: 4%  Date assessed:   20     Latex Allergy:  [x]NO      []YES  Preferred Language for Healthcare:   [x]English       []other    Pain level:  3/10     SUBJECTIVE:   Patient states that she went to a pilates class and her shoulder was very sore afterwards.      Type: []Constant   [x]Intermitment  []Radiating []Localized  []other:     Functional Limitations: [x]Lifting/reaching []Grooming  []Carrying     []ADL's  []Driving []Sports/Recreations   []Other:      OBJECTIVE: 20    ROM Current (R) Current (L)   flex 166    abd 163    IR T8    ER T3    Strength     flex 5/5    abd 4+/5    IR 5/5    ER 5/5      Gait: n/a    Joint mobility: G-H joint   []Normal    [x]Hypo   []Hyper    Palpation: supraspinatus tendon    Orthopedic Tests: n/a    RESTRICTIONS/PRECAUTIONS: Hx of non-Hodgkins-lymphoma, OA    Exercises/Interventions: Stretching/AAROM  Repetitions/Resistance Notes/Last Progression   Pulley/Wallslides 3'/--    Cane Flex/ ER-seated    IR Strap    Sleeper Stretch    Tableslide Flex/ ER/ Abd    Bear Hug Stretch     Cross Arm Stretch     Upper Trap Stretch     Levator Scapula Stretch     Bicep stretch @ wall     Corner Stretch                  Exercises     Shrugs/ Shoulder Blade Squeeze     Rhythmic stabs 3x30''    SA Punch/ ABC 2# 2x15/2# 1x       Standing Flexion/ Abduction 1# 2x15 each    SL ER/ SL Abd 1# 3x10/1# 3x10    Prone Row/ Ext/ HAB/ Scap 1# 3x10/ 1# 3x10    TB Row/ Ext Green 3x10 each    TB ER/ IR     No Money/ HAB     Lift off: in/out, up/down Yellow 10x each    Standing wall retraction 10x B    Finisher                      Manual       Oscillations-Mobs:  G-I, II, III, IV (PA's, Inf., Post.) 5'    PROM 10'    Cervical PA's Grade-     Thoracic PA's Grade-     STM-               Access Code: [address]   URL: [employer]/   Date: 10/01/2020   Prepared by: Michelle Ahr     Exercises   Seated Shoulder Flexion Towel Slide at Table Top - 10 reps - 10 seconds hold - 2x daily - 7x weekly   Seated Shoulder External Rotation AAROM with Cane and Hand in Neutral - 10 reps - 10 seconds hold - 2x daily - 7x weekly   Isometric Shoulder Flexion at Wall - 10 reps - 10 seconds hold - 2x daily - 7x weekly   Standing Isometric Shoulder External Rotation with Doorway - 10 reps - 10 seconds hold - 2x daily - 7x weekly   Standing Isometric Shoulder Internal Rotation at Doorway - 10 reps - 10 seconds hold - 2x daily - 7x weekly     Access Code: [facility]   URL: [employer]/   Date: 10/07/2020   Prepared by: Michelle Ahr    Exercises   Sleeper Stretch - 3 reps - 20 seconds hold - 1x daily - 7x weekly   Supine Single Arm Shoulder Protraction - 10 reps - 3 sets - 1x daily - 7x weekly   Sidelying Shoulder External Rotation - 10 reps - 3 sets - 1x daily - 7x weekly     Therapeutic Exercise and NMR EXR  [x] (19046) Provided verbal/tactile cueing for activities related to strengthening, flexibility, endurance, ROM  for improvements in scapular, scapulothoracic and UE control with self care, reaching, carrying, lifting, house/yardwork, driving/computer work.    [] (93074) Provided verbal/tactile cueing for activities related to improving balance, coordination, kinesthetic sense, posture, motor skill, proprioception  to assist with  scapular, scapulothoracic and UE control with self care, reaching, carrying, lifting, house/yardwork, driving/computer work. Therapeutic Activities:    [] (62662 or 61216) Provided verbal/tactile cueing for activities related to improving balance, coordination, kinesthetic sense, posture, motor skill, proprioception and motor activation to allow for proper function of scapular, scapulothoracic and UE control with self care, carrying, lifting, driving/computer work.      Home Exercise Program:    [x] (63463) Reviewed/Progressed HEP activities related to strengthening, flexibility, endurance, ROM of scapular, scapulothoracic and UE control with self care, reaching, carrying, lifting, house/yardwork, driving/computer work  [] (92661) Reviewed/Progressed HEP activities related to improving balance, coordination, kinesthetic sense, posture, motor skill, proprioception of scapular, scapulothoracic and UE control with self care, reaching, carrying, lifting, house/yardwork, driving/computer work      Manual Treatments:  PROM / STM / Oscillations-Mobs:  G-I, II, III, IV (PA's, Inf., Post.)  [x] (36627) Provided manual therapy to mobilize soft tissue/joints of cervical/CT, scapular GHJ and UE for the purpose of modulating pain, promoting relaxation,  increasing ROM, reducing/eliminating soft tissue swelling/inflammation/restriction, improving soft tissue extensibility and allowing for proper ROM for normal function with self care, reaching, carrying, lifting, house/yardwork, driving/computer work    Modalities:  CP x 10'      Charges:  Timed Code Treatment Minutes: 45   Total Treatment Minutes: 45     [] EVAL (LOW) 40235 (typically 20 minutes face-to-face)  [] EVAL (MOD) 03601 (typically 30 minutes face-to-face)  [] EVAL (HIGH) 82092 (typically 45 minutes face-to-face)  [] RE-EVAL     [x] NX(64574) x 2     [] IONTO  [] NMR (03720) x     [] VASO  [x] Manual (66433) x 1      [] Other:  [] TA x      [] Mech Traction (93812)  [] ES(attended) (07820)      [] ES (un) (45731):     GOALS:  Short Term Goals: To be achieved in: 2 weeks  1. Independent in HEP and progression per patient tolerance, in order to prevent re-injury. [] Progressing: [x] Met: [] Not Met: [] Adjusted  2. Patient will have a decrease in pain to facilitate improvement in movement, function, and ADLs as indicated by Functional Deficits. [x] Progressing: [] Met: [] Not Met: [] Adjusted    Long Term Goals: To be achieved in: 6 weeks  1. Disability index score of 20% or less for the [employer] to assist with reaching prior level of function. [] Progressing: [x] Met: [] Not Met: [] Adjusted  2. Patient will demonstrate increased AROM to 160 R shoulder flex/abd, 90 ER, 50 IR to allow for proper joint functioning as indicated by patients Functional Deficits. [x] Progressing: [] Met: [] Not Met: [] Adjusted  3. Patient will demonstrate an increase in Strength to 4+/5 R shoulder flex/abd/ER to allow for proper functional mobility as indicated by patients Functional Deficits. [] Progressing: [x] Met: [] Not Met: [] Adjusted  4. Patient will return to reaching behind back to wash without increased symptoms or restriction. [x] Progressing: [] Met: [] Not Met: [] Adjusted      Overall Progression Towards Functional goals/ Treatment Progress Update:  [x] Patient is progressing as expected towards functional goals listed. [] Progression is slowed due to complexities/Impairments listed.   [] Progression has been slowed due to

## 2020-12-14 ENCOUNTER — HOSPITAL ENCOUNTER (OUTPATIENT)
Dept: PHYSICAL THERAPY | Age: 60
Setting detail: THERAPIES SERIES
Discharge: HOME | End: 2020-12-14
Payer: COMMERCIAL

## 2020-12-14 NOTE — FLOWSHEET NOTE
[facility] [address]      Physical Therapy  Cancellation/No-show Note  Patient Name:  Jammie Jean  :  1960   Date:  2020  Cancelled visits to date: 1  No-shows to date: 1    For today's appointment patient:  []  Cancelled  []  Rescheduled appointment  [x]  No-show     Reason given by patient:  []  Patient ill  []  Conflicting appointment  []  No transportation    []  Conflict with work  []  No reason given  []  Other:     Comments:      Electronically signed by: Mary Cazares PT, OMT-C

## 2020-12-16 ENCOUNTER — OFFICE VISIT (OUTPATIENT)
Dept: ORTHOPEDIC SURGERY | Age: 60
End: 2020-12-16
Payer: COMMERCIAL

## 2020-12-16 VITALS — BODY MASS INDEX: 26.65 KG/M2 | WEIGHT: 156.09 LBS | TEMPERATURE: 97.3 F | HEIGHT: 64 IN

## 2020-12-16 PROCEDURE — 99212 OFFICE O/P EST SF 10 MIN: CPT | Performed by: ORTHOPAEDIC SURGERY

## 2020-12-16 NOTE — LETTER
Physical Therapy Rehabilitation Referral    Patient Name:  Marius Sandifer      YOB: 1960    Diagnosis:  No diagnosis found. DOS: N/A      Precautions:     ? Evaluate and Treat    Post Op Instructions:  ? Continuous passive motion (CPM)             ? Elbow ROM  ? Exercise in plane of scapula  ?  Strengthening     ? Pulley and instruction   ? Home exercise program (copy to patient)   ? Sling when arm at risk  ? Sling or brace at all times   ? AAROM: Forward elevation to 140            ? AAROM: External rotation to 40    ? Isometric external rotator strengthening ? AAROM: internal rotation: up the back  ? Isometric abductor strengthening  ? AAROM: Internal abduction   ? Isometric internal rotator strengthening ? AAROM: cross-body adduction             Stretching:     Strengthening:  ? Four quadrant (FE, ER, IR, CBA)  ? Rotator cuff (ER, IR, Abd)  ? Forward Elevation    ? External Rotators     ? External Rotation    ? Internal Rotators  ? Internal Rotation: up/back   ? Abductors     ? Internal Rotation: supine in abduction ? Flexors  ? Cross-body abduction    ? Extensors  ? Pendulum (FE, Abd/Add, cw/ccw)  ? Scapular Stabilizers   ? [address] (FE, Abd)        ? Shoulder shrugs     ? Table slides (FE)                ? Rhomboid pinch  ? Elbow (flex, ext, pron, sup)        ? Lat. Pull downs     ? Medial epicondylitis program       ? Forward punch   ? Lateral epicondylitis program       ? Internal rotators     ? Progressive resistive exercises  ? Bench Press        ? Bench press plus  Activities:     ? Lateral pull-downs  ? Rowing     ? Progressive two-hand supine press  ? Stepper/Exercise bike   ? Biceps: curls/supination  ? Swimming  ? Water exercises    Modalities:     Return to Sport:  ? Of Choice      ? Plyometrics  ? Ultrasound     ? Rhythmic stabilization  ? Iontophoresis    ? Core strengthening   ? Moist heat     ? Sports specific program:   ? Massage         ?  Cryotherapy ? Electrical stimulation     ? Paraffin  ? Whirlpool  ? TENS    ? Home exercise program (copy to patient). Perform exercises for:   15     minutes    3      times/day  ? Supervised physical therapy  Frequency: X 1x week  ? 2x week  ? 3x week  ? Other:   Duration: ? 2 weeks   ? 4 weeks  ? 6 weeks  ? Other:     Additional Instructions:       Patient to continue PT 1 x a week working her ROM and general strengthening. PT and patient can dictate the continued length of PT duration. Derick Perry MD, PhD

## 2020-12-16 NOTE — PROGRESS NOTES
Chief Complaint    Follow-up (right shoulder )  Right shoulder cuff tendinitis    History of Present Illness:  Edwin Boone is a pleasant, 63 y.o., female, here today for follow up of right shoulder RC tendonitis. She has been working with Lashanda in physical therapy at the [place] office and she reports that therapy has been very helpful for her. She reports no new injuries or setbacks. She reports that her sleep has improved considerably since her last visit. Pain Assessment  Location of Pain: Shoulder  Location Modifiers: Right  Severity of Pain: 3  Quality of Pain: Other (Comment)(soreness)  Duration of Pain: Persistent  Frequency of Pain: Constant  Aggravating Factors: Exercise  Relieving Factors: Rest  Result of Injury: No  Work-Related Injury: No  Are there other pain locations you wish to document?: No      Medical History:  Patient's medications, allergies, past medical, surgical, social and family histories were reviewed and updated as appropriate. Review of Systems    Done: 9/30/20      Review of Systems  A 14 point review of systems was completed by the patient on 9/30/20 and is available in the media section of the scanned medical record and was reviewed on 12/16/2020. The review is negative with the exception of those things mentioned in the HPI and Past Medical History    Vital Signs:  Vitals:    12/16/20 0958   Temp: 97.3 °F (36.3 °C)       General/Appearance: Alert and oriented and in no apparent distress. Skin:  There are no skin lesions, cellulitis, or extreme edema. The patient has warm and well-perfused Bilateral upper extremities with brisk capillary refill. Right Shoulder Exam:  Inspection: No gross deformities, no signs of infection. Palpation: Tenderness was not noted on physical exam.  There is no subacromial crepitus noted. Active Range of Motion: Forward Elevation 170, Abduction 150 with mild discomfort, External Rotation 60, Internal Rotation T10 vs T6.     Passive Range of Motion:  Deferred. Strength:  External Rotation 4+/5, Internal Rotation 4+/5, Supraspinatus 4+/5, Champagne Toast 4+/5    Special Tests:  No Bandar muscle deformity. Neurovascular: Sensation to light touch is intact, no motor deficits, palpable radial pulses 2+      Radiology:       No new XR obtained at this time. Assessment :  Ms. Jaclyn Huff is a pleasant, 63 y.o. patient who is doing well with conservative management of her right shoulder cuff tendinitis       Impression:  Encounter Diagnosis   Name Primary?  Tendinitis of right rotator cuff Yes       Office Procedures:  Orders Placed This Encounter   Procedures    [facility]     Referral Priority:   Routine     Referral Type:   Eval and Treat     Referral Reason:   Specialty Services Required     Requested Specialty:   Physical Therapy     Number of Visits Requested:   1       Treatment Plan: We recommend She continue in physical therapy at [facility] at her convenience. A new physical therapy letter was documented in EPIC today. We will see Jb Perez back in only as needed. All questions were answered to patient's satisfaction and She was encouraged to call with any further questions or concerns. Maren Riojas is in agreement with this plan. 12/16/2020  10:36 AM    [facility],  scribing for and in the presence of Dr. Alanis Mulligan. The history taking and physical examination were performed by Dr. Charis Shay. All counseling during the appointment was performed between the patient and Dr. Charis Shay. 12/16/20 12/16/20  10:36 AM  __________________  I, Dr. Alanis Mulligan, personally performed the services described in this documentation as described by NU Sy in my presence, and it is both accurate and complete. Derick Shay MD, PhD  12/16/2020

## 2020-12-19 ENCOUNTER — NURSE TRIAGE (OUTPATIENT)
Dept: OTHER | Facility: CLINIC | Age: 60
End: 2020-12-19

## 2020-12-19 NOTE — TELEPHONE ENCOUNTER
Patient called pre-service [facility] with red flag complaint. Brief description of triage: sinus problems     Triage indicates for patient to see PCP in three days for sinusitis and she would like to page the on call MD for antibiotics     Care advice provided, patient verbalizes understanding; denies any other questions or concerns; instructed to call back for any new or worsening symptoms. Writer provided warm transfer [facility] for appointment scheduling. Attention Provider: Thank you for allowing me to participate in the care of your patient. The patient was connected to triage in response to information provided to the [facility]. Please do not respond through this encounter as the response is not directed to a shared pool. Reason for Disposition   [1] Sinus congestion (pressure, fullness) AND [2] present > 10 days    Answer Assessment - Initial Assessment Questions  1. LOCATION: \"Where does it hurt? \"       Nasal passages on the side of head     2. ONSET: \"When did the sinus pain start? \"  (e.g., hours, days)       Last Wednesday     3. SEVERITY: \"How bad is the pain? \"   (Scale 1-10; mild, moderate or severe)    - MILD (1-3): doesn't interfere with normal activities     - MODERATE (4-7): interferes with normal activities (e.g., work or school) or awakens from sleep    - SEVERE (8-10): excruciating pain and patient unable to do any normal activities         Moderate     4. RECURRENT SYMPTOM: \"Have you ever had sinus problems before? \" If so, ask: \"When was the last time? \" and \"What happened that time? \"       Yes have had before and they gave a z-cassia     5. NASAL CONGESTION: \"Is the nose blocked? \" If so, ask, \"Can you open it or must you breathe through the mouth? \"      Can breath out of them during the day at night she is stuffy she is using two different nasal sprays and taking OTC nasal medications as well   6. NASAL DISCHARGE: \"Do you have discharge from your nose? \" If so ask, \"What color? \"      No discharge it is clear watery     7. FEVER: \"Do you have a fever? \" If so, ask: \"What is it, how was it measured, and when did it start? \"       99.8    8. OTHER SYMPTOMS: \"Do you have any other symptoms? \" (e.g., sore throat, cough, earache, difficulty breathing)      No     9. PREGNANCY: \"Is there any chance you are pregnant? \" \"When was your last menstrual period? \"      Na    Protocols used: SINUS PAIN OR CONGESTION-ADULT-

## 2020-12-22 ENCOUNTER — APPOINTMENT (OUTPATIENT)
Dept: PHYSICAL THERAPY | Age: 60
End: 2020-12-22
Payer: COMMERCIAL

## 2020-12-22 ENCOUNTER — VIRTUAL VISIT (OUTPATIENT)
Dept: INTERNAL MEDICINE CLINIC | Age: 60
End: 2020-12-22
Payer: COMMERCIAL

## 2020-12-22 ENCOUNTER — OFFICE VISIT (OUTPATIENT)
Dept: PRIMARY CARE CLINIC | Age: 60
End: 2020-12-22
Payer: COMMERCIAL

## 2020-12-22 PROCEDURE — 99214 OFFICE O/P EST MOD 30 MIN: CPT | Performed by: INTERNAL MEDICINE

## 2020-12-22 PROCEDURE — 99211 OFF/OP EST MAY X REQ PHY/QHP: CPT | Performed by: NURSE PRACTITIONER

## 2020-12-22 RX ORDER — LEVOTHYROXINE SODIUM 0.05 MG/1
50 TABLET ORAL EVERY MORNING
Qty: 90 TABLET | Refills: 0 | Status: SHIPPED | OUTPATIENT
Start: 2020-12-22 | End: 2021-01-19 | Stop reason: SDUPTHER

## 2020-12-22 RX ORDER — BROMPHENIRAMINE MALEATE, PSEUDOEPHEDRINE HYDROCHLORIDE, AND DEXTROMETHORPHAN HYDROBROMIDE 2; 30; 10 MG/5ML; MG/5ML; MG/5ML
5 SYRUP ORAL 4 TIMES DAILY PRN
Qty: 240 ML | Refills: 0 | Status: SHIPPED | OUTPATIENT
Start: 2020-12-22 | End: 2021-04-19

## 2020-12-22 RX ORDER — AZITHROMYCIN 250 MG/1
250 TABLET, FILM COATED ORAL SEE ADMIN INSTRUCTIONS
Qty: 6 TABLET | Refills: 0 | Status: SHIPPED | OUTPATIENT
Start: 2020-12-22 | End: 2020-12-27

## 2020-12-22 NOTE — PROGRESS NOTES
2020    TELEHEALTH EVALUATION -- Audio/Visual (During AVYYJ-84 public health emergency)    PLACE OF SERVICE: PATIENT'S HOME     HPI: SEE BELOW    Chico Pineda (:  1960) has requested an audio/video evaluation for the following concern(s):    C/O HEADACHE - PAST 2 WEEKS. FRONTAL. THROBBING. PAIN SCALE 8/10. ? PHOTOPHOBIA, NO PHONOPHOBIA. DENIES TRAUMA  C/O  NASAL CONGESTION - INCREASED PAST 1 WEEK, + POSTNASAL DRAINAGE , ? SINUS PRESSURE, + HA, + SNEEZING, + OCC WATERY ITCHY EYES.     C/O COUGH - PAST 2 WEEKS. INCREASED PAST FEW DAYS. NON PRODUCTIVE, NO WHEEZING, + SOB. + FEVER - 100.4, NO CHILLS. NO KNOWN SICK CONTACT, OCC SORE THROAT. + LOSS OF SENSE OF TASTE AND SMELL    HYPOTHYROIDISM - TAKING MED. Maurie Mangle. NO COLD / NO HEAT INTOLERANCE    HLP - ? DIET / [facility] D/W PT  GERD - ON NEXIUM - TAKING OCCASIONALLY. NO HEARTBURN, NO BELCHING              CKD - AVOIDING NSAIDS.  RECENT LAB D/W PT.  NON HODGKIN'S LYMPHOMA -  S/P SURGERY, S/P CHEMOX. S/P BONE MARROW TRANSPLANT.      DENIES CP, OCC SOB, No PALPITATIONS  No ABD PAIN, + NAUSEA, NO VOMITING, No DIARRHEA, No CONSTIPATION, No MELENA, No HEMATOCHEZIA. No DYSURIA, No FREQ, No URGENCY, No HEMATURIA        Allergies   Allergen Reactions    No Known Allergies        Prior to Visit Medications    Medication Sig Taking? Authorizing Provider   benzonatate (TESSALON) 100 MG capsule TAKE 1 CAPSULE BY MOUTH THREE TIMES DAILY AS NEEDED FOR COUGH Yes Bradley Vazquez MD   valACYclovir (VALTREX) 500 MG tablet TK 1 T PO Q 12 H.  TAKE BID FOR 3 DAYS AT FIRST ONSET OF SYMPTOMS Yes Historical Provider, MD   fluorometholone (FML) 0.1 % ophthalmic suspension SHAKE LQ AND INT 1 GTT IN OU QID Yes Historical Provider, MD   ketotifen (ZADITOR) 0.025 % ophthalmic solution  Yes Historical Provider, MD   MAGNESIUM-OXIDE 400 (241.3 Mg) MG TABS tablet  Yes Historical Provider, MD zolpidem (AMBIEN) 5 MG tablet Take one tablet nightly as needed for sleep Yes DWIGHT Zuluaga - CNP       Social History     Tobacco Use    Smoking status: Never Smoker    Smokeless tobacco: Never Used   Substance Use Topics    Alcohol use: Yes     Alcohol/week: 5.0 standard drinks     Types: 2 Glasses of wine, 3 Shots of liquor per week     Comment: DAILY    Drug use: No        ROS: COMPREHENSIVE ROS AS IN HX, REST -VE  History obtained from chart review and the patient    PHYSICAL EXAMINATION:  [ INSTRUCTIONS:  \"[x]\" Indicates a positive item  \"[]\" Indicates a negative item  -- DELETE ALL ITEMS NOT EXAMINED]  Vital Signs: (As obtained by patient/caregiver or practitioner observation)    Blood pressure- 122/81  Heart rate- 97   Respiratory rate-    Temperature- 100.4 Pulse oximetry-     Constitutional: [x] Appears well-developed and well-nourished [] No apparent distress      [x] Abnormal- ILL LOOKING. NO ACUTE RESPY DISTRESS NOTED  Mental status  [x] Alert and awake  [x] Oriented to person/place/time [x]Able to follow commands      Eyes:  EOM    [x]  Normal  [] Abnormal-  Sclera  [x]  Normal  [] Abnormal -         Discharge [x]  None visible  [] Abnormal -    HENT:   [x] Normocephalic, atraumatic.   [] Abnormal   [x] Mouth/Throat: Mucous membranes are moist.     External Ears [x] Normal  [] Abnormal-     Neck: [x] No visualized mass     Pulmonary/Chest: [x] Respiratory effort normal.  [x] No visualized signs of difficulty breathing or respiratory distress        [] Abnormal-      Musculoskeletal:   [] Normal gait with no signs of ataxia         [x] Normal range of motion of neck        [] Abnormal-       Neurological:        [x] No Facial Asymmetry (Cranial nerve 7 motor function) (limited exam to video visit)          [x] No gaze palsy        [] Abnormal-         Skin:        [x] No significant exanthematous lesions or discoloration noted on facial skin         [] Abnormal- Psychiatric:       [x] Normal Affect [x] No Hallucinations        [] Abnormal-     Other pertinent observable physical exam findings-  + NASAL CONGESTION, + SINUS TENDERNESS    PREVIOUS LABS REVIEWED AND D/W PT     ASSESSMENT/PLAN:       Diagnosis Orders   1. Other headache syndrome  COUNSELLED. ANALGESICS PRN - TYLENOL  SINUS HA - ADVISED ZYRTEC PRN. MONITOR ON ATB  ADVISED F/U COVID TEST  MAKE CHANGES AS NEEDED. 2. Allergic sinusitis  COUNSELLED. ACUTE. ADVISED ZYRTEC, FLONASE PRN  Z JOSE LUIS X 5 DAYS  SYMPTOMATIC RX  MAKE CHANGES AS NEEDED. 3. Cough  COUNSELLED. SYMPTOMATIC RX  BROMFED DM PRN. MEED TO R/O COVID - F/U TESTING  ADVISED SELF QUARANTINE PENDING RESULT  MAKE CHANGES AS NEEDED. 4. Acquired hypothyroidism  COUNSELLED. CONTINUE SYNTHROID 50 MCG . MONITOR  MAKE CHANGES AS NEEDED. 5. Mixed hyperlipidemia  COUNSELLED. ADVISED LOW FAT / CHOL DIET/ EXERCISE.  MONITOR. GOALS D/W PT.  MAKE CHANGES AS NEEDED. 6. CKD (chronic kidney disease) stage 4, GFR 15-29 ml/min ([facility])  COUNSELLED. AVOID NSAIDS. MONITOR. MAKE CHANGES AS NEEDED. 7. Other specified type of non-Hodgkin lymphoma, unspecified body region [facility])  COUNSELLED.  F/U   ONCOLOGY  MAKE CHANGES AS NEEDED.              MEDICATION SIDE EFFECTS D/W PATIENT       RETURN TO CLINIC WITHIN 4 WEEKS  / PRN    FOLLOW UP [address]

## 2020-12-22 NOTE — PROGRESS NOTES
Celeste Hoover received a viral test for COVID-19. They were educated on isolation and quarantine as appropriate. For any symptoms, they were directed to seek care from their PCP, given contact information to establish with a doctor, directed to an urgent care or the emergency room.

## 2020-12-22 NOTE — PATIENT INSTRUCTIONS
TAKE MED AS ADVISED    DIET/ EXERCISE.     FOLLOW UP WITHIN 4 WEEKS  / AS NEEDED    FOLLOW UP COVID CLINIC

## 2020-12-23 LAB — SARS-COV-2: DETECTED

## 2020-12-30 ENCOUNTER — TELEPHONE (OUTPATIENT)
Dept: INTERNAL MEDICINE CLINIC | Age: 60
End: 2020-12-30

## 2020-12-30 NOTE — TELEPHONE ENCOUNTER
Patient has a bad cough. She prescribe cough syrup.  Wants to know how long should she be using her oxygen that was provided by hospital?

## 2020-12-31 NOTE — TELEPHONE ENCOUNTER
I called an spoke to pt to let her know that dr did not see o2 on her discharge paper from [facility] pt states she has 3 big tanks I advise pt to use them when she needs them pt also stated she is giving her self breathing treatment every 4 hr pt also stated that she be winded when she go up and down stairs I suggested that she use her o2 when she goes up and down the stairs pt understood

## 2020-12-31 NOTE — TELEPHONE ENCOUNTER
DO NOT SEE AN REFERENCE TO OXYGEN ON DISCHARGE NOTE FROM [facility] PT CHECK INSTRUCTIONS ON DISCHARGE ORDER  MAKE APPT- VIRTUAL

## 2021-01-08 ENCOUNTER — TELEPHONE (OUTPATIENT)
Dept: INTERNAL MEDICINE CLINIC | Age: 61
End: 2021-01-08

## 2021-01-08 NOTE — TELEPHONE ENCOUNTER
Patient needs a referral to pain management physician for lower leftside of back. Please send fax to [phone]. The phone number [phone]. The name of the physician Bam Tabor.

## 2021-01-11 ENCOUNTER — TELEPHONE (OUTPATIENT)
Dept: INTERNAL MEDICINE CLINIC | Age: 61
End: 2021-01-11

## 2021-01-11 DIAGNOSIS — M54.50 LEFT-SIDED LOW BACK PAIN WITHOUT SCIATICA, UNSPECIFIED CHRONICITY: ICD-10-CM

## 2021-01-11 NOTE — TELEPHONE ENCOUNTER
Patient needs a referral to pain management physician for lower leftside of back. Please send fax to [phone]. The phone number [phone]. The name of the physician Beronica Hollins.

## 2021-01-11 NOTE — TELEPHONE ENCOUNTER
Patient is calling on the status of the referral for pain management. She indicated the pain is so bad that she can hardly walk.  Please advise

## 2021-01-13 ENCOUNTER — NURSE ONLY (OUTPATIENT)
Dept: PRIMARY CARE CLINIC | Age: 61
End: 2021-01-13
Payer: COMMERCIAL

## 2021-01-13 DIAGNOSIS — Z01.818 PREOP EXAMINATION: Primary | ICD-10-CM

## 2021-01-13 PROCEDURE — 99211 OFF/OP EST MAY X REQ PHY/QHP: CPT | Performed by: NURSE PRACTITIONER

## 2021-01-14 LAB — SARS-COV-2, PCR: DETECTED

## 2021-01-14 RX ORDER — BACLOFEN 10 MG/1
10 TABLET ORAL 3 TIMES DAILY PRN
Qty: 30 TABLET | Refills: 0 | Status: SHIPPED | OUTPATIENT
Start: 2021-01-14 | End: 2022-08-11

## 2021-01-14 RX ORDER — ZINC GLUCONATE 50 MG
50 TABLET ORAL DAILY
COMMUNITY

## 2021-01-14 RX ORDER — MULTIVIT WITH MINERALS/LUTEIN
500 TABLET ORAL DAILY
COMMUNITY

## 2021-01-14 NOTE — PROGRESS NOTES
Patient __X reached   _____not reached-preop instructions left on voice mail_____________      DATE_1/20/21_______ TIME__0800______ARRIVAL__0630 FEC_______      Nothing to eat or drink after midnight the night before,except for what the prep instructions call for. If you do not have the instructions or do not understand them please contact your doctors office. Follow any instructions your doctors office has given you including what medications to take the AM of your procedure and which ones to hold. You may use your inhalers. If you take a long acting insulin the nael prior please cut the dose in half and take no diabetic medications that AM.Follow specific doctors office instructions regarding blood thinners and if they want you to hold and for how long. If you are on a blood thinner and have no instructions please contact the office and ask. Dress comfortably,bring your insurance card,picture ID,and a complete list of medications, including supplements. You must have a responsible adult to stay with you during the procedure,drive you home and stay with you. [facility] phone number [phone] for any questions. OTHER INTRUCTIONS(if applicable)_________________________________________________________      COVID TEST         _____ Done ___ where ____       _____ Scheduled ___ where ____       _X____Other__Pt. COVID + 12/22/2020 and 1/13/21--currently asymptomatic_______________      VISITOR POLICY(subject to change)    There is a one visitor policy at [facility] for all surgeries and endoscopies. Whether the visitor can stay or will be asked to wait in the car will depend on the current policy and if social distancing can be maintained. The policy is subject to change at any time. Please make sure the visitor has a cell phone that is on,charged and able to accept calls, as this may be the way that the staff communicates with them. Pain management is NO VISITOR policyThe patients ride is expected to remain in the car with a cell phone for communication. If the ride is leaving the hospital grounds please make sure they are back in time for pickup. Have the patient inform the staff on arrival what their rides plans are while the patient is in the facility. At the MAIN there is one visitor allowed. Please note that the visitor policy is subject to change.

## 2021-01-15 NOTE — TELEPHONE ENCOUNTER
CALLED AND D/W PT  SEE OTHER NOTE IN CHART    PT HAS APPT WITH ORTHO / SPINE  DEFERRED PAIN MGT REFERRAL

## 2021-01-19 ENCOUNTER — VIRTUAL VISIT (OUTPATIENT)
Dept: INTERNAL MEDICINE CLINIC | Age: 61
End: 2021-01-19
Payer: COMMERCIAL

## 2021-01-19 DIAGNOSIS — Z86.16 HISTORY OF 2019 NOVEL CORONAVIRUS DISEASE (COVID-19): ICD-10-CM

## 2021-01-19 DIAGNOSIS — C85.80 OTHER SPECIFIED TYPE OF NON-HODGKIN LYMPHOMA, UNSPECIFIED BODY REGION (HCC): ICD-10-CM

## 2021-01-19 DIAGNOSIS — E78.2 MIXED HYPERLIPIDEMIA: ICD-10-CM

## 2021-01-19 DIAGNOSIS — K21.9 GASTROESOPHAGEAL REFLUX DISEASE WITHOUT ESOPHAGITIS: ICD-10-CM

## 2021-01-19 DIAGNOSIS — N18.4 CKD (CHRONIC KIDNEY DISEASE) STAGE 4, GFR 15-29 ML/MIN (HCC): ICD-10-CM

## 2021-01-19 DIAGNOSIS — E87.5 HYPERKALEMIA: ICD-10-CM

## 2021-01-19 DIAGNOSIS — E03.9 ACQUIRED HYPOTHYROIDISM: Primary | ICD-10-CM

## 2021-01-19 DIAGNOSIS — J30.9 ALLERGIC SINUSITIS: ICD-10-CM

## 2021-01-19 PROCEDURE — 99214 OFFICE O/P EST MOD 30 MIN: CPT | Performed by: INTERNAL MEDICINE

## 2021-01-19 RX ORDER — LEVOTHYROXINE SODIUM 0.05 MG/1
50 TABLET ORAL EVERY MORNING
Qty: 90 TABLET | Refills: 0 | Status: SHIPPED | OUTPATIENT
Start: 2021-01-19 | End: 2021-04-19 | Stop reason: SDUPTHER

## 2021-01-19 NOTE — PROGRESS NOTES
2021    TELEHEALTH EVALUATION -- Audio/Visual (During VAWKT-05 public health emergency)    PLACE OF SERVICE: PATIENT'S HOME     HPI: SEE BELOW    Sylvia Pearson (:  1960) has requested an audio/video evaluation for the following concern(s):      HYPOTHYROIDISM - TAKING MED. Magalene Rands. NO COLD / NO HEAT INTOLERANCE    HLP - ? DIET / Verlean Brainard D/W PT  GERD - ON NEXIUM - TAKING OCCASIONALLY. NO HEARTBURN, NO BELCHING              CKD - AVOIDING NSAIDS.  RECENT LAB D/W PT.  ALLERGIC SINUSITIS -   NASAL CONGESTION - IMPROVED, OCC POSTNASAL DRAINAGE , ? SINUS PRESSURE, OCC HA - IMPROVED, ? SNEEZING, + OCC WATERY ITCHY EYES   H/O COVID - + COUGH  - OCC PRODUCTIVE, SOB - IMPROVED, NO F/C. STATES HAD BEEN D/BRANDON ON O2. HAS NOT BEEN USING > 3 DAYS. O2 SAT 95% ON RA. LOSS OF SENSE OF TASTE AND SMELL  - RESOLVED  HYPERKALEMIA - NOTED ON PREVIOUS LAB D/W PT  NON HODGKIN'S LYMPHOMA -  S/P SURGERY, S/P CHEMOX.  S/P BONE MARROW TRANSPLANT.      DENIES CP,  SOB - IMPROVED,  No PALPITATIONS  No ABD PAIN, NO NAUSEA, NO VOMITING, No DIARRHEA, No CONSTIPATION, No MELENA, No HEMATOCHEZIA. No DYSURIA, No FREQ, No URGENCY, No HEMATURIA        Allergies   Allergen Reactions    No Known Allergies         Prior to Visit Medications    Medication Sig Taking?  Authorizing Provider   Ascorbic Acid (VITAMIN C) 250 MG tablet Take 500 mg by mouth daily  Historical Provider, MD   zinc 50 MG TABS tablet Take 50 mg by mouth daily  Historical Provider, MD   baclofen (LIORESAL) 10 MG tablet Take 1 tablet by mouth 3 times daily as needed (PRN)  Natasha Sanchez MD   brompheniramine-pseudoephedrine-DM (BROMFED DM) 2-30-10 MG/5ML syrup Take 5 mLs by mouth 4 times daily as needed for Cough  Natasha Sanchez MD   levothyroxine (SYNTHROID) 50 MCG tablet Take 1 tablet by mouth every morning  Natasha Sanchez MD benzonatate (TESSALON) 100 MG capsule TAKE 1 CAPSULE BY MOUTH THREE TIMES DAILY AS NEEDED FOR COUGH  Horacio Argueta MD   valACYclovir (VALTREX) 500 MG tablet TK 1 T PO Q 12 H. TAKE BID FOR 3 DAYS AT FIRST ONSET OF SYMPTOMS  Historical Provider, MD   ketotifen (ZADITOR) 0.025 % ophthalmic solution Place into both eyes 2 times daily   Historical Provider, MD   MAGNESIUM-OXIDE 400 (241.3 Mg) MG TABS tablet daily   Historical Provider, MD   polyethylene glycol-electrolytes (NULYTELY) 420 g solution MIX AND DRINK UTD  Historical Provider, MD   Cholecalciferol (VITAMIN D-3) 25 MCG (1000 UT) CAPS Take 1 capsule by mouth daily  Ron Holden MD   esomeprazole (NEXIUM) 40 MG delayed release capsule Take 1 capsule by mouth daily  Ron Holden MD   azelastine (ASTELIN) 0.1 % nasal spray 2 sprays by Nasal route 2 times daily Use in each nostril as directed  Patient taking differently: 2 sprays by Nasal route as needed Use in each nostril as directed  Horacio Argueta MD   fluticasone-vilanterol (BREO ELLIPTA) 100-25 MCG/INH AEPB inhaler Inhale 1 puff into the lungs daily  Horacio Argueta MD   cetirizine (ZYRTEC) 10 MG tablet TAKE 1 CAPSULE BY MOUTH DAILY  Horacio Argueta MD   fluticasone (FLONASE) 50 MCG/ACT nasal spray INSTILL 1 SPRAY IN [address]  Patient taking differently: as needed INSTILL 1 SPRAY IN EACH NSOTRIL DAILY PRN  Ron Holden MD   albuterol sulfate HFA (PROVENTIL HFA) 108 (90 Base) MCG/ACT inhaler Inhale 2 puffs into the lungs every 4 hours as needed for Wheezing or Shortness of Breath (Space out to every 6 hours as symptoms improve) Space out to every 6 hours as symptoms improve.   Sreekanth Chinchilla MD   ondansetron (ZOFRAN) 4 MG tablet Take 4 mg by mouth every 8 hours as needed for Nausea or Vomiting  Historical Provider, MD   penicillin v potassium (VEETID) 500 MG tablet TAKE 1 TABLET BY MOUTH TWICE DAILY  Casey Bailey, APRN - CNP       Social History     Tobacco Use  Smoking status: Never Smoker    Smokeless tobacco: Never Used   Substance Use Topics    Alcohol use: Yes     Alcohol/week: 5.0 standard drinks     Types: 2 Glasses of wine, 3 Shots of liquor per week     Comment: DAILY    Drug use: No        ROS: COMPREHENSIVE ROS AS IN HX, REST -VE  History obtained from chart review and the patient       PHYSICAL EXAMINATION:  [ INSTRUCTIONS:  \"[x]\" Indicates a positive item  \"[]\" Indicates a negative item  -- DELETE ALL ITEMS NOT EXAMINED]  Vital Signs: (As obtained by patient/caregiver or practitioner observation)    Blood pressure-  Heart rate-    Respiratory rate-    Temperature-  Pulse oximetry- 95% RA    PT UNABLE TO CHECK BP / VITALS AT TIME OF VISIT      Constitutional: [x] Appears well-developed and well-nourished [x] No apparent distress      [] Abnormal-   Mental status  [x] Alert and awake  [x] Oriented to person/place/time [x]Able to follow commands      Eyes:  EOM    [x]  Normal  [] Abnormal-  Sclera  [x]  Normal  [] Abnormal -         Discharge [x]  None visible  [] Abnormal -    HENT:   [x] Normocephalic, atraumatic.   [] Abnormal   [x] Mouth/Throat: Mucous membranes are moist.     External Ears [x] Normal  [] Abnormal-     Neck: [x] No visualized mass     Pulmonary/Chest: [x] Respiratory effort normal.  [x] No visualized signs of difficulty breathing or respiratory distress        [] Abnormal-      Musculoskeletal:   [] Normal gait with no signs of ataxia         [x] Normal range of motion of neck        [] Abnormal-       Neurological:        [x] No Facial Asymmetry (Cranial nerve 7 motor function) (limited exam to video visit)          [x] No gaze palsy        [] Abnormal-         Skin:        [x] No significant exanthematous lesions or discoloration noted on facial skin         [] Abnormal-            Psychiatric:       [x] Normal Affect [x] No Hallucinations        [] Abnormal-     Other pertinent observable physical exam findings-  NO SINUS TENDERNESS PREVIOUS LABS REVIEWED AND D/W PT     ASSESSMENT/PLAN:       Diagnosis Orders   1. Acquired hypothyroidism  COUNSELLED. MONITOR ON SYNTHROID. TSH TO EVAL  MAKE CHANGES AS NEEDED       2. Mixed hyperlipidemia  COUNSELLED. ADVISED LOW FAT / CHOL DIET/ EXERCISE.  MONITOR. GOALS D/W PT.  MAKE CHANGES AS NEEDED. 3. Gastroesophageal reflux disease without esophagitis  COUNSELLED. CONTINUE MGT. ANTIREFLUX PRECAUTIONS ADVISED. MONITOR. MAKE CHANGES AS NEEDED. 4. CKD (chronic kidney disease) stage 4, GFR 15-29 ml/min ([facility])  COUNSELLED. AVOID NSAIDS. MONITOR. MAKE CHANGES AS NEEDED. 5. Allergic sinusitis  COUNSELLED. MED PRN. MONITOR  MAKE CHANGES AS NEEDED. 6. History of 2019 novel coronavirus disease (COVID-19)  COUNSELLED. ADVISED SAFETY PRECAUTIONS. ADVISED ON HEALTH DIET, EXERCISE. MAINTAIN HYDRATION, REST  PT VERBALIZED UNDERSTANDING   MAKE CHANGES AS NEEDED       7. Hyperkalemia  COUNSELLED. LAB TO [facility]. MONITOR  F/U ALSO WITH NEPHROLOGY. MAKE CHANGES AS NEEDED. 8. Other specified type of non-Hodgkin lymphoma, unspecified body region [facility])  COUNSELLED.  F/U HEM / ONC  MAKE CHANGES AS NEEDED.              MEDICATION SIDE EFFECTS D/W PATIENT     RETURN TO CLINIC WITHIN 2 MONTHS  / PRN     FOLLOW UP FOR FASTING LABS Due to this being a TeleHealth encounter (During GXACQ-23 public health emergency), evaluation of the following organ systems was limited: Vitals/Constitutional/EENT/Resp/CV/GI//MS/Neuro/Skin/Heme-Lymph-Imm. Pursuant to the emergency declaration under the [address] [place] [address] and the [employer] Act, this Virtual Visit was conducted with patient's (and/or legal guardian's) consent, to reduce the patient's risk of exposure to COVID-19 and provide necessary medical care. The patient (and/or legal guardian) has also been advised to contact this office for worsening conditions or problems, and seek emergency medical treatment and/or call 911 if deemed necessary. Patient identification was verified at the start of the visit: Yes      Services were provided through a video synchronous discussion virtually to substitute for in-person clinic visit. Patient and provider were located at their individual homes. --Fabio Hilario MD on 1/19/2021 at 6:49 PM    An electronic signature was used to authenticate this note.

## 2021-01-20 ENCOUNTER — HOSPITAL ENCOUNTER (OUTPATIENT)
Age: 61
Setting detail: OUTPATIENT SURGERY
Discharge: HOME | End: 2021-01-20
Attending: INTERNAL MEDICINE | Admitting: INTERNAL MEDICINE
Payer: COMMERCIAL

## 2021-01-20 ENCOUNTER — ANESTHESIA EVENT (OUTPATIENT)
Dept: ENDOSCOPY | Age: 61
End: 2021-01-20
Payer: COMMERCIAL

## 2021-01-20 ENCOUNTER — ANESTHESIA (OUTPATIENT)
Dept: ENDOSCOPY | Age: 61
End: 2021-01-20
Payer: COMMERCIAL

## 2021-01-20 VITALS
SYSTOLIC BLOOD PRESSURE: 86 MMHG | OXYGEN SATURATION: 100 % | DIASTOLIC BLOOD PRESSURE: 62 MMHG | RESPIRATION RATE: 17 BRPM

## 2021-01-20 VITALS
SYSTOLIC BLOOD PRESSURE: 122 MMHG | RESPIRATION RATE: 16 BRPM | TEMPERATURE: 97.4 F | WEIGHT: 151 LBS | HEIGHT: 64 IN | OXYGEN SATURATION: 100 % | DIASTOLIC BLOOD PRESSURE: 85 MMHG | HEART RATE: 68 BPM | BODY MASS INDEX: 25.78 KG/M2

## 2021-01-20 PROCEDURE — 3700000000 HC ANESTHESIA ATTENDED CARE: Performed by: INTERNAL MEDICINE

## 2021-01-20 PROCEDURE — 3700000001 HC ADD 15 MINUTES (ANESTHESIA): Performed by: INTERNAL MEDICINE

## 2021-01-20 PROCEDURE — 2500000003 HC RX 250 WO HCPCS: Performed by: NURSE ANESTHETIST, CERTIFIED REGISTERED

## 2021-01-20 PROCEDURE — 88305 TISSUE EXAM BY PATHOLOGIST: CPT

## 2021-01-20 PROCEDURE — 2580000003 HC RX 258: Performed by: ANESTHESIOLOGY

## 2021-01-20 PROCEDURE — 6370000000 HC RX 637 (ALT 250 FOR IP): Performed by: INTERNAL MEDICINE

## 2021-01-20 PROCEDURE — 3609010600 HC COLONOSCOPY POLYPECTOMY SNARE/COLD BIOPSY: Performed by: INTERNAL MEDICINE

## 2021-01-20 PROCEDURE — 7100000011 HC PHASE II RECOVERY - ADDTL 15 MIN: Performed by: INTERNAL MEDICINE

## 2021-01-20 PROCEDURE — 2709999900 HC NON-CHARGEABLE SUPPLY: Performed by: INTERNAL MEDICINE

## 2021-01-20 PROCEDURE — 6360000002 HC RX W HCPCS: Performed by: NURSE ANESTHETIST, CERTIFIED REGISTERED

## 2021-01-20 PROCEDURE — 7100000010 HC PHASE II RECOVERY - FIRST 15 MIN: Performed by: INTERNAL MEDICINE

## 2021-01-20 PROCEDURE — 2500000003 HC RX 250 WO HCPCS: Performed by: INTERNAL MEDICINE

## 2021-01-20 RX ORDER — SODIUM CHLORIDE 9 MG/ML
INJECTION, SOLUTION INTRAVENOUS CONTINUOUS
Status: DISCONTINUED | OUTPATIENT
Start: 2021-01-20 | End: 2021-01-20 | Stop reason: HOSPADM

## 2021-01-20 RX ORDER — LIDOCAINE HYDROCHLORIDE 20 MG/ML
INJECTION, SOLUTION INFILTRATION; PERINEURAL PRN
Status: DISCONTINUED | OUTPATIENT
Start: 2021-01-20 | End: 2021-01-20 | Stop reason: SDUPTHER

## 2021-01-20 RX ORDER — PROPOFOL 10 MG/ML
INJECTION, EMULSION INTRAVENOUS PRN
Status: DISCONTINUED | OUTPATIENT
Start: 2021-01-20 | End: 2021-01-20 | Stop reason: SDUPTHER

## 2021-01-20 RX ORDER — PROPOFOL 10 MG/ML
INJECTION, EMULSION INTRAVENOUS CONTINUOUS PRN
Status: DISCONTINUED | OUTPATIENT
Start: 2021-01-20 | End: 2021-01-20 | Stop reason: SDUPTHER

## 2021-01-20 RX ADMIN — PROPOFOL 120 MG: 10 INJECTION, EMULSION INTRAVENOUS at 08:00

## 2021-01-20 RX ADMIN — PHENYLEPHRINE HYDROCHLORIDE 100 MCG: 10 INJECTION INTRAVENOUS at 08:13

## 2021-01-20 RX ADMIN — PHENYLEPHRINE HYDROCHLORIDE 100 MCG: 10 INJECTION INTRAVENOUS at 08:22

## 2021-01-20 RX ADMIN — LIDOCAINE HYDROCHLORIDE 60 MG: 20 INJECTION, SOLUTION INFILTRATION; PERINEURAL at 08:00

## 2021-01-20 RX ADMIN — PROPOFOL 120 MCG/KG/MIN: 10 INJECTION, EMULSION INTRAVENOUS at 08:01

## 2021-01-20 RX ADMIN — SODIUM CHLORIDE: 9 INJECTION, SOLUTION INTRAVENOUS at 06:57

## 2021-01-20 ASSESSMENT — PAIN - FUNCTIONAL ASSESSMENT: PAIN_FUNCTIONAL_ASSESSMENT: 0-10

## 2021-01-20 ASSESSMENT — PAIN SCALES - GENERAL
PAINLEVEL_OUTOF10: 0
PAINLEVEL_OUTOF10: 0

## 2021-01-20 NOTE — ANESTHESIA PRE PROCEDURE
Department of Anesthesiology  Preprocedure Note       Name:  Daphney De Leon   Age:  63 y.o.  :  1960                                          MRN:  [id number]         Date:  2021      Surgeon: Harsh Rosas):  Ede Etienne MD    Procedure: Procedure(s):  COLONOSCOPY DIAGNOSTIC    Medications prior to admission:   Prior to Admission medications    Medication Sig Start Date End Date Taking?  Authorizing Provider   levothyroxine (SYNTHROID) 50 MCG tablet Take 1 tablet by mouth every morning 21  Yes Barbara Main MD   Ascorbic Acid (VITAMIN C) 250 MG tablet Take 500 mg by mouth daily   Yes Historical Provider, MD   zinc 50 MG TABS tablet Take 50 mg by mouth daily   Yes Historical Provider, MD   brompheniramine-pseudoephedrine-DM (BROMFED DM) 2-30-10 MG/5ML syrup Take 5 mLs by mouth 4 times daily as needed for Cough 20  Yes Barbara Main MD   benzonatate (TESSALON) 100 MG capsule TAKE 1 CAPSULE BY MOUTH THREE TIMES DAILY AS NEEDED FOR COUGH 12/15/20  Yes Glory Nance MD   ketotifen (ZADITOR) 0.025 % ophthalmic solution Place into both eyes 2 times daily  10/8/20  Yes Historical Provider, MD   MAGNESIUM-OXIDE 400 (241.3 Mg) MG TABS tablet daily  10/8/20  Yes Historical Provider, MD   polyethylene glycol-electrolytes (NULYTELY) 420 g solution MIX AND DRINK UTD 20  Yes Historical Provider, MD   Cholecalciferol (VITAMIN D-3) 25 MCG (1000 UT) CAPS Take 1 capsule by mouth daily 10/7/20  Yes Barbara Main MD   esomeprazole ([address]) 40 MG delayed release capsule Take 1 capsule by mouth daily 20  Yes Barbara Main MD   azelastine (ASTELIN) 0.1 % nasal spray 2 sprays by Nasal route 2 times daily Use in each nostril as directed  Patient taking differently: 2 sprays by Nasal route as needed Use in each nostril as directed 20  Yes Glory Nance MD   fluticasone-vilanterol (BREO ELLIPTA) 100-25 MCG/INH AEPB inhaler Inhale 1 puff into the lungs daily 20  Yes Glory Nance MD cetirizine (ZYRTEC) 10 MG tablet TAKE 1 CAPSULE BY MOUTH DAILY 6/1/20  Yes Adilia Reed MD   fluticasone (FLONASE) 50 MCG/ACT nasal spray INSTILL 1 SPRAY IN [address]  Patient taking differently: as needed INSTILL 1 SPRAY IN EACH NSOTRIL DAILY PRN 5/1/20  Yes Dayana Valerio MD   albuterol sulfate HFA (PROVENTIL HFA) 108 (90 Base) MCG/ACT inhaler Inhale 2 puffs into the lungs every 4 hours as needed for Wheezing or Shortness of Breath (Space out to every 6 hours as symptoms improve) Space out to every 6 hours as symptoms improve. 1/18/20  Yes Vivi Velázquez MD   ondansetron (ZOFRAN) 4 MG tablet Take 4 mg by mouth every 8 hours as needed for Nausea or Vomiting   Yes Historical Provider, MD   penicillin v potassium (VEETID) 500 MG tablet TAKE 1 TABLET BY MOUTH TWICE DAILY 8/10/17  Yes DWIGHT Solorio - BELLE   baclofen (LIORESAL) 10 MG tablet Take 1 tablet by mouth 3 times daily as needed (PRN) 1/14/21   Dayana Valerio MD   valACYclovir (VALTREX) 500 MG tablet TK 1 T PO Q 12 H. 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    Peripheral neuropathy due to chemotherapy ([facility] G62.0, T45.1X5A    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 ([address] C83.36    Acute on chronic renal failure (HCC) N17.9, N18.9    Idiopathic hypotension I95.0    SVT (supraventricular tachycardia) ([facility]) I47.1    History of allogeneic bone marrow transplant ([facility] Z94.81  Encounter for aftercare following bone marrow transplant ([facility] Z48.290    Anemia due to chemotherapy D64.81, T45.1X5A    Need for prophylactic antibiotic Z79.2    H/O stem cell transplant ([facility] Z94.84    Neutropenia ([facility]) D70.9    Pancytopenia ([facility] D61.818    Chronic renal failure N18.9    Hypokalemia E87.6    Weight loss, unintentional R63.4    Chronic kidney disease N18.9    Idiopathic peripheral neuropathy G60.9    Gastroesophageal reflux disease without esophagitis K21.9    Chronic cough R05    BV (bacterial vaginosis) N76.0, B96.89    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, 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    Vaginal atrophy N95.2    GVHD (graft versus host disease) ([facility] D89.813    Hypogammaglobulinemia ([facility]) D80.1    Hypomagnesemia E83.42    Status post chemotherapy Z92.21    S/P allogeneic bone marrow transplant ([facility] Z94.81    Hypogammaglobulinemia ([facility]) D80.1    Anemia due to antineoplastic chemotherapy D64.81, T45.1X5A    Abdominal pain R10.9    Enteritis K52.9    Abnormal CT of the chest R93.89    MCDONALD (dyspnea on exertion) R06.00    Mucus plugging of bronchi J98.09    Mixed hyperlipidemia E78.2    Acquired hypothyroidism E03.9    Vitamin D deficiency E55.9    Prediabetes R73.03       Past Medical History:        Diagnosis Date    Allergic rhinitis     Arthritis     CKD (chronic kidney disease)     DLBCL [facility]use large B cell [facility]) [facility] 2/2010    non-hodgkins lymphoma    GERD (gastroesophageal reflux disease)     Hx of non-Hodgkin's lymphoma     MDRO (multiple drug resistant organisms) resistance 5/15/16    E.coli MDRO in urine    Migraines     Non Hodgkin's lymphoma ([facility]     Peripheral neuropathy     PONV (postoperative nausea and vomiting)        Past Surgical History:        Procedure Laterality Date    BONE MARROW TRANSPLANT  05/16/2016    BONE MARROW TRANSPLANT      BRONCHOSCOPY  8/30/2018 Component Value Date    WBC 5.1 01/11/2021    RBC 5.75 01/11/2021    RBC 5.23 08/10/2017    HGB 15.6 01/11/2021    HCT 48.5 01/11/2021    MCV 84.3 01/11/2021    RDW 15.7 01/11/2021     01/11/2021       CMP:   Lab Results   Component Value Date     09/21/2020     09/21/2020    K 7.3 09/21/2020    K 4.4 09/21/2020     09/21/2020     09/21/2020    CO2 18 09/21/2020    CO2 25 09/21/2020    BUN 27 09/21/2020    BUN 28 09/21/2020    CREATININE 2.3 09/21/2020    CREATININE 2.4 09/21/2020    GFRAA 26 09/21/2020    GFRAA 25 09/21/2020    GFRAA 86 11/10/2012    AGRATIO 2.0 09/21/2020    LABGLOM 22 09/21/2020    LABGLOM 21 09/21/2020    GLUCOSE 80 09/21/2020    GLUCOSE 112 09/21/2020    GLUCOSE 82 08/10/2017    PROT 6.6 09/21/2020    PROT 7.3 08/10/2017    CALCIUM 9.9 09/21/2020    CALCIUM 9.9 09/21/2020    BILITOT 0.3 09/21/2020    ALKPHOS 132 09/21/2020    AST 30 09/21/2020    ALT 24 09/21/2020       POC Tests: No results for input(s): POCGLU, POCNA, POCK, POCCL, POCBUN, POCHEMO, POCHCT in the last 72 hours. Coags:   Lab Results   Component Value Date    PROTIME 11.3 04/16/2019    INR 0.99 04/16/2019    APTT 26.5 06/06/2016       HCG (If Applicable): No results found for: PREGTESTUR, PREGSERUM, HCG, HCGQUANT     ABGs: No results found for: PHART, PO2ART, ZST4NPG, WHZ9UBS, BEART, Y0PRFZAR     Type & Screen (If Applicable):  Lab Results   Component Value Date    LABABO A 10/29/2012    [address] Positive 10/29/2012       Drug/Infectious Status (If Applicable):  Lab Results   Component Value Date    HEPCAB - 09/06/2012       COVID-19 Screening (If Applicable):   Lab Results   Component Value Date    COVID19 DETECTED 01/13/2021         Anesthesia Evaluation  Patient summary reviewed and Nursing notes reviewed   history of anesthetic complications: PONV.   Airway: Mallampati: II  TM distance: >3 FB   Neck ROM: full  Mouth opening: > = 3 FB Dental: normal exam         Pulmonary:       (-) asthma Cardiovascular:        (-) hypertension and  angina             ROS comment: 2/18    Conclusions      Summary   Left ventricular size is normal. Normal left ventricular wall thickness. Left ventricular function is low normal with ejection fraction estimated at   50%. No regional wall motion abnormalities are noted. Diastolic filling parameters suggests grade I diastolic dysfunction . Mild mitral regurgitation is present. There is mild tricuspid regurgitation with RVSP estimated at 35 mmHg. Similar to prior echo on (4/16/16). Neuro/Psych:   (+) headaches: migraine headaches, depression/anxiety    (-) CVA           GI/Hepatic/Renal:   (+) GERD:, renal disease: CRI,      (-) liver disease       Endo/Other:    (+) hypothyroidism::., malignancy/cancer. (-) diabetes mellitus               Abdominal:           Vascular:     - PVD. Anesthesia Plan      MAC     ASA 3       Induction: intravenous. Anesthetic plan and risks discussed with patient. Plan discussed with CRNA.                   Toni Morales MD   1/20/2021

## 2021-01-20 NOTE — ANESTHESIA POSTPROCEDURE EVALUATION
Department of Anesthesiology  Postprocedure Note    Patient: Lisset Junior  MRN: [id number]  YOB: 1960  Date of evaluation: 1/20/2021  Time:  8:32 AM     Procedure Summary     Date: 01/20/21 Room / Location: [address] [place] [address]    Anesthesia Start: 1968 Anesthesia Stop: 0830    Procedure: COLONOSCOPY POLYPECTOMY SNARE/COLD BIOPSY (N/A ) Diagnosis: (HISTORY OF COLON POLYPS Z86.010)    Surgeons: Noam Wynn MD Responsible Provider: Onel Aranda MD    Anesthesia Type: MAC ASA Status: 3          Anesthesia Type: MAC    Gayla Phase I: Gayla Score: 10    Gayla Phase II:      Last vitals: Reviewed and per EMR flowsheets.        Anesthesia Post Evaluation    Patient location during evaluation: PACU  Level of consciousness: awake  Airway patency: patent  Complications: no  Cardiovascular status: hemodynamically stable  Respiratory status: acceptable

## 2021-01-20 NOTE — H&P
[address] [place] [address]  GI and [facility] [place]    Pre-operative History and Physical    Patient: Dulce Maria Merino  : 1960  CSN:     History Obtained From:   Patient or guardian. HISTORY OF PRESENT ILLNESS:    The patient is a 63 y.o. female here for Endoscopy. Past Medical History:    Past Medical History:   Diagnosis Date    Allergic rhinitis     Arthritis     CKD (chronic kidney disease)     DLBCL (diffuse large B cell lymphoma) ([facility] 2010    non-hodgkins lymphoma    GERD (gastroesophageal reflux disease)     Hx of non-Hodgkin's lymphoma     MDRO (multiple drug resistant organisms) resistance 5/15/16    E.coli MDRO in urine    Migraines     Non Hodgkin's lymphoma ([address]     Peripheral neuropathy     PONV (postoperative nausea and vomiting)      Past Surgical History:    Past Surgical History:   Procedure Laterality Date    BONE MARROW TRANSPLANT  2016    BONE MARROW TRANSPLANT      BRONCHOSCOPY  2018    BRONCHOSCOPY THERAPUTIC ASPIRATION INITIAL WITH MUCUS PLUG SENT FOR BACTERIAL CULTURE performed by Viki Owens MD at [address] [place] [address]      x 5    COLONOSCOPY      HYSTERECTOMY  2010    with oophorectomy    LYMPH NODE BIOPSY  2011    R external iliac node -non diagnostic    LYMPH NODE BIOPSY  2012    R external node - Dr Sahu Michigamme [facility] NHL    OTHER SURGICAL HISTORY  10/08/2012    INSERTION NEOSTAR CATHETER and REMOVAL        OTHER SURGICAL HISTORY Right 05/10/2016    INSERTION TRIPLE LUMEN DORANTES CENTRAL LINE    [address]/BRNCL ALVEOLAR LAVAGE N/A 2018    BRONCHOSCOPY WITH BRONCHIAL WASHINGS AND LLL BAL. performed by Viki Owens MD at [place] [address]      TONSILLECTOMY       Medications Prior to Admission:   No current facility-administered medications on file prior to encounter.       Current Outpatient Medications on File Prior to Encounter   Medication Sig Dispense Refill    Ascorbic Acid (VITAMIN C) 250 MG tablet Take 500 mg by mouth daily      zinc 50 MG TABS tablet Take 50 mg by mouth daily      brompheniramine-pseudoephedrine-DM (BROMFED DM) 2-30-10 MG/5ML syrup Take 5 mLs by mouth 4 times daily as needed for Cough 240 mL 0    benzonatate (TESSALON) 100 MG capsule TAKE 1 CAPSULE BY MOUTH THREE TIMES DAILY AS NEEDED FOR COUGH 90 capsule 1    ketotifen (ZADITOR) 0.025 % ophthalmic solution Place into both eyes 2 times daily       MAGNESIUM-OXIDE 400 (241.3 Mg) MG TABS tablet daily       polyethylene glycol-electrolytes (NULYTELY) 420 g solution MIX AND DRINK UTD      Cholecalciferol (VITAMIN D-3) 25 MCG (1000 UT) CAPS Take 1 capsule by mouth daily 90 capsule 0    esomeprazole (NEXIUM) 40 MG delayed release capsule Take 1 capsule by mouth daily 30 capsule 2    azelastine (ASTELIN) 0.1 % nasal spray 2 sprays by Nasal route 2 times daily Use in each nostril as directed (Patient taking differently: 2 sprays by Nasal route as needed Use in each nostril as directed) 3 Bottle 3    fluticasone-vilanterol (BREO ELLIPTA) 100-25 MCG/INH AEPB inhaler Inhale 1 puff into the lungs daily 1 each 11    cetirizine (ZYRTEC) 10 MG tablet TAKE 1 CAPSULE BY MOUTH DAILY 90 tablet 3    fluticasone (FLONASE) 50 MCG/ACT nasal spray INSTILL 1 SPRAY IN EACH NSOTRIL DAILY (Patient taking differently: as needed INSTILL 1 SPRAY IN EACH NSOTRIL DAILY PRN) 1 Bottle 1    albuterol sulfate HFA (PROVENTIL HFA) 108 (90 Base) MCG/ACT inhaler Inhale 2 puffs into the lungs every 4 hours as needed for Wheezing or Shortness of Breath (Space out to every 6 hours as symptoms improve) Space out to every 6 hours as symptoms improve. 1 Inhaler 0    ondansetron (ZOFRAN) 4 MG tablet Take 4 mg by mouth every 8 hours as needed for Nausea or Vomiting      penicillin v potassium (VEETID) 500 MG tablet TAKE 1 TABLET BY MOUTH TWICE DAILY 60 tablet 2    valACYclovir (VALTREX) 500 MG tablet TK 1 T PO Q 12 H.  TAKE BID FOR 3 DAYS AT FIRST ONSET OF SYMPTOMS          Allergies: No known allergies      Social History:   Social History     Tobacco Use    Smoking status: Never Smoker    Smokeless tobacco: Never Used   Substance Use Topics    Alcohol use: Yes     Alcohol/week: 5.0 standard drinks     Types: 2 Glasses of wine, 3 Shots of liquor per week     Comment: DAILY     Family History:   Family History   Problem Relation Age of Onset    Cancer Mother          Lung    Hypertension Mother     Heart Disease Father     High Blood Pressure Father     Coronary Art Dis Father     Cancer Brother         lung    Stroke Brother     Diabetes Brother     Hypertension Brother     Coronary Art Dis Brother     High Cholesterol Brother     Rheum Arthritis Brother     Arthritis Other     Kidney Disease Other     Arthritis Sister     Diabetes Sister     Hypertension Sister     Rheum Arthritis Sister     Birth Defects Grandchild        PHYSICAL EXAM:      /86   Pulse 85   Temp 97.4 °F (36.3 °C) (Temporal)   Resp 16   Ht 5' 4\" (1.626 m)   Wt 151 lb (68.5 kg)   LMP 12/26/2008   SpO2 97%   BMI 25.92 kg/m²  I        Heart:  RRR, normal s1s2    Lungs:  CTA and normal effort    Abdomen:   Soft, nt nd. ASSESSMENT AND PLAN:    1. Patient is a 63 y.o. female here for endoscopy with MAC sedation. 2.  Procedure options, risks and benefits reviewed with patient and/or guardian. They express understanding.

## 2021-01-26 ENCOUNTER — OFFICE VISIT (OUTPATIENT)
Dept: ORTHOPEDIC SURGERY | Age: 61
End: 2021-01-26
Payer: COMMERCIAL

## 2021-01-26 VITALS — BODY MASS INDEX: 25.78 KG/M2 | WEIGHT: 151 LBS | HEIGHT: 64 IN

## 2021-01-26 DIAGNOSIS — M54.50 LUMBAR PAIN: Primary | ICD-10-CM

## 2021-01-26 DIAGNOSIS — M47.816 LUMBAR SPONDYLOSIS: ICD-10-CM

## 2021-01-26 PROCEDURE — 99203 OFFICE O/P NEW LOW 30 MIN: CPT | Performed by: PHYSICIAN ASSISTANT

## 2021-01-26 PROCEDURE — MISCD282 ADJUSTA LIFT: Performed by: ORTHOPAEDIC SURGERY

## 2021-01-26 NOTE — PROGRESS NOTES
New Patient: LUMBAR SPINE    Referring Provider: Susanne Haq MD  CHIEF COMPLAINT:    Chief Complaint   Patient presents with    Back Pain     lumbar pain, pain started 1/7/2021, pain down left leg, 5/10 pain       HISTORY OF PRESENT ILLNESS:       Ms. Brynn Garner  is a pleasant 63 y.o. female who presents today for consultation of low back and left leg pain. Patient states that she does have a history of low back pain in 2019 but she states that this gradually improved with time. She states that since the beginning of January she woke up with low back and posterior thigh pain that radiated to the knee. She contacted her primary care physician who did give her a muscle relaxant which she states gave her minimal improvement. Patient states that the pain is constant and dull within her low back and increased with cough or sneeze in both the back and leg. She states that walking increases her pain and she finds that sitting improves her symptoms. She does have a significant history of lymphoma with a bone marrow transplant in 2016 with remission since. She states that the pain does increase with cough and sneeze into her lower back and minimally within the leg. She denies any bowel or bladder dysfunction and denies any saddle anesthesia.   Pain Assessment  Location of Pain: Back  Severity of Pain: 5  Quality of Pain: Other (Comment)  Frequency of Pain: Other (Comment)]    Current/Past Treatment:   · Physical Therapy: None for her lower back  · Chiropractic: None  · Injection: None  · Medications: Tylenol    Past Medical History:   Past Medical History:   Diagnosis Date    Allergic rhinitis     Arthritis     CKD (chronic kidney disease)     DLBCL (diffuse large B cell lymphoma) [facility] 2/2010    non-hodgkins lymphoma    GERD (gastroesophageal reflux disease)     Hx of non-Hodgkin's lymphoma     MDRO (multiple drug resistant organisms) resistance 5/15/16    E.coli MDRO in urine    Migraines     Non Hodgkin's lymphoma ([facility]     Peripheral neuropathy     PONV (postoperative nausea and vomiting)         Past Surgical History:     Past Surgical History:   Procedure Laterality Date    BONE MARROW TRANSPLANT  05/16/2016    BONE MARROW TRANSPLANT      BRONCHOSCOPY  8/30/2018    BRONCHOSCOPY THERAPUTIC ASPIRATION INITIAL WITH MUCUS PLUG SENT FOR BACTERIAL CULTURE performed by Carlo Mata MD at [address] [facility]      x 5    COLONOSCOPY      COLONOSCOPY N/A 1/20/2021    COLONOSCOPY POLYPECTOMY SNARE/COLD BIOPSY performed by Rashida Francis MD at [facility]  2/2010    with oophorectomy    LYMPH NODE BIOPSY  6/2011    R external iliac node -non diagnostic    LYMPH NODE BIOPSY  5/2012    R external node - Dr Tori Hill [facility] NHL    OTHER SURGICAL HISTORY  10/08/2012    INSERTION NEOSTAR CATHETER and REMOVAL        OTHER SURGICAL HISTORY Right 05/10/2016    INSERTION TRIPLE LUMEN DORANTES CENTRAL LINE    [address]/BRNCL ALVEOLAR LAVAGE N/A 8/30/2018    BRONCHOSCOPY WITH BRONCHIAL WASHINGS AND LLL BAL. performed by Carlo Mata MD at [place] [address]  2010    TONSILLECTOMY         Current Medications:     Current Outpatient Medications:     levothyroxine (SYNTHROID) 50 MCG tablet, Take 1 tablet by mouth every morning, Disp: 90 tablet, Rfl: 0    Ascorbic Acid (VITAMIN C) 250 MG tablet, Take 500 mg by mouth daily, Disp: , Rfl:     zinc 50 MG TABS tablet, Take 50 mg by mouth daily, Disp: , Rfl:     baclofen (LIORESAL) 10 MG tablet, Take 1 tablet by mouth 3 times daily as needed (PRN), Disp: 30 tablet, Rfl: 0    brompheniramine-pseudoephedrine-DM (BROMFED DM) 2-30-10 MG/5ML syrup, Take 5 mLs by mouth 4 times daily as needed for Cough, Disp: 240 mL, Rfl: 0    benzonatate (TESSALON) 100 MG capsule, TAKE 1 CAPSULE BY MOUTH THREE TIMES DAILY AS NEEDED FOR COUGH, Disp: 90 capsule, Rfl: 1    valACYclovir (VALTREX) 500 MG tablet, TK 1 T PO Q 12 H.  TAKE BID step-off or bruising. Lumbar alignment is normal.  Sagittal and Coronal balance is neutral.      · Palpation:   No evidence of tenderness at the midline. No tenderness bilaterally at the paraspinal or trochanters. There is no step-off or paraspinal spasm. · Range of Motion: Lumbar flexion, extension and rotation are mildly limited due to pain. · Strength:   Strength testing is 5/5 in all muscle groups tested. · Special Tests:   Straight leg raise and crossed SLR negative. Leg length and pelvis level. · Skin: There are no rashes, ulcerations or lesions. · Reflexes: Reflexes are symmetrically 2+ at the patellar and ankle tendons. Clonus absent bilaterally at the feet. · Gait & station: normal, patient ambulates without assistance    · Additional Examinations:   · RIGHT LOWER EXTREMITY: Inspection/examination of the right lower extremity does not show any tenderness, deformity or injury. Range of motion is unremarkable. There is no gross instability. There are no rashes, ulcerations or lesions. Strength and tone are normal.  There is 1/4 inch leg length discrepancy noted on the right as compared to the left  · LEFT LOWER EXTREMITY:  Inspection/examination of the left lower extremity does not show any tenderness, deformity or injury. Range of motion is unremarkable. There is no gross instability. There are no rashes, ulcerations or lesions. Strength and tone are normal.    Diagnostic Testin views of the lumbar spine to include AP and lateral were obtained today in the office and reviewed with the patient that shows degenerative scoliosis with spondylosis at L3-4 and L4-5. Impression:   Lumbar spondylosis with degenerative scoliosis    1. Lumbar pain        Plan:      · We discussed treatment options including observation, additional oral steroids, physical therapy, epidural injections and additional imaging.  She wishes to proceed with physical therapy for lumbar flexibility and core strengthening exercises. She was also given a  heel lift to be placed into her right shoe. She will contact the office for scheduling of an MRI if she finds that she has had no significant improvement with the exercises and the shoe lift. · Follow up -as needed    Old records were reviewed. Patient examined and note dictated by Lety Metz PA-C. Patient also seen and examined by Dr. Robinson Nunes.

## 2021-02-03 ENCOUNTER — HOSPITAL ENCOUNTER (OUTPATIENT)
Dept: PHYSICAL THERAPY | Age: 61
Setting detail: THERAPIES SERIES
Discharge: HOME | End: 2021-02-03
Payer: COMMERCIAL

## 2021-02-03 PROCEDURE — 97140 MANUAL THERAPY 1/> REGIONS: CPT | Performed by: PHYSICAL THERAPIST

## 2021-02-03 PROCEDURE — 97110 THERAPEUTIC EXERCISES: CPT | Performed by: PHYSICAL THERAPIST

## 2021-02-03 PROCEDURE — 97161 PT EVAL LOW COMPLEX 20 MIN: CPT | Performed by: PHYSICAL THERAPIST

## 2021-02-03 NOTE — PLAN OF CARE
[facility]  Orthopaedics and Sports Rehabilitation, [facility]  [address], [place], [address]  Phone: [phone]   Fax:     [phone]                                                       Emilia Bamberger    Dear  Referring Practitioner: Dr Aldo Bentley,    We had the pleasure of evaluating the following patient for physical therapy services at [address] [place] [address]. A summary of our findings can be found in the initial assessment below. This includes our plan of care. If you have any questions or concerns regarding these findings, please do not hesitate to contact me at the office phone number checked above. Thank you for the referral.       Physician Signature:_______________________________Date:__________________  By signing above (or electronic signature), therapists plan is approved by physician      Patient: Heather Looney   : 1960   MRN: [id number]  Referring Physician: Referring Practitioner: Dr Aldo Bentley      Evaluation Date: 2/3/2021      Medical Diagnosis Information:  Diagnosis: M47.816 (ICD-10-CM) - Lumbar spondylosis   Treatment Diagnosis: PT treatment diagnosis:  low back pain  M54.5                                         Insurance information: PT Insurance Information: [address]     Precautions/ Contra-indications: Hx of non-Hodgkins lymphoma  Latex Allergy:  [x]NO      []YES  Preferred Language for Healthcare:   [x]English       []other:    SUBJECTIVE: Patient stated complaint:   Woke up with low back pain that radiated into the L buttocks and posterior thigh to the knee. No known MACY but patient did test (+) for Covid in December and spent 3 days in the hospital.  Had a similar bout of symptoms approx. 15 years ago and received an epidural injection at that time. Currently, has constant ache and is worse with bending over and increased activity. X-rays: lumbar spondylosis. MD follow up is PRN. Relevant Medical History:see above  Functional Disability Index:Mod KAREN- 28%      Pain Scale: 5/10    Easing factors: changing positions    Provocative factors: bending forward, prolonged sitting     Night Pain:  No longer having pain when sleeping at night     Type: []Constant   []Intermittent  []Radiating []Localized []other:     Numbness/Tingling: none     Red Flag Symptoms: Denied symptoms associated with more severe pathology    to include loss of bowel and bladder control, fever, chills, nausea, headache, recent weight gain, recent weight loss, night sweats, decreased appetite, fatigue. Functional Limitations/Impairments: [x]Sitting []Standing []Walking    []Squatting/bending  []Stairs           []ADL's  []Transfers []Sports/Recreation [x]Other: bending forward    Occupation/School: retired    Sport/recreational activities:      Living Status/Prior Level of Function: This patient was independent in ADL's and IADL's prior to onset of symptoms.        OBJECTIVE:       Standing Exam Normal Abnormal N/A Comments   Toe walk   x      Heel Walk x      Pelvic Height x      Fwd Bend- (aberrant juttering or innominate mvmt)- Standing Flexion Test x      Extension x      Sacral Sulcus Test (Side Flexion) x      Trendelenburg       Combined Movements                                   Seated Exam Normal Abnormal N/A Comments   Pelvic Height x      Seated Rotation x      Seated flexion x      B hip IR       SLUMP Test  x  (+) on L          Supine Exam Normal Abnormal N/A Comments   Hip flexion x      Abduction x      ER x      IR x      ANNA/Silver x      FADIR x      SLR x      Crossed SLR x      Supine to sit       Supine to Sit Test                            Prone Exam Normal Abnormal N/A Comments   Prone knee bend       Prone hip IR       B Achilles reflex/Pheasant       PA/Spring  x  guarded   Prone Instability test       Sacral Spring/thrust       Femoral Nerve Tension Test ROM LEFT RIGHT Comments   Lumbar Flex 50%   pain   Lumbar Ext 50%  pain   Side Bend 50% pain 75%    Rotation 75% 100%                  ROM LEFT RIGHT Comments   Hip Flexion 110 110    Hip Abd      Hip ER 35 45    Hip IR 20 25    Hip Extension      Knee Ext      Knee Flex      Hamstring Flex -45 -35    Piriformis                    Strength LEFT RIGHT Comments   Multifidus 4 4    Transverse Ab      Hip Flexors 4 5    Hip Abductors 4- 5    Hip Extensors 4 5                   Myotomes Normal Abnormal Comments   Hip flexion (L1-L2) x     Knee extension (L2-L4) x     Dorsiflexion (L4-L5) x     Great Toe Ext (L5) x     Ankle Eversion (S1-S2) x     Ankle PF(S1-S2) x         Dermatomes Normal Abnormal Comments   inguinal area (L1)  x     anterior mid-thigh (L2) x     distal ant thigh/med knee (L3) x     medial lower leg and foot (L4) x     lateral lower leg and foot (L5) x     posterior calf (S1) x     medial calcaneus (S2) x           Reflexes Normal Abnormal Comments   S1-2 Seated achilles x     S1-2 Prone knee bend      L3-4 Patellar tendon x     C5-6 Biceps      C6 Brachioradialis      C7-8 Triceps      Clonus x     Babinski      Nieves's        Joint mobility: Guarded   []Normal    []Hypo   []Hyper    Palpation: Left lumbar paraspinals, Glut max, piriformis    Functional Mobility/Transfers: slow, labored table movements    Posture: level pelvic landmarks    Gait: (include devices/WB status) antalgic gait with decreased weight shift/stance time on the LLE. Bandages/Dressings/Incisions: n/a                         [x] Patient history, allergies, meds reviewed. Medical chart reviewed. See intake form. Review Of Systems (ROS):  [x]Performed Review of systems (Integumentary, CardioPulmonary, Neurological) by intake and observation. Intake form has been scanned into medical record. Patient has been instructed to contact their primary care physician regarding ROS issues if not already being addressed at this time. Co-morbidities/Complexities (which will affect course of rehabilitation):   []None           Arthritic conditions   []Rheumatoid arthritis (M05.9)  [x]Osteoarthritis (M19.91)   Cardiovascular conditions   []Hypertension (I10)  []Hyperlipidemia (E78.5)  []Angina pectoris (I20)  []Atherosclerosis (I70)   Musculoskeletal conditions   [x]Disc pathology   []Congenital spine pathologies   []Prior surgical intervention  []Osteoporosis (M81.8)  []Osteopenia (M85.8)   Endocrine conditions   []Hypothyroid (E03.9)  []Hyperthyroid Gastrointestinal conditions   []Constipation (R00.57)   Metabolic conditions   []Morbid obesity (E66.01)  []Diabetes type 1(E10.65) or 2 (E11.65)   []Neuropathy (G60.9)     Pulmonary conditions   []Asthma (J45)  []Coughing   []COPD (J44.9)   Psychological Disorders  []Anxiety (F41.9)  []Depression (F32.9)   []Other:   [x]Other:     Hx of cancer     Barriers to/and or personal factors that will affect rehab potential:              []Age  []Sex              []Motivation/Lack of Motivation                        [x]Co-Morbidities              []Cognitive Function, education/learning barriers              []Environmental, home barriers              []profession/work barriers  []past PT/medical experience  []other:  Justification:  OA is degenerative    Falls Risk Assessment (30 days):   [x] Falls Risk assessed and no intervention required.   [] Falls Risk assessed and Patient requires intervention due to being higher risk   TUG score (>12s at risk):     [] Falls education provided, including       ASSESSMENT:   Functional Impairments:     [x]Noted lumbar/proximal hip hypomobility   []Noted lumbosacral and/or generalized hypermobility   [x]Decreased Lumbosacral/hip/LE functional ROM   [x]Decreased core/proximal hip strength and neuromuscular control    [x]Decreased LE functional strength    []Abnormal reflexes/sensation/myotomal/dermatomal deficits  [x]Reduced balance/proprioceptive control    []other: Functional Activity Limitations (from functional questionnaire and intake)   [x]Reduced ability to tolerate prolonged functional positions   [x]Reduced ability or difficulty with changes of positions or transfers between positions   [x]Reduced ability to maintain good posture and demonstrate good body mechanics with sitting, bending, and lifting   []Reduced ability to sleep   [] Reduced ability or tolerance with driving and/or computer work   [x]Reduced ability to perform lifting, reaching, carrying tasks   []Reduced ability to squat   [x]Reduced ability to forward bend   []Reduced ability to ambulate prolonged functional periods/distances/surfaces   []Reduced ability to ascend/descend stairs   []other:       Participation Restrictions   []Reduced participation in self care activities   [x]Reduced participation in home management activities   []Reduced participation in work activities   [x]Reduced participation in social activities. []Reduced participation in sport/recreation activities. Classification:   [x]Signs/symptoms consistent with Lumbar instability/stabilization subgroup. []Signs/symptoms consistent with Lumbar mobilization/manipulation subgroup, myotomes and dermatomes intact. Meets manipulation criteria. []Signs/symptoms consistent with Lumbar direction specific/centralization subgroup   []Signs/symptoms consistent with Lumbar traction subgroup     []Signs/symptoms consistent with lumbar facet dysfunction   []Signs/symptoms consistent with lumbar stenosis type dysfunction   []Signs/symptoms consistent with nerve root involvement including myotome & dermatome dysfunction   []Signs/symptoms consistent with post-surgical status including: decreased ROM, strength and function.    []signs/symptoms consistent with pathology which may benefit from Dry needling     []other:    Prognosis/Rehab Potential:      []Excellent   [x]Good    []Fair   []Poor    Tolerance of evaluation/treatment: []Excellent   [x]Good    []Fair   []Poor    Physical Therapy Evaluation Complexity Justification  [x] A history of present problem with:  [] no personal factors and/or comorbidities that impact the plan of care;  [x]1-2 personal factors and/or comorbidities that impact the plan of care  []3 personal factors and/or comorbidities that impact the plan of care  [x] An examination of body systems using standardized tests and measures addressing any of the following: body structures and functions (impairments), activity limitations, and/or participation restrictions;:  [] a total of 1-2 or more elements   [] a total of 3 or more elements   [x] a total of 4 or more elements   [x] A clinical presentation with:  [x] stable and/or uncomplicated characteristics   [] evolving clinical presentation with changing characteristics  [] unstable and unpredictable characteristics;   [x] Clinical decision making of [x] low, [] moderate, [] high complexity using standardized patient assessment instrument and/or measurable assessment of functional outcome. [x] EVAL (LOW) 53212 (typically 20 minutes face-to-face)  [] EVAL (MOD) 42869 (typically 30 minutes face-to-face)  [] EVAL (HIGH) 88006 (typically 45 minutes face-to-face)  [] RE-EVAL       PLAN: Begin PT focusing on: proximal hip mobilizations, LB mobs, LB core activation, proximal hip activation, and HEP    Frequency/Duration:  1-2 days per week for 6 Weeks:  Interventions:  1. Therapeutic exercise including: strength training, ROM/flexibility, NMR and proprioception for the proximal upper extremity and deep neck flexors. 1 Therapeutic exercise including: strength training, ROM/flexibility, NMR and proprioception for the core, hips and bilateral lower extremities. 2. Manual therapy as indicated including Dry Needling/IASTM, STM, PROM, Gr I-IV mobilizations, spinal mobilization/manipulation. 3. Modalities as needed including: thermal agents, E-stim, US, iontophoresis as indicated. 4. Patient education on spine protection, activity modification, progression of HEP. HEP instruction: Can be found in media file. (see scanned forms)      GOALS:  Patient stated goal: decrease pain    Therapist goals for Patient:Lumbar   Short Term Goals: To be achieved in: 2 weeks  1. Independent in HEP and progression per patient tolerance, in order to prevent re-injury. [] Progressing: [] Met: [] Not Met: [] Adjusted  2. Patient will have a decrease in pain to facilitate improvement in movement, function, and ADLs as indicated by Functional Deficits. [] Progressing: [] Met: [] Not Met: [] Adjusted    Long Term Goals: To be achieved in: 6 weeks  1. Disability index score of 15% or less for the KAREN to assist with reaching prior level of function. [] Progressing: [] Met: [] Not Met: [] Adjusted  2. Patient will demonstrate increased AROM to WNL, good LS mobility, good hip ROM to allow for proper joint functioning as indicated by patients Functional Deficits. [] Progressing: [] Met: [] Not Met: [] Adjusted  3. Patient will demonstrate an increase in Strength to good proximal hip and core activation to allow for proper functional mobility as indicated by patients Functional Deficits. [] Progressing: [] Met: [] Not Met: [] Adjusted  4. Patient will return to bending forward to don/doff shoes and socks, functional activities without increased symptoms or restriction.    [] Progressing: [] Met: [] Not Met: [] Adjusted        Electronically signed by: Erin Moffett PT, FABY

## 2021-02-03 NOTE — PROGRESS NOTES
Sahara Ureña   1960, 63 y.o. female   [id number]       Referring Provider: Rudi Kehr, MD  Referral Type: In an order in [address] [place] [address]    Reason for Visit: Evaluation of suspected change in hearing, tinnitus, or balance. ADULT AUDIOLOGIC EVALUATION      Sahara Ureña is a 63 y.o. female seen today, 2/11/2021 for an initial audiologic evaluation. 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 Sahara Ureña denied otalgia, aural fullness, otorrhea, dizziness, imbalance, history of occupational/recreational noise exposure, history of head trauma, history of ear surgery, and family history of hearing loss. IMPRESSIONS:       Today's results are consistent with bilateral sensorineural hearing loss. Hearing loss is significant enough to result in difficulty understanding speech in most listening environments. Discussed tinnitus management strategies, good communication strategies, and recommended binaural hearing aids. Follow medical recommendations from Dr. Lane Menard. ASSESSMENT AND FINDINGS:       Otoscopy revealed: Clear ear canals bilaterally      RIGHT EAR:  Hearing Sensitivity: Within normal limits to mild through 2000 Hz sloping to severe sensorineural hearing loss. Speech Recognition Threshold: 25 dB HL  Word Recognition: Excellent (92%), based on NU-6 25-word list at 60 dBHL using recorded speech stimuli. This finding is consistent with hearing sensitivity. Tympanometry: Normal peak pressure and compliance, Type A tympanogram, consistent with normal middle ear function.       LEFT EAR:  Hearing Sensitivity: Within normal limits to mild through 2000 Hz sloping to moderately-severe sensorineural hearing loss. Speech Recognition Threshold: 25 dB HL  Word Recognition: Good (88%), based on NU-6 25-word list at 60 dBHL using recorded speech stimuli. This finding is consistent with hearing sensitivity. Tympanometry: Normal peak pressure and compliance, Type A tympanogram, consistent with normal middle ear function. Reliability: Good  Transducer: Inserts    See scanned audiogram dated 2/11/2021 for results. PATIENT EDUCATION:       The following items were discussed with the patient:    - Good Communication Strategies   - Hearing Loss and Hearing Aids    - Tinnitus Management Strategies    Educational information was shared in the After Visit Summary. RECOMMENDATIONS:                                                                                                                                                                                                                                                                      The following items are recommended based on patient report and results from today's appointment:   - Continue medical follow-up with Jaime Alexis MD.   - Retest hearing as medically indicated and/or sooner if a change in hearing is noted. - If desired, schedule a Hearing Aid Evaluation (HAE) appointment to discuss hearing aid options. She has a Medicaid plan, as well as a secondary plan, and was provided with information for known Medicaid providers for hearing aids in the area. We discussed that if she has a benefit through her other insurance, she would need to satisfy the Medicaid benefit first.     - Utilize \"Good Communication Strategies\" as discussed to assist in speech understanding with communication partners. - Maintain a sound enriched environment to assist in the management of tinnitus symptoms.            [facility] [place], [place]  Audiologist      Chart CC'd

## 2021-02-03 NOTE — FLOWSHEET NOTE
Figure 4 Piriformis  3x30'' seated   HS stretch 3x30'' standing   Supine ITB     Prone Quad Stretch     Prayer Stretch     Hand Heel Rock     Cat/Cow     LTR                    Exercises     Bridge  10x    SLR Flex     SLR Abd     SLR Ext     Clamshells     TA / Multifidus / Abd Hollow     TA March     Prone Plank     Side Plank     Quadriped UE/ LE/ Birddog     Squats     [employer] Kick                 Manual      Hamstring Stretch     SKC     Piriformis Stretch      ITB Stretch      Prone Quad Stretch      Traction     Sacral Decompression     Lumbar PA Grade-      Supine Lumbar Roll     SL Lumbar      Long Axis Hip Distraction Grade     Access Code: [facility]   URL: [email or web address]   Date: 02/03/2021   Prepared by: Darrian Butts     Exercises   Supine Piriformis Stretch with Leg Straight - 3 reps - 30 seconds hold - 2x daily - 7x weekly   Seated Piriformis Stretch - 3 reps - 30 seconds hold - 2x daily - 7x weekly   Standing Hamstring Stretch with Step - 3 reps - 30 seconds hold - 2x daily - 7x weekly   Supine Bridge - 10 reps - 2x daily - 7x weekly     Therapeutic Exercise and NMR EXR  [] (29337) Provided verbal/tactile cueing for activities related to strengthening, flexibility, endurance, ROM  for improvements in proximal hip and core control with self care, mobility, lifting and ambulation.  [] (83679) Provided verbal/tactile cueing for activities related to improving balance, coordination, kinesthetic sense, posture, motor skill, proprioception  to assist with core control in self care, mobility, lifting, and ambulation.      Therapeutic Activities:    [] (67338 or 20762) Provided verbal/tactile cueing for activities related to improving balance, coordination, kinesthetic sense, posture, motor skill, proprioception and motor activation to allow for proper function  with self care and ADLs 1. Disability index score of 15% or less for the KAREN to assist with reaching prior level of function. [] Progressing: [] Met: [] Not Met: [] Adjusted  2. Patient will demonstrate increased AROM to WNL, good LS mobility, good hip ROM to allow for proper joint functioning as indicated by patients Functional Deficits. [] Progressing: [] Met: [] Not Met: [] Adjusted  3. Patient will demonstrate an increase in Strength to good proximal hip and core activation to allow for proper functional mobility as indicated by patients Functional Deficits. [] Progressing: [] Met: [] Not Met: [] Adjusted  4. Patient will return to bending forward to don/doff shoes and socks, functional activities without increased symptoms or restriction. [] Progressing: [] Met: [] Not Met: [] Adjusted     Overall Progression Towards Functional goals/ Treatment Progress Update:  [] Patient is progressing as expected towards functional goals listed. [] Progression is slowed due to complexities/Impairments listed. [] Progression has been slowed due to co-morbidities.   [x] Plan just implemented, too soon to assess goals progression <30days   [] Goals require adjustment due to lack of progress  [] Patient is not progressing as expected and requires additional follow up with physician  [] Other    ASSESSMENT:  See eval    Treatment/Activity Tolerance:  [x] Patient tolerated treatment well [] Patient limited by fatique  [] Patient limited by pain  [] Patient limited by other medical complications  [] Other:     Prognosis: [x] Good [] Fair  [] Poor    Patient Requires Follow-up: [x] Yes  [] No    PLAN: See eval  [] Continue per plan of care [] Alter current plan (see comments)  [x] Plan of care initiated [] Hold pending MD visit [] Discharge        Electronically signed by: Meredith Ruffin PT, OMT-C Note: If patient does not return for scheduled/ recommended follow up visits, this note will serve as a discharge from care along with most recent update on progress.

## 2021-02-08 ENCOUNTER — HOSPITAL ENCOUNTER (OUTPATIENT)
Dept: PHYSICAL THERAPY | Age: 61
Setting detail: THERAPIES SERIES
Discharge: HOME | End: 2021-02-08
Payer: COMMERCIAL

## 2021-02-08 PROCEDURE — 97112 NEUROMUSCULAR REEDUCATION: CPT | Performed by: PHYSICAL THERAPIST

## 2021-02-08 PROCEDURE — 97110 THERAPEUTIC EXERCISES: CPT | Performed by: PHYSICAL THERAPIST

## 2021-02-08 PROCEDURE — 97140 MANUAL THERAPY 1/> REGIONS: CPT | Performed by: PHYSICAL THERAPIST

## 2021-02-08 RX ORDER — ESOMEPRAZOLE MAGNESIUM 40 MG/1
40 CAPSULE, DELAYED RELEASE ORAL DAILY
Qty: 30 CAPSULE | Refills: 2 | Status: SHIPPED | OUTPATIENT
Start: 2021-02-08 | End: 2021-04-19 | Stop reason: SDUPTHER

## 2021-02-08 NOTE — FLOWSHEET NOTE
[facility] [employer]  Orthopaedics and Sports Rehabilitation, [place]    Physical Therapy Treatment Note/ Progress Report:   Date:  2021    Patient Name:  Crystal Garibay    :  1960  MRN: [id number]  Restrictions/Precautions:    Medical/Treatment Diagnosis Information:  · Diagnosis: M47.816 (ICD-10-CM) - Lumbar spondylosis  · Treatment Diagnosis: PT treatment diagnosis:  low back pain  I37.6  Insurance/Certification information:  PT Insurance Information: [address],  Visits BMN, $25 copay  Physician Information:  Referring Practitioner: Dr Neeru Mckeon of care signed (Y/N):     Date of Patient follow up with Physician:     Is this a Progress Report:     []  Yes  [x]  No        If Yes:  Date Range for reporting period:  Beginning  Ending    Progress report will be due (10 Rx or 30 days whichever is less):        Recertification will be due (POC Duration  / 90 days whichever is less): 3/17/21     Date of Surgery:        Visit # Insurance Allowable Auth Required   2 BMN []  Yes []  No        Functional Scale:     Date assessed:        Latex Allergy:  [x]NO      []YES  Preferred Language for Healthcare:   [x]English       []other    Pain level:  10     SUBJECTIVE:  Reports no change since the first visit. Having increased upper glut pain today.      Type: []Constant   []Intermitment  []Radiating []Localized  []other:     Functional Limitations: []Sitting []Standing []Walking    []Squatting []Stairs                []ADL's  []Driving []Sports/Recreation   []Lifting/reaching     []Grooming    []Carrying []Driving  []Sports/Recreations   []Other:      OBJECTIVE:     ROM Current (R) Current (L)                       Strength                           Gait:     Joint mobility:    []Normal    []Hypo   []Hyper    Palpation:     Orthopedic Tests:     RESTRICTIONS/PRECAUTIONS: Hx of non-Hodgkins lymphoma    Exercises/Interventions: Stretching Repetitions/Resistance Nots/Last Progression   [facility]     Piriformis Cross Over 3x30''    Figure 4 Piriformis  3x30'' seated   HS stretch 3x30'' standing   Supine ITB     Prone Quad Stretch     Prayer Stretch     Hand Heel Rock     Cat/Cow     LTR                    Exercises     Bridge  10x    SLR Flex     SLR Abd 2x10 B    SLR Ext 10x  EOT   Clamshells     TA UE/opp LE 10x B    TA March: up/up, down/down 10x B    Prone Plank     Side Plank     Quadriped UE/ LE/ Ethan [place]     [employer] Kick                 Manual      STM- superior glut max/med 5'    Hamstring Stretch 2'    [facility]     Piriformis Stretch  '2    ITB Stretch      Prone Quad Stretch      Traction     Sacral Decompression     Lumbar PA Grade-      Supine Lumbar Roll 2' No thrust, stretch only   SL Lumbar      Long Axis Hip Distraction Grade     Access Code: [facility]   URL: [email or web address]   Date: 02/03/2021   Prepared by: Fei Silva     Exercises   Supine Piriformis Stretch with Leg Straight - 3 reps - 30 seconds hold - 2x daily - 7x weekly   Seated Piriformis Stretch - 3 reps - 30 seconds hold - 2x daily - 7x weekly   Standing Hamstring Stretch with Step - 3 reps - 30 seconds hold - 2x daily - 7x weekly   Supine Bridge - 10 reps - 2x daily - 7x weekly     Therapeutic Exercise and NMR EXR  [x] (18983) Provided verbal/tactile cueing for activities related to strengthening, flexibility, endurance, ROM  for improvements in proximal hip and core control with self care, mobility, lifting and ambulation.  [] (48547) Provided verbal/tactile cueing for activities related to improving balance, coordination, kinesthetic sense, posture, motor skill, proprioception  to assist with core control in self care, mobility, lifting, and ambulation.      Therapeutic Activities: [x] Continue per plan of care [] Alter current plan (see comments)  [] Plan of care initiated [] Hold pending MD visit [] Discharge        Electronically signed by: Neeta Dooley PT, OMT-C      Note: If patient does not return for scheduled/ recommended follow up visits, this note will serve as a discharge from care along with most recent update on progress.

## 2021-02-10 ENCOUNTER — HOSPITAL ENCOUNTER (OUTPATIENT)
Dept: PHYSICAL THERAPY | Age: 61
Setting detail: THERAPIES SERIES
Discharge: HOME | End: 2021-02-10
Payer: COMMERCIAL

## 2021-02-10 PROCEDURE — 97110 THERAPEUTIC EXERCISES: CPT | Performed by: PHYSICAL THERAPIST

## 2021-02-10 PROCEDURE — 97112 NEUROMUSCULAR REEDUCATION: CPT | Performed by: PHYSICAL THERAPIST

## 2021-02-10 PROCEDURE — 97140 MANUAL THERAPY 1/> REGIONS: CPT | Performed by: PHYSICAL THERAPIST

## 2021-02-10 NOTE — FLOWSHEET NOTE
[facility] [employer] [employer]  Orthopaedics and Sports Rehabilitation, [place]    Physical Therapy Treatment Note/ Progress Report:   Date:  2/10/2021    Patient Name:  Fredy Mathews    :  1960  MRN: [id number]  Restrictions/Precautions:    Medical/Treatment Diagnosis Information:  · Diagnosis: M47.816 (ICD-10-CM) - Lumbar spondylosis  · Treatment Diagnosis: PT treatment diagnosis:  low back pain  E66.7  Insurance/Certification information:  PT Insurance Information: [address],  Visits BMN, $25 copay  Physician Information:  Referring Practitioner: Dr Kaycee Busch of care signed (Y/N):     Date of Patient follow up with Physician:     Is this a Progress Report:     []  Yes  [x]  No        If Yes:  Date Range for reporting period:  Beginning  Ending    Progress report will be due (10 Rx or 30 days whichever is less): 55       Recertification will be due (POC Duration  / 90 days whichever is less): 3/17/21     Date of Surgery:        Visit # Insurance Allowable Auth Required   3 BMN []  Yes []  No        Functional Scale:     Date assessed:        Latex Allergy:  [x]NO      []YES  Preferred Language for Healthcare:   [x]English       []other    Pain level:  5/10     SUBJECTIVE:  Patient states her back felt very good after the last visit until cleaning house later that night which aggravated her symptoms again.      Type: []Constant   []Intermitment  []Radiating []Localized  []other:     Functional Limitations: []Sitting []Standing []Walking    []Squatting []Stairs                []ADL's  []Driving []Sports/Recreation   []Lifting/reaching     []Grooming    []Carrying []Driving  []Sports/Recreations   []Other:      OBJECTIVE:     ROM Current (R) Current (L)                       Strength                           Gait:     Joint mobility:    []Normal    []Hypo   []Hyper    Palpation:     Orthopedic Tests:     RESTRICTIONS/PRECAUTIONS: Hx of non-Hodgkins lymphoma    Exercises/Interventions: Stretching Repetitions/Resistance Nots/Last Progression   [facility]     Piriformis Cross Over 3x30''    Figure 4 Piriformis  3x30'' seated   HS stretch 3x30'' standing   Supine ITB     Prone Quad Stretch     Prayer Stretch     Hand Heel Rock     Cat/Cow     LTR                    Exercises     Bridge  2x10    Prone glut squeeze 15x5''    SLR Flex     SLR Abd 2x10 B    SLR Ext 10x  EOT   Clamshells 20x B    TA UE/opp LE 10x B    TA March: up/up, down/down 10x B    Prone Plank     Side Plank     Quadriped UE/ LE/ Ethan [place]     [facility] [employer]                 Manual      STM- superior glut max/med 5'    Hamstring Stretch 2'    [facility]     Piriformis Stretch  '2    ITB Stretch      Prone Quad Stretch      Traction     Sacral Decompression     Lumbar PA Grade-      Supine Lumbar Roll 2' No thrust, stretch only   SL Lumbar      Long Axis Hip Distraction Grade     Access Code: [facility]   URL: [email or web address]   Date: 02/03/2021   Prepared by: Darrian Butts     Exercises   Supine Piriformis Stretch with Leg Straight - 3 reps - 30 seconds hold - 2x daily - 7x weekly   Seated Piriformis Stretch - 3 reps - 30 seconds hold - 2x daily - 7x weekly   Standing Hamstring Stretch with Step - 3 reps - 30 seconds hold - 2x daily - 7x weekly   Supine Bridge - 10 reps - 2x daily - 7x weekly     Therapeutic Exercise and NMR EXR  [x] (68668) Provided verbal/tactile cueing for activities related to strengthening, flexibility, endurance, ROM  for improvements in proximal hip and core control with self care, mobility, lifting and ambulation.  [] (64543) Provided verbal/tactile cueing for activities related to improving balance, coordination, kinesthetic sense, posture, motor skill, proprioception  to assist with core control in self care, mobility, lifting, and ambulation.      Therapeutic Activities: [] (49534 or 76427) Provided verbal/tactile cueing for activities related to improving balance, coordination, kinesthetic sense, posture, motor skill, proprioception and motor activation to allow for proper function  with self care and ADLs  [] (76309) Provided training and instruction to the patient for proper core and proximal hip recruitment and positioning with ambulation re-education     Home Exercise Program:    [x] (40359) Reviewed/Progressed HEP activities related to strengthening, flexibility, endurance, ROM of core, proximal hip and LE for functional self-care, mobility, lifting and ambulation   [] (76802) Reviewed/Progressed HEP activities related to improving balance, coordination, kinesthetic sense, posture, motor skill, proprioception of core, proximal hip and LE for self care, mobility, lifting, and ambulation      Manual Treatments:    [x] (25621) Provided manual therapy to mobilize proximal hip and LS spine soft tissue/joints for the purpose of modulating pain, promoting relaxation,  increasing ROM, reducing/eliminating soft tissue swelling/inflammation/restriction, improving soft tissue extensibility and allowing for proper ROM for normal function with self care, mobility, lifting and ambulation. Modalities:       Charges:  Timed Code Treatment Minutes: 40   Total Treatment Minutes: 40     [] EVAL (LOW) 61723 (typically 20 minutes face-to-face)  [] EVAL (MOD) 06807 (typically 30 minutes face-to-face)  [] EVAL (HIGH) 19615 (typically 45 minutes face-to-face)  [] RE-EVAL     [x] QP(40191) x 1    [] IONTO  [x] NMR (17637) x 1     [] VASO  [x] Manual (80134) x 1      [] Other:  [] TA x      [] Mech Traction (55972)  [] ES(attended) (23168)      [] ES (un) (36851):     Goals:   Short Term Goals: To be achieved in: 2 weeks  1. Independent in HEP and progression per patient tolerance, in order to prevent re-injury.    [] Progressing: [x] Met: [] Not Met: [] Adjusted 2. Patient will have a decrease in pain to facilitate improvement in movement, function, and ADLs as indicated by Functional Deficits. [] Progressing: [] Met: [] Not Met: [] Adjusted    Long Term Goals: To be achieved in: 6 weeks  1. Disability index score of 15% or less for the KAREN to assist with reaching prior level of function. [] Progressing: [] Met: [] Not Met: [] Adjusted  2. Patient will demonstrate increased AROM to WNL, good LS mobility, good hip ROM to allow for proper joint functioning as indicated by patients Functional Deficits. [] Progressing: [] Met: [] Not Met: [] Adjusted  3. Patient will demonstrate an increase in Strength to good proximal hip and core activation to allow for proper functional mobility as indicated by patients Functional Deficits. [] Progressing: [] Met: [] Not Met: [] Adjusted  4. Patient will return to bending forward to don/doff shoes and socks, functional activities without increased symptoms or restriction. [] Progressing: [] Met: [] Not Met: [] Adjusted     Overall Progression Towards Functional goals/ Treatment Progress Update:  [] Patient is progressing as expected towards functional goals listed. [] Progression is slowed due to complexities/Impairments listed. [] Progression has been slowed due to co-morbidities. [x] Plan just implemented, too soon to assess goals progression <30days   [] Goals require adjustment due to lack of progress  [] Patient is not progressing as expected and requires additional follow up with physician  [] Other    ASSESSMENT:  L glut/piriformis tenderness persists. Symptoms exacerbated by repetitive forward bending and house cleaning activities.      Treatment/Activity Tolerance:  [x] Patient tolerated treatment well [] Patient limited by fatique  [] Patient limited by pain  [] Patient limited by other medical complications  [] Other:     Prognosis: [x] Good [] Fair  [] Poor    Patient Requires Follow-up: [x] Yes  [] No PLAN: See eval  [x] Continue per plan of care [] Alter current plan (see comments)  [] Plan of care initiated [] Hold pending MD visit [] Discharge        Electronically signed by: Linwood Graf PT, OMT-C      Note: If patient does not return for scheduled/ recommended follow up visits, this note will serve as a discharge from care along with most recent update on progress.

## 2021-02-11 ENCOUNTER — PROCEDURE VISIT (OUTPATIENT)
Dept: AUDIOLOGY | Age: 61
End: 2021-02-11
Payer: COMMERCIAL

## 2021-02-11 ENCOUNTER — OFFICE VISIT (OUTPATIENT)
Dept: ENT CLINIC | Age: 61
End: 2021-02-11
Payer: COMMERCIAL

## 2021-02-11 VITALS
BODY MASS INDEX: 27.49 KG/M2 | WEIGHT: 161 LBS | DIASTOLIC BLOOD PRESSURE: 73 MMHG | HEART RATE: 95 BPM | TEMPERATURE: 97.1 F | HEIGHT: 64 IN | SYSTOLIC BLOOD PRESSURE: 106 MMHG

## 2021-02-11 DIAGNOSIS — H93.13 TINNITUS OF BOTH EARS: Primary | ICD-10-CM

## 2021-02-11 DIAGNOSIS — H90.3 SENSORINEURAL HEARING LOSS, BILATERAL: Primary | ICD-10-CM

## 2021-02-11 DIAGNOSIS — H93.13 TINNITUS, BILATERAL: ICD-10-CM

## 2021-02-11 PROCEDURE — 92557 COMPREHENSIVE HEARING TEST: CPT | Performed by: AUDIOLOGIST

## 2021-02-11 PROCEDURE — 92567 TYMPANOMETRY: CPT | Performed by: AUDIOLOGIST

## 2021-02-11 PROCEDURE — 99204 OFFICE O/P NEW MOD 45 MIN: CPT | Performed by: OTOLARYNGOLOGY

## 2021-02-11 NOTE — Clinical Note
Dr. Jonn Bar,    Please see note from this patient's audiogram from today. Please let me know if there is anything further you need.       Junior Willoughby [address] [place]  Audiologist

## 2021-02-11 NOTE — PATIENT INSTRUCTIONS
Tinnitus: Overview and Management Strategies          Many people have some ringing sounds in their ears once in a while. You may hear a roar, a hiss, a tinkle, or a buzz. The sound usually lasts only a few minutes. If it goes on all the time, you may have tinnitus. Tinnitus is usually caused by long-term exposure to loud noise. This damages the nerves in the inner ear. It can occur with all types of hearing loss. It may be a symptom of almost any ear problem. Tinnitus may be caused by a buildup of earwax. Or, it may be caused by ear infections or certain medicines (especially antibiotics or large amounts of aspirin). You can also hear noises in your ears because of an injury to the ears, drinking too much alcohol or caffeine, or a medical condition. Other conditions may also contribute to tinnitus, including: head and neck trauma, temporomandibular joint disorder (TMJ), sinus pressure and barometric trauma, traumatic brain injury, metabolic disorders, autoimmune disorders, stress, and high blood pressure. You may need tests to evaluate your hearing and to find causes of long-lasting tinnitus. Your doctor may suggest one or more treatments to help you cope with the tinnitus. You can also do things at home to help reduce symptoms. Follow-up care is a key part of your treatment and safety. Be sure to make and go to all appointments, and call your doctor if you are having problems. It's also a good idea to know your test results and keep a list of the medicines you take. How can you care for yourself at home? · Limit or cut out alcohol, caffeine, and sodium. They can make your symptoms worse. · Do not smoke or use other tobacco products. Nicotine reduces blood flow to the ear and makes tinnitus worse. If you need help quitting, talk to your doctor about stop-smoking programs and medicines. These can increase your chances of quitting for good.   · Talk to your doctor about whether to stop taking aspirin and similar products such as ibuprofen or naproxen. · Get exercise often. It can help improve blood flow to the ear. Ways to manage/cope with tinnitus  Some tinnitus may last a long time. To manage your tinnitus, try to:  · Avoid noises that you think caused your tinnitus. If you can't avoid loud noises, wear earplugs or earmuffs. · Ignore the sound by paying attention to other things. Keeping your brain busy with other tasks or background noise can help your brain not focus on the tinnitus. · Try to not give the tinnitus an emotional reaction. Do your best to ignore the sound and not let it bother you. Relax using biofeedback, meditation, or yoga. Feeling stressed and being tired can make tinnitus worse. · Play music or white noise to help you sleep. Background noise may cover up the noise that you hear in your ears. You can buy a tabletop machine or a device that sits under your pillow to play soothing sounds, like ocean waves. · Smart phones have free apps, such as Whist, Relax Melodies, ReSound Relief, and White Noise Lite. These apps have different types of sounds/noise, some of which you can blend together to find sounds that are most soothing to you. · Hearing aid technology, especially when there is some hearing loss, may help reduce tinnitus symptoms by giving your brain better access to the sounds it is missing. There are some hearing aids with built-in noise generator programs, which may help when amplification alone is not enough. Additional resources may be found through the [employer]inn[employer] at www.sally.org    When should you call for help? Call 911 anytime you think you may need emergency care. For example, call if:    · You have symptoms of a stroke. These may include:  ? Sudden numbness, tingling, weakness, or loss of movement in your face, arm, or leg, especially on only one side of your body. ? Sudden vision changes. ? Sudden trouble speaking.   ? Sudden confusion or trouble understanding simple statements. ? Sudden problems with walking or balance. ? A sudden, severe headache that is different from past headaches. Call your doctor now or seek immediate medical care if:    · You develop other symptoms. These may include hearing loss (or worse hearing loss), balance problems, dizziness, nausea, or vomiting. Watch closely for changes in your health, and be sure to contact your doctor if:    · Your tinnitus moves from both ears to one ear. · Your hearing loss gets worse within 1 day after an ear injury. · Your tinnitus or hearing loss does not get better within 1 week after an ear injury. · Your tinnitus bothers you enough that you want to take medicines to help you cope with it. If you notice changes in your tinnitus and/or your hearing, it is recommended that you have your hearing tested by your audiologist and to follow-up with your physician that manages your hearing loss (such as your ENT or Primary Care doctor). Good Communication Strategies    Communication can be challenging for anyone, but can be especially difficult for those with some degree of hearing loss. While we may not be able to control every factor that may lead to difficulty with communication, there are Good Communication Strategies that we can all use in our day-to-day lives. Communication takes both parties working together for it to be successful. Tips as a Listener:   1. Control your environment. It is important to limit the amount of background noise in the room when possible. You should also consider having a good light source in the room to best see the other person. 2. Ask for clarification. Instead of saying \"What?\", you can use parts of what you heard to make a new question. For example, if you heard the word \"Thursday\" but not the rest of the week, you may ask \"What was that about Thursday? \" or \"What did you want to do Thursday? \".   This shows the to keep your brain active and give it access to the sounds it is missing. · If you are beginning your process with hearing aid(s), schedule a \"Hearing Aid Evaluation\" with an audiologist to discuss your lifestyle, features of hearing aid technology, and styles of hearing aids available. It is recommended that you contact your insurance company to determine if you have a hearing aid benefit, as this may dictate who you can see for these services. · Have hearing tests as your doctor suggests. They can show whether your hearing has changed. Your hearing aid may need to be adjusted. · Use other assistive devices as needed. These may include:  ? Telephone amplifiers and hearing aids that can connect to a television, stereo, radio, or microphone. ? Devices that use lights or vibrations. These alert you to the doorbell, a ringing telephone, or a baby monitor. ? Television closed-captioning. This shows the words at the bottom of the screen. Most new TVs can do this. ? TTY (text telephone). This lets you type messages back and forth on the telephone instead of talking or listening. These devices are also called TDD. When messages are typed on the keyboard, they are sent over the phone line to a receiving TTY. The message is shown on a monitor. · Use pagers, fax machines, text, and email if it is hard for you to communicate by telephone. · Try to learn a listening technique called speech-reading. It is not lip-reading. You pay attention to people's gestures, expressions, posture, and tone of voice. These clues can help you understand what a person is saying. Face the person you are talking to, and have him or her face you. Make sure the lighting is good. You need to see the other person's face clearly. · Think about counseling if you need help to adjust to your hearing loss. When should you call for help?   Watch closely for changes in your health, and be sure to contact your doctor if:    · You think your hearing is getting worse. · You have new symptoms, such as dizziness or nausea.

## 2021-02-11 NOTE — PROGRESS NOTES
CHIEF COMPLAINT: Tinnitus both ears    HISTORY OF PRESENT ILLNESS:  63 y.o. female referred by Dr. Maria Antonia To who presents with tinnitus both ears of 1 year duration. Insidious onset. Noted while undergoing chemotherapy for Non Hodgkins Lymphoma. High pitch, non pulsatile. Worse in quiet room at night. Hearing is subjectively good. No family history of hearing loss. No history of noise exposure. PAST MEDICAL HISTORY:   Social History     Tobacco Use   Smoking Status Never Smoker   Smokeless Tobacco Never Used                                                    Social History     Substance and Sexual Activity   Alcohol Use Yes    Alcohol/week: 1.0 standard drinks    Types: 1 Glasses of wine per week    Comment: 1 glass wine daily                                                    Current Outpatient Medications:     esomeprazole (NEXIUM) 40 MG delayed release capsule, TAKE 1 CAPSULE BY MOUTH DAILY, Disp: 30 capsule, Rfl: 2    levothyroxine (SYNTHROID) 50 MCG tablet, Take 1 tablet by mouth every morning, Disp: 90 tablet, Rfl: 0    Ascorbic Acid (VITAMIN C) 250 MG tablet, Take 500 mg by mouth daily, Disp: , Rfl:     zinc 50 MG TABS tablet, Take 50 mg by mouth daily, Disp: , Rfl:     baclofen (LIORESAL) 10 MG tablet, Take 1 tablet by mouth 3 times daily as needed (PRN), Disp: 30 tablet, Rfl: 0    brompheniramine-pseudoephedrine-DM (BROMFED DM) 2-30-10 MG/5ML syrup, Take 5 mLs by mouth 4 times daily as needed for Cough, Disp: 240 mL, Rfl: 0    benzonatate (TESSALON) 100 MG capsule, TAKE 1 CAPSULE BY MOUTH THREE TIMES DAILY AS NEEDED FOR COUGH, Disp: 90 capsule, Rfl: 1    valACYclovir (VALTREX) 500 MG tablet, TK 1 T PO Q 12 H.  TAKE BID FOR 3 DAYS AT FIRST ONSET OF SYMPTOMS, Disp: , Rfl:     ketotifen (ZADITOR) 0.025 % ophthalmic solution, Place into both eyes 2 times daily , Disp: , Rfl:     MAGNESIUM-OXIDE 400 (241.3 Mg) MG TABS tablet, daily , Disp: , Rfl: Past Surgical History:   Procedure Laterality Date    BONE MARROW TRANSPLANT  05/16/2016    BONE MARROW TRANSPLANT      BRONCHOSCOPY  8/30/2018    BRONCHOSCOPY THERAPUTIC ASPIRATION INITIAL WITH MUCUS PLUG SENT FOR BACTERIAL CULTURE [address]      x 5    COLONOSCOPY      COLONOSCOPY N/A 1/20/2021    COLONOSCOPY POLYPECTOMY SNARE/COLD BIOPSY performed by Chi Thompson MD at [address]  2/2010    with oophorectomy    LYMPH NODE BIOPSY  6/2011    R external iliac node -non diagnostic    LYMPH NODE BIOPSY  5/2012    R external node - Dr David Schmidt - [facility]    OTHER SURGICAL HISTORY  10/08/2012    INSERTION NEOSTAR CATHETER and REMOVAL        OTHER SURGICAL HISTORY Right 05/10/2016    INSERTION TRIPLE LUMEN DORANTES CENTRAL LINE    [address] W/BRNCL ALVEOLAR LAVAGE N/A 8/30/2018    BRONCHOSCOPY WITH BRONCHIAL WASHINGS AND LLL BAL. performed by Willie Morales MD at [place] [address]  2010    TONSILLECTOMY       FAMILY HISTORY: Family history reviewed. Except as noted in history of present illness, there is no pertinent family history      REVIEW OF SYSTEMS:  All pertinent positive and negative review of systems included in HPI. Otherwise, all systems are reviewed and negative. PHYSICAL EXAMINATION:   GENERAL: wdwn- no acute distress  RESPIRATORY:  No stridor or respiratory distress  COMMUNICATION :  Normal voice  MENTAL STATUS:  Mood and affect normal, oriented X 3  HEAD AND FACE:  No abnormalities of the skin of face or head  EXTERNAL EARS AND NOSE:  Normal pinnae bilateral  FACIAL MUSCLES:  All branches of facial nerve intact  EXTRAOCULAR MUSCLES: Intact with full range of motion  FACE PALPATION:  No tenderness over sinuses.   Zygomatic arches and orbital rims intact  OTOSCOPY:  Normal external auditory canals, tympanic membranes, and middle ear spaces TUNING FORKS: Rinne ++ Zhu midline at 512 Hz  INTRANASAL:  Septum midline, turbinates normal, meati clear. LIPS, TEETH, GINGIVA:  Normal mucosa  PHARYNX:  Normal  NECK:  No masses. LYMPHATIC:  No cervical adenopathy  SALIVARY GLANDS:  No swelling or masses in the parotid or submandibular salivary glands  THYROID:  No goiter or thyroid masses. AUDIOGRAM & TYMPANOGRAM ORDERED AND REVIEWED:   IMPRESSION: Tinnitus bilateral associated with high-frequency sensorineural hearing loss which is symmetrical.    PLAN: Patient advised about the relationship between hearing loss and tinnitus. She will try melatonin at night and be careful about caffeine use. FOLLOW-UP: As needed.

## 2021-02-15 ENCOUNTER — HOSPITAL ENCOUNTER (OUTPATIENT)
Dept: PHYSICAL THERAPY | Age: 61
Setting detail: THERAPIES SERIES
Discharge: HOME | End: 2021-02-15
Payer: COMMERCIAL

## 2021-02-15 PROCEDURE — 97112 NEUROMUSCULAR REEDUCATION: CPT | Performed by: PHYSICAL THERAPIST

## 2021-02-15 PROCEDURE — 97110 THERAPEUTIC EXERCISES: CPT | Performed by: PHYSICAL THERAPIST

## 2021-02-15 PROCEDURE — 97140 MANUAL THERAPY 1/> REGIONS: CPT | Performed by: PHYSICAL THERAPIST

## 2021-02-15 NOTE — FLOWSHEET NOTE
[facility] [employer] [employer]  Orthopaedics and Sports Rehabilitation, [place]    Physical Therapy Treatment Note/ Progress Report:   Date:  2/15/2021    Patient Name:  Celina Junior    :  1960  MRN: [id number]  Restrictions/Precautions:    Medical/Treatment Diagnosis Information:  · Diagnosis: M47.816 (ICD-10-CM) - Lumbar spondylosis  · Treatment Diagnosis: PT treatment diagnosis:  low back pain  V82.2  Insurance/Certification information:  PT Insurance Information: [address],  Visits BMN, $25 copay  Physician Information:  Referring Practitioner: Dr Domenico Wilson of care signed (Y/N):     Date of Patient follow up with Physician:     Is this a Progress Report:     []  Yes  [x]  No        If Yes:  Date Range for reporting period:  Beginning  Ending    Progress report will be due (10 Rx or 30 days whichever is less):        Recertification will be due (POC Duration  / 90 days whichever is less): 3/17/21     Date of Surgery:        Visit # Insurance Allowable Auth Required   4 BMN []  Yes []  No        Functional Scale:     Date assessed:        Latex Allergy:  [x]NO      []YES  Preferred Language for Healthcare:   [x]English       []other    Pain level:  10     SUBJECTIVE:   Reports no change in her symptoms over the past couple of weeks. States she feels better for a couple of hours after doing HEP but pain increases when going to work.      Type: []Constant   []Intermitment  []Radiating []Localized  []other:     Functional Limitations: []Sitting []Standing []Walking    []Squatting []Stairs                []ADL's  []Driving []Sports/Recreation   []Lifting/reaching     []Grooming    []Carrying []Driving  []Sports/Recreations   []Other:      OBJECTIVE:     ROM Current (R) Current (L)                       Strength                           Gait:     Joint mobility:    []Normal    []Hypo   []Hyper    Palpation:     Orthopedic Tests:     RESTRICTIONS/PRECAUTIONS: Hx of non-Hodgkins lymphoma Exercises/Interventions:         Stretching Repetitions/Resistance Nots/Last Progression   [facility] [place]     [place]     Piriformis Cross Over 3x30''    Figure 4 Piriformis  3x30'' seated   HS stretch 3x30'' standing   Supine ITB     Prone Quad Stretch     Prayer Stretch     Hand Heel Rock     Cat/Cow     LTR                    Exercises     Bridge  2x10    Prone glut squeeze 15x5''    SLR Flex     SLR Abd 2x15 B    SLR Ext 10x  EOT   Clamshells 20x B    TA UE/opp LE 15x B    TA March: up/up, down/down 15x B    Prone Plank     Side Plank     Quadriped UE/ LE/ Ethan [place]     [employer] Kick                 Manual      STM- superior glut max/med 5'    Hamstring Stretch 2'    [facility] [place]     Piriformis Stretch  '2    ITB Stretch      Prone Quad Stretch      Traction     Sacral Decompression     Lumbar PA Grade-      Supine Lumbar Roll 2' No thrust, stretch only   SL Lumbar      Long Axis Hip Distraction Grade     Access Code: [facility]   URL: [email or web address]   Date: 02/03/2021   Prepared by: Neeta Dooley     Exercises   Supine Piriformis Stretch with Leg Straight - 3 reps - 30 seconds hold - 2x daily - 7x weekly   Seated Piriformis Stretch - 3 reps - 30 seconds hold - 2x daily - 7x weekly   Standing Hamstring Stretch with Step - 3 reps - 30 seconds hold - 2x daily - 7x weekly   Supine Bridge - 10 reps - 2x daily - 7x weekly     Therapeutic Exercise and NMR EXR  [x] (03230) Provided verbal/tactile cueing for activities related to strengthening, flexibility, endurance, ROM  for improvements in proximal hip and core control with self care, mobility, lifting and ambulation.  [] (79482) Provided verbal/tactile cueing for activities related to improving balance, coordination, kinesthetic sense, posture, motor skill, proprioception  to assist with core control in self care, mobility, lifting, and ambulation.      Therapeutic Activities: [] (41199 or 95372) Provided verbal/tactile cueing for activities related to improving balance, coordination, kinesthetic sense, posture, motor skill, proprioception and motor activation to allow for proper function  with self care and ADLs  [] (25164) Provided training and instruction to the patient for proper core and proximal hip recruitment and positioning with ambulation re-education     Home Exercise Program:    [x] (64878) Reviewed/Progressed HEP activities related to strengthening, flexibility, endurance, ROM of core, proximal hip and LE for functional self-care, mobility, lifting and ambulation   [] (18195) Reviewed/Progressed HEP activities related to improving balance, coordination, kinesthetic sense, posture, motor skill, proprioception of core, proximal hip and LE for self care, mobility, lifting, and ambulation      Manual Treatments:    [x] (37177) Provided manual therapy to mobilize proximal hip and LS spine soft tissue/joints for the purpose of modulating pain, promoting relaxation,  increasing ROM, reducing/eliminating soft tissue swelling/inflammation/restriction, improving soft tissue extensibility and allowing for proper ROM for normal function with self care, mobility, lifting and ambulation. Modalities:       Charges:  Timed Code Treatment Minutes: 40   Total Treatment Minutes: 40     [] EVAL (LOW) 11233 (typically 20 minutes face-to-face)  [] EVAL (MOD) 09068 (typically 30 minutes face-to-face)  [] EVAL (HIGH) 39372 (typically 45 minutes face-to-face)  [] RE-EVAL     [x] PZ(79144) x 1    [] IONTO  [x] NMR (38709) x 1     [] VASO  [x] Manual (19911) x 1      [] Other:  [] TA x      [] Mech Traction (39067)  [] ES(attended) (89538)      [] ES (un) (65478):     Goals:   Short Term Goals: To be achieved in: 2 weeks  1. Independent in HEP and progression per patient tolerance, in order to prevent re-injury.    [] Progressing: [x] Met: [] Not Met: [] Adjusted 2. Patient will have a decrease in pain to facilitate improvement in movement, function, and ADLs as indicated by Functional Deficits. [] Progressing: [] Met: [] Not Met: [] Adjusted    Long Term Goals: To be achieved in: 6 weeks  1. Disability index score of 15% or less for the KAREN to assist with reaching prior level of function. [] Progressing: [] Met: [] Not Met: [] Adjusted  2. Patient will demonstrate increased AROM to WNL, good LS mobility, good hip ROM to allow for proper joint functioning as indicated by patients Functional Deficits. [] Progressing: [] Met: [] Not Met: [] Adjusted  3. Patient will demonstrate an increase in Strength to good proximal hip and core activation to allow for proper functional mobility as indicated by patients Functional Deficits. [] Progressing: [] Met: [] Not Met: [] Adjusted  4. Patient will return to bending forward to don/doff shoes and socks, functional activities without increased symptoms or restriction. [] Progressing: [] Met: [] Not Met: [] Adjusted     Overall Progression Towards Functional goals/ Treatment Progress Update:  [] Patient is progressing as expected towards functional goals listed. [] Progression is slowed due to complexities/Impairments listed. [] Progression has been slowed due to co-morbidities. [x] Plan just implemented, too soon to assess goals progression <30days   [] Goals require adjustment due to lack of progress  [] Patient is not progressing as expected and requires additional follow up with physician  [] Other    ASSESSMENT:  L glut/piriformis tenderness persists. No long term carryover with reduction of symptoms.      Treatment/Activity Tolerance:  [x] Patient tolerated treatment well [] Patient limited by fatique  [] Patient limited by pain  [] Patient limited by other medical complications  [] Other:     Prognosis: [x] Good [] Fair  [] Poor    Patient Requires Follow-up: [x] Yes  [] No    PLAN: See eval [x] Continue per plan of care [] Alter current plan (see comments)  [] Plan of care initiated [] Hold pending MD visit [] Discharge        Electronically signed by: Sergio Polanco PT, OMT-C      Note: If patient does not return for scheduled/ recommended follow up visits, this note will serve as a discharge from care along with most recent update on progress.

## 2021-02-17 ENCOUNTER — HOSPITAL ENCOUNTER (OUTPATIENT)
Dept: PHYSICAL THERAPY | Age: 61
Setting detail: THERAPIES SERIES
Discharge: HOME | End: 2021-02-17
Payer: COMMERCIAL

## 2021-02-17 PROCEDURE — 97140 MANUAL THERAPY 1/> REGIONS: CPT | Performed by: PHYSICAL THERAPIST

## 2021-02-17 PROCEDURE — 97110 THERAPEUTIC EXERCISES: CPT | Performed by: PHYSICAL THERAPIST

## 2021-02-17 PROCEDURE — 97112 NEUROMUSCULAR REEDUCATION: CPT | Performed by: PHYSICAL THERAPIST

## 2021-02-17 NOTE — FLOWSHEET NOTE
[facility] [employer]  Orthopaedics and Sports Rehabilitation, [place]    Physical Therapy Treatment Note/ Progress Report:   Date:  2021    Patient Name:  Fredy Mathews    :  1960  MRN: [id number]  Restrictions/Precautions:    Medical/Treatment Diagnosis Information:  · Diagnosis: M47.816 (ICD-10-CM) - Lumbar spondylosis  · Treatment Diagnosis: PT treatment diagnosis:  low back pain  F58.4  Insurance/Certification information:  PT Insurance Information: [address],  Visits BMN, $25 copay  Physician Information:  Referring Practitioner: Dr Kaycee Busch of care signed (Y/N):     Date of Patient follow up with Physician:     Is this a Progress Report:     []  Yes  [x]  No        If Yes:  Date Range for reporting period:  Beginning  Ending    Progress report will be due (10 Rx or 30 days whichever is less):        Recertification will be due (POC Duration  / 90 days whichever is less): 3/17/21     Date of Surgery:        Visit # Insurance Allowable Auth Required   5 BMN []  Yes []  No        Functional Scale:     Date assessed:        Latex Allergy:  [x]NO      []YES  Preferred Language for Healthcare:   [x]English       []other    Pain level:  510     SUBJECTIVE:   Patient reports her low back still hurts most of the time and feels worse with increased activity of cleaning.       Type: []Constant   []Intermitment  []Radiating []Localized  []other:     Functional Limitations: []Sitting []Standing []Walking    []Squatting []Stairs                []ADL's  []Driving []Sports/Recreation   []Lifting/reaching     []Grooming    []Carrying []Driving  []Sports/Recreations   []Other:      OBJECTIVE:     ROM Current (R) Current (L)                       Strength                           Gait:     Joint mobility:    []Normal    []Hypo   []Hyper    Palpation:     Orthopedic Tests:     RESTRICTIONS/PRECAUTIONS: Hx of non-Hodgkins lymphoma    Exercises/Interventions: Stretching Repetitions/Resistance Nots/Last Progression   [facility]     Piriformis Cross Over 3x30''    Figure 4 Piriformis  3x30'' seated   HS stretch 3x30'' standing   Supine ITB     Prone Quad Stretch     Prayer Stretch     Hand Heel Rock     Cat/Cow     LTR                    Exercises     Bridge  2x15    Prone glut squeeze 15x5''    SLR Flex     SLR Abd 2x15 B    SLR Ext 10x B  EOT   Clamshells 20x B    TA UE/opp LE 15x B    TA March: up/up, down/down 15x B    Prone Plank     Side Plank     Quadriped UE/ LE/ Ethan [place]     [employer] Kick                 Manual      STM- superior glut max/med 5'    Hamstring Stretch 2'    [facility]     Piriformis Stretch  '2    ITB Stretch      Prone Quad Stretch      Traction     Sacral Decompression     Lumbar PA Grade-      Supine Lumbar Roll 2' No thrust, stretch only   SL Lumbar      Long Axis Hip Distraction Grade     Access Code: [facility]   URL: [email or web address]   Date: 02/03/2021   Prepared by: Linwood Homar     Exercises   Supine Piriformis Stretch with Leg Straight - 3 reps - 30 seconds hold - 2x daily - 7x weekly   Seated Piriformis Stretch - 3 reps - 30 seconds hold - 2x daily - 7x weekly   Standing Hamstring Stretch with Step - 3 reps - 30 seconds hold - 2x daily - 7x weekly   Supine Bridge - 10 reps - 2x daily - 7x weekly     Access Code: [email or web address]   URL: [email or web address]   Date: 02/17/2021   Prepared by: Linwood Homar     Exercises   Seated Piriformis Stretch - 3 reps - 30 seconds hold - 2x daily - 7x weekly   Slump Stretch - 10 reps - 5 seconds hold - 2x daily - 7x weekly       Therapeutic Exercise and NMR EXR  [x] (73836) Provided verbal/tactile cueing for activities related to strengthening, flexibility, endurance, ROM  for improvements in proximal hip and core control with self care, mobility, lifting and ambulation. [] (80065) Provided verbal/tactile cueing for activities related to improving balance, coordination, kinesthetic sense, posture, motor skill, proprioception  to assist with core control in self care, mobility, lifting, and ambulation. Therapeutic Activities:    [] (02827 or 14868) Provided verbal/tactile cueing for activities related to improving balance, coordination, kinesthetic sense, posture, motor skill, proprioception and motor activation to allow for proper function  with self care and ADLs  [] (95084) Provided training and instruction to the patient for proper core and proximal hip recruitment and positioning with ambulation re-education     Home Exercise Program:    [x] (38977) Reviewed/Progressed HEP activities related to strengthening, flexibility, endurance, ROM of core, proximal hip and LE for functional self-care, mobility, lifting and ambulation   [] (45950) Reviewed/Progressed HEP activities related to improving balance, coordination, kinesthetic sense, posture, motor skill, proprioception of core, proximal hip and LE for self care, mobility, lifting, and ambulation      Manual Treatments:    [x] (39117) Provided manual therapy to mobilize proximal hip and LS spine soft tissue/joints for the purpose of modulating pain, promoting relaxation,  increasing ROM, reducing/eliminating soft tissue swelling/inflammation/restriction, improving soft tissue extensibility and allowing for proper ROM for normal function with self care, mobility, lifting and ambulation.      Modalities:       Charges:  Timed Code Treatment Minutes: 40   Total Treatment Minutes: 40     [] EVAL (LOW) 62925 (typically 20 minutes face-to-face)  [] EVAL (MOD) 57501 (typically 30 minutes face-to-face)  [] EVAL (HIGH) 19735 (typically 45 minutes face-to-face)  [] RE-EVAL     [x] UM(80961) x 1    [] IONTO  [x] NMR (79070) x 1     [] VASO  [x] Manual (63273) x 1      [] Other:  [] TA x      [] Mech Traction (04648) [] ES(attended) (85394)      [] ES (un) (60597):     Goals:   Short Term Goals: To be achieved in: 2 weeks  1. Independent in HEP and progression per patient tolerance, in order to prevent re-injury. [] Progressing: [x] Met: [] Not Met: [] Adjusted  2. Patient will have a decrease in pain to facilitate improvement in movement, function, and ADLs as indicated by Functional Deficits. [] Progressing: [] Met: [] Not Met: [] Adjusted    Long Term Goals: To be achieved in: 6 weeks  1. Disability index score of 15% or less for the KAREN to assist with reaching prior level of function. [] Progressing: [] Met: [] Not Met: [] Adjusted  2. Patient will demonstrate increased AROM to WNL, good LS mobility, good hip ROM to allow for proper joint functioning as indicated by patients Functional Deficits. [] Progressing: [] Met: [] Not Met: [] Adjusted  3. Patient will demonstrate an increase in Strength to good proximal hip and core activation to allow for proper functional mobility as indicated by patients Functional Deficits. [] Progressing: [] Met: [] Not Met: [] Adjusted  4. Patient will return to bending forward to don/doff shoes and socks, functional activities without increased symptoms or restriction. [] Progressing: [] Met: [] Not Met: [] Adjusted     Overall Progression Towards Functional goals/ Treatment Progress Update:  [] Patient is progressing as expected towards functional goals listed. [] Progression is slowed due to complexities/Impairments listed. [] Progression has been slowed due to co-morbidities. [x] Plan just implemented, too soon to assess goals progression <30days   [] Goals require adjustment due to lack of progress  [] Patient is not progressing as expected and requires additional follow up with physician  [] Other    ASSESSMENT:  L glut/piriformis tenderness persists. Fatigues easily with core/hip strengthening.      Treatment/Activity Tolerance: [x] Patient tolerated treatment well [] Patient limited by fatique  [] Patient limited by pain  [] Patient limited by other medical complications  [] Other:     Prognosis: [x] Good [] Fair  [] Poor    Patient Requires Follow-up: [x] Yes  [] No    PLAN: See eval  [x] Continue per plan of care [] Alter current plan (see comments)  [] Plan of care initiated [] Hold pending MD visit [] Discharge        Electronically signed by: Janey Crandall PT, OMT-C      Note: If patient does not return for scheduled/ recommended follow up visits, this note will serve as a discharge from care along with most recent update on progress.

## 2021-02-18 ENCOUNTER — TELEPHONE (OUTPATIENT)
Dept: ORTHOPEDIC SURGERY | Age: 61
End: 2021-02-18

## 2021-02-18 DIAGNOSIS — M47.816 LUMBAR SPONDYLOSIS: Primary | ICD-10-CM

## 2021-02-18 NOTE — TELEPHONE ENCOUNTER
General Question     Subject: [facility] MRI  Patient and /or Facility Request: Anette Rivas  Contact Number: [phone]    PATIENT REQUESTING A MRI TEST

## 2021-02-22 ENCOUNTER — HOSPITAL ENCOUNTER (OUTPATIENT)
Dept: PHYSICAL THERAPY | Age: 61
Setting detail: THERAPIES SERIES
Discharge: HOME | End: 2021-02-22
Payer: COMMERCIAL

## 2021-02-23 ENCOUNTER — TELEPHONE (OUTPATIENT)
Dept: ORTHOPEDIC SURGERY | Age: 61
End: 2021-02-23

## 2021-02-23 NOTE — TELEPHONE ENCOUNTER
Spoke with patient and let her know that MRI is approved. She will call [facility] to schedule.

## 2021-02-24 ENCOUNTER — HOSPITAL ENCOUNTER (OUTPATIENT)
Dept: PHYSICAL THERAPY | Age: 61
Setting detail: THERAPIES SERIES
Discharge: HOME | End: 2021-02-24
Payer: COMMERCIAL

## 2021-02-24 PROCEDURE — 97140 MANUAL THERAPY 1/> REGIONS: CPT | Performed by: PHYSICAL THERAPIST

## 2021-02-24 PROCEDURE — 97110 THERAPEUTIC EXERCISES: CPT | Performed by: PHYSICAL THERAPIST

## 2021-02-24 PROCEDURE — 97112 NEUROMUSCULAR REEDUCATION: CPT | Performed by: PHYSICAL THERAPIST

## 2021-02-24 NOTE — FLOWSHEET NOTE
[address] [facility]    Physical Therapy Treatment Note/ Progress Report:   Date:  2021    Patient Name:  Lisset Junior    :  1960  MRN: [id number]  Restrictions/Precautions:    Medical/Treatment Diagnosis Information:  · Diagnosis: M47.816 (ICD-10-CM) - Lumbar spondylosis  · Treatment Diagnosis: PT treatment diagnosis:  low back pain  R26.4  Insurance/Certification information:  PT Insurance Information: [address],  Visits BMN, $25 copay  Physician Information:  Referring Practitioner: Dr Joshua Squires of care signed (Y/N):     Date of Patient follow up with Physician:     Is this a Progress Report:     []  Yes  [x]  No        If Yes:  Date Range for reporting period:  Beginning  Ending    Progress report will be due (10 Rx or 30 days whichever is less):        Recertification will be due (POC Duration  / 90 days whichever is less): 3/17/21     Date of Surgery:        Visit # Insurance Allowable Auth Required   6 BMN []  Yes []  No        Functional Scale:     Date assessed:        Latex Allergy:  [x]NO      []YES  Preferred Language for Healthcare:   [x]English       []other    Pain level:  3-510     SUBJECTIVE:   Patient reports little change in symptoms. States feels better during therapy and after, but increase in pain following cleaning activity.       Type: []Constant   []Intermitment  []Radiating []Localized  []other:     Functional Limitations: []Sitting []Standing []Walking    []Squatting []Stairs                []ADL's  []Driving []Sports/Recreation   []Lifting/reaching     []Grooming    []Carrying []Driving  []Sports/Recreations   []Other:      OBJECTIVE:     ROM Current (R) Current (L)                       Strength                           Gait:     Joint mobility:    []Normal    []Hypo   []Hyper    Palpation:     Orthopedic Tests: Neural tension SLR: negative     RESTRICTIONS/PRECAUTIONS: Hx of non-Hodgkins lymphoma    Exercises/Interventions:         Stretching Repetitions/Resistance Nots/Last Progression   [facility]     DK     Piriformis Cross Over Figure 4 Piriformis  HS stretch Supine ITB     Prone Quad Stretch     Prayer Stretch     Hand Heel Rock     Cat/Cow     LTR                    Exercises     Bridge  2x15    Prone glut squeeze 15x5''    SLR Flex     SLR Abd 2x15 B    SLR Ext 10x B  EOT   Clamshells 20x B    TA UE/opp LE 2x15x B    TA March: up/up, down/down 2x15x B    Prone Plank     Side Plank     Quadriped UE/ LE/ Ethan Aly     [employer]                 Manual      STM- superior glut max/med     Hamstring Stretch 3' B     [facility]     Piriformis Stretch  '3 B    Prone hip IR/ER stretch '4 B    ITB Stretch      Prone Quad Stretch      Hip adductor Stretch 3' B    Traction 2'    Sacral Decompression     Lumbar PA Grade- 2-3 5'    Supine Lumbar Roll SL Lumbar      Long Axis Hip Distraction Grade     Access Code: [facility]   URL: [email or web address]   Date: 02/03/2021   Prepared by: Jasmin Muckle     Exercises   Supine Piriformis Stretch with Leg Straight - 3 reps - 30 seconds hold - 2x daily - 7x weekly   Seated Piriformis Stretch - 3 reps - 30 seconds hold - 2x daily - 7x weekly   Standing Hamstring Stretch with Step - 3 reps - 30 seconds hold - 2x daily - 7x weekly   Supine Bridge - 10 reps - 2x daily - 7x weekly     Access Code: [email or web address]   URL: [email or web address]   Date: 02/17/2021   Prepared by: Jasmin Muckle     Exercises   Seated Piriformis Stretch - 3 reps - 30 seconds hold - 2x daily - 7x weekly   Slump Stretch - 10 reps - 5 seconds hold - 2x daily - 7x weekly       Therapeutic Exercise and NMR EXR  [x] (47306) Provided verbal/tactile cueing for activities related to strengthening, flexibility, endurance, ROM  for improvements in proximal hip and core control with self care, mobility, lifting and ambulation.   [x] (16366) Provided verbal/tactile cueing for activities Goals: To be achieved in: 2 weeks  1. Independent in HEP and progression per patient tolerance, in order to prevent re-injury. [] Progressing: [x] Met: [] Not Met: [] Adjusted  2. Patient will have a decrease in pain to facilitate improvement in movement, function, and ADLs as indicated by Functional Deficits. [] Progressing: [] Met: [] Not Met: [] Adjusted    Long Term Goals: To be achieved in: 6 weeks  1. Disability index score of 15% or less for the KAREN to assist with reaching prior level of function. [] Progressing: [] Met: [] Not Met: [] Adjusted  2. Patient will demonstrate increased AROM to WNL, good LS mobility, good hip ROM to allow for proper joint functioning as indicated by patients Functional Deficits. [] Progressing: [] Met: [] Not Met: [] Adjusted  3. Patient will demonstrate an increase in Strength to good proximal hip and core activation to allow for proper functional mobility as indicated by patients Functional Deficits. [] Progressing: [] Met: [] Not Met: [] Adjusted  4. Patient will return to bending forward to don/doff shoes and socks, functional activities without increased symptoms or restriction. [] Progressing: [] Met: [] Not Met: [] Adjusted     Overall Progression Towards Functional goals/ Treatment Progress Update:  [] Patient is progressing as expected towards functional goals listed. [] Progression is slowed due to complexities/Impairments listed. [] Progression has been slowed due to co-morbidities. [x] Plan just implemented, too soon to assess goals progression <30days   [] Goals require adjustment due to lack of progress  [] Patient is not progressing as expected and requires additional follow up with physician  [] Other    ASSESSMENT: Pt continues to struggle with LLE radiating pain in buttock into hamstring. Tolerates intervention well. Able to progress therapeutic exercise with no increase in symptoms.      Treatment/Activity Tolerance:  [x] Patient tolerated

## 2021-03-02 ENCOUNTER — HOSPITAL ENCOUNTER (OUTPATIENT)
Dept: MRI IMAGING | Age: 61
Discharge: HOME | End: 2021-03-02
Payer: COMMERCIAL

## 2021-03-02 DIAGNOSIS — M47.816 LUMBAR SPONDYLOSIS: ICD-10-CM

## 2021-03-02 PROCEDURE — 72148 MRI LUMBAR SPINE W/O DYE: CPT

## 2021-03-04 ENCOUNTER — HOSPITAL ENCOUNTER (OUTPATIENT)
Dept: PHYSICAL THERAPY | Age: 61
Setting detail: THERAPIES SERIES
Discharge: HOME | End: 2021-03-04
Payer: COMMERCIAL

## 2021-03-04 NOTE — FLOWSHEET NOTE
The [address] [facility], [place]      Physical Therapy  Cancellation/No-show Note  Patient Name:  Wilver Ortega  :  1960   Date:  3/4/2021  Cancelled visits to date: 2  No-shows to date: 0    For today's appointment patient:  [x]  Cancelled  []  Rescheduled appointment  []  No-show     Reason given by patient:  []  Patient ill  []  Conflicting appointment  []  No transportation    []  Conflict with work  [x]  No reason given  []  Other:     Comments:      Electronically signed by: Casey Perez PT, OMT-C

## 2021-03-08 ENCOUNTER — TELEPHONE (OUTPATIENT)
Dept: ORTHOPEDIC SURGERY | Age: 61
End: 2021-03-08

## 2021-03-08 DIAGNOSIS — M47.816 LUMBAR SPONDYLOSIS: Primary | ICD-10-CM

## 2021-03-08 DIAGNOSIS — M54.50 LUMBAR PAIN: ICD-10-CM

## 2021-03-09 ENCOUNTER — HOSPITAL ENCOUNTER (OUTPATIENT)
Dept: PHYSICAL THERAPY | Age: 61
Setting detail: THERAPIES SERIES
Discharge: HOME | End: 2021-03-09
Payer: COMMERCIAL

## 2021-03-09 PROCEDURE — 97110 THERAPEUTIC EXERCISES: CPT | Performed by: PHYSICAL THERAPIST

## 2021-03-09 PROCEDURE — 97140 MANUAL THERAPY 1/> REGIONS: CPT | Performed by: PHYSICAL THERAPIST

## 2021-03-09 NOTE — FLOWSHEET NOTE
[facility]  Orthopaedics and Sports Rehabilitation, [place]    Physical Therapy Treatment Note/ Progress Report:   Date:  3/9/2021    Patient Name:  Marlon Avila    :  1960  MRN: [id number]  Restrictions/Precautions:    Medical/Treatment Diagnosis Information:  · Diagnosis: M47.816 (ICD-10-CM) - Lumbar spondylosis  · Treatment Diagnosis: PT treatment diagnosis:  low back pain  O54.7  Insurance/Certification information:  PT Insurance Information: [address],  Visits BMN, $25 copay  Physician Information:  Referring Practitioner: Dr Zachary Kaur of care signed (Y/N):     Date of Patient follow up with Physician:     Is this a Progress Report:     []  Yes  [x]  No        If Yes:  Date Range for reporting period:  Beginning  Ending    Progress report will be due (10 Rx or 30 days whichever is less):        Recertification will be due (POC Duration  / 90 days whichever is less): 3/17/21     Date of Surgery:        Visit # Insurance Allowable Auth Required   7 BMN []  Yes []  No        Functional Scale:     Date assessed:        Latex Allergy:  [x]NO      []YES  Preferred Language for Healthcare:   [x]English       []other    Pain level:  3-10     SUBJECTIVE:   Pt states back pain has slightly improved, but continues to limit functional mobility and ADL completion.        Type: []Constant   []Intermitment  []Radiating []Localized  []other:     Functional Limitations: []Sitting []Standing []Walking    []Squatting []Stairs                []ADL's  []Driving []Sports/Recreation   []Lifting/reaching     []Grooming    []Carrying []Driving  []Sports/Recreations   []Other:      OBJECTIVE:     ROM Current (R) Current (L)                       Strength                           Gait:     Joint mobility:    []Normal    []Hypo   []Hyper    Palpation:     Orthopedic Tests: Neural tension SLR: negative     RESTRICTIONS/PRECAUTIONS: Hx of non-Hodgkins lymphoma    Exercises/Interventions: Stretching Repetitions/Resistance Nots/Last Progression   [facility]     Piriformis Cross Over 3x30'' Manual   Figure 4 Piriformis  HS stretch 3x30'' Manual   Supine ITB     Prone Quad Stretch     Prayer Stretch     Hand Heel Rock     Cat/Cow     LTR                    Exercises     Bridge  2x15    Prone glut squeeze 15x5''    SLR Flex     SLR Abd 2x15 B    SLR Ext 10x B  EOT   Clamshells  Cut for time 3/9   TA UE/opp LE 2x15x B    TA March: up/up, down/down 2x15x B    Prone Plank     Side Plank     Quadriped UE/ LE/ Ethan Aly     [employer]                 Manual      STM- superior glut max/med 5'    Hamstring Stretch 3' B     [facility]     Piriformis Stretch  '3 B    Prone hip IR/ER stretch '4 B    ITB Stretch      Prone Quad Stretch  4'    Hip adductor Stretch 3' B    Traction 3'    Sacral Decompression     Lumbar PA Grade- 2-3 5'    Supine Lumbar Roll SL Lumbar      Long Axis Hip Distraction Grade     Access Code: [facility]   URL: [employer]/   Date: 02/03/2021   Prepared by: Tessaie Guest     Exercises   Supine Piriformis Stretch with Leg Straight - 3 reps - 30 seconds hold - 2x daily - 7x weekly   Seated Piriformis Stretch - 3 reps - 30 seconds hold - 2x daily - 7x weekly   Standing Hamstring Stretch with Step - 3 reps - 30 seconds hold - 2x daily - 7x weekly   Supine Bridge - 10 reps - 2x daily - 7x weekly     Access Code: [email or web address]   URL: [employer]/   Date: 02/17/2021   Prepared by: Pixie Guest     Exercises   Seated Piriformis Stretch - 3 reps - 30 seconds hold - 2x daily - 7x weekly   Slump Stretch - 10 reps - 5 seconds hold - 2x daily - 7x weekly       Therapeutic Exercise and NMR EXR  [x] (58561) Provided verbal/tactile cueing for activities related to strengthening, flexibility, endurance, ROM  for improvements in proximal hip and core control with self care, mobility, lifting and ambulation.   [x] (85537) Provided verbal/tactile cueing for activities related to improving balance, coordination, kinesthetic sense, posture, motor skill, proprioception  to assist with core control in self care, mobility, lifting, and ambulation. Therapeutic Activities:    [] (26672 or 22059) Provided verbal/tactile cueing for activities related to improving balance, coordination, kinesthetic sense, posture, motor skill, proprioception and motor activation to allow for proper function  with self care and ADLs  [] (82901) Provided training and instruction to the patient for proper core and proximal hip recruitment and positioning with ambulation re-education     Home Exercise Program:    [] (25744) Reviewed/Progressed HEP activities related to strengthening, flexibility, endurance, ROM of core, proximal hip and LE for functional self-care, mobility, lifting and ambulation   [] (01596) Reviewed/Progressed HEP activities related to improving balance, coordination, kinesthetic sense, posture, motor skill, proprioception of core, proximal hip and LE for self care, mobility, lifting, and ambulation      Manual Treatments:    [x] (92335) Provided manual therapy to mobilize proximal hip and LS spine soft tissue/joints for the purpose of modulating pain, promoting relaxation,  increasing ROM, reducing/eliminating soft tissue swelling/inflammation/restriction, improving soft tissue extensibility and allowing for proper ROM for normal function with self care, mobility, lifting and ambulation.      Modalities:       Charges:  Timed Code Treatment Minutes: 55   Total Treatment Minutes: 55     [] EVAL (LOW) 01001 (typically 20 minutes face-to-face)  [] EVAL (MOD) 76636 (typically 30 minutes face-to-face)  [] EVAL (HIGH) 84162 (typically 45 minutes face-to-face)  [] RE-EVAL     [x] SS(54471) x 1    [] IONTO  [] NMR (47041) x      [] VASO  [x] Manual (26421) x 2      [] Other:  [] TA x      [] Mech Traction (38806)  [] ES(attended) (07721)      [] ES (un) (84276):     Goals:   Short Term Goals: [] Patient limited by fatique  [] Patient limited by pain  [] Patient limited by other medical complications  [] Other:     Prognosis: [x] Good [] Fair  [] Poor    Patient Requires Follow-up: [x] Yes  [] No    PLAN: See eval  [x] Continue per plan of care [] Alter current plan (see comments)  [] Plan of care initiated [] Hold pending MD visit [] Discharge        Electronically signed by: Davin Jones, SPT, Brittany Leon PT, OMT-C        Note: If patient does not return for scheduled/ recommended follow up visits, this note will serve as a discharge from care along with most recent update on progress.

## 2021-03-10 ENCOUNTER — HOSPITAL ENCOUNTER (OUTPATIENT)
Dept: PHYSICAL THERAPY | Age: 61
Setting detail: THERAPIES SERIES
Discharge: HOME | End: 2021-03-10
Payer: COMMERCIAL

## 2021-03-10 ENCOUNTER — HOSPITAL ENCOUNTER (OUTPATIENT)
Dept: PHYSICAL THERAPY | Age: 61
Setting detail: THERAPIES SERIES
End: 2021-03-10
Payer: COMMERCIAL

## 2021-03-15 ENCOUNTER — HOSPITAL ENCOUNTER (OUTPATIENT)
Dept: PHYSICAL THERAPY | Age: 61
Setting detail: THERAPIES SERIES
Discharge: HOME | End: 2021-03-15
Payer: COMMERCIAL

## 2021-03-15 PROCEDURE — 97140 MANUAL THERAPY 1/> REGIONS: CPT | Performed by: PHYSICAL THERAPIST

## 2021-03-15 PROCEDURE — 97110 THERAPEUTIC EXERCISES: CPT | Performed by: PHYSICAL THERAPIST

## 2021-03-15 NOTE — PLAN OF CARE
The [facility]  Orthopaedics and Sports Rehabilitation, [facility]             Physical Therapy Re-Certification Plan of Yola Christianson        Dear Dr. Marco Antonio Newman,    We had the pleasure of treating the following patient for physical therapy services at [address] [place] [address]. A summary of our findings can be found in the updated assessment below. This includes our plan of care. If you have any questions or concerns regarding these findings, please do not hesitate to contact me at the office phone number checked above.   Thank you for the referral.     Physician Signature:________________________________Date:__________________  By signing above (or electronic signature), therapists plan is approved by physician    Date Range Of Visits: 2/3/21  Total Visits to Date: 8  Overall Response to Treatment:   [x]Patient is responding well to treatment and improvement is noted with regards  to goals   []Patient should continue to improve in reasonable time if they continue HEP   []Patient has plateaued and is no longer responding to skilled PT intervention    []Patient is getting worse and would benefit from return to referring MD   []Patient unable to adhere to initial POC   []Other:    Plan: 2x/week for 4 weeks     Physical Therapy Treatment Note/ Progress Report:   Date:  3/15/2021    Patient Name:  Alethea Monroy    :  1960  MRN: [id number]  Restrictions/Precautions:    Medical/Treatment Diagnosis Information:  · Diagnosis: M47.816 (ICD-10-CM) - Lumbar spondylosis  · Treatment Diagnosis: PT treatment diagnosis:  low back pain  J49.9  Insurance/Certification information:  PT Insurance Information: [address],  Visits BMN, $25 copay  Physician Information:  Referring Practitioner: Dr Telly Berman of care signed (Y/N):     Date of Patient follow up with Physician:     Is this a Progress Report:     []  Yes  [x]  No        If Yes:  Date Range for reporting period:  Beginning 2/3/21 Ending 3/15/21    Progress report will be due (10 Rx or 30 days whichever is less): 7/90/48         Recertification will be due (POC Duration  / 90 days whichever is less): 4/15/21      Date of Surgery: N/A        Visit # Insurance Allowable Auth Required   8 BMN []  Yes []  No        Functional Scale: Mod KAREN: 28%  Date assessed:    3/15/21    Latex Allergy:  [x]NO      []YES  Preferred Language for Healthcare:   [x]English       []other    Pain level:  3-6/10     SUBJECTIVE: Pt states her pain has increased slightly following an increase in lifting tasks at home. Type: []Constant   [x]Intermitment  []Radiating []Localized  []other:     Functional Limitations: []Sitting [x]Standing [x]Walking    [x]Squatting []Stairs                [x]ADL's  []Driving []Sports/Recreation   [x]Lifting/reaching     []Grooming    []Carrying []Driving  []Sports/Recreations   []Other:      OBJECTIVE:     ROM Current (R) Current (L)   Hip ER 45 40   Hip IR 35 40   Hip flexion 115 115             Strength     Hip extension 5 4+   Multifidus  4+ 4+   Hip flexion 5 4+   Hip abduction 5 4+          Gait:  antalgic gait with decreased weight shift/stance time on the LLE. Slightly improved weight bearing uniformity since eval    Joint mobility: PA to lumbar spine, decreased guarding this date   []Normal    [x]Hypo   []Hyper    Palpation:  TTP over lumbar paraspinal musculature and gluteal musculature.      Orthopedic Tests: Neural tension SLR: negative     RESTRICTIONS/PRECAUTIONS: Hx of non-Hodgkins lymphoma    Exercises/Interventions:         Stretching Repetitions/Resistance Nots/Last Progression   [facility]     [facility]     Piriformis Cross Over 3x30'' Manual   Figure 4 Piriformis  HS stretch 3x30'' Manual   Supine ITB     Prone Quad Stretch     Prayer Stretch     Hand Heel Rock     Cat/Cow     LTR                    Exercises     Bridge   Pt complaining of cramping during exercise 3/15   Prone glut squeeze 15x5''    SLR Flex     SLR Abd 2x15 B    SLR Ext 10x B  EOT   Clamshells  Cut for time 3/9   TA UE/opp LE 2x15x B    TA March: up/up, down/down 2x15x B    Prone Plank     Side Plank     Quadriped UE/ LE/ [employer]     Squats     [employer] Kick                 Manual      STM- superior glut max/med 5'    Hamstring Stretch 3' B     [facility]     Piriformis Stretch  '3 B    Prone hip IR/ER stretch '4 B    ITB Stretch      Prone Quad Stretch  4'    Hip adductor Stretch 3' B    Traction 3'    Sacral Decompression     Lumbar PA Grade- 2-3 5'    Supine Lumbar Roll SL Lumbar      Long Axis Hip Distraction Grade     Access Code: [facility]   URL: [employer]/   Date: 02/03/2021   Prepared by: Gina Rumble     Exercises   Supine Piriformis Stretch with Leg Straight - 3 reps - 30 seconds hold - 2x daily - 7x weekly   Seated Piriformis Stretch - 3 reps - 30 seconds hold - 2x daily - 7x weekly   Standing Hamstring Stretch with Step - 3 reps - 30 seconds hold - 2x daily - 7x weekly   Supine Bridge - 10 reps - 2x daily - 7x weekly     Access Code: [email or web address]   URL: [email or web address]   Date: 02/17/2021   Prepared by: Gina Rumble     Exercises   Seated Piriformis Stretch - 3 reps - 30 seconds hold - 2x daily - 7x weekly   Slump Stretch - 10 reps - 5 seconds hold - 2x daily - 7x weekly       Therapeutic Exercise and NMR EXR  [x] (61270) Provided verbal/tactile cueing for activities related to strengthening, flexibility, endurance, ROM  for improvements in proximal hip and core control with self care, mobility, lifting and ambulation. [x] (76158) Provided verbal/tactile cueing for activities related to improving balance, coordination, kinesthetic sense, posture, motor skill, proprioception  to assist with core control in self care, mobility, lifting, and ambulation.       Therapeutic Activities:    [] (02760 or 36101) Provided verbal/tactile cueing for activities related to improving balance, coordination, kinesthetic sense, posture, motor skill, proprioception and motor activation to allow for proper function  with self care and ADLs  [] (00715) Provided training and instruction to the patient for proper core and proximal hip recruitment and positioning with ambulation re-education     Home Exercise Program:    [] (73393) Reviewed/Progressed HEP activities related to strengthening, flexibility, endurance, ROM of core, proximal hip and LE for functional self-care, mobility, lifting and ambulation   [] (92169) Reviewed/Progressed HEP activities related to improving balance, coordination, kinesthetic sense, posture, motor skill, proprioception of core, proximal hip and LE for self care, mobility, lifting, and ambulation      Manual Treatments:    [x] (97475) Provided manual therapy to mobilize proximal hip and LS spine soft tissue/joints for the purpose of modulating pain, promoting relaxation,  increasing ROM, reducing/eliminating soft tissue swelling/inflammation/restriction, improving soft tissue extensibility and allowing for proper ROM for normal function with self care, mobility, lifting and ambulation. Modalities:       Charges:  Timed Code Treatment Minutes: 55   Total Treatment Minutes: 55     [] EVAL (LOW) 84755 (typically 20 minutes face-to-face)  [] EVAL (MOD) 09503 (typically 30 minutes face-to-face)  [] EVAL (HIGH) 04691 (typically 45 minutes face-to-face)  [] RE-EVAL     [x] PH(08668) x 1    [] IONTO  [] NMR (75607) x      [] VASO  [x] Manual (27022) x 2      [] Other:  [] TA x      [] Mech Traction (57233)  [] ES(attended) (95893)      [] ES (un) (22695):     Goals:   Short Term Goals: To be achieved in: 2 weeks  1. Independent in HEP and progression per patient tolerance, in order to prevent re-injury. [] Progressing: [x] Met: [] Not Met: [] Adjusted  2. Patient will have a decrease in pain to facilitate improvement in movement, function, and ADLs as indicated by Functional Deficits.   [x] Progressing: [] Met: [] Not Met: []

## 2021-03-17 ENCOUNTER — HOSPITAL ENCOUNTER (OUTPATIENT)
Dept: PHYSICAL THERAPY | Age: 61
Setting detail: THERAPIES SERIES
Discharge: HOME | End: 2021-03-17
Payer: COMMERCIAL

## 2021-03-17 PROCEDURE — 97140 MANUAL THERAPY 1/> REGIONS: CPT | Performed by: PHYSICAL THERAPIST

## 2021-03-17 PROCEDURE — 97110 THERAPEUTIC EXERCISES: CPT | Performed by: PHYSICAL THERAPIST

## 2021-03-17 NOTE — PLAN OF CARE
[facility]  Orthopaedics and Sports Rehabilitation, [place]          Physical Therapy Treatment Note/ Progress Report:   Date:  3/17/2021    Patient Name:  Purnima Villavicencio    :  1960  MRN: [id number]  Restrictions/Precautions:    Medical/Treatment Diagnosis Information:  · Diagnosis: M47.816 (ICD-10-CM) - Lumbar spondylosis  · Treatment Diagnosis: PT treatment diagnosis:  low back pain  X78.9  Insurance/Certification information:  PT Insurance Information: [address],  Visits BMN, $25 copay  Physician Information:  Referring Practitioner: Dr Malik Coley of care signed (Y/N):     Date of Patient follow up with Physician:     Is this a Progress Report:     []  Yes  [x]  No        If Yes:  Date Range for reporting period:  Beginning 2/3/21  Ending 3/15/21    Progress report will be due (10 Rx or 30 days whichever is less):          Recertification will be due (POC Duration  / 90 days whichever is less): 4/15/21      Date of Surgery: N/A        Visit # Insurance Allowable Auth Required   9 BMN []  Yes []  No        Functional Scale: Mod KAREN: 28%  Date assessed:    3/15/21    Latex Allergy:  [x]NO      []YES  Preferred Language for Healthcare:   [x]English       []other    Pain level:  3-6/10     SUBJECTIVE: Pt states doing well today. Soreness and pain in lower back/hip continue to aggrivate. Type: []Constant   [x]Intermitment  []Radiating []Localized  []other:     Functional Limitations: []Sitting [x]Standing [x]Walking    [x]Squatting []Stairs                [x]ADL's  []Driving []Sports/Recreation   [x]Lifting/reaching     []Grooming    []Carrying []Driving  []Sports/Recreations   []Other:      OBJECTIVE:     ROM Current (R) Current (L)   Hip ER 45 40   Hip IR 35 40   Hip flexion 115 115             Strength     Hip extension 5 4+   Multifidus  4+ 4+   Hip flexion 5 4+   Hip abduction 5 4+          Gait:  antalgic gait with decreased weight shift/stance time on the LLE. Slightly improved weight bearing uniformity since eval    Joint mobility: PA to lumbar spine, decreased guarding this date   []Normal    [x]Hypo   []Hyper    Palpation:  TTP over lumbar paraspinal musculature and gluteal musculature. Orthopedic Tests: Neural tension SLR: negative     RESTRICTIONS/PRECAUTIONS: Hx of non-Hodgkins lymphoma    Exercises/Interventions:         Stretching Repetitions/Resistance Nots/Last Progression   [facility]     Piriformis Cross Over 3x30'' Manual   Figure 4 Piriformis  HS stretch 3x30'' Manual   Supine ITB     Prone Quad Stretch     Prayer Stretch     Hand Heel Rock     Cat/Cow     LTR                    Exercises     Bridge  2x15    Prone glut squeeze 15x5'' With leg lift   SLR Flex     SLR Abd 2x15 B    SLR Ext 10x B  EOT   Clamshells  Cut for time 3/9   TA UE/opp LE 2x15x B    TA March: up/up, down/down 2x15x B    Prone Plank     Side Plank     Quadriped UE/ LE/ [place]     Beats Music                 Manual      STM- superior glut max/med 5'    Hamstring Stretch 3' B     [facility]     Piriformis Stretch  '3 B    Prone hip IR/ER stretch '4 B    ITB Stretch      Prone Quad Stretch  4'    Hip adductor Stretch 3' B    Traction 3'    Sacral Decompression     Lumbar PA Grade- 2-3 5'    Supine Lumbar Roll SL Lumbar      Long Axis Hip Distraction Grade     Access Code: [facility]   URL: [email or web address]   Date: 02/03/2021   Prepared by: Angie Araiza     Exercises   Supine Piriformis Stretch with Leg Straight - 3 reps - 30 seconds hold - 2x daily - 7x weekly   Seated Piriformis Stretch - 3 reps - 30 seconds hold - 2x daily - 7x weekly   Standing Hamstring Stretch with Step - 3 reps - 30 seconds hold - 2x daily - 7x weekly   Supine Bridge - 10 reps - 2x daily - 7x weekly     Access Code: [email or web address]   URL: [email or web address]   Date: 02/17/2021   Prepared by: Angie Araiza     Exercises   Seated Piriformis Stretch - 3 reps - 30 seconds hold - 2x daily - 7x weekly   Slump Stretch - 10 reps - 5 seconds hold - 2x daily - 7x weekly       Therapeutic Exercise and NMR EXR  [x] (15630) Provided verbal/tactile cueing for activities related to strengthening, flexibility, endurance, ROM  for improvements in proximal hip and core control with self care, mobility, lifting and ambulation. [x] (76787) Provided verbal/tactile cueing for activities related to improving balance, coordination, kinesthetic sense, posture, motor skill, proprioception  to assist with core control in self care, mobility, lifting, and ambulation. Therapeutic Activities:    [] (58157 or 43438) Provided verbal/tactile cueing for activities related to improving balance, coordination, kinesthetic sense, posture, motor skill, proprioception and motor activation to allow for proper function  with self care and ADLs  [] (97209) Provided training and instruction to the patient for proper core and proximal hip recruitment and positioning with ambulation re-education     Home Exercise Program:    [] (26973) Reviewed/Progressed HEP activities related to strengthening, flexibility, endurance, ROM of core, proximal hip and LE for functional self-care, mobility, lifting and ambulation   [] (96624) Reviewed/Progressed HEP activities related to improving balance, coordination, kinesthetic sense, posture, motor skill, proprioception of core, proximal hip and LE for self care, mobility, lifting, and ambulation      Manual Treatments:    [x] (09765) Provided manual therapy to mobilize proximal hip and LS spine soft tissue/joints for the purpose of modulating pain, promoting relaxation,  increasing ROM, reducing/eliminating soft tissue swelling/inflammation/restriction, improving soft tissue extensibility and allowing for proper ROM for normal function with self care, mobility, lifting and ambulation.      Modalities:       Charges:  Timed Code Treatment Minutes: 55   Total Treatment Minutes: 55     [] FRANCO Goals require adjustment due to lack of progress  [] Patient is not progressing as expected and requires additional follow up with physician  [] Other     ASSESSMENT: Pt tolerating treatment well. Able to progress strengthening and neuromuscular facilitation intervention this date without complaints of increased pain. Stabilization of proximal musculature continues to improve. Treatment/Activity Tolerance:  [x] Patient tolerated treatment well [] Patient limited by fatique  [] Patient limited by pain  [] Patient limited by other medical complications  [] Other:     Prognosis: [x] Good [] Fair  [] Poor    Patient Requires Follow-up: [x] Yes  [] No    PLAN:  [x] Continue per plan of care [] Alter current plan (see comments)  [] Plan of care initiated [] Hold pending MD visit [] Discharge        Electronically signed by: Ramone Gould, SPT, Dakota Morton PT, OMT-C        Note: If patient does not return for scheduled/ recommended follow up visits, this note will serve as a discharge from care along with most recent update on progress.

## 2021-03-22 ENCOUNTER — HOSPITAL ENCOUNTER (OUTPATIENT)
Dept: PHYSICAL THERAPY | Age: 61
Setting detail: THERAPIES SERIES
Discharge: HOME | End: 2021-03-22
Payer: COMMERCIAL

## 2021-03-22 PROCEDURE — 97110 THERAPEUTIC EXERCISES: CPT | Performed by: PHYSICAL THERAPIST

## 2021-03-22 PROCEDURE — 97140 MANUAL THERAPY 1/> REGIONS: CPT | Performed by: PHYSICAL THERAPIST

## 2021-03-22 PROCEDURE — 97164 PT RE-EVAL EST PLAN CARE: CPT | Performed by: PHYSICAL THERAPIST

## 2021-03-22 NOTE — PLAN OF CARE
Practitioner: Dr Jhonny Tejeda of care signed (Y/N):     Date of Patient follow up with Physician: N/A    Is this a Progress Report:     [x]  Yes  []  No        If Yes:  Date Range for reporting period:  Beginning 12/2/20  Ending 3/22/21    Progress report will be due (10 Rx or 30 days whichever is less): 6/35/76       Recertification will be due (POC Duration  / 90 days whichever is less): 5/22/21     Date of Surgery: N/A        Visit # Insurance Allowable Auth Required   15 for shoulder, 24 total including back BMN []  Yes []  No        Functional Scale:  UEFI: 59%  Date assessed:   3/22/21     Latex Allergy:  [x]NO      []YES  Preferred Language for Healthcare:   [x]English       []other    Pain level:  5/10     SUBJECTIVE:   10/2/20: Patient stated complaint:  Right shoulder pain that has been ongoing for one year. No known MACY. 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 Right hand dominant. MRI: (+) G-H arthritis, RC tendonitis. Difficulty with reaching overhead, behind the back. Cortisone injection on 9/30/20 which has helped a little. 3/22/21: Patient states shoulder pain has been gradually increasing, unable to focus on shoulder HEP due to time spent on lower back pain.      Type: []Constant   [x]Intermitment  []Radiating []Localized  []other:     Functional Limitations: [x]Lifting/reaching []Grooming  []Carrying     []ADL's  []Driving []Sports/Recreations   []Other:      OBJECTIVE: 3/22/21    ROM Current (R) passive Current (L)   flex 135    abd 100    IR 45    ER 60    Strength     flex 3+/5    abd 3+/5    IR 4-/5    ER 4-/5      Gait: n/a    Joint mobility: G-H joint   []Normal    [x]Hypo   []Hyper    Palpation: supraspinatus tendon ttp, significant guarding of scapular musculature    Orthopedic Tests: n/a    RESTRICTIONS/PRECAUTIONS: Hx of non-Hodgkins-lymphoma, OA    Exercises/Interventions:       Stretching/AAROM  Repetitions/Resistance Notes/Last Progression Pulley/Wallslides 3'/--    Cane Flex supine/ ER-seated 10x10\"/10x10''   IR Strap    Sleeper Stretch 4x20''   Tableslide Flex/ ER/ Abd 10x10''   Bear Hug Stretch     Cross Arm Stretch     Upper Trap Stretch     Levator Scapula Stretch     Bicep stretch @ wall     Corner Stretch                  Exercises     Shrugs/ Shoulder Blade Squeeze     Rhythmic stabs     SA Punch/ ABC        Standing Flexion/ Abduction     SL ER/ SL Abd 1# 3x10/    Prone Row/ Ext/ HAB/ Scap     TB Row/ Ext Green 3x10/red 3x10    TB ER/ IR     No Money/ HAB     Lift off: in/out, up/down     Standing wall retraction     Finisher                      Manual       Oscillations-Mobs:  G-I, II, III, IV (PA's, Inf., Post.) 3'    PROM 7'    Cervical PA's Grade-     Thoracic PA's Grade-     STM-               3/22/21 [employer] access code: [email or web address]    Therapeutic Exercise and NMR EXR  [x] (51015) Provided verbal/tactile cueing for activities related to strengthening, flexibility, endurance, ROM  for improvements in scapular, scapulothoracic and UE control with self care, reaching, carrying, lifting, house/yardwork, driving/computer work.    [] (04571) Provided verbal/tactile cueing for activities related to improving balance, coordination, kinesthetic sense, posture, motor skill, proprioception  to assist with  scapular, scapulothoracic and UE control with self care, reaching, carrying, lifting, house/yardwork, driving/computer work. Therapeutic Activities:    [] (85946 or 49516) Provided verbal/tactile cueing for activities related to improving balance, coordination, kinesthetic sense, posture, motor skill, proprioception and motor activation to allow for proper function of scapular, scapulothoracic and UE control with self care, carrying, lifting, driving/computer work.      Home Exercise Program:    [x] (95720) Reviewed/Progressed HEP activities related to strengthening, flexibility, endurance, ROM of scapular, scapulothoracic and UE control with self care, reaching, carrying, lifting, house/yardwork, driving/computer work  [] (02658) Reviewed/Progressed HEP activities related to improving balance, coordination, kinesthetic sense, posture, motor skill, proprioception of scapular, scapulothoracic and UE control with self care, reaching, carrying, lifting, house/yardwork, driving/computer work      Manual Treatments:  PROM / STM / Oscillations-Mobs:  G-I, II, III, IV (PA's, Inf., Post.)  [x] (29305) Provided manual therapy to mobilize soft tissue/joints of cervical/CT, scapular GHJ and UE for the purpose of modulating pain, promoting relaxation,  increasing ROM, reducing/eliminating soft tissue swelling/inflammation/restriction, improving soft tissue extensibility and allowing for proper ROM for normal function with self care, reaching, carrying, lifting, house/yardwork, driving/computer work    Modalities:  CP x 10'      Charges:  Timed Code Treatment Minutes: 35   Total Treatment Minutes: 55     [] EVAL (LOW) 57296 (typically 20 minutes face-to-face)  [] EVAL (MOD) 60196 (typically 30 minutes face-to-face)  [] EVAL (HIGH) 29954 (typically 45 minutes face-to-face)  [x] RE-EVAL     [x] YG(42605) x 2     [] IONTO  [] NMR (25580) x     [] VASO  [x] Manual (70597) x 1      [] Other:  [] TA x      [] Mech Traction (38917)  [] ES(attended) (43686)      [] ES (un) (05236):     GOALS:  Short Term Goals: To be achieved in: 2 weeks  1. Independent in HEP and progression per patient tolerance, in order to prevent re-injury. [] Progressing: [x] Met: [] Not Met: [] Adjusted  2. Patient will have a decrease in pain to facilitate improvement in movement, function, and ADLs as indicated by Functional Deficits. [x] Progressing: [] Met: [] Not Met: [] Adjusted    Long Term Goals: To be achieved in: 6 weeks  1. Disability index score of 30% or less for the [employer] to assist with reaching prior level of function. [] Progressing: [] Met: [] Not Met: [x] Adjusted  2. Patient will demonstrate increased AROM to 160 R shoulder flex/abd, 90 ER, 50 IR to allow for proper joint functioning as indicated by patients Functional Deficits. [x] Progressing: [] Met: [] Not Met: [] Adjusted  3. Patient will demonstrate an increase in Strength to 4+/5 R shoulder flex/abd/ER to allow for proper functional mobility as indicated by patients Functional Deficits. [x] Progressing: [] Met: [] Not Met: [] Adjusted  4. Patient will return to reaching behind back to wash without increased symptoms or restriction. [x] Progressing: [] Met: [] Not Met: [] Adjusted      Overall Progression Towards Functional goals/ Treatment Progress Update:  [] Patient is progressing as expected towards functional goals listed. [] Progression is slowed due to complexities/Impairments listed. [x] Progression has been slowed due to co-morbidities. [] Plan just implemented, too soon to assess goals progression <30days   [] Goals require adjustment due to lack of progress  [] Patient is not progressing as expected and requires additional follow up with physician  [] Other    ASSESSMENT:  Pt mobility and strength has decreased significantly since last visit for shoulder pain. Mobility deficits are severely limiting for function, and strength appears to be limited due to pain. Pt tolerates treatment well, and is motivated to re-gain function.         Treatment/Activity Tolerance:  [x] Patient tolerated treatment well [] Patient limited by fatique  [] Patient limited by pain  [] Patient limited by other medical complications  [] Other:     Prognosis: [x] Good [] Fair  [] Poor    Patient Requires Follow-up: [x] Yes  [] No    PLAN: See eval  [x] Continue per plan of care [] Alter current plan (see comments)  [] Plan of care initiated [] Hold pending MD visit [] Discharge        Electronically signed by:  Jae Torres, Cheryle ROLLINS PT, OMT-C          Note: If patient does not return for scheduled/ recommended follow up visits, this note will serve as a discharge from care along with most recent update on progress.

## 2021-03-24 ENCOUNTER — HOSPITAL ENCOUNTER (OUTPATIENT)
Dept: PHYSICAL THERAPY | Age: 61
Setting detail: THERAPIES SERIES
Discharge: HOME | End: 2021-03-24
Payer: COMMERCIAL

## 2021-03-24 PROCEDURE — 97110 THERAPEUTIC EXERCISES: CPT | Performed by: PHYSICAL THERAPIST

## 2021-03-24 PROCEDURE — 97140 MANUAL THERAPY 1/> REGIONS: CPT | Performed by: PHYSICAL THERAPIST

## 2021-03-24 NOTE — FLOWSHEET NOTE
[facility]               Physical Therapy Treatment Note/ Progress Report:   Date:  3/24/2021    Patient Name:  Sahara Ureña    :  1960  MRN: [id number]  Restrictions/Precautions:    Medical/Treatment Diagnosis Information:  · Diagnosis: Right shoulder tendonitis, G-H arthritis  M75.81  · Treatment Diagnosis: PT treatment diagnosis:  right shoulder pain  V93.604  Insurance/Certification information:  PT Insurance Information: [address] [place]  visits BMN, $20 copay  Physician Information:  Referring Practitioner: Dr Chaparrita Gao of care signed (Y/N):     Date of Patient follow up with Physician: N/A    Is this a Progress Report:     [x]  Yes  []  No        If Yes:  Date Range for reporting period:  Beginning 20  Ending 3/22/21    Progress report will be due (10 Rx or 30 days whichever is less): 03       Recertification will be due (POC Duration  / 90 days whichever is less): 21     Date of Surgery: N/A        Visit # Insurance Allowable Auth Required   16 for shoulder, 25 total including back BMN []  Yes []  No        Functional Scale:  UEFI: 59%  Date assessed:   3/22/21     Latex Allergy:  [x]NO      []YES  Preferred Language for Healthcare:   [x]English       []other    Pain level:  4/10     SUBJECTIVE: Pt states shoulder has been painful following increase in activity, vacuuming during the past few days.       Type: []Constant   [x]Intermitment  []Radiating []Localized  []other:     Functional Limitations: [x]Lifting/reaching []Grooming  []Carrying     []ADL's  []Driving []Sports/Recreations   []Other:      OBJECTIVE: 3/22/21    ROM Current (R) passive Current (L)   flex 135    abd 100    IR 45    ER 60    Strength     flex 3+/5    abd 3+/5    IR 4-/5    ER 4-/5      Gait: n/a    Joint mobility: G-H joint   []Normal    [x]Hypo   []Hyper    Palpation: supraspinatus tendon ttp, significant guarding of scapular musculature    Orthopedic Tests: n/a    RESTRICTIONS/PRECAUTIONS: Hx of non-Hodgkins-lymphoma, OA    Exercises/Interventions:       Stretching/AAROM  Repetitions/Resistance Notes/Last Progression   Pulley/Wallslides 3'/--    Cane Flex supine/ ER supine at 45 degrees 10x10\"/10x10''   IR Strap    Sleeper Stretch 4x20''   Tableslide Flex/ ER/ Abd 10x10''   Bear Hug Stretch     Cross Arm Stretch  10x10\" Sidelying   Upper Trap Stretch     Levator Scapula Stretch     Bicep stretch @ wall     Corner Stretch                  Exercises     Shrugs/ Shoulder Blade Squeeze     Rhythmic stabs     SA Punch/ ABC        Standing Flexion/ Abduction     SL ER/ SL Abd 1# 3x10/    Prone Row/ Ext/ HAB/ Scap     TB Row/ Ext Green 3x10/red 3x10    TB ER/ IR     No Money/ HAB     Lift off: in/out, up/down     Standing wall retraction     Finisher                      Manual       Oscillations-Mobs:  G-I, II, III, IV (PA's, Inf., Post.) 3'    PROM 7'    Cervical PA's Grade-     Thoracic PA's Grade-     STM-               3/22/21 [employer] access code: [email or web address]    Therapeutic Exercise and NMR EXR  [x] (92920) Provided verbal/tactile cueing for activities related to strengthening, flexibility, endurance, ROM  for improvements in scapular, scapulothoracic and UE control with self care, reaching, carrying, lifting, house/yardwork, driving/computer work.    [] (20188) Provided verbal/tactile cueing for activities related to improving balance, coordination, kinesthetic sense, posture, motor skill, proprioception  to assist with  scapular, scapulothoracic and UE control with self care, reaching, carrying, lifting, house/yardwork, driving/computer work.     Therapeutic Activities:    [] (51836 or 47407) Provided verbal/tactile cueing for activities related to improving balance, coordination, kinesthetic sense, posture, motor skill, proprioception and motor activation to allow for proper function of scapular, scapulothoracic and UE control with self care, carrying, lifting, driving/computer work. Home Exercise Program:    [x] (42724) Reviewed/Progressed HEP activities related to strengthening, flexibility, endurance, ROM of scapular, scapulothoracic and UE control with self care, reaching, carrying, lifting, house/yardwork, driving/computer work  [] (52829) Reviewed/Progressed HEP activities related to improving balance, coordination, kinesthetic sense, posture, motor skill, proprioception of scapular, scapulothoracic and UE control with self care, reaching, carrying, lifting, house/yardwork, driving/computer work      Manual Treatments:  PROM / STM / Oscillations-Mobs:  G-I, II, III, IV (PA's, Inf., Post.)  [x] (69470) Provided manual therapy to mobilize soft tissue/joints of cervical/CT, scapular GHJ and UE for the purpose of modulating pain, promoting relaxation,  increasing ROM, reducing/eliminating soft tissue swelling/inflammation/restriction, improving soft tissue extensibility and allowing for proper ROM for normal function with self care, reaching, carrying, lifting, house/yardwork, driving/computer work    Modalities:  Pt declined 3/24/21     Charges:  Timed Code Treatment Minutes: 45   Total Treatment Minutes: 45     [] EVAL (LOW) 48056 (typically 20 minutes face-to-face)  [] EVAL (MOD) 42105 (typically 30 minutes face-to-face)  [] EVAL (HIGH) 56949 (typically 45 minutes face-to-face)  [] RE-EVAL     [x] DJ(69388) x 2     [] IONTO  [] NMR (84366) x     [] VASO  [x] Manual (96304) x 1      [] Other:  [] TA x      [] Mech Traction (73598)  [] ES(attended) (28518)      [] ES (un) (01892):     GOALS:  Short Term Goals: To be achieved in: 2 weeks  1. Independent in HEP and progression per patient tolerance, in order to prevent re-injury. [] Progressing: [x] Met: [] Not Met: [] Adjusted  2. Patient will have a decrease in pain to facilitate improvement in movement, function, and ADLs as indicated by Functional Deficits.   [x] Progressing: [] Met: [] Not Met: [] Adjusted

## 2021-03-29 NOTE — PROGRESS NOTES
PATIENT REACHED   YES__X__NO____    PREOP INSTRUCTIONS LEFT ON VM NUMBER_______________      DATE__4/1/2021_______ TIME__1530_______ARRIVAL___1430_____PLACE___[facility]_________  NOTHING TO EAT OR DRINK  AFTER MIDNIGHT THE EVENING PRIOR OR AS INSTRUCTED BY YOUR DR.  Trish Szymanski NEED A RESPONSIBLE ADULT AGE 18 OR OLDER TO DRIVE YOU HOME  PLEASE BRING INSURANCE CARD. PICTURE ID AND COMPLETE LIST OF MEDS  WEAR LOOSE COMFORTABLE CLOTHING  FOLLOW ANY INSTRUCTIONS YOUR DRS OFFICE HAS GIVEN YOU,INCLUDING WHAT MEDICATIONS TO TAKE THE AM OF PROCEDURE AND WHEN AND IF YOU NEED TO STOP ANY BLOOD THINNERS. IF YOU HAVE QUESTIONS REGARDING THIS CALL THE OFFICE  THE GOAL BLOOD SUGAR THE AM OF PROCEDURE  OR LESS ABOVE THAT THE PROCEDURE MAY BE CANCELLED  ANY QUESTIONS CALL YOUR DOCTOR. ALSO,PLEASE READ THE INSTRUCTION PACKET FROM YOUR DR IF YOU RECEIVED ONE. SPINE INTERVENTION NUMBER [phone]      OTHER___________________________________      VISITOR POLICY(subject to change)      There is a one visitor policy at [facility] for all surgeries and endoscopies. Whether the visitor can stay or will be asked to wait in the car will depend on the current policy and if social distancing can be maintained. The policy is subject to change at any time. Please make sure the visitor has a cell phone that is on,charged and able to accept calls, as this may be the way that the staff communicates with them. Pain management is NO VISITOR policyThe patients ride is expected to remain in the car with a cell phone for communication. If the ride is leaving the hospital grounds please make sure they are back in time for pickup. Have the patient inform the staff on arrival what their rides plans are while the patient is in the facility. At the MAIN there is one visitor allowed. Please note that the visitor policy is subject to change.

## 2021-03-30 ENCOUNTER — HOSPITAL ENCOUNTER (OUTPATIENT)
Dept: PHYSICAL THERAPY | Age: 61
Setting detail: THERAPIES SERIES
Discharge: HOME | End: 2021-03-30
Payer: COMMERCIAL

## 2021-03-30 NOTE — FLOWSHEET NOTE
The [facility], [place]               Physical Therapy Treatment Note/ Progress Report:   Date:  3/30/2021    Patient Name:  Kwabena Yeh    :  1960  MRN: [id number]  Restrictions/Precautions:    Medical/Treatment Diagnosis Information:  · Diagnosis: Right shoulder tendonitis, G-H arthritis  M75.81  · Treatment Diagnosis: PT treatment diagnosis:  right shoulder pain  J10.278  Insurance/Certification information:  PT Insurance Information: [facility]  visits BMN, $20 copay  Physician Information:  Referring Practitioner: Dr Mary Love of care signed (Y/N):     Date of Patient follow up with Physician: N/A    Is this a Progress Report:     [x]  Yes  []  No        If Yes:  Date Range for reporting period:  Beginning 20  Ending 3/22/21    Progress report will be due (10 Rx or 30 days whichever is less): 76       Recertification will be due (POC Duration  / 90 days whichever is less): 21     Date of Surgery: N/A        Visit # Insurance Allowable Auth Required   17 for shoulder, 26 total including back BMN []  Yes []  No        Functional Scale:  UEFI: 59%  Date assessed:   3/22/21     Latex Allergy:  [x]NO      []YES  Preferred Language for Healthcare:   [x]English       []other    Pain level:  3/10     SUBJECTIVE: Pt states shoulder does not feel different from previous visit: still stiff and sore.       Type: []Constant   [x]Intermitment  []Radiating []Localized  []other:     Functional Limitations: [x]Lifting/reaching []Grooming  []Carrying     []ADL's  []Driving []Sports/Recreations   []Other:      OBJECTIVE: 3/22/21    ROM Current (R) passive Current (L)   flex 135    abd 100    IR 45    ER 60    Strength     flex 3+/5    abd 3+/5    IR 4-/5    ER 4-/5      Gait: n/a    Joint mobility: G-H joint   []Normal    [x]Hypo   []Hyper    Palpation: supraspinatus tendon ttp, significant guarding of scapular musculature    Orthopedic Tests: n/a    RESTRICTIONS/PRECAUTIONS: Hx of non-Hodgkins-lymphoma, OA    Exercises/Interventions:       Stretching/AAROM  Repetitions/Resistance Notes/Last Progression   Pulley/Wallslides 3'/--    Cane Flex supine/ ER supine at 45 degrees 10x10\"/10x10''   IR Strap    Sleeper Stretch 4x20''   Tableslide Flex/ ER/ Abd    Wall slide 10x10\"    Bear Hug Stretch     Cross Arm Stretch  10x10\" Sidelying   Upper Trap Stretch     Levator Scapula Stretch     Bicep stretch @ wall     Corner Stretch                  Exercises     Shrugs/ Shoulder Blade Squeeze     Rhythmic stabs     SA Punch/ ABC 1# x30/1# 1x       Standing Flexion/ Abduction     SL ER/ SL Abd 1# 3x10/    Prone Row/ Ext/ HAB/ Scap     TB Row/ Ext Green 3x10/red 3x10    TB ER/ IR     No Money/ HAB     Lift off: in/out, up/down     Standing wall retraction     Finisher                      Manual       Oscillations-Mobs:  G-I, II, III, IV (PA's, Inf., Post.) 5'    PROM 10'    Cervical PA's Grade-     Thoracic PA's Grade-     STM-               3/22/21 SPHARES access code: [email or web address]    Therapeutic Exercise and NMR EXR  [x] (77227) Provided verbal/tactile cueing for activities related to strengthening, flexibility, endurance, ROM  for improvements in scapular, scapulothoracic and UE control with self care, reaching, carrying, lifting, house/yardwork, driving/computer work.    [] (52239) Provided verbal/tactile cueing for activities related to improving balance, coordination, kinesthetic sense, posture, motor skill, proprioception  to assist with  scapular, scapulothoracic and UE control with self care, reaching, carrying, lifting, house/yardwork, driving/computer work.     Therapeutic Activities:    [] (42869 or 72718) Provided verbal/tactile cueing for activities related to improving balance, coordination, kinesthetic sense, posture, motor skill, proprioception and motor activation to allow for proper function of scapular, scapulothoracic and UE control with self care, carrying, lifting, driving/computer work. Home Exercise Program:    [] (29399) Reviewed/Progressed HEP activities related to strengthening, flexibility, endurance, ROM of scapular, scapulothoracic and UE control with self care, reaching, carrying, lifting, house/yardwork, driving/computer work  [] (19070) Reviewed/Progressed HEP activities related to improving balance, coordination, kinesthetic sense, posture, motor skill, proprioception of scapular, scapulothoracic and UE control with self care, reaching, carrying, lifting, house/yardwork, driving/computer work      Manual Treatments:  PROM / STM / Oscillations-Mobs:  G-I, II, III, IV (PA's, Inf., Post.)  [x] (50695) Provided manual therapy to mobilize soft tissue/joints of cervical/CT, scapular GHJ and UE for the purpose of modulating pain, promoting relaxation,  increasing ROM, reducing/eliminating soft tissue swelling/inflammation/restriction, improving soft tissue extensibility and allowing for proper ROM for normal function with self care, reaching, carrying, lifting, house/yardwork, driving/computer work    Modalities:  Pt declined 3/24/21     Charges:  Timed Code Treatment Minutes: 45   Total Treatment Minutes: 45     [] EVAL (LOW) 06085 (typically 20 minutes face-to-face)  [] EVAL (MOD) 59714 (typically 30 minutes face-to-face)  [] EVAL (HIGH) 26045 (typically 45 minutes face-to-face)  [] RE-EVAL     [x] ZZ(56778) x 2     [] IONTO  [] NMR (21199) x     [] VASO  [x] Manual (58637) x 1      [] Other:  [] TA x      [] Mech Traction (97674)  [] ES(attended) (22631)      [] ES (un) (84298):     GOALS:  Short Term Goals: To be achieved in: 2 weeks  1. Independent in HEP and progression per patient tolerance, in order to prevent re-injury. [] Progressing: [x] Met: [] Not Met: [] Adjusted  2. Patient will have a decrease in pain to facilitate improvement in movement, function, and ADLs as indicated by Functional Deficits.   [x] Progressing: [] Met: [] Not Met: eval  [x] Continue per plan of care [] Alter current plan (see comments)  [] Plan of care initiated [] Hold pending MD visit [] Discharge        Electronically signed by:  AYO Porras, Neeta Dooley PT, OMT-C          Note: If patient does not return for scheduled/ recommended follow up visits, this note will serve as a discharge from care along with most recent update on progress.

## 2021-04-01 ENCOUNTER — APPOINTMENT (OUTPATIENT)
Dept: GENERAL RADIOLOGY | Age: 61
End: 2021-04-01
Attending: PHYSICAL MEDICINE & REHABILITATION
Payer: COMMERCIAL

## 2021-04-01 ENCOUNTER — HOSPITAL ENCOUNTER (OUTPATIENT)
Age: 61
Setting detail: OUTPATIENT SURGERY
Discharge: HOME | End: 2021-04-01
Attending: PHYSICAL MEDICINE & REHABILITATION | Admitting: PHYSICAL MEDICINE & REHABILITATION
Payer: COMMERCIAL

## 2021-04-01 VITALS
HEART RATE: 84 BPM | HEIGHT: 64 IN | DIASTOLIC BLOOD PRESSURE: 80 MMHG | RESPIRATION RATE: 16 BRPM | BODY MASS INDEX: 26.63 KG/M2 | WEIGHT: 156 LBS | TEMPERATURE: 97.5 F | OXYGEN SATURATION: 98 % | SYSTOLIC BLOOD PRESSURE: 103 MMHG

## 2021-04-01 PROCEDURE — 77002 NEEDLE LOCALIZATION BY XRAY: CPT

## 2021-04-01 PROCEDURE — 6360000002 HC RX W HCPCS: Performed by: PHYSICAL MEDICINE & REHABILITATION

## 2021-04-01 PROCEDURE — 2500000003 HC RX 250 WO HCPCS: Performed by: PHYSICAL MEDICINE & REHABILITATION

## 2021-04-01 PROCEDURE — 3610000056 HC PAIN LEVEL 4 BASE (NON-OR): Performed by: PHYSICAL MEDICINE & REHABILITATION

## 2021-04-01 PROCEDURE — 2709999900 HC NON-CHARGEABLE SUPPLY: Performed by: PHYSICAL MEDICINE & REHABILITATION

## 2021-04-01 PROCEDURE — 99152 MOD SED SAME PHYS/QHP 5/>YRS: CPT | Performed by: PHYSICAL MEDICINE & REHABILITATION

## 2021-04-01 RX ORDER — MIDAZOLAM HYDROCHLORIDE 1 MG/ML
INJECTION INTRAMUSCULAR; INTRAVENOUS
Status: COMPLETED | OUTPATIENT
Start: 2021-04-01 | End: 2021-04-01

## 2021-04-01 RX ORDER — FENTANYL CITRATE 50 UG/ML
INJECTION, SOLUTION INTRAMUSCULAR; INTRAVENOUS
Status: COMPLETED | OUTPATIENT
Start: 2021-04-01 | End: 2021-04-01

## 2021-04-01 RX ORDER — DEXAMETHASONE SODIUM PHOSPHATE 10 MG/ML
INJECTION, SOLUTION INTRAMUSCULAR; INTRAVENOUS
Status: COMPLETED | OUTPATIENT
Start: 2021-04-01 | End: 2021-04-01

## 2021-04-01 RX ORDER — LIDOCAINE HYDROCHLORIDE 10 MG/ML
INJECTION, SOLUTION EPIDURAL; INFILTRATION; INTRACAUDAL; PERINEURAL
Status: COMPLETED | OUTPATIENT
Start: 2021-04-01 | End: 2021-04-01

## 2021-04-01 ASSESSMENT — PAIN SCALES - GENERAL
PAINLEVEL_OUTOF10: 0
PAINLEVEL_OUTOF10: 0

## 2021-04-01 NOTE — H&P
HISTORY AND PHYSICAL/PRE-SEDATION ASSESSMENT    Patient:  Cony Todd   :  1960  Medical Record No.:  [id number]   Date:  2021  Physician:  Jaison Shearer MD  Facility: [address] [place] [address] Drive:                 The patient is a 63 y.o. female whom presents with leg pain. Review of the imaging and physical exam of the patient confirmed the pre-procedure diagnosis. After a thorough discussion of risks, benefits and alternatives informed consent was obtained.     Diagnosis:  M54.16  LUMBAR RADICULOPATHY    Past Medical History:   Past Medical History:   Diagnosis Date    Allergic rhinitis     Arthritis     CKD (chronic kidney disease)     DLBCL (diffuse large B cell lymphoma) ([facility] [address] 2010    non-hodgkins lymphoma    GERD (gastroesophageal reflux disease)     Hx of non-Hodgkin's lymphoma     MDRO (multiple drug resistant organisms) resistance 5/15/16    E.coli MDRO in urine    Migraines     Non Hodgkin's lymphoma ([facility] [address]     Peripheral neuropathy     PONV (postoperative nausea and vomiting)         Past Surgical History:     Past Surgical History:   Procedure Laterality Date    BONE MARROW TRANSPLANT  2016    BONE MARROW TRANSPLANT      BRONCHOSCOPY  2018    BRONCHOSCOPY THERAPUTIC ASPIRATION INITIAL WITH MUCUS PLUG SENT FOR BACTERIAL CULTURE performed by Rahul Franco MD at [address] [place] [address]      x 5    COLONOSCOPY      COLONOSCOPY N/A 2021    COLONOSCOPY POLYPECTOMY SNARE/COLD BIOPSY performed by Jose Garber MD at [address] [place] [address]  2010    with oophorectomy    LYMPH NODE BIOPSY  2011    R external iliac node -non diagnostic    LYMPH NODE BIOPSY  2012    R external node - Dr Bennie Heimlich - [facility]    OTHER SURGICAL HISTORY  10/08/2012    INSERTION NEOSTAR CATHETER and REMOVAL        OTHER SURGICAL HISTORY Right 05/10/2016    INSERTION TRIPLE LUMEN DORANTES CENTRAL LINE    MO hours as symptoms improve. 1/18/20  Yes Lucianne Fleischer, MD   ondansetron (ZOFRAN) 4 MG tablet Take 4 mg by mouth every 8 hours as needed for Nausea or Vomiting   Yes Historical Provider, MD   penicillin v potassium (VEETID) 500 MG tablet TAKE 1 TABLET BY MOUTH TWICE DAILY 8/10/17  Yes DWIGHT Garcia - CNP   baclofen (LIORESAL) 10 MG tablet Take 1 tablet by mouth 3 times daily as needed (PRN) 1/14/21   Madisyn Levi MD   brompheniramine-pseudoephedrine-DM (BROMFED DM) 2-30-10 MG/5ML syrup Take 5 mLs by mouth 4 times daily as needed for Cough 12/22/20   Madisyn Levi MD   valACYclovir (VALTREX) 500 MG tablet TK 1 T PO Q 12 H. TAKE BID FOR 3 DAYS AT FIRST ONSET OF SYMPTOMS 10/8/20   Historical Provider, MD   fluticasone-vilanterol (BREO ELLIPTA) 100-25 MCG/INH AEPB inhaler Inhale 1 puff into the lungs daily 6/1/20   Guero Hua MD       Allergies:  Patient has no known allergies. Social History:    reports that she has never smoked. She has never used smokeless tobacco. She reports current alcohol use of about 1.0 standard drinks of alcohol per week. She reports that she does not use drugs. Family History:   Family History   Problem Relation Age of Onset   Jody Lozano Cancer Mother          Lung    Hypertension Mother     Heart Disease Father     High Blood Pressure Father     Coronary Art Dis Father     Cancer Brother         lung    Stroke Brother     Diabetes Brother     Hypertension Brother     Coronary Art Dis Brother     High Cholesterol Brother     Rheum Arthritis Brother     Arthritis Other     Kidney Disease Other     Arthritis Sister     Diabetes Sister     Hypertension Sister     Rheum Arthritis Sister     Birth Defects Grandchild         Vitals: Blood pressure 117/88, pulse 93, temperature 97.5 °F (36.4 °C), temperature source Temporal, resp.  rate 20, height 5' 4\" (1.626 m), weight 156 lb (70.8 kg), last menstrual period 12/26/2008, SpO2 98 %, not currently

## 2021-04-01 NOTE — OP NOTE
PATIENT:  Mai Sheppard  AGE:  63 yrs  MEDICAL RECORD #:  [id number]  YOB: 1960     DATE:  4/1/2021  PHYSICIAN: Leilani Charles M.D. PROCEDURE: Left L2, L3 transforaminal epidural steroid injection under fluoroscopy. PRE-OP DIAGNOSIS:  Low Back Pain/Radiculopathy     POST-OP DIAGNOSIS:  same     HISTORY OF PRESENT ILLNESS:  See office notes. Patient has failed previous less-invasive treatments. Pre-op pain score: 7    Post-op pain score: 0     ALLERGIES:  Patient has no known allergies. MEDICATIONS:    No current facility-administered medications for this encounter. PHYSICAL EXAMINATION:              General:  Awake, alert              Heart:  No audible murmurs, extremities well perfused              Lungs:  No increased WOB or audible wheezing              Extremities:  Normal tone. Warm. No swelling. Anesthesia: 1 mg Versed and 50 mcg fentanyl    Estimated blood loss: None    DESCRIPTION OF PROCEDURE:     Components of the procedure were again reviewed with the patient prior to the procedure. She is aware of risks including infection, bleeding, allergic reaction, and nerve injury. She had ample opportunity for additional questions. She elected to proceed with treatment. The patient was placed in the prone position. Cardiovascular monitoring was initiated, and vital signs were stable prior to, during, and after the procedure. Utilizing fluoroscopy, the L2, L3 vertebrae were identified. The area was sterilely prepped and draped. Skin anesthesia was achieved at each entry site using 1-2 cc of Lidocaine 1%. A 22 g 3.50 inch spinal needle was slowly inserted into the left L2,3 neuroforamen using AP, lateral and oblique fluoroscopic imaging. Negative aspiration was confirmed. 1 cc Isovue-M 300 was injected at each site showing contrast spread into the epidural space and along the nerve root.   A combination of 1 cc Lidocaine 1% and 10 mg dexamethasone were slowly injected at each site. The needles were removed after the stylets were repositioned. A sterile bandage was applied. The patient was brought to recovery in stable condition. The patient tolerated the procedure well. DISPOSITION:  The patient was transported to recovery. The patient was monitored for 15 to 20 minutes post-procedure. Precautions were discussed and written instructions provided. Comment: good epidural and NR flow at both sites.

## 2021-04-05 ENCOUNTER — HOSPITAL ENCOUNTER (OUTPATIENT)
Dept: PHYSICAL THERAPY | Age: 61
Setting detail: THERAPIES SERIES
Discharge: HOME | End: 2021-04-05
Payer: COMMERCIAL

## 2021-04-05 NOTE — FLOWSHEET NOTE
The [address]      Physical Therapy  Cancellation/No-show Note  Patient Name:  Vinh Mcdonald  :  1960   Date:  2021  Cancelled visits to date: 3  No-shows to date: 2     For today's appointment patient:  [x]  Cancelled  []  Rescheduled appointment  []  No-show     Reason given by patient:  []  Patient ill  []  Conflicting appointment  []  No transportation    []  Conflict with work  []  No reason given  []  Other:     Comments:      Electronically signed by: Padma Parra, AYO Pizano PT, OMT-C

## 2021-04-06 DIAGNOSIS — E55.9 VITAMIN D DEFICIENCY: ICD-10-CM

## 2021-04-07 ENCOUNTER — HOSPITAL ENCOUNTER (OUTPATIENT)
Dept: PHYSICAL THERAPY | Age: 61
Setting detail: THERAPIES SERIES
Discharge: HOME | End: 2021-04-07
Payer: COMMERCIAL

## 2021-04-07 NOTE — FLOWSHEET NOTE
The [address]      Physical Therapy  Cancellation/No-show Note  Patient Name:  Rut Ferrara  :  1960   Date:  2021  Cancelled visits to date: 4  No-shows to date: 2     For today's appointment patient:  [x]  Cancelled  []  Rescheduled appointment  []  No-show     Reason given by patient:  []  Patient ill  []  Conflicting appointment  []  No transportation    []  Conflict with work  [x]  No reason given  []  Other:     Comments:      Electronically signed by:    Angelica Lainez PT, OMT-C

## 2021-04-12 ENCOUNTER — HOSPITAL ENCOUNTER (OUTPATIENT)
Dept: PHYSICAL THERAPY | Age: 61
Setting detail: THERAPIES SERIES
Discharge: HOME | End: 2021-04-12
Payer: COMMERCIAL

## 2021-04-12 NOTE — FLOWSHEET NOTE
The [address] [facility],[address], [place]      Physical Therapy  Cancellation/No-show Note  Patient Name:  Timbo Hernandez  :  1960   Date:  2021  Cancelled visits to date: 4  No-shows to date: 3     For today's appointment patient:  [x]  Cancelled  []  Rescheduled appointment  []  No-show     Reason given by patient:  []  Patient ill  []  Conflicting appointment  []  No transportation    []  Conflict with work  [x]  No reason given  []  Other:     Comments:      Electronically signed by:    Jigar Gaviria, AYO Hermosillo PT, OMT-C

## 2021-04-13 ENCOUNTER — TELEPHONE (OUTPATIENT)
Dept: INTERNAL MEDICINE CLINIC | Age: 61
End: 2021-04-13

## 2021-04-13 NOTE — TELEPHONE ENCOUNTER
Requesting referral for pain management due to ongoing issues with back pain that are not being relieved.  Please advise patient will call back with name of physician she was previously seeing and be treated at [facility].

## 2021-04-13 NOTE — TELEPHONE ENCOUNTER
Patient is needing referral  faxed to [phone] patient had xray and pain is radiating from L2 and L3 disc in back please advise.

## 2021-04-19 ENCOUNTER — OFFICE VISIT (OUTPATIENT)
Dept: INTERNAL MEDICINE CLINIC | Age: 61
End: 2021-04-19
Payer: COMMERCIAL

## 2021-04-19 ENCOUNTER — HOSPITAL ENCOUNTER (OUTPATIENT)
Dept: PHYSICAL THERAPY | Age: 61
Setting detail: THERAPIES SERIES
Discharge: HOME | End: 2021-04-19
Payer: COMMERCIAL

## 2021-04-19 VITALS
OXYGEN SATURATION: 95 % | HEART RATE: 91 BPM | BODY MASS INDEX: 27.52 KG/M2 | HEIGHT: 64 IN | DIASTOLIC BLOOD PRESSURE: 80 MMHG | TEMPERATURE: 98.2 F | WEIGHT: 161.2 LBS | SYSTOLIC BLOOD PRESSURE: 120 MMHG

## 2021-04-19 DIAGNOSIS — G89.29 CHRONIC LEFT-SIDED LOW BACK PAIN WITH LEFT-SIDED SCIATICA: ICD-10-CM

## 2021-04-19 DIAGNOSIS — N18.4 CKD (CHRONIC KIDNEY DISEASE) STAGE 4, GFR 15-29 ML/MIN (HCC): ICD-10-CM

## 2021-04-19 DIAGNOSIS — M54.42 CHRONIC LEFT-SIDED LOW BACK PAIN WITH LEFT-SIDED SCIATICA: ICD-10-CM

## 2021-04-19 DIAGNOSIS — J30.9 ALLERGIC SINUSITIS: ICD-10-CM

## 2021-04-19 DIAGNOSIS — E03.9 ACQUIRED HYPOTHYROIDISM: ICD-10-CM

## 2021-04-19 DIAGNOSIS — C85.80 OTHER SPECIFIED TYPE OF NON-HODGKIN LYMPHOMA, UNSPECIFIED BODY REGION (HCC): ICD-10-CM

## 2021-04-19 DIAGNOSIS — R05.9 COUGH: ICD-10-CM

## 2021-04-19 DIAGNOSIS — E78.2 MIXED HYPERLIPIDEMIA: Primary | ICD-10-CM

## 2021-04-19 DIAGNOSIS — K21.9 GASTROESOPHAGEAL REFLUX DISEASE WITHOUT ESOPHAGITIS: ICD-10-CM

## 2021-04-19 PROCEDURE — 99214 OFFICE O/P EST MOD 30 MIN: CPT | Performed by: INTERNAL MEDICINE

## 2021-04-19 RX ORDER — FLUTICASONE PROPIONATE 50 MCG
SPRAY, SUSPENSION (ML) NASAL
Qty: 48 G | Refills: 0 | Status: SHIPPED | OUTPATIENT
Start: 2021-04-19 | End: 2021-07-23 | Stop reason: SDUPTHER

## 2021-04-19 RX ORDER — LEVOTHYROXINE SODIUM 0.05 MG/1
50 TABLET ORAL EVERY MORNING
Qty: 90 TABLET | Refills: 0 | Status: SHIPPED | OUTPATIENT
Start: 2021-04-19 | End: 2021-07-23 | Stop reason: SDUPTHER

## 2021-04-19 RX ORDER — FLUTICASONE PROPIONATE 50 MCG
2 SPRAY, SUSPENSION (ML) NASAL DAILY PRN
Qty: 1 BOTTLE | Refills: 2 | Status: SHIPPED | OUTPATIENT
Start: 2021-04-19 | End: 2021-04-19

## 2021-04-19 RX ORDER — BENZONATATE 200 MG/1
200 CAPSULE ORAL 3 TIMES DAILY PRN
Qty: 30 CAPSULE | Refills: 0 | Status: SHIPPED | OUTPATIENT
Start: 2021-04-19 | End: 2021-06-22

## 2021-04-19 RX ORDER — ESOMEPRAZOLE MAGNESIUM 40 MG/1
40 CAPSULE, DELAYED RELEASE ORAL DAILY
Qty: 30 CAPSULE | Refills: 2 | Status: SHIPPED | OUTPATIENT
Start: 2021-04-19 | End: 2021-06-21

## 2021-04-19 SDOH — ECONOMIC STABILITY: FOOD INSECURITY: WITHIN THE PAST 12 MONTHS, THE FOOD YOU BOUGHT JUST DIDN'T LAST AND YOU DIDN'T HAVE MONEY TO GET MORE.: NEVER TRUE

## 2021-04-19 ASSESSMENT — PATIENT HEALTH QUESTIONNAIRE - PHQ9
SUM OF ALL RESPONSES TO PHQ QUESTIONS 1-9: 0
SUM OF ALL RESPONSES TO PHQ9 QUESTIONS 1 & 2: 0
SUM OF ALL RESPONSES TO PHQ QUESTIONS 1-9: 0

## 2021-04-19 NOTE — PATIENT INSTRUCTIONS
TAKE MED AS ADVISED    DIET/ EXERCISE.     FOLLOW UP WITHIN 3 MONTHS / AS NEEDED    FOLLOW UP FOR FASTING LABS, PAIN CLINIC

## 2021-04-19 NOTE — PROGRESS NOTES
SUBJECTIVE:  Forrest Zafar is a 63 y.o. female [address]   Chief Complaint   Patient presents with    Hyperlipidemia    Joint Pain    Other        PT HERE FOR EVAL      [facility] - ? DIET / Jayden Birch D/W PT  HYPOTHYROIDISM - TAKING MED. Mirian Hy. NO COLD / NO HEAT INTOLERANCE    GERD - ON NEXIUM - TAKING OCCASIONALLY. NO HEARTBURN, NO BELCHING              CKD - AVOIDING NSAIDS.  RECENT LAB D/W PT.  NON HODGKIN'S LYMPHOMA -  S/P SURGERY, S/P CHEMOX.  S/P BONE MARROW TRANSPLANT.    C/O LOW BACK PAIN - LT. STATES HAS HAD > 1 YR. ? XTER. + RAD LT LEG. PAIN SCALE  6/10. ? NUMBNESS / TINGLING. PT REQUESTING REFERRAL TO PAIN MGT. STATES HAD BEEN SEEN AT [phone] THE PAST  ALLERGIC SINUSITIS -   + NASAL CONGESTION , OCC POSTNASAL DRAINAGE , ? SINUS PRESSURE, NO HA , + SNEEZING, + OCC WATERY ITCHY EYES         DENIES CP,  NO SOB ,  No PALPITATIONS. + COUGH - ? COLOR OF PHLEGM, NO HEMOPTYSIS  No ABD PAIN, NO NAUSEA, NO VOMITING, No DIARRHEA, No CONSTIPATION, No MELENA, No HEMATOCHEZIA. No DYSURIA, No FREQ, No URGENCY, No HEMATURIA       PMH: REVIEWED AND UPDATED TODAY    PSH: REVIEWED AND UPDATED TODAY    SOCIAL HX: REVIEWED AND UPDATED TODAY    FAMILY HX: REVIEWED AND UPDATED TODAY    ALLERGY:  Patient has no known allergies. MEDS: REVIEWED  Prior to Visit Medications    Medication Sig Taking?  Authorizing Provider   Cholecalciferol (VITAMIN D3) 25 MCG (1000 UT) CAPS TAKE 1 CAPSULE BY MOUTH DAILY Yes Linnette Ziegler MD   esomeprazole (NEXIUM) 40 MG delayed release capsule TAKE 1 CAPSULE BY MOUTH DAILY Yes Linnette Ziegler MD   levothyroxine (SYNTHROID) 50 MCG tablet Take 1 tablet by mouth every morning Yes Linnette Ziegler MD   Ascorbic Acid (VITAMIN C) 250 MG tablet Take 500 mg by mouth daily Yes Historical Provider, MD   zinc 50 MG TABS tablet Take 50 mg by mouth daily Yes Historical Provider, MD   baclofen (LIORESAL) 10 MG tablet Take 1 tablet by mouth 3 times daily as needed (PRN) START ON TESSALON 200 MG PRN  MONITOR. MAKE CHANGES AS NEEDED.                          MEDICATION SIDE EFFECTS D/W PATIENT    PT STABLE AT TIME OF D/C.    RETURN TO CLINIC WITHIN 3 MONTHS / PRN    FOLLOW UP FOR FASTING LABS, PAIN CLINIC

## 2021-04-19 NOTE — FLOWSHEET NOTE
The [address]      Physical Therapy  Cancellation/No-show Note  Patient Name:  Queen Brittani  :  1960   Date:  2021  Cancelled visits to date: 5  No-shows to date: 3     For today's appointment patient:  [x]  Cancelled  []  Rescheduled appointment  []  No-show     Reason given by patient:  []  Patient ill  [x]  Conflicting appointment  []  No transportation    []  Conflict with work  []  No reason given  []  Other:     Comments:      Electronically signed by:    Erica Winter PT, OMT-C

## 2021-04-21 ENCOUNTER — HOSPITAL ENCOUNTER (OUTPATIENT)
Dept: PHYSICAL THERAPY | Age: 61
Setting detail: THERAPIES SERIES
Discharge: HOME | End: 2021-04-21
Payer: COMMERCIAL

## 2021-04-21 DIAGNOSIS — N18.4 CKD (CHRONIC KIDNEY DISEASE) STAGE 4, GFR 15-29 ML/MIN (HCC): ICD-10-CM

## 2021-04-21 DIAGNOSIS — E78.2 MIXED HYPERLIPIDEMIA: ICD-10-CM

## 2021-04-21 DIAGNOSIS — E87.5 HYPERKALEMIA: ICD-10-CM

## 2021-04-21 DIAGNOSIS — E03.9 ACQUIRED HYPOTHYROIDISM: ICD-10-CM

## 2021-04-21 LAB
ALBUMIN SERPL-MCNC: 4.4 G/DL (ref 3.4–5)
ANION GAP SERPL CALCULATED.3IONS-SCNC: 14 MMOL/L (ref 3–16)
BUN BLDV-MCNC: 27 MG/DL (ref 7–20)
CALCIUM SERPL-MCNC: 9.4 MG/DL (ref 8.3–10.6)
CHLORIDE BLD-SCNC: 107 MMOL/L (ref 99–110)
CO2: 24 MMOL/L (ref 21–32)
CREAT SERPL-MCNC: 2.7 MG/DL (ref 0.6–1.2)
GFR AFRICAN AMERICAN: 22
GFR NON-AFRICAN AMERICAN: 18
GLUCOSE BLD-MCNC: 86 MG/DL (ref 70–99)
PHOSPHORUS: 3.2 MG/DL (ref 2.5–4.9)
POTASSIUM SERPL-SCNC: 4.3 MMOL/L (ref 3.5–5.1)
SODIUM BLD-SCNC: 145 MMOL/L (ref 136–145)
URIC ACID, SERUM: 6.3 MG/DL (ref 2.6–6)

## 2021-04-21 NOTE — FLOWSHEET NOTE
The [address] [facility] [address], [place]      Physical Therapy  Cancellation/No-show Note  Patient Name:  Shelbie Whelan  :  1960   Date:  2021  Cancelled visits to date: 5  No-shows to date: 3     For today's appointment patient:  []  Cancelled  []  Rescheduled appointment  [x]  No-show     Reason given by patient:  []  Patient ill  []  Conflicting appointment  []  No transportation    []  Conflict with work  []  No reason given  []  Other:     Comments:      Electronically signed by:    Summer Oliver PT, OMURSULA-C

## 2021-04-22 LAB
A/G RATIO: 2 (ref 1.1–2.2)
ALBUMIN SERPL-MCNC: 4.3 G/DL (ref 3.4–5)
ALP BLD-CCNC: 112 U/L (ref 40–129)
ALT SERPL-CCNC: 16 U/L (ref 10–40)
ANION GAP SERPL CALCULATED.3IONS-SCNC: 13 MMOL/L (ref 3–16)
AST SERPL-CCNC: 20 U/L (ref 15–37)
BILIRUB SERPL-MCNC: 0.4 MG/DL (ref 0–1)
BUN BLDV-MCNC: 26 MG/DL (ref 7–20)
CALCIUM SERPL-MCNC: 9.3 MG/DL (ref 8.3–10.6)
CHLORIDE BLD-SCNC: 103 MMOL/L (ref 99–110)
CHOLESTEROL, TOTAL: 222 MG/DL (ref 0–199)
CO2: 24 MMOL/L (ref 21–32)
CREAT SERPL-MCNC: 2.6 MG/DL (ref 0.6–1.2)
CREATININE URINE: 54.3 MG/DL (ref 28–259)
GFR AFRICAN AMERICAN: 23
GFR NON-AFRICAN AMERICAN: 19
GLOBULIN: 2.1 G/DL
GLUCOSE BLD-MCNC: 84 MG/DL (ref 70–99)
HDLC SERPL-MCNC: 75 MG/DL (ref 40–60)
LDL CHOLESTEROL CALCULATED: 130 MG/DL
POTASSIUM SERPL-SCNC: 4.2 MMOL/L (ref 3.5–5.1)
PROTEIN PROTEIN: 11 MG/DL
PROTEIN/CREAT RATIO: 0.2 MG/DL
SODIUM BLD-SCNC: 140 MMOL/L (ref 136–145)
TOTAL PROTEIN: 6.4 G/DL (ref 6.4–8.2)
TRIGL SERPL-MCNC: 87 MG/DL (ref 0–150)
TSH REFLEX: 1.94 UIU/ML (ref 0.27–4.2)
VLDLC SERPL CALC-MCNC: 17 MG/DL

## 2021-04-29 ENCOUNTER — TELEPHONE (OUTPATIENT)
Dept: INTERNAL MEDICINE CLINIC | Age: 61
End: 2021-04-29

## 2021-04-29 NOTE — TELEPHONE ENCOUNTER
Pt came in to see if she had fasting labs but there are none in the system. Pt would like to be called if she needs to come back or not.  Pt had labs drawn but was not fasten

## 2021-05-20 ENCOUNTER — OFFICE VISIT (OUTPATIENT)
Dept: PULMONOLOGY | Age: 61
End: 2021-05-20
Payer: COMMERCIAL

## 2021-05-20 ENCOUNTER — HOSPITAL ENCOUNTER (OUTPATIENT)
Age: 61
Discharge: HOME | End: 2021-05-20
Payer: COMMERCIAL

## 2021-05-20 ENCOUNTER — HOSPITAL ENCOUNTER (OUTPATIENT)
Dept: GENERAL RADIOLOGY | Age: 61
Discharge: HOME | End: 2021-05-20
Payer: COMMERCIAL

## 2021-05-20 VITALS
OXYGEN SATURATION: 96 % | HEIGHT: 64 IN | HEART RATE: 78 BPM | WEIGHT: 160 LBS | DIASTOLIC BLOOD PRESSURE: 74 MMHG | SYSTOLIC BLOOD PRESSURE: 120 MMHG | BODY MASS INDEX: 27.31 KG/M2 | TEMPERATURE: 97.1 F

## 2021-05-20 DIAGNOSIS — Z94.84 H/O STEM CELL TRANSPLANT (HCC): ICD-10-CM

## 2021-05-20 DIAGNOSIS — Z85.72 HX OF LYMPHOMA: ICD-10-CM

## 2021-05-20 DIAGNOSIS — R05.8 COUGH WITH SPUTUM: ICD-10-CM

## 2021-05-20 DIAGNOSIS — R05.8 COUGH WITH SPUTUM: Primary | ICD-10-CM

## 2021-05-20 DIAGNOSIS — J30.89 OTHER ALLERGIC RHINITIS: ICD-10-CM

## 2021-05-20 DIAGNOSIS — J45.40 MODERATE PERSISTENT ASTHMA WITHOUT COMPLICATION: ICD-10-CM

## 2021-05-20 PROCEDURE — 99213 OFFICE O/P EST LOW 20 MIN: CPT | Performed by: INTERNAL MEDICINE

## 2021-05-20 PROCEDURE — 71046 X-RAY EXAM CHEST 2 VIEWS: CPT

## 2021-05-20 RX ORDER — AZITHROMYCIN 250 MG/1
TABLET, FILM COATED ORAL
Qty: 6 TABLET | Refills: 0 | Status: SHIPPED | OUTPATIENT
Start: 2021-05-20 | End: 2021-07-23 | Stop reason: ALTCHOICE

## 2021-05-20 NOTE — PROGRESS NOTES
[facility]    Outpatient Follow Up Note    Subjective:   CHIEF COMPLAINT / HPI:     The patient is 61 y.o. female with past medical history of lymphoma status post bone marrow transplant with upper airway cough syndrome and asthma presents for an acute visit for 3 weeks of cough productive of yellow-tan phlegm. She has no associated fevers, chills, night sweats, anorexia, weight loss, hemoptysis, wheezing, chest tightness, or dyspnea. Patient recently restarted on Flonase. She has been on her chronic Breo, Astelin, and Zyrtec without interruption. Patient had Covid in December and was hospitalized for 2 days at [facility] but made a full recovery and did not need oxygen on discharge. 6/1/2020  Rayne Marques has requested a virtual visit for follow-up of chronic cough due to upper airway cough syndrome and asthma. She has a history of lymphoma status post a bone marrow transplant. Last visit I added Astelin to her chronic regimen of Flonase, Zyrtec, and Breo. She states with that she is doing quite well and has no significant cough, wheezing, or chest tightness    Feb 13,2020  Rayne Marques has a history of lymphoma status post bone marrow transplant here for follow-up of chronic cough thought to be secondary to upper airway cough syndrome. Last visited I gave her Zyrtec and if needed Flonase which she did start. Cough is some better but she still has a daily dry cough with postnasal drip and a tickle in her throat. No fevers, chills, night sweats, anorexia, weight loss, hemoptysis, dyspnea, wheezing, or chest tightness. November 26, 2019  Rayne Marques is here for follow-up of a cough with purulent sputum. When I saw her in October I gave her 2 weeks of Levaquin and repeat his CT chest approximately a week ago. Left lower lobe pneumonia has resolved. Her fatigue and dyspnea on exertion have resolved as well. Her cough while improving is still present.   She has postnasal drip and dyspnea on exertion but no fevers, chills, night sweats, malaise, anorexia or weight loss    Previous visit August 23, 2018  Michelle Collins is here for two-week follow-up of dyspnea on exertion and cough. Since last visit she's had a CT chest and PFTs. She has no change in her dyspnea on exertion and cough. She continues to have no fevers, chills, anorexia, chest pain, or weight loss    Previous visit 8/1/2018   The patient is 63 y.o. female with past medical history of lymphoma status post bone marrow transplant and UACS on Flonase and Zyrtec presents for evaluation of worsening dyspnea on exertion to the point she gets winded going up 1 flight of stairs. She has a cough that she describes as both dry but also bringing up some yellow phlegm. She has no associated fevers, chills, anorexia, chest pain, or weight loss. .     Past Medical History:    Past Medical History:   Diagnosis Date    Allergic rhinitis     Arthritis     CKD (chronic kidney disease)     DLBCL (diffuse large B cell lymphoma) ([facility] [address] 2/2010    non-hodgkins lymphoma    GERD (gastroesophageal reflux disease)     Hx of non-Hodgkin's lymphoma     MDRO (multiple drug resistant organisms) resistance 5/15/16    E.coli MDRO in urine    Migraines     Non Hodgkin's lymphoma ([facility] [address]     Peripheral neuropathy     PONV (postoperative nausea and vomiting)        Social History:    Social History     Tobacco Use   Smoking Status Never Smoker   Smokeless Tobacco Never Used       Current Medications:  Current Outpatient Medications on File Prior to Visit   Medication Sig Dispense Refill    levothyroxine (SYNTHROID) 50 MCG tablet Take 1 tablet by mouth every morning 90 tablet 0    esomeprazole (NEXIUM) 40 MG delayed release capsule Take 1 capsule by mouth daily 30 capsule 2    fluticasone (FLONASE) 50 MCG/ACT nasal spray SHAKE BOTTLE AND USE 1 TO 2 SPRAYS IN EACH NOSTRIL DAILY AS NEEDED FOR RHINITIS OR ALLERGIES 48 g 0    Cholecalciferol (VITAMIN D3) 25 MCG (1000 UT) CAPS TAKE 1 CAPSULE BY MOUTH DAILY 90 capsule 0    Ascorbic Acid (VITAMIN C) 250 MG tablet Take 500 mg by mouth daily      zinc 50 MG TABS tablet Take 50 mg by mouth daily      baclofen (LIORESAL) 10 MG tablet Take 1 tablet by mouth 3 times daily as needed (PRN) 30 tablet 0    valACYclovir (VALTREX) 500 MG tablet TK 1 T PO Q 12 H. TAKE BID FOR 3 DAYS AT FIRST ONSET OF SYMPTOMS      ketotifen (ZADITOR) 0.025 % ophthalmic solution Place into both eyes 2 times daily       MAGNESIUM-OXIDE 400 (241.3 Mg) MG TABS tablet daily       azelastine (ASTELIN) 0.1 % nasal spray 2 sprays by Nasal route 2 times daily Use in each nostril as directed (Patient taking differently: 2 sprays by Nasal route as needed Use in each nostril as directed) 3 Bottle 3    fluticasone-vilanterol (BREO ELLIPTA) 100-25 MCG/INH AEPB inhaler Inhale 1 puff into the lungs daily 1 each 11    cetirizine (ZYRTEC) 10 MG tablet TAKE 1 CAPSULE BY MOUTH DAILY 90 tablet 3    albuterol sulfate HFA (PROVENTIL HFA) 108 (90 Base) MCG/ACT inhaler Inhale 2 puffs into the lungs every 4 hours as needed for Wheezing or Shortness of Breath (Space out to every 6 hours as symptoms improve) Space out to every 6 hours as symptoms improve. 1 Inhaler 0    ondansetron (ZOFRAN) 4 MG tablet Take 4 mg by mouth every 8 hours as needed for Nausea or Vomiting      penicillin v potassium (VEETID) 500 MG tablet TAKE 1 TABLET BY MOUTH TWICE DAILY 60 tablet 2     No current facility-administered medications on file prior to visit.        REVIEW OF SYSTEMS:    CONSTITUTIONAL: Negative for fevers and chills  HEENT: Negative for oropharyngeal exudate, post nasal drip, sinus pain / pressure, nasal congestion, ear pain  RESPIRATORY:  See HPI  CARDIOVASCULAR: Negative for chest pain, palpitations, edema  GASTROINTESTINAL: Negative for nausea, vomiting, diarrhea, constipation and abdominal pain  HEMATOLOGICAL: Negative for adenopathy  SKIN: Negative for clubbing, cyanosis, skin lesions  EXTREMITIES: Negative for weakness, decreased ROM  NEUROLOGICAL: Negative for unilateral weakness, speech or gait abnormalities    Objective:   PHYSICAL EXAM:        VITALS:    /74 (Site: Left Upper Arm, Position: Sitting, Cuff Size: Medium Adult)   Pulse 78   Temp 97.1 °F (36.2 °C) (Infrared)   Ht 5' 4\" (1.626 m)   Wt 160 lb (72.6 kg)   LMP 12/26/2008   SpO2 96%   BMI 27.46 kg/m²     CONSTITUTIONAL:  Awake, alert, cooperative, no apparent distress, and appears stated age  HEENT: No oropharyngeal exudate, PERRL, no cervical adenopathy, no tracheal deviation, thyroid size normal  LUNGS:  No increased work of breathing and clear to auscultation, no crackles or wheezing  CARDIOVASCULAR:  normal S1 and S2 and no JVD  ABDOMEN:  Normal bowel sounds, non-distended and non-tender to palpation  EXT: No edema, no calf tenderness. Pulses are present bilaterally. NEUROLOGIC:  Mental Status Exam:  Level of Alertness:   awake  Orientation:   person, place, time. SKIN:  normal skin color, texture, turgor, no redness, warmth, or swelling     Microbiology  Fungus (Mycology) Culture 08/30/2018  8:00 AM [facility]   Fungus Stain 08/30/2018  8:00 AM  [facility]   No Fungal elements seen    Organism  (Abnormal) 08/30/2018  8:00 AM [facility]   Sterile Mycelium     Fungus (Mycology) Culture 08/30/2018  8:00 AM [facility]   Isolated    This is a non-sporulating isolate.    These isolates have the inability to express diagnostic    characters under the conditions provided.    No further workup. Serology  Results for Hernán Saeed (MRN [id number]) as of 10/25/2018 15:31   Ref.  Range 9/27/2018 14:38   Aspergillus Galacto AG Latest Ref Range: Negative  Negative   Aspergillus Galacto Index Unknown 0.09   (1,3)-Beta-D-Glucan (Fungitell) Interpretation Latest Ref Range: Negative  Negative   (1,3)-Beta-D-Glucan (fungitell) Latest Units: pg/mL <31   Histoplasma

## 2021-06-18 DIAGNOSIS — K21.9 GASTROESOPHAGEAL REFLUX DISEASE WITHOUT ESOPHAGITIS: ICD-10-CM

## 2021-06-21 RX ORDER — ESOMEPRAZOLE MAGNESIUM 40 MG/1
40 CAPSULE, DELAYED RELEASE ORAL DAILY
Qty: 30 CAPSULE | Refills: 2 | Status: SHIPPED | OUTPATIENT
Start: 2021-06-21 | End: 2021-07-23 | Stop reason: SDUPTHER

## 2021-06-22 DIAGNOSIS — R05.9 COUGH: ICD-10-CM

## 2021-06-22 RX ORDER — BENZONATATE 200 MG/1
CAPSULE ORAL
Qty: 30 CAPSULE | Refills: 0 | Status: SHIPPED | OUTPATIENT
Start: 2021-06-22 | End: 2021-07-23

## 2021-07-07 DIAGNOSIS — E55.9 VITAMIN D DEFICIENCY: ICD-10-CM

## 2021-07-23 ENCOUNTER — OFFICE VISIT (OUTPATIENT)
Dept: INTERNAL MEDICINE CLINIC | Age: 61
End: 2021-07-23
Payer: COMMERCIAL

## 2021-07-23 VITALS
SYSTOLIC BLOOD PRESSURE: 120 MMHG | DIASTOLIC BLOOD PRESSURE: 80 MMHG | WEIGHT: 159.6 LBS | BODY MASS INDEX: 27.25 KG/M2 | HEIGHT: 64 IN | HEART RATE: 89 BPM | OXYGEN SATURATION: 97 %

## 2021-07-23 DIAGNOSIS — E78.2 MIXED HYPERLIPIDEMIA: ICD-10-CM

## 2021-07-23 DIAGNOSIS — K21.9 GASTROESOPHAGEAL REFLUX DISEASE WITHOUT ESOPHAGITIS: ICD-10-CM

## 2021-07-23 DIAGNOSIS — N18.4 CKD (CHRONIC KIDNEY DISEASE) STAGE 4, GFR 15-29 ML/MIN (HCC): ICD-10-CM

## 2021-07-23 DIAGNOSIS — M54.42 CHRONIC LEFT-SIDED LOW BACK PAIN WITH LEFT-SIDED SCIATICA: ICD-10-CM

## 2021-07-23 DIAGNOSIS — R05.3 PERSISTENT COUGH: ICD-10-CM

## 2021-07-23 DIAGNOSIS — G89.29 CHRONIC LEFT-SIDED LOW BACK PAIN WITH LEFT-SIDED SCIATICA: ICD-10-CM

## 2021-07-23 DIAGNOSIS — Z12.31 ENCOUNTER FOR SCREENING MAMMOGRAM FOR BREAST CANCER: ICD-10-CM

## 2021-07-23 DIAGNOSIS — E03.9 ACQUIRED HYPOTHYROIDISM: Primary | ICD-10-CM

## 2021-07-23 DIAGNOSIS — R73.03 PREDIABETES: ICD-10-CM

## 2021-07-23 DIAGNOSIS — J30.9 ALLERGIC SINUSITIS: ICD-10-CM

## 2021-07-23 LAB
CHP ED QC CHECK: NORMAL
GLUCOSE BLD-MCNC: 124 MG/DL
HBA1C MFR BLD: 5.7 %

## 2021-07-23 PROCEDURE — 82962 GLUCOSE BLOOD TEST: CPT | Performed by: INTERNAL MEDICINE

## 2021-07-23 PROCEDURE — 83036 HEMOGLOBIN GLYCOSYLATED A1C: CPT | Performed by: INTERNAL MEDICINE

## 2021-07-23 PROCEDURE — 99214 OFFICE O/P EST MOD 30 MIN: CPT | Performed by: INTERNAL MEDICINE

## 2021-07-23 RX ORDER — ESOMEPRAZOLE MAGNESIUM 40 MG/1
40 CAPSULE, DELAYED RELEASE ORAL DAILY
Qty: 30 CAPSULE | Refills: 2 | Status: SHIPPED | OUTPATIENT
Start: 2021-07-23 | End: 2021-10-27 | Stop reason: SDUPTHER

## 2021-07-23 RX ORDER — LIDOCAINE 50 MG/G
1 PATCH TOPICAL DAILY PRN
Qty: 30 PATCH | Refills: 0 | Status: SHIPPED | OUTPATIENT
Start: 2021-07-23 | End: 2021-08-22

## 2021-07-23 RX ORDER — BROMPHENIRAMINE MALEATE, PSEUDOEPHEDRINE HYDROCHLORIDE, AND DEXTROMETHORPHAN HYDROBROMIDE 2; 30; 10 MG/5ML; MG/5ML; MG/5ML
5 SYRUP ORAL 4 TIMES DAILY PRN
Qty: 240 ML | Refills: 0 | Status: SHIPPED | OUTPATIENT
Start: 2021-07-23 | End: 2021-10-27 | Stop reason: SDUPTHER

## 2021-07-23 RX ORDER — FLUTICASONE PROPIONATE 50 MCG
SPRAY, SUSPENSION (ML) NASAL
Qty: 48 G | Refills: 0 | Status: SHIPPED | OUTPATIENT
Start: 2021-07-23 | End: 2022-08-11 | Stop reason: ALTCHOICE

## 2021-07-23 RX ORDER — LEVOTHYROXINE SODIUM 0.05 MG/1
50 TABLET ORAL EVERY MORNING
Qty: 90 TABLET | Refills: 0 | Status: SHIPPED | OUTPATIENT
Start: 2021-07-23 | End: 2021-10-27 | Stop reason: SDUPTHER

## 2021-07-23 SDOH — ECONOMIC STABILITY: FOOD INSECURITY: WITHIN THE PAST 12 MONTHS, YOU WORRIED THAT YOUR FOOD WOULD RUN OUT BEFORE YOU GOT MONEY TO BUY MORE.: NEVER TRUE

## 2021-07-23 SDOH — ECONOMIC STABILITY: FOOD INSECURITY: WITHIN THE PAST 12 MONTHS, THE FOOD YOU BOUGHT JUST DIDN'T LAST AND YOU DIDN'T HAVE MONEY TO GET MORE.: NEVER TRUE

## 2021-07-23 ASSESSMENT — PATIENT HEALTH QUESTIONNAIRE - PHQ9
SUM OF ALL RESPONSES TO PHQ QUESTIONS 1-9: 0
SUM OF ALL RESPONSES TO PHQ QUESTIONS 1-9: 0
1. LITTLE INTEREST OR PLEASURE IN DOING THINGS: 0
2. FEELING DOWN, DEPRESSED OR HOPELESS: 0
SUM OF ALL RESPONSES TO PHQ9 QUESTIONS 1 & 2: 0
SUM OF ALL RESPONSES TO PHQ QUESTIONS 1-9: 0

## 2021-07-23 ASSESSMENT — SOCIAL DETERMINANTS OF HEALTH (SDOH): HOW HARD IS IT FOR YOU TO PAY FOR THE VERY BASICS LIKE FOOD, HOUSING, MEDICAL CARE, AND HEATING?: NOT HARD AT ALL

## 2021-07-23 NOTE — PROGRESS NOTES
USE 1 TO 2 SPRAYS IN EACH NOSTRIL DAILY AS NEEDED FOR RHINITIS OR ALLERGIES Yes Andrew Starks MD   Ascorbic Acid (VITAMIN C) 250 MG tablet Take 500 mg by mouth daily Yes Historical Provider, MD   zinc 50 MG TABS tablet Take 50 mg by mouth daily Yes Historical Provider, MD   baclofen (LIORESAL) 10 MG tablet Take 1 tablet by mouth 3 times daily as needed (PRN) Yes Andrew Starks MD   valACYclovir (VALTREX) 500 MG tablet TK 1 T PO Q 12 H. TAKE BID FOR 3 DAYS AT FIRST ONSET OF SYMPTOMS Yes Historical Provider, MD   ketotifen (ZADITOR) 0.025 % ophthalmic solution Place into both eyes 2 times daily  Yes Historical Provider, MD   MAGNESIUM-OXIDE 400 (241.3 Mg) MG TABS tablet daily  Yes Historical Provider, MD   azelastine (ASTELIN) 0.1 % nasal spray 2 sprays by Nasal route 2 times daily Use in each nostril as directed  Patient taking differently: 2 sprays by Nasal route as needed Use in each nostril as directed Yes Sulma Haq MD   fluticasone-vilanterol (BREO ELLIPTA) 100-25 MCG/INH AEPB inhaler Inhale 1 puff into the lungs daily Yes Sulma Haq MD   cetirizine (ZYRTEC) 10 MG tablet TAKE 1 CAPSULE BY MOUTH DAILY Yes Sulma Haq MD   albuterol sulfate HFA (PROVENTIL HFA) 108 (90 Base) MCG/ACT inhaler Inhale 2 puffs into the lungs every 4 hours as needed for Wheezing or Shortness of Breath (Space out to every 6 hours as symptoms improve) Space out to every 6 hours as symptoms improve.  Yes Noel Hernandez MD   ondansetron (ZOFRAN) 4 MG tablet Take 4 mg by mouth every 8 hours as needed for Nausea or Vomiting Yes Historical Provider, MD   penicillin v potassium (VEETID) 500 MG tablet TAKE 1 TABLET BY MOUTH TWICE DAILY Yes Aurelia Cull Cheadle, APRN - CNP       ROS: COMPREHENSIVE ROS AS IN HX, REST -VE  History obtained from chart review and the patient      OBJECTIVE:   NURSING NOTE AND VITALS REVIEWED  /60 (Site: Left Upper Arm, Position: Sitting, Cuff Size: Large Adult)   Pulse 89   Ht 5' 4\" (1.626 m)   Wt 159 lb 9.6 oz (72.4 kg)   LMP 12/26/2008   SpO2 97%   Breastfeeding No   BMI 27.40 kg/m²     NO ACUTE DISTRESS    REPEAT BP: 120/80 (LT), NO ORTHOSTASIS     Body mass index is 27.4 kg/m². HEENT: NO PALLOR, ANICTERIC, PERRLA, EOMI, NO CONJUNCTIVAL ERYTHEMA,                 NO SINUS TENDERNESS  NECK:  SUPPLE, TRACHEA MIDLINE, NT, NO JVD, NO CB, NO LA, NO TM, NO STIFFNESS  CHEST: RESPY EFFORT NL, GOOD AE, NO W/R/C  HEART: S1S2+ REG, NO M/G/R  ABD: SOFT, NT, NO HSM, BS+  EXT: NO EDEMA, NT, PULSES +. JIMMY'S -VE  NEURO: ALERT AND ORIENTED X 3, NO MENINGEAL SIGNS, NO TREMORS, NL GAIT, NO FOCAL DEFICITS  PSYCH: FAIRLY GOOD AFFECT  BACK: + TENDERNESS LOW BACK, + PAIN WITH MVT, NO ROM, NO CVA TENDERNESS    PREVIOUS LABS / X RAY REVIEWED AND D/W PT      ACCUCHECK: 124    POC HBA1C: 5.7    ASSESSMENT / PLAN:     Diagnosis Orders   1. Acquired hypothyroidism  COUNSELLED. MONITOR ON SYNTHROID. TSH TO EVAL  MAKE CHANGES AS NEEDED       2. Mixed hyperlipidemia  COUNSELLED. ADVISED LOW FAT / CHOL DIET/ EXERCISE.  MONITOR. F/U LABS  GOALS D/W PT.  MAKE CHANGES AS NEEDED. 3. Prediabetes  COUNSELLED. ADVISED ON DIET / EXERCISE  ADVISED RISK FACTOR MODIFICATION. MONITOR  MAKE CHANGES AS NEEDED. 4. Gastroesophageal reflux disease without esophagitis  COUNSELLED. CONTINUE MGT. ANTIREFLUX PRECAUTIONS ADVISED. MONITOR. MAKE CHANGES AS NEEDED. 5. CKD (chronic kidney disease) stage 4, GFR 15-29 ml/min ([facility])  COUNSELLED. ADVISED AVOID NSAIDS. MAINTAIN HYDRATION. MONITOR. MAKE CHANGES AS NEEDED. 6. Chronic left-sided low back pain with left-sided sciatica  COUNSELLED. EXERCISES. ANALGESICS PRN. MONITOR. .  START ON lidocaine (LIDODERM) 5 % PRN  HOME EXERCISES  MAKE CHANGES AS NEEDED. 7. Allergic sinusitis  COUNSELLED. SYMPTOMATIC RX  FLONASE PRN. MONITOR  MAKE CHANGES AS NEEDED. 8. Persistent cough  COUNSELLED. BROMFED DM PRN. MONITOR  MAKE CHANGES AS NEEDED.        9. Encounter for screening mammogram for breast cancer  COUNSELLED. [facility] DIGITAL SCREEN W OR WO CAD BILATERAL ORDERED  MAKE CHANGES AS NEEDED.                        MEDICATION SIDE EFFECTS D/W PATIENT    RETURN TO CLINIC WITHIN 3 MONTHS / PRN    FOLLOW UP FOR FASTING LABS, MAMMOGRAM

## 2021-07-30 ENCOUNTER — TELEPHONE (OUTPATIENT)
Dept: ADMINISTRATIVE | Age: 61
End: 2021-07-30

## 2021-07-30 NOTE — TELEPHONE ENCOUNTER
PA COVER MY MEDS  Medication:Lidocaine 5% patches  Key:[id number] - PA Case ID: [id number] - Rx #: [id number]  Status:PENDING

## 2021-08-02 NOTE — TELEPHONE ENCOUNTER
Authorization Status:APPROVAL  Authorization [id number]   Approved : Lidocaine 5% patches  Date Span:Start-07/30/2021  End-07/30/2022

## 2021-09-19 ENCOUNTER — HOSPITAL ENCOUNTER (EMERGENCY)
Age: 61
Discharge: HOME | End: 2021-09-19
Attending: EMERGENCY MEDICINE
Payer: COMMERCIAL

## 2021-09-19 VITALS
SYSTOLIC BLOOD PRESSURE: 134 MMHG | RESPIRATION RATE: 16 BRPM | HEIGHT: 64 IN | BODY MASS INDEX: 27.37 KG/M2 | TEMPERATURE: 97.9 F | WEIGHT: 160.31 LBS | OXYGEN SATURATION: 96 % | HEART RATE: 69 BPM | DIASTOLIC BLOOD PRESSURE: 90 MMHG

## 2021-09-19 DIAGNOSIS — S05.01XA ABRASION OF RIGHT CORNEA, INITIAL ENCOUNTER: Primary | ICD-10-CM

## 2021-09-19 PROCEDURE — 99284 EMERGENCY DEPT VISIT MOD MDM: CPT

## 2021-09-19 RX ORDER — ERYTHROMYCIN 5 MG/G
OINTMENT OPHTHALMIC
Qty: 3.5 G | Refills: 0 | Status: SHIPPED | OUTPATIENT
Start: 2021-09-19

## 2021-09-19 RX ORDER — TETRACAINE HYDROCHLORIDE 5 MG/ML
1 SOLUTION OPHTHALMIC ONCE
Status: DISCONTINUED | OUTPATIENT
Start: 2021-09-19 | End: 2021-09-19 | Stop reason: HOSPADM

## 2021-09-19 ASSESSMENT — VISUAL ACUITY
OD: 20/50
OU: 20/50
OS: 20/50

## 2021-09-19 ASSESSMENT — PAIN DESCRIPTION - ORIENTATION: ORIENTATION: RIGHT

## 2021-09-19 ASSESSMENT — PAIN DESCRIPTION - DESCRIPTORS: DESCRIPTORS: ACHING

## 2021-09-19 ASSESSMENT — PAIN DESCRIPTION - LOCATION: LOCATION: EYE

## 2021-09-19 ASSESSMENT — PAIN SCALES - GENERAL: PAINLEVEL_OUTOF10: 8

## 2021-09-19 ASSESSMENT — PAIN DESCRIPTION - PAIN TYPE: TYPE: ACUTE PAIN

## 2021-09-19 NOTE — ED NOTES
Patient to ed with complaints of right eye pain and itching which started yesterday, patient is concerned about pink eye.      Celeste Richards RN  09/19/21 [id number]

## 2021-09-19 NOTE — ED PROVIDER NOTES
[facility] CHIEF COMPLAINT:   Chief Complaint   Patient presents with    Eye Problem     right         HPI: Olivier Torres is a 63 y.o. female who presents to the Emergency Department with complaint of onset this morning of right eye pain, irritation and some photophobia. No left eye symptoms. She states she might have rubbed her eye after touching the pet. No other injury. She does not wear contact lenses. Denies any visual blurring. No known exposure to pinkeye. Patient has had bilateral cataract surgery. She was seen by her eye provider about 10 days ago and complained of dry eyes. She states that she was started on prednisone drops and ketorolac drops. REVIEW OF SYSTEMS:  6 systems reviewed. Pertinent positives per HPI. Otherwise noted to be negative. Nursing notes reviewed and agree with above. Past medical/surgical history reviewed.     MEDICATIONS   Discharge Medication List as of 9/19/2021 12:03 PM      START taking these medications    Details   erythromycin (ROMYCIN) 5 MG/GM ophthalmic ointment Apply to OD TID for 7 days, Disp-3.5 g, R-0, Normal         CONTINUE these medications which have NOT CHANGED    Details   BREO ELLIPTA 100-25 MCG/INH AEPB inhaler INHALE 1 PUFF INTO THE LUNGS DAILY, Disp-30 each, R-5Normal      esomeprazole (NEXIUM) 40 MG delayed release capsule Take 1 capsule by mouth daily, Disp-30 capsule, R-2Normal      levothyroxine (SYNTHROID) 50 MCG tablet Take 1 tablet by mouth every morning, Disp-90 tablet, R-0Normal      fluticasone (FLONASE) 50 MCG/ACT nasal spray SHAKE BOTTLE AND USE 1 TO 2 SPRAYS IN EACH NOSTRIL DAILY AS NEEDED FOR RHINITIS OR ALLERGIES, Disp-48 g, R-0**Patient requests 90 days supply**Normal      Cholecalciferol (VITAMIN D3) 25 MCG (1000 UT) CAPS TAKE 1 CAPSULE BY MOUTH DAILY, Disp-90 capsule, R-0Normal      Ascorbic Acid (VITAMIN C) 250 MG tablet Take 500 mg by mouth dailyHistorical Med      zinc 50 MG TABS tablet Take 50 mg by mouth dailyHistorical Med baclofen (LIORESAL) 10 MG tablet Take 1 tablet by mouth 3 times daily as needed (PRN), Disp-30 tablet, R-0Normal      ketotifen (ZADITOR) 0.025 % ophthalmic solution Place into both eyes 2 times daily Historical Med      MAGNESIUM-OXIDE 400 (241.3 Mg) MG TABS tablet daily , DAWHistorical Med      azelastine (ASTELIN) 0.1 % nasal spray 2 sprays by Nasal route 2 times daily Use in each nostril as directed, Disp-3 Bottle, R-3Normal      cetirizine (ZYRTEC) 10 MG tablet TAKE 1 CAPSULE BY MOUTH DAILY, Disp-90 tablet, R-3Normal      albuterol sulfate HFA (PROVENTIL HFA) 108 (90 Base) MCG/ACT inhaler Inhale 2 puffs into the lungs every 4 hours as needed for Wheezing or Shortness of Breath (Space out to every 6 hours as symptoms improve) Space out to every 6 hours as symptoms improve., Disp-1 Inhaler, R-0Print      penicillin v potassium (VEETID) 500 MG tablet TAKE 1 TABLET BY MOUTH TWICE DAILY, Disp-60 tablet, R-2Normal      brompheniramine-pseudoephedrine-DM (BROMFED DM) 2-30-10 MG/5ML syrup Take 5 mLs by mouth 4 times daily as needed for Congestion or Cough, Disp-240 mL, R-0Normal      valACYclovir (VALTREX) 500 MG tablet TK 1 T PO Q 12 H. TAKE BID FOR 3 DAYS AT FIRST ONSET OF SYMPTOMSHistorical Med      ondansetron (ZOFRAN) 4 MG tablet Take 4 mg by mouth every 8 hours as needed for Nausea or VomitingHistorical Med               ALLERGIES No Known Allergies      BP (!) 134/90   Pulse 69   Temp 97.9 °F (36.6 °C) (Oral)   Resp 16   Ht 5' 4\" (1.626 m)   Wt 160 lb 5 oz (72.7 kg)   LMP 12/26/2008   SpO2 96%   BMI 27.52 kg/m²   General:  No acute distress. Non toxic appearance  Head:   Normocephalic and atraumatic  Eyes:   No external eye swelling noted. Right eye shows mild diffuse conjunctival injection with some tearing and mild photophobia. Left eye appears normal.  Visual acuity at distant vision without correction is 20/50 in the right eye and 20/50 in the left eye. PERRL, EOMI. Anterior chambers are clear.

## 2021-09-21 DIAGNOSIS — J30.89 OTHER ALLERGIC RHINITIS: ICD-10-CM

## 2021-09-21 RX ORDER — CETIRIZINE HYDROCHLORIDE 10 MG/1
TABLET ORAL
Qty: 90 TABLET | Refills: 3 | Status: SHIPPED | OUTPATIENT
Start: 2021-09-21 | End: 2022-01-27 | Stop reason: SDUPTHER

## 2021-10-26 DIAGNOSIS — R05.3 PERSISTENT COUGH: ICD-10-CM

## 2021-10-26 DIAGNOSIS — E55.9 VITAMIN D DEFICIENCY: ICD-10-CM

## 2021-10-26 DIAGNOSIS — E03.9 ACQUIRED HYPOTHYROIDISM: ICD-10-CM

## 2021-10-26 RX ORDER — BROMPHENIRAMINE MALEATE, PSEUDOEPHEDRINE HYDROCHLORIDE, AND DEXTROMETHORPHAN HYDROBROMIDE 2; 30; 10 MG/5ML; MG/5ML; MG/5ML
5 SYRUP ORAL 4 TIMES DAILY PRN
Qty: 240 ML | Refills: 0 | Status: CANCELLED | OUTPATIENT
Start: 2021-10-26

## 2021-10-26 RX ORDER — LEVOTHYROXINE SODIUM 0.05 MG/1
50 TABLET ORAL EVERY MORNING
Qty: 90 TABLET | Refills: 0 | Status: CANCELLED | OUTPATIENT
Start: 2021-10-26

## 2021-10-26 NOTE — TELEPHONE ENCOUNTER
----- Message from [facility] sent at 10/26/2021  9:02 AM EDT -----  Subject: Refill Request    QUESTIONS  Name of Medication? levothyroxine (SYNTHROID) 50 MCG tablet  Patient-reported dosage and instructions? 50 mcg / daily every morning  How many days do you have left? 4  Preferred Pharmacy? [id number]  Pharmacy phone number (if available)? [phone]  Additional Information for Provider? 90 day supply  ---------------------------------------------------------------------------  --------------,  Name of Medication? Cholecalciferol (VITAMIN D3) 25 MCG (1000 UT) CAPS  Patient-reported dosage and instructions? 25 mcg / one tablet daily  How many days do you have left? 0  Preferred Pharmacy? [id number] #[id number]  Pharmacy phone number (if available)? [phone]  Additional Information for Provider? 90 day supply, out of medication  ---------------------------------------------------------------------------  --------------,  Name of Medication? MAGNESIUM-OXIDE 400 (241.3 Mg) MG TABS tablet  Patient-reported dosage and instructions? 400 mg / daily  How many days do you have left? 0  Preferred Pharmacy? [facility] #[id number]  Pharmacy phone number (if available)? [phone]  Additional Information for Provider? 90 day supply, out of medications  ---------------------------------------------------------------------------  --------------,  Name of Medication? brompheniramine-pseudoephedrine-DM (BROMFED DM)   2-30-10 MG/5ML syrup  Patient-reported dosage and instructions? 2-30-19 mg / 5ML syrup / 4 times   daily  How many days do you have left? 2  Preferred Pharmacy? [facility] #[id number]  Pharmacy phone number (if available)? [phone]  Additional Information for Provider? 90 day supply  ---------------------------------------------------------------------------  --------------  CALL BACK INFO  What is the best way for the office to contact you?  OK to leave message on voicemail  Preferred Call Back Phone Number?  [phone]

## 2021-10-27 ENCOUNTER — OFFICE VISIT (OUTPATIENT)
Dept: INTERNAL MEDICINE CLINIC | Age: 61
End: 2021-10-27
Payer: COMMERCIAL

## 2021-10-27 VITALS
BODY MASS INDEX: 27.14 KG/M2 | DIASTOLIC BLOOD PRESSURE: 80 MMHG | SYSTOLIC BLOOD PRESSURE: 116 MMHG | HEART RATE: 80 BPM | TEMPERATURE: 96.9 F | WEIGHT: 159 LBS | OXYGEN SATURATION: 98 % | HEIGHT: 64 IN

## 2021-10-27 DIAGNOSIS — G89.29 CHRONIC LEFT-SIDED LOW BACK PAIN WITH LEFT-SIDED SCIATICA: ICD-10-CM

## 2021-10-27 DIAGNOSIS — Z23 NEED FOR IMMUNIZATION AGAINST INFLUENZA: ICD-10-CM

## 2021-10-27 DIAGNOSIS — M54.42 CHRONIC LEFT-SIDED LOW BACK PAIN WITH LEFT-SIDED SCIATICA: ICD-10-CM

## 2021-10-27 DIAGNOSIS — R05.3 PERSISTENT COUGH: ICD-10-CM

## 2021-10-27 DIAGNOSIS — N18.4 CKD (CHRONIC KIDNEY DISEASE) STAGE 4, GFR 15-29 ML/MIN (HCC): ICD-10-CM

## 2021-10-27 DIAGNOSIS — R73.03 PREDIABETES: ICD-10-CM

## 2021-10-27 DIAGNOSIS — J30.9 ALLERGIC SINUSITIS: ICD-10-CM

## 2021-10-27 DIAGNOSIS — E03.9 ACQUIRED HYPOTHYROIDISM: ICD-10-CM

## 2021-10-27 DIAGNOSIS — E78.2 MIXED HYPERLIPIDEMIA: Primary | ICD-10-CM

## 2021-10-27 DIAGNOSIS — K21.9 GASTROESOPHAGEAL REFLUX DISEASE WITHOUT ESOPHAGITIS: ICD-10-CM

## 2021-10-27 LAB
CHP ED QC CHECK: NORMAL
GLUCOSE BLD-MCNC: 101 MG/DL
HBA1C MFR BLD: 5.7 %

## 2021-10-27 PROCEDURE — 82962 GLUCOSE BLOOD TEST: CPT | Performed by: INTERNAL MEDICINE

## 2021-10-27 PROCEDURE — 83036 HEMOGLOBIN GLYCOSYLATED A1C: CPT | Performed by: INTERNAL MEDICINE

## 2021-10-27 PROCEDURE — 99214 OFFICE O/P EST MOD 30 MIN: CPT | Performed by: INTERNAL MEDICINE

## 2021-10-27 PROCEDURE — G0008 ADMIN INFLUENZA VIRUS VAC: HCPCS | Performed by: INTERNAL MEDICINE

## 2021-10-27 PROCEDURE — 90674 CCIIV4 VAC NO PRSV 0.5 ML IM: CPT | Performed by: INTERNAL MEDICINE

## 2021-10-27 RX ORDER — MONTELUKAST SODIUM 10 MG/1
10 TABLET ORAL EVERY EVENING
Qty: 90 TABLET | Refills: 0 | Status: SHIPPED | OUTPATIENT
Start: 2021-10-27 | End: 2022-01-27 | Stop reason: SDUPTHER

## 2021-10-27 RX ORDER — BROMPHENIRAMINE MALEATE, PSEUDOEPHEDRINE HYDROCHLORIDE, AND DEXTROMETHORPHAN HYDROBROMIDE 2; 30; 10 MG/5ML; MG/5ML; MG/5ML
5 SYRUP ORAL 4 TIMES DAILY PRN
Qty: 240 ML | Refills: 0 | Status: SHIPPED | OUTPATIENT
Start: 2021-10-27 | End: 2021-11-18 | Stop reason: SDUPTHER

## 2021-10-27 RX ORDER — LEVOTHYROXINE SODIUM 0.05 MG/1
50 TABLET ORAL EVERY MORNING
Qty: 90 TABLET | Refills: 0 | Status: SHIPPED | OUTPATIENT
Start: 2021-10-27 | End: 2022-01-27 | Stop reason: SDUPTHER

## 2021-10-27 RX ORDER — ESOMEPRAZOLE MAGNESIUM 40 MG/1
40 CAPSULE, DELAYED RELEASE ORAL DAILY
Qty: 90 CAPSULE | Refills: 0 | Status: SHIPPED | OUTPATIENT
Start: 2021-10-27 | End: 2022-01-27 | Stop reason: SDUPTHER

## 2021-10-27 RX ORDER — ALBUTEROL SULFATE 90 UG/1
2 AEROSOL, METERED RESPIRATORY (INHALATION) EVERY 6 HOURS PRN
Qty: 1 EACH | Refills: 0 | Status: SHIPPED | OUTPATIENT
Start: 2021-10-27 | End: 2021-11-29 | Stop reason: SDUPTHER

## 2021-10-27 ASSESSMENT — PATIENT HEALTH QUESTIONNAIRE - PHQ9
2. FEELING DOWN, DEPRESSED OR HOPELESS: 0
1. LITTLE INTEREST OR PLEASURE IN DOING THINGS: 0
SUM OF ALL RESPONSES TO PHQ9 QUESTIONS 1 & 2: 0
SUM OF ALL RESPONSES TO PHQ QUESTIONS 1-9: 0

## 2021-10-27 NOTE — PROGRESS NOTES
SUBJECTIVE:  Onur Medellin is a 63 y.o. female [address]   Chief Complaint   Patient presents with    Hyperlipidemia    Other        PT HERE FOR EVAL     HLP - ? DIET / Matt Plants D/W PT   HYPOTHYROIDISM - TAKING MED. [facility] Fred. NO COLD / NO HEAT INTOLERANCE    PREDIABETES  - DIET / EXERCISE REVIEWED. LAB D/W PT  GERD - ON NEXIUM - TAKING OCCASIONALLY. NO HEARTBURN, NO BELCHING              CKD - AVOIDING NSAIDS.  RECENT LAB D/W PT.  LOW BACK PAIN - LT. PERSISTENT. + RAD LT LEG. PAIN SCALE  3-5/10. ? NUMBNESS / TINGLING. COUGH - PERSISTENT. OCC  PRODUCTIVE ? COLOR OF PHLEGM, NO HEMOPTYSIS. NO F/C, NO WHEEZING  ALLERGIC SINUSITIS -   OCC NASAL CONGESTION , OCC POSTNASAL DRAINAGE , ? SINUS PRESSURE, NO HA , + SNEEZING, + OCC WATERY ITCHY EYES  NEEDS FLU VACCINE      DENIES CP,  NO SOB ,  No PALPITATIONS. No ABD PAIN, NO NAUSEA, NO VOMITING, No DIARRHEA, No CONSTIPATION, No MELENA, No HEMATOCHEZIA. No DYSURIA, No FREQ, No URGENCY, No HEMATURIA      PMH: REVIEWED AND UPDATED TODAY    PSH: REVIEWED AND UPDATED TODAY    SOCIAL HX: REVIEWED AND UPDATED TODAY    FAMILY HX: REVIEWED AND UPDATED TODAY    ALLERGY:  Patient has no known allergies. MEDS: REVIEWED  Prior to Visit Medications    Medication Sig Taking?  Authorizing Provider   cetirizine (ZYRTEC) 10 MG tablet TAKE 1 TABLET BY MOUTH DAILY Yes Stacie Perrin MD   erythromycin [facility]) 5 MG/GM ophthalmic ointment Apply to OD TID for 7 days Yes July Sepulveda MD   BREO ELLIPTA 100-25 MCG/INH AEPB inhaler INHALE 1 PUFF INTO THE LUNGS DAILY Yes Stacie Perrin MD   esomeprazole (NEXIUM) 40 MG delayed release capsule Take 1 capsule by mouth daily Yes Baltazar Vickers MD   levothyroxine (SYNTHROID) 50 MCG tablet Take 1 tablet by mouth every morning Yes Baltazar Vickers MD   fluticasone (FLONASE) 50 MCG/ACT nasal spray SHAKE BOTTLE AND USE 1 TO 2 SPRAYS IN EACH NOSTRIL DAILY AS NEEDED FOR RHINITIS OR ALLERGIES Yes Baltazar Vickers MD (72.1 kg)   [facility] 12/26/2008   SpO2 98%   Breastfeeding No   BMI 27.29 kg/m²     NO ACUTE DISTRESS    REPEAT BP: 116/80 (LT), NO ORTHOSTASIS     Body mass index is 27.29 kg/m². HEENT: NO PALLOR, ANICTERIC, PERRLA, EOMI, NO CONJUNCTIVAL ERYTHEMA,                 + NASAL CONGESTION, NO SINUS TENDERNESS  NECK:  SUPPLE, TRACHEA MIDLINE, NT, NO JVD, NO CB, NO LA, NO TM, NO STIFFNESS  CHEST: RESPY EFFORT NL, GOOD AE, NO W/R/C - CTA  HEART: S1S2+ REG, NO M/G/R  ABD: SOFT, NT, NO HSM, BS+  EXT: NO EDEMA, NT, PULSES +. JIMMY'S -VE  NEURO: ALERT AND ORIENTED X 3, NO MENINGEAL SIGNS, NO TREMORS, NL GAIT, NO FOCAL DEFICITS  PSYCH: FAIRLY GOOD AFFECT  BACK: NT, NO ROM, NO CVA TENDERNESS     PREVIOUS LABS REVIEWED AND D/W PT / LAST LABS PENDING    ACCUCHECK: 101    POC HBA1C: 5.7    ASSESSMENT / PLAN:     Diagnosis Orders   1. Mixed hyperlipidemia  COUNSELLED. DEFERRED MED. ADVISED LOW FAT / CHOL DIET/ EXERCISE.  MONITOR. F/U LABS  GOALS D/W PT.  MAKE CHANGES AS NEEDED. 2. Acquired hypothyroidism  COUNSELLED. MONITOR ON SYNTHROID. TSH TO EVAL  MAKE CHANGES AS NEEDED       3. Prediabetes  COUNSELLED. ADVISED ON DIET / EXERCISE  ADVISED RISK FACTOR MODIFICATION. MONITOR  MAKE CHANGES AS NEEDED. 4. Gastroesophageal reflux disease without esophagitis  COUNSELLED. CONTINUE MGT. ANTIREFLUX PRECAUTIONS ADVISED. MONITOR. MAKE CHANGES AS NEEDED. 5. CKD (chronic kidney disease) stage 4, GFR 15-29 ml/min ([facility])  COUNSELLED. ADVISED AVOID NSAIDS. MAINTAIN HYDRATION. MONITOR. MAKE CHANGES AS NEEDED. 6. Chronic left-sided low back pain with left-sided sciatica  COUNSELLED. ? OA. EXERCISES. ANALGESICS PRN. MONITOR. MAKE CHANGES AS NEEDED. 7. Persistent cough  COUNSELLED. ANTITUSSIVE PRN  ADD SINGULAIR 10 MG QPM  F/U CXR  MAKE CHANGES AS NEEDED. 8. Allergic sinusitis  COUNSELLED. ZYRTEC PRN  ADD SINGULAIR AS ABOVE  MAKE CHANGES AS NEEDED.        9. Need for immunization against influenza COUNSELLED. S/E D/W PT. FLU VACCINE GIVEN. PT TOLERATED.                          MEDICATION SIDE EFFECTS D/W PATIENT    RETURN TO CLINIC WITHIN 3 MONTHS / PRN     FOLLOW UP FOR FASTING LABS, X RAY

## 2021-10-29 ENCOUNTER — HOSPITAL ENCOUNTER (OUTPATIENT)
Dept: GENERAL RADIOLOGY | Age: 61
Discharge: HOME | End: 2021-10-29
Payer: COMMERCIAL

## 2021-10-29 ENCOUNTER — HOSPITAL ENCOUNTER (OUTPATIENT)
Age: 61
Discharge: HOME | End: 2021-10-29
Payer: COMMERCIAL

## 2021-10-29 DIAGNOSIS — N18.4 CKD (CHRONIC KIDNEY DISEASE) STAGE 4, GFR 15-29 ML/MIN (HCC): ICD-10-CM

## 2021-10-29 DIAGNOSIS — R05.3 PERSISTENT COUGH: ICD-10-CM

## 2021-10-29 DIAGNOSIS — K21.9 GASTROESOPHAGEAL REFLUX DISEASE WITHOUT ESOPHAGITIS: ICD-10-CM

## 2021-10-29 DIAGNOSIS — R73.03 PREDIABETES: ICD-10-CM

## 2021-10-29 DIAGNOSIS — E03.9 ACQUIRED HYPOTHYROIDISM: ICD-10-CM

## 2021-10-29 DIAGNOSIS — E78.2 MIXED HYPERLIPIDEMIA: ICD-10-CM

## 2021-10-29 LAB
A/G RATIO: 1.9 (ref 1.1–2.2)
ALBUMIN SERPL-MCNC: 4.5 G/DL (ref 3.4–5)
ALP BLD-CCNC: 129 U/L (ref 40–129)
ALT SERPL-CCNC: 17 U/L (ref 10–40)
ANION GAP SERPL CALCULATED.3IONS-SCNC: 15 MMOL/L (ref 3–16)
AST SERPL-CCNC: 20 U/L (ref 15–37)
BILIRUB SERPL-MCNC: 0.5 MG/DL (ref 0–1)
BUN BLDV-MCNC: 22 MG/DL (ref 7–20)
CALCIUM SERPL-MCNC: 9.8 MG/DL (ref 8.3–10.6)
CHLORIDE BLD-SCNC: 105 MMOL/L (ref 99–110)
CHOLESTEROL, TOTAL: 221 MG/DL (ref 0–199)
CO2: 22 MMOL/L (ref 21–32)
CREAT SERPL-MCNC: 2.6 MG/DL (ref 0.6–1.2)
GFR AFRICAN AMERICAN: 23
GFR NON-AFRICAN AMERICAN: 19
GLUCOSE BLD-MCNC: 92 MG/DL (ref 70–99)
HDLC SERPL-MCNC: 81 MG/DL (ref 40–60)
LDL CHOLESTEROL CALCULATED: 121 MG/DL
MAGNESIUM: 2 MG/DL (ref 1.8–2.4)
POTASSIUM SERPL-SCNC: 4.2 MMOL/L (ref 3.5–5.1)
SODIUM BLD-SCNC: 142 MMOL/L (ref 136–145)
TOTAL PROTEIN: 6.9 G/DL (ref 6.4–8.2)
TRIGL SERPL-MCNC: 97 MG/DL (ref 0–150)
TSH REFLEX: 3.21 UIU/ML (ref 0.27–4.2)
VITAMIN D 25-HYDROXY: 59.1 NG/ML
VLDLC SERPL CALC-MCNC: 19 MG/DL

## 2021-10-29 PROCEDURE — 71046 X-RAY EXAM CHEST 2 VIEWS: CPT

## 2021-11-17 ENCOUNTER — TELEPHONE (OUTPATIENT)
Dept: INTERNAL MEDICINE CLINIC | Age: 61
End: 2021-11-17

## 2021-11-17 NOTE — TELEPHONE ENCOUNTER
I called pt got the v/m left message for pt that she would need to be seen to get z-pack.  Please make pt an appt for 10 am vv visit for tomorrow if and when pt call back

## 2021-11-17 NOTE — TELEPHONE ENCOUNTER
Patient is experiencing cold symptoms and is requesting a z-pac be sent to [facility]. Symptoms: bad cough, constant runny nose, headache, chest hurts when coughing and she has no fever. Patient took Theraflu yesterday and it is not working. She has been drinking lots of tea and water. Patient has not been around anyone with Covid and has not been tested for Covid.

## 2021-11-18 ENCOUNTER — VIRTUAL VISIT (OUTPATIENT)
Dept: INTERNAL MEDICINE CLINIC | Age: 61
End: 2021-11-18
Payer: COMMERCIAL

## 2021-11-18 DIAGNOSIS — J30.9 ACUTE ALLERGIC RHINITIS: Primary | ICD-10-CM

## 2021-11-18 DIAGNOSIS — R05.3 PERSISTENT COUGH: ICD-10-CM

## 2021-11-18 DIAGNOSIS — N18.4 CKD (CHRONIC KIDNEY DISEASE) STAGE 4, GFR 15-29 ML/MIN (HCC): ICD-10-CM

## 2021-11-18 DIAGNOSIS — E03.9 ACQUIRED HYPOTHYROIDISM: ICD-10-CM

## 2021-11-18 DIAGNOSIS — R73.03 PREDIABETES: ICD-10-CM

## 2021-11-18 DIAGNOSIS — E78.2 MIXED HYPERLIPIDEMIA: ICD-10-CM

## 2021-11-18 DIAGNOSIS — J02.9 ACUTE PHARYNGITIS, UNSPECIFIED ETIOLOGY: ICD-10-CM

## 2021-11-18 DIAGNOSIS — K21.9 GASTROESOPHAGEAL REFLUX DISEASE WITHOUT ESOPHAGITIS: ICD-10-CM

## 2021-11-18 PROCEDURE — 99214 OFFICE O/P EST MOD 30 MIN: CPT | Performed by: INTERNAL MEDICINE

## 2021-11-18 RX ORDER — AMOXICILLIN 500 MG/1
500 CAPSULE ORAL 3 TIMES DAILY
Qty: 30 CAPSULE | Refills: 0 | Status: SHIPPED | OUTPATIENT
Start: 2021-11-18 | End: 2021-11-28

## 2021-11-18 RX ORDER — BROMPHENIRAMINE MALEATE, PSEUDOEPHEDRINE HYDROCHLORIDE, AND DEXTROMETHORPHAN HYDROBROMIDE 2; 30; 10 MG/5ML; MG/5ML; MG/5ML
5 SYRUP ORAL 4 TIMES DAILY PRN
Qty: 240 ML | Refills: 0 | Status: SHIPPED | OUTPATIENT
Start: 2021-11-18 | End: 2022-01-27 | Stop reason: SDUPTHER

## 2021-11-18 NOTE — PROGRESS NOTES
2021    TELEHEALTH EVALUATION -- Audio/Visual (During LAPAD-60 public health emergency)    PLACE OF SERVICE: PATIENT'S HOME    HPI: SEE BELOW    Denver Pringle (:  1960) has requested an audio/video evaluation for the following concern(s): ALLERGIC SINUSITIS -  INCREASED SYMPTOMS PAST 4 DAYS. _+ NASAL CONGESTION , +  POSTNASAL DRAINAGE , + SINUS PRESSURE, + HA , + SNEEZING, + OCC WATERY ITCHY EYES. + SCRATCHY THROAT  C/O SORE THROAT - PAST 3 DAYS  COUGH - INCREASED.  +  PRODUCTIVE - YELLOWISH PHLEGM, NO HEMOPTYSIS, NO WHEEZING. ? NO SICK CONTACTS, NO FEVER. + CHILLS  HLP - ? DIET / EXERCISE COMPLIANCE. LAST LAB D/W PT   HYPOTHYROIDISM - TAKING MED. [place] Reasons. NO COLD / NO HEAT INTOLERANCE    PREDIABETES  - DIET / EXERCISE REVIEWED. LAB D/W PT  GERD - TAKING NEXIUM  OCCASIONALLY. NO HEARTBURN, NO BELCHING              CKD - AVOIDING NSAIDS.  RECENT LAB D/W PT.      DENIES CP,  NO SOB ,  No PALPITATIONS. No ABD PAIN, NO NAUSEA, NO VOMITING, No DIARRHEA, No CONSTIPATION, No MELENA, No HEMATOCHEZIA. No DYSURIA, No FREQ, No URGENCY, No HEMATURIA    ALLERGY:  No Known Allergies      Prior to Visit Medications    Medication Sig Taking?  Authorizing Provider   MAGNESIUM-OXIDE 400 (241.3 Mg) MG TABS tablet Take 1 tablet by mouth daily Yes Claribel Mi MD   esomeprazole (NEXIUM) 40 MG delayed release capsule Take 1 capsule by mouth daily Yes Claribel Mi MD   levothyroxine (SYNTHROID) 50 MCG tablet Take 1 tablet by mouth every morning Yes Claribel Mi MD   brompheniramine-pseudoephedrine-DM (BROMFED DM) 2-30-10 MG/5ML syrup Take 5 mLs by mouth 4 times daily as needed for Congestion or Cough Yes Claribel Mi MD   Cholecalciferol (VITAMIN D3) 25 MCG (1000 UT) CAPS TAKE 1 CAPSULE BY MOUTH DAILY Yes Claribel Mi MD   montelukast (SINGULAIR) 10 MG tablet Take 1 tablet by mouth every evening Yes Claribel Mi MD   albuterol sulfate HFA (PROVENTIL HFA) 108 (90 Base) MCG/ACT inhaler Inhale 2 puffs into the lungs every 6 hours as needed (Space out to every 6 hours as symptoms improve) Space out to every 6 hours as symptoms improve. Yes Manjula Dias MD   cetirizine (ZYRTEC) 10 MG tablet TAKE 1 TABLET BY MOUTH DAILY Yes Ahmet Dong MD   erythromycin [facility]) 5 MG/GM ophthalmic ointment Apply to OD TID for 7 days Yes Linda Reaves MD   BREO ELLIPTA 100-25 MCG/INH AEPB inhaler INHALE 1 PUFF INTO THE LUNGS DAILY Yes Ahmet Dong MD   fluticasone (FLONASE) 50 MCG/ACT nasal spray SHAKE BOTTLE AND USE 1 TO 2 SPRAYS IN EACH NOSTRIL DAILY AS NEEDED FOR RHINITIS OR ALLERGIES Yes Manjula Dias MD   Ascorbic Acid (VITAMIN C) 250 MG tablet Take 500 mg by mouth daily Yes Historical Provider, MD   zinc 50 MG TABS tablet Take 50 mg by mouth daily Yes Historical Provider, MD   baclofen (LIORESAL) 10 MG tablet Take 1 tablet by mouth 3 times daily as needed (PRN) Yes Manjula Dias MD   valACYclovir (VALTREX) 500 MG tablet TK 1 T PO Q 12 H. TAKE BID FOR 3 DAYS AT FIRST ONSET OF SYMPTOMS Yes Historical Provider, MD   ketotifen (ZADITOR) 0.025 % ophthalmic solution Place into both eyes 2 times daily  Yes Historical Provider, MD   azelastine (ASTELIN) 0.1 % nasal spray 2 sprays by Nasal route 2 times daily Use in each nostril as directed  Patient taking differently: 2 sprays by Nasal route as needed Use in each nostril as directed Yes Ahmet Dong MD   penicillin v potassium (VEETID) 500 MG tablet TAKE 1 TABLET BY MOUTH TWICE DAILY Yes Alyssa Escoto APRN - CNP       Social History     Tobacco Use    Smoking status: Never Smoker    Smokeless tobacco: Never Used   Vaping Use    Vaping Use: Never used   Substance Use Topics    Alcohol use:  Yes     Alcohol/week: 1.0 standard drink     Types: 1 Glasses of wine per week     Comment: 1 glass wine daily    Drug use: No          ROS: COMPREHENSIVE ROS AS IN HX, REST -VE  History obtained from chart review and the patient    PHYSICAL EXAMINATION:  [ Karoline Mini SALT WATER GAGGLES. THROAT LOZENGES PRN. MAKE CHANGES AS NEEDED. 3. Persistent cough  COUNSELLED. SYMPTOMATIC RX. MED PRN  F/U COVID TEST  MAKE CHANGES AS NEEDED. 4. Mixed hyperlipidemia  COUNSELLED. ADVISED LOW FAT / CHOL DIET/ EXERCISE.  MONITOR. GOALS D/W PT.  MAKE CHANGES AS NEEDED. 5. Acquired hypothyroidism  COUNSELLED. MONITOR ON SYNTHROID.  MAKE CHANGES AS NEEDED       6. Prediabetes  COUNSELLED. ADVISED ON DIET / EXERCISE  ADVISED RISK FACTOR MODIFICATION. MONITOR  MAKE CHANGES AS NEEDED. 7. Gastroesophageal reflux disease without esophagitis  COUNSELLED. CONTINUE MGT. ANTIREFLUX PRECAUTIONS ADVISED. MONITOR. MAKE CHANGES AS NEEDED. 8. CKD (chronic kidney disease) stage 4, GFR 15-29 ml/min ([facility])  COUNSELLED. ADVISED AVOID NSAIDS. MAINTAIN HYDRATION. MONITOR. MAKE CHANGES AS NEEDED. MEDICATION SIDE EFFECTS D/W PATIENT     RETURN TO CLINIC PREVIOUS  / PRN      FOLLOW UP FOR LABS      Tamara Andrews, was evaluated through a synchronous (real-time) audio-video encounter. The patient (or guardian if applicable) is aware that this is a billable service. Verbal consent to proceed has been obtained within the past 12 months. The visit was conducted pursuant to the emergency declaration under the [facility] [address] authority and the [employer] and [employer] General Act. Patient identification was verified, and a caregiver was present when appropriate. The patient was located in a state where the provider was credentialed to provide care. --Alissa Marquez MD on 11/18/2021 at 5:00 PM    An electronic signature was used to authenticate this note.

## 2021-11-29 ENCOUNTER — OFFICE VISIT (OUTPATIENT)
Dept: PULMONOLOGY | Age: 61
End: 2021-11-29
Payer: COMMERCIAL

## 2021-11-29 VITALS
HEIGHT: 64 IN | HEART RATE: 81 BPM | WEIGHT: 161 LBS | OXYGEN SATURATION: 93 % | TEMPERATURE: 96 F | BODY MASS INDEX: 27.49 KG/M2 | SYSTOLIC BLOOD PRESSURE: 112 MMHG | DIASTOLIC BLOOD PRESSURE: 78 MMHG

## 2021-11-29 DIAGNOSIS — J45.40 MODERATE PERSISTENT ASTHMA WITHOUT COMPLICATION: Primary | ICD-10-CM

## 2021-11-29 DIAGNOSIS — Z94.84 H/O STEM CELL TRANSPLANT (HCC): ICD-10-CM

## 2021-11-29 DIAGNOSIS — Z85.72 HX OF LYMPHOMA: ICD-10-CM

## 2021-11-29 DIAGNOSIS — R05.8 UPPER AIRWAY COUGH SYNDROME: ICD-10-CM

## 2021-11-29 DIAGNOSIS — J30.89 OTHER ALLERGIC RHINITIS: ICD-10-CM

## 2021-11-29 DIAGNOSIS — R05.3 PERSISTENT COUGH: ICD-10-CM

## 2021-11-29 PROCEDURE — 99214 OFFICE O/P EST MOD 30 MIN: CPT | Performed by: INTERNAL MEDICINE

## 2021-11-29 RX ORDER — ALBUTEROL SULFATE 90 UG/1
2 AEROSOL, METERED RESPIRATORY (INHALATION) EVERY 4 HOURS PRN
Qty: 2 EACH | Refills: 11 | Status: SHIPPED | OUTPATIENT
Start: 2021-11-29 | End: 2021-11-29

## 2021-11-29 RX ORDER — AZELASTINE 1 MG/ML
2 SPRAY, METERED NASAL 2 TIMES DAILY
Qty: 3 EACH | Refills: 3 | Status: SHIPPED | OUTPATIENT
Start: 2021-11-29 | End: 2022-01-27 | Stop reason: SDUPTHER

## 2021-11-29 NOTE — PROGRESS NOTES
visited I gave her Zyrtec and if needed Flonase which she did start. Cough is some better but she still has a daily dry cough with postnasal drip and a tickle in her throat. No fevers, chills, night sweats, anorexia, weight loss, hemoptysis, dyspnea, wheezing, or chest tightness. November 26, 2019  Miroslava Rodas is here for follow-up of a cough with purulent sputum. When I saw her in October I gave her 2 weeks of Levaquin and repeat his CT chest approximately a week ago. Left lower lobe pneumonia has resolved. Her fatigue and dyspnea on exertion have resolved as well. Her cough while improving is still present. She has postnasal drip and throat clearing. No fevers, chills, anorexia, or weight loss. Phlegm production is clear to yellow. October 16, 2019  Miroslava Rodas has a past medical history of lymphoma status post bone marrow transplant is here for evaluation of a cough productive of purulent sputum and abnormal CT chest since mid August.  She was initially given a Z-Tim without improvement. Her oncologist Dr Vishal Manley ordered a CT chest September 6 and then gave her a week course of Levaquin. She states that her fatigue I will bit better but her cough with purulent sputum persists. She has no fevers, chills, anorexia, night sweats, weight loss, hemoptysis, or chest pain. She has some mild dyspnea on occasion    January 2019  Miroslava Rodas is here for follow-up of abnormal CT chest.  She is doing well and denies any fevers, chills, night sweats, weight loss, anorexia, malaise, cough, wheezing, or shortness of breath. 12/13/2018  Miroslava Rodas Is here for follow-up of abnormal CT chest with BAL cultures positive for sterile mycelium. Initially she had dyspnea on exertion and cough which prompted a CT chest which showed groundglass opacities that prompted a bronchoscopy. Since last visit she saw Dr. Araceli Shah from infectious disease and his opinion was that this did not represent a pathogen.   She was not treated and has done very well.  Her cough and dyspnea on exertion are much improved. His no fevers, chills, night sweats, anorexia, weight loss, or malaise. Previous Visit 10/25/2018  Lily Fischer has a  past medical history of lymphoma status post bone marrow transplant and UA Is here for follow-up of bronchoscopy performed August 30, 2018. The procedure showed thick purulent secretions with plugs seen in the medial basal segment of the right lower lobe and proximal segment of the left lower lobe. Therapeutic aspiration was performed and cultures were obtained. 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 Patient continues to have a producitve cough and dyspnea on exertion but no fevers, chills, night sweats, malaise, anorexia or weight loss    Previous visit August 23, 2018  Lily Fischer is here for two-week follow-up of dyspnea on exertion and cough. Since last visit she's had a CT chest and PFTs. She has no change in her dyspnea on exertion and cough. She continues to have no fevers, chills, anorexia, chest pain, or weight loss    Previous visit 8/1/2018   The patient is 63 y.o. female with past medical history of lymphoma status post bone marrow transplant and UACS on Flonase and Zyrtec presents for evaluation of worsening dyspnea on exertion to the point she gets winded going up 1 flight of stairs. She has a cough that she describes as both dry but also bringing up some yellow phlegm. She has no associated fevers, chills, anorexia, chest pain, or weight loss. .     Past Medical History:    Past Medical History:   Diagnosis Date    Allergic rhinitis     Arthritis     CKD (chronic kidney disease)     DLBCL (diffuse large B cell lymphoma) ([address] 2/2010    non-hodgkins lymphoma    GERD (gastroesophageal reflux disease)     Hx of non-Hodgkin's lymphoma     MDRO (multiple drug resistant organisms) resistance 5/15/16    E.coli MDRO in urine    Migraines     Non Hodgkin's lymphoma ([address]     Peripheral neuropathy     PONV (postoperative nausea and vomiting)        Social History:    Social History     Tobacco Use   Smoking Status Never Smoker   Smokeless Tobacco Never Used       Current Medications:  Current Outpatient Medications on File Prior to Visit   Medication Sig Dispense Refill    brompheniramine-pseudoephedrine-DM (BROMFED DM) 2-30-10 MG/5ML syrup Take 5 mLs by mouth 4 times daily as needed for Congestion or Cough 240 mL 0    MAGNESIUM-OXIDE 400 (241.3 Mg) MG TABS tablet Take 1 tablet by mouth daily 30 tablet 1    esomeprazole (NEXIUM) 40 MG delayed release capsule Take 1 capsule by mouth daily 90 capsule 0    levothyroxine (SYNTHROID) 50 MCG tablet Take 1 tablet by mouth every morning 90 tablet 0    Cholecalciferol (VITAMIN D3) 25 MCG (1000 UT) CAPS TAKE 1 CAPSULE BY MOUTH DAILY 90 capsule 0    montelukast (SINGULAIR) 10 MG tablet Take 1 tablet by mouth every evening 90 tablet 0    cetirizine (ZYRTEC) 10 MG tablet TAKE 1 TABLET BY MOUTH DAILY 90 tablet 3    erythromycin (ROMYCIN) 5 MG/GM ophthalmic ointment Apply to OD TID for 7 days 3.5 g 0    BREO ELLIPTA 100-25 MCG/INH AEPB inhaler INHALE 1 PUFF INTO THE LUNGS DAILY 30 each 5    fluticasone (FLONASE) 50 MCG/ACT nasal spray SHAKE BOTTLE AND USE 1 TO 2 SPRAYS IN EACH NOSTRIL DAILY AS NEEDED FOR RHINITIS OR ALLERGIES 48 g 0    Ascorbic Acid (VITAMIN C) 250 MG tablet Take 500 mg by mouth daily      zinc 50 MG TABS tablet Take 50 mg by mouth daily      baclofen (LIORESAL) 10 MG tablet Take 1 tablet by mouth 3 times daily as needed (PRN) 30 tablet 0    valACYclovir (VALTREX) 500 MG tablet TK 1 T PO Q 12 H. TAKE BID FOR 3 DAYS AT FIRST ONSET OF SYMPTOMS      ketotifen (ZADITOR) 0.025 % ophthalmic solution Place into both eyes 2 times daily       penicillin v potassium (VEETID) 500 MG tablet TAKE 1 TABLET BY MOUTH TWICE DAILY 60 tablet 2     No current facility-administered medications on file prior to visit.        REVIEW OF SYSTEMS:    CONSTITUTIONAL: Negative for fevers and chills  HEENT: Negative for oropharyngeal exudate, post nasal drip, sinus pain / pressure, nasal congestion, ear pain  RESPIRATORY:  See HPI  CARDIOVASCULAR: Negative for chest pain, palpitations, edema  GASTROINTESTINAL: Negative for nausea, vomiting, diarrhea, constipation and abdominal pain  HEMATOLOGICAL: Negative for adenopathy  SKIN: Negative for clubbing, cyanosis, skin lesions  EXTREMITIES: Negative for weakness, decreased ROM  NEUROLOGICAL: Negative for unilateral weakness, speech or gait abnormalities    Objective:   PHYSICAL EXAM:        VITALS:    /78 (Site: Left Upper Arm, Position: Sitting, Cuff Size: Medium Adult)   Pulse 81   Temp 96 °F (35.6 °C) (Infrared)   Ht 5' 4\" (1.626 m)   Wt 161 lb (73 kg)   LMP 12/26/2008   SpO2 93%   Breastfeeding No   BMI 27.64 kg/m²     CONSTITUTIONAL:  Awake, alert, cooperative, no apparent distress, and appears stated age  HEENT: No oropharyngeal exudate, PERRL, no cervical adenopathy, no tracheal deviation, thyroid size normal  LUNGS:  No increased work of breathing and clear to auscultation, no crackles or wheezing  CARDIOVASCULAR:  normal S1 and S2 and no JVD  ABDOMEN:  Normal bowel sounds, non-distended and non-tender to palpation  EXT: No edema, no calf tenderness. Pulses are present bilaterally. NEUROLOGIC:  Mental Status Exam:  Level of Alertness:   awake  Orientation:   person, place, time.   SKIN:  normal skin color, texture, turgor, no redness, warmth, or swelling     Microbiology  Fungus (Mycology) Culture 08/30/2018  8:00 AM [facility]   Fungus Stain 08/30/2018  8:00 AM [address] [place] [address]   No Fungal elements seen    Organism  (Abnormal) 08/30/2018  8:00 AM [facility]   Sterile Mycelium     Fungus (Mycology) Culture 08/30/2018  8:00 AM [facility]   Isolated    This is a non-sporulating isolate.    These isolates have the inability to express diagnostic    characters under the conditions provided.    No further workup. Serology  Results for Guerda Zaidi (MRN [id number]) as of 10/25/2018 15:31   Ref. Range 9/27/2018 14:38   Aspergillus Galacto AG Latest Ref Range: Negative  Negative   Aspergillus Galacto Index Unknown 0.09   (1,3)-Beta-D-Glucan (Fungitell) Interpretation Latest Ref Range: Negative  Negative   (1,3)-Beta-D-Glucan (fungitell) Latest Units: pg/mL <31   Histoplasma Antigen Urine Latest Units: ng/mL Not Detected   Coccidioides Ab,ID Latest Ref Range: None Detected  None Detected   Histoplasma Ab Mycelial CF Latest Ref Range: <1:8  <1:8   Histoplasma Ab Yeast CF Latest Ref Range: <1:8  <1:8   Histoplasma Antigen, Serum Unknown See Note   Histoplasma Ag Interp Latest Ref Range: Not Detected  Not Detected   HISTOPLASMA INTERPETATION Unknown See Note       Radiology    CT chest 11/20/2019 - images and report personally reviewed by myself and the presence of the patient:     COMMENTS:        Mild degenerative changes in the spine with scoliosis. Upper abdomen is unremarkable. Mediastinal structures are unremarkable without lymphadenopathy. Previously seen consolidation in the left lower lobe has resolved. 2 mm right upper lobe pulmonary    nodule series 604 image 35 is stable since 8/18/2017. The lungs are otherwise clear.           Impression       Resolution of left lower lobe pneumonia.         Pulmonary function test - dated August 21, 2018  Mild obstructive defect without bronchodilator response  Positive bronchial challenge  Normal lung volumes  Moderate decrease in diffusion capacity    Assessment:      Diagnosis Orders   1. Moderate persistent asthma without complication     2. Persistent cough  albuterol sulfate HFA (PROVENTIL HFA) 108 (90 Base) MCG/ACT inhaler   3. Upper airway cough syndrome     4. Other allergic rhinitis     5. Hx of lymphoma     6. [facility]         Plan:     1. Continue Breo and albuterol  2. Continue Zyrtec, Flonase, and Astelin  3. Due to her past medical history she is a candidate for a third vaccination and her primary series. I recommend that she get this when she gets back on her feet from her Covid.   4.  Follow-up in 6 months or sooner if needed

## 2021-12-20 PROBLEM — D80.1 HYPOGAMMAGLOBULINEMIA (HCC): Status: RESOLVED | Noted: 2017-04-13 | Resolved: 2021-12-20

## 2022-01-27 ENCOUNTER — OFFICE VISIT (OUTPATIENT)
Dept: INTERNAL MEDICINE CLINIC | Age: 62
End: 2022-01-27
Payer: MEDICAID

## 2022-01-27 VITALS
DIASTOLIC BLOOD PRESSURE: 78 MMHG | OXYGEN SATURATION: 98 % | BODY MASS INDEX: 27.31 KG/M2 | HEART RATE: 85 BPM | WEIGHT: 160 LBS | HEIGHT: 64 IN | TEMPERATURE: 95.9 F | SYSTOLIC BLOOD PRESSURE: 110 MMHG

## 2022-01-27 DIAGNOSIS — E03.9 ACQUIRED HYPOTHYROIDISM: ICD-10-CM

## 2022-01-27 DIAGNOSIS — K21.9 GASTROESOPHAGEAL REFLUX DISEASE WITHOUT ESOPHAGITIS: ICD-10-CM

## 2022-01-27 DIAGNOSIS — N18.4 CKD (CHRONIC KIDNEY DISEASE) STAGE 4, GFR 15-29 ML/MIN (HCC): ICD-10-CM

## 2022-01-27 DIAGNOSIS — R05.3 PERSISTENT COUGH: ICD-10-CM

## 2022-01-27 DIAGNOSIS — C85.80 OTHER SPECIFIED TYPE OF NON-HODGKIN LYMPHOMA, UNSPECIFIED BODY REGION (HCC): ICD-10-CM

## 2022-01-27 DIAGNOSIS — E78.2 MIXED HYPERLIPIDEMIA: ICD-10-CM

## 2022-01-27 DIAGNOSIS — J30.9 ALLERGIC SINUSITIS: ICD-10-CM

## 2022-01-27 DIAGNOSIS — R73.03 PREDIABETES: ICD-10-CM

## 2022-01-27 DIAGNOSIS — Z00.00 ROUTINE GENERAL MEDICAL EXAMINATION AT A HEALTH CARE FACILITY: Primary | ICD-10-CM

## 2022-01-27 LAB
CHP ED QC CHECK: NORMAL
GLUCOSE BLD-MCNC: 95 MG/DL
HBA1C MFR BLD: 5.8 %

## 2022-01-27 PROCEDURE — 83036 HEMOGLOBIN GLYCOSYLATED A1C: CPT | Performed by: INTERNAL MEDICINE

## 2022-01-27 PROCEDURE — 82962 GLUCOSE BLOOD TEST: CPT | Performed by: INTERNAL MEDICINE

## 2022-01-27 PROCEDURE — G0439 PPPS, SUBSEQ VISIT: HCPCS | Performed by: INTERNAL MEDICINE

## 2022-01-27 RX ORDER — ESOMEPRAZOLE MAGNESIUM 40 MG/1
40 CAPSULE, DELAYED RELEASE ORAL DAILY
Qty: 90 CAPSULE | Refills: 0 | Status: SHIPPED | OUTPATIENT
Start: 2022-01-27 | End: 2022-07-07

## 2022-01-27 RX ORDER — BROMPHENIRAMINE MALEATE, PSEUDOEPHEDRINE HYDROCHLORIDE, AND DEXTROMETHORPHAN HYDROBROMIDE 2; 30; 10 MG/5ML; MG/5ML; MG/5ML
5 SYRUP ORAL 4 TIMES DAILY PRN
Qty: 240 ML | Refills: 0 | Status: SHIPPED | OUTPATIENT
Start: 2022-01-27 | End: 2022-06-13

## 2022-01-27 RX ORDER — MONTELUKAST SODIUM 10 MG/1
10 TABLET ORAL EVERY EVENING
Qty: 90 TABLET | Refills: 0 | Status: SHIPPED | OUTPATIENT
Start: 2022-01-27 | End: 2022-05-05 | Stop reason: SDUPTHER

## 2022-01-27 RX ORDER — AZELASTINE 1 MG/ML
2 SPRAY, METERED NASAL 2 TIMES DAILY
Qty: 3 EACH | Refills: 3 | Status: SHIPPED | OUTPATIENT
Start: 2022-01-27

## 2022-01-27 RX ORDER — CETIRIZINE HYDROCHLORIDE 10 MG/1
TABLET ORAL
Qty: 90 TABLET | Refills: 3 | Status: SHIPPED | OUTPATIENT
Start: 2022-01-27 | End: 2022-05-05 | Stop reason: ALTCHOICE

## 2022-01-27 RX ORDER — LEVOTHYROXINE SODIUM 0.05 MG/1
50 TABLET ORAL EVERY MORNING
Qty: 90 TABLET | Refills: 0 | Status: SHIPPED | OUTPATIENT
Start: 2022-01-27 | End: 2022-05-05 | Stop reason: SDUPTHER

## 2022-01-27 ASSESSMENT — PATIENT HEALTH QUESTIONNAIRE - PHQ9
1. LITTLE INTEREST OR PLEASURE IN DOING THINGS: 0
3. TROUBLE FALLING OR STAYING ASLEEP: 0
SUM OF ALL RESPONSES TO PHQ QUESTIONS 1-9: 0
SUM OF ALL RESPONSES TO PHQ QUESTIONS 1-9: 0
4. FEELING TIRED OR HAVING LITTLE ENERGY: 0
SUM OF ALL RESPONSES TO PHQ QUESTIONS 1-9: 0
2. FEELING DOWN, DEPRESSED OR HOPELESS: 0
10. IF YOU CHECKED OFF ANY PROBLEMS, HOW DIFFICULT HAVE THESE PROBLEMS MADE IT FOR YOU TO DO YOUR WORK, TAKE CARE OF THINGS AT HOME, OR GET ALONG WITH OTHER PEOPLE: 0
SUM OF ALL RESPONSES TO PHQ QUESTIONS 1-9: 0
SUM OF ALL RESPONSES TO PHQ9 QUESTIONS 1 & 2: 0
9. THOUGHTS THAT YOU WOULD BE BETTER OFF DEAD, OR OF HURTING YOURSELF: 0
7. TROUBLE CONCENTRATING ON THINGS, SUCH AS READING THE NEWSPAPER OR WATCHING TELEVISION: 0
6. FEELING BAD ABOUT YOURSELF - OR THAT YOU ARE A FAILURE OR HAVE LET YOURSELF OR YOUR FAMILY DOWN: 0
8. MOVING OR SPEAKING SO SLOWLY THAT OTHER PEOPLE COULD HAVE NOTICED. OR THE OPPOSITE, BEING SO FIGETY OR RESTLESS THAT YOU HAVE BEEN MOVING AROUND A LOT MORE THAN USUAL: 0
5. POOR APPETITE OR OVEREATING: 0

## 2022-01-27 ASSESSMENT — LIFESTYLE VARIABLES
HOW OFTEN DO YOU HAVE A DRINK CONTAINING ALCOHOL: 2
HAS A RELATIVE, FRIEND, DOCTOR, OR ANOTHER HEALTH PROFESSIONAL EXPRESSED CONCERN ABOUT YOUR DRINKING OR SUGGESTED YOU CUT DOWN: 0
HOW OFTEN DURING THE LAST YEAR HAVE YOU FAILED TO DO WHAT WAS NORMALLY EXPECTED FROM YOU BECAUSE OF DRINKING: 0
HOW OFTEN DURING THE LAST YEAR HAVE YOU HAD A FEELING OF GUILT OR REMORSE AFTER DRINKING: 0
HOW OFTEN DURING THE LAST YEAR HAVE YOU FOUND THAT YOU WERE NOT ABLE TO STOP DRINKING ONCE YOU HAD STARTED: 0
HAVE YOU OR SOMEONE ELSE BEEN INJURED AS A RESULT OF YOUR DRINKING: 0
AUDIT-C TOTAL SCORE: 3
HOW OFTEN DURING THE LAST YEAR HAVE YOU BEEN UNABLE TO REMEMBER WHAT HAPPENED THE NIGHT BEFORE BECAUSE YOU HAD BEEN DRINKING: 0
HOW MANY STANDARD DRINKS CONTAINING ALCOHOL DO YOU HAVE ON A TYPICAL DAY: 0
AUDIT TOTAL SCORE: 3
HOW OFTEN DURING THE LAST YEAR HAVE YOU NEEDED AN ALCOHOLIC DRINK FIRST THING IN THE MORNING TO GET YOURSELF GOING AFTER A NIGHT OF HEAVY DRINKING: 0
HOW OFTEN DO YOU HAVE SIX OR MORE DRINKS ON ONE OCCASION: 1

## 2022-01-27 NOTE — PROGRESS NOTES
Medicare Annual Wellness Visit  Name: Kelsie Peterson Date: 2022   MRN: [id number] Sex: Female   Age: 57 y.o. Ethnicity: Non- / Non    : 1960 Race: Black /       Gerard Fitzgerald is here for Hypertension, Hyperlipidemia, and Medicare AWV      LAST PHYSICAL > 1 YR      PREDIABETES  - DIET / EXERCISE REVIEWED. LAB D/W   HLP - ? DIET / EXERCISE COMPLIANCE. LAST LAB D/W PT   HYPOTHYROIDISM - TAKING MED. Delmar Lapping. [place] INTOLERANCE  PT  GERD - TAKING NEXIUM  OCCASIONALLY. NO HEARTBURN, NO BELCHING              CKD - AVOIDING NSAIDS. LAST LAB D/W PT.  ALLERGIC SINUSITIS -  NO NASAL CONGESTION , +  POSTNASAL DRAINAGE , NO SINUS PRESSURE, NO HA , + SNEEZING, + OCC WATERY ITCHY EYES. + SCRATCHY THROAT   H/O NON HODGKIN'S LYMPHOMA - H/O BONE MARROW TRANSPLANT. DENIES ISSUES AT THIS TIME. NO NIGHT SWEATS      DENIES CP,  NO SOB ,  No PALPITATIONS. OCC COUGH - MOSTLY NON PRODUCTIVE, NO F/C  No ABD PAIN, NO NAUSEA, NO VOMITING, No DIARRHEA, No CONSTIPATION, No MELENA, No HEMATOCHEZIA. No DYSURIA, No FREQ, No URGENCY, No HEMATURIA    ROS: COMPREHENSIVE ROS AS IN HX, REST -VE  History obtained from chart review and the patient      Screenings for behavioral, psychosocial and functional/safety risks, and cognitive dysfunction are all negative except as indicated below. These results, as well as other patient data from the [address] form, are documented in Flowsheets linked to this Encounter. No Known Allergies    Prior to Visit Medications    Medication Sig Taking?  Authorizing Provider   MAGNESIUM-OXIDE 400 (241.3 Mg) MG TABS tablet TAKE 1 TABLET BY MOUTH DAILY Yes Lashon Mendoza MD   azelastine (ASTELIN) 0.1 % nasal spray 2 sprays by Nasal route 2 times daily Use in each nostril as directed Yes Babs Vega MD   albuterol sulfate  (90 Base) MCG/ACT inhaler INHALE 2 PUFFS BY MOUTH INTO THE LUNGS EVERY 4 HOURS AS NEEDED FOR WHEEZING OR non-hodgkins lymphoma    Encounter for aftercare following bone marrow transplant ([facility] [address] 6/10/2016    GERD (gastroesophageal reflux disease)     Hx of non-Hodgkin's lymphoma     Hypogammaglobulinemia ([facility] [address] 4/13/2017    MDRO (multiple drug resistant organisms) resistance 5/15/16    E.coli MDRO in urine    Migraines     Neutropenia ([address] 7/20/2016    Non Hodgkin's lymphoma ([facility] [address]     Pancytopenia ([facility] [address] 7/20/2016    Peripheral neuropathy     PONV (postoperative nausea and vomiting)        Past Surgical History:   Procedure Laterality Date    BONE MARROW TRANSPLANT  05/16/2016    BONE MARROW TRANSPLANT      BRONCHOSCOPY  8/30/2018    BRONCHOSCOPY THERAPUTIC ASPIRATION INITIAL WITH MUCUS PLUG SENT FOR BACTERIAL CULTURE performed by Yvonne Vasquez MD at [address]      x 5    COLONOSCOPY      COLONOSCOPY N/A 1/20/2021    COLONOSCOPY POLYPECTOMY SNARE/COLD BIOPSY performed by Chen Nick MD at [address] [place] [address]  2/2010    with oophorectomy    LYMPH NODE BIOPSY  6/2011    R external iliac node -non diagnostic    LYMPH NODE BIOPSY  5/2012    R external node - Dr Sury Crow - [place]  10/08/2012    INSERTION NEOSTAR CATHETER and REMOVAL        OTHER SURGICAL HISTORY Right 05/10/2016    INSERTION TRIPLE LUMEN DORANTES CENTRAL LINE    PAIN MANAGEMENT PROCEDURE Left 4/1/2021    LEFT L2, L3 TRANSFORAMINAL EPIDURAL STEROID INJECTION WITH FLUOROSCOPY (49298, 04143) performed by Rosalio Harmon MD at [facility] [address] 8/30/2018    BRONCHOSCOPY WITH BRONCHIAL WASHINGS AND LLL BAL.  performed by Yvonne Vasquez MD at [facility]  2010    TONSILLECTOMY           Family History   Problem Relation Age of Onset   Samara Orts Cancer Mother          Lung    Hypertension Mother     Heart Disease Father     High Blood Pressure Father     Coronary Art Dis Father     Cancer Brother         lung    Stroke Brother     Diabetes Brother     Hypertension Brother     Coronary Art Dis Brother     High Cholesterol Brother     Rheum Arthritis Brother     Arthritis Other     Kidney Disease Other     Arthritis Sister     Diabetes Sister     Hypertension Sister     Rheum Arthritis Sister     Birth Defects Grandchild        CareTeam (Including outside providers/suppliers regularly involved in providing care):   Patient Care Team:  Luca Dockery MD as PCP - General (Internal Medicine)  David Carver MD as PCP - Hematology/Oncology  Luca Dockery MD as PCP - [facility] Provider  Isiah Hogan RN as Registered Nurse  Ivonne Quinonez MD as Surgeon (General Surgery)  Calvin Schultz MD as Surgeon (General Surgery)  Robert Tovar MD as Consulting Physician (Gynecologic Oncology)  Connie Le MD as Consulting Physician (Pulmonology)    Wt Readings from Last 3 Encounters:   01/27/22 160 lb (72.6 kg)   11/29/21 161 lb (73 kg)   10/27/21 159 lb (72.1 kg)     Vitals:    01/27/22 1219   BP: 100/70   Site: Left Upper Arm   Position: Sitting   Cuff Size: Large Adult   Pulse: 85   Temp: 95.9 °F (35.5 °C)   SpO2: 98%   Weight: 160 lb (72.6 kg)   Height: 5' 4\" (1.626 m)       Based upon direct observation of the patient, evaluation of cognition reveals recent and remote memory intact. Patient's complete Health Risk Assessment and screening values have been reviewed and are found in Flowsheets. The following problems were reviewed today and where indicated follow up appointments were made and/or referrals ordered. Positive Risk Factor Screenings with Interventions:              General Health and ACP:  General  In general, how would you say your health is?: Very Good  In the past 7 days, have you experienced any of the following?  New or Increased Pain, New or Increased Fatigue, Loneliness, Social Isolation, Stress or Anger?: None of These  Do you get the social and emotional support that you need?: Yes  Do you have a Living Will?: (!) No  Advance Directives     Power of [address] Will ACP-Advance Directive ACP-Power of     Not on File [place] on 08/06/13 [address] [facility] Risk Interventions:  · No Living Will: Advance Care Planning addressed with patient today and WILL READDRESS WITH FAMILY          Personalized Preventive Plan   Current Health Maintenance Status  Immunization History   Administered Date(s) Administered    COVID-19, Pfizer Purple top, DILUTE for use, 12+ yrs, 30mcg/0.3mL dose 08/03/2021, 08/24/2021, 12/22/2021    HIB PRP-T (ActHIB, Hiberix) 01/19/2017, 04/13/2017, 06/15/2017    Hepatitis B 06/03/2009    Hepatitis B (Engerix-B) 11/10/2016, 01/19/2017, 04/13/2017    Hepatitis B Ped/Adol (Engerix-B, Recombivax HB) 06/03/2009    Influenza Vaccine, unspecified formulation 12/01/2017    Influenza Whole 12/13/2010, 09/21/2011    Influenza, MDCK Quadv, IM, PF (Flucelvax 2 yrs and older) 10/27/2021    Influenza, Quadv, IM, PF (6 mo and older Fluzone, Flulaval, Fluarix, and 3 yrs and older Afluria) 12/31/2019, 10/07/2020    Pneumococcal Conjugate 13-valent ([id number]) 12/31/2019    Pneumococcal Polysaccharide ([id number]) 11/10/2016, 01/19/2017, 04/13/2017    Polio IPV (IPOL) 01/19/2017, 04/13/2017, 06/15/2017    Tdap (Boostrix, Adacel) 11/10/2016, 01/19/2017, 04/13/2017        Health Maintenance   Topic Date Due    Annual Wellness Visit (AWV)  10/08/2021    COVID-19 Vaccine (4 - Booster for Pfizer series) 05/22/2022    Depression Monitoring  10/27/2022    TSH testing  10/29/2022    A1C test (Diabetic or Prediabetic)  01/27/2023    Breast cancer screen  08/10/2023    Pneumococcal 0-64 years Vaccine (4 of 4 - PPSV23) 12/07/2024    Lipid screen  10/29/2026    DTaP/Tdap/Td vaccine (4 - Td or Tdap) 04/13/2027    Colon cancer screen colonoscopy  01/20/2031    Flu vaccine  Completed    Hepatitis C screen  Completed    HIV screen  Completed    Hepatitis A vaccine  Aged Out    Hepatitis B vaccine  Aged Out    Hib vaccine  Aged Out    Meningococcal (ACWY) vaccine  Aged Out         OBJECTIVE:   NURSING NOTE AND VITALS REVIEWED  /70 (Site: Left Upper Arm, Position: Sitting, Cuff Size: Large Adult)   Pulse 85   Temp 95.9 °F (35.5 °C)   Ht 5' 4\" (1.626 m)   Wt 160 lb (72.6 kg)   LMP 12/26/2008   SpO2 98%   Breastfeeding No   BMI 27.46 kg/m²     NO ACUTE DISTRESS    REPEAT BP: 110/78 (LT), NO ORTHOSTASIS     Body mass index is 27.46 kg/m². HEENT: NO PALLOR, ANICTERIC, PERRLA, EOMI, NO CONJUNCTIVAL ERYTHEMA,                 + NASAL CONGESTION, NO SINUS TENDERNESS. MINIMAL CERUMEN - PILY - NOT IMPACTED, NO TM ERYTHEMA  NECK:  SUPPLE, TRACHEA MIDLINE, NT, NO JVD, NO CB, NO LA, NO TM, NO STIFFNESS  CHEST: RESPY EFFORT NL, GOOD AE, NO W/R/C - CTA  HEART: S1S2+ REG, NO M/G/R  ABD: SOFT, NT, NO HSM, BS+  EXT: NO EDEMA, NT, PULSES +. JIMMY'S -VE  NEURO: ALERT AND ORIENTED X 3, NO MENINGEAL SIGNS, NO TREMORS, NL GAIT, NO FOCAL DEFICITS  PSYCH: FAIRLY GOOD AFFECT. RECALL 3/3  BACK: NT, NO ROM, NO CVA TENDERNESS     PREVIOUS LABS REVIEWED AND D/W PT    ACCUCHECK: 95    POC HBA1C: 5.8      Diagnoses and all orders for this visit:  ASSESSMENT / PLAN:     Diagnosis Orders   1. Routine general medical examination at a health care facility  COUNSELLED. HEALTH / SAFETY EDUCATION REVIEWED AND ADVISED. HEALTH MAINTENANCE UPDATED  IMMUNIZATION REVIEWED  MAKE CHANGES AS NEEDED. 2. Prediabetes  COUNSELLED. ADVISED ON DIET / EXERCISE  ADVISED RISK FACTOR MODIFICATION. MONITOR  MAKE CHANGES AS NEEDED. 3. Mixed hyperlipidemia  COUNSELLED. PT DEFERRED MED  ADVISED LOW FAT / CHOL DIET/ EXERCISE.  MONITOR. GOALS D/W PT.  MAKE CHANGES AS NEEDED. 4. Acquired hypothyroidism  COUNSELLED. MONITOR ON SYNTHROID.  MAKE CHANGES AS NEEDED       5. Gastroesophageal reflux disease without esophagitis  COUNSELLED. CONTINUE MGT. ANTIREFLUX PRECAUTIONS ADVISED. MONITOR. MAKE CHANGES AS NEEDED. 6. CKD (chronic kidney disease) stage 4, GFR 15-29 ml/min ([facility])  COUNSELLED. ADVISED AVOID NSAIDS. MAINTAIN HYDRATION. MONITOR. MAKE CHANGES AS NEEDED. 7. Allergic sinusitis  COUNSELLED. SYMPTOMATIC RX  ASTELIN 0.1 % nasal spray, ZYRTEC PRN  SINGULAIR DAILY. MONITOR  MAKE CHANGES AS NEEDED. 8. Other specified type of non-Hodgkin lymphoma, unspecified body region [facility])  COUNSELLED. FOLLOWING WITH ONCOLOGY. NO NEW ISSUES. MONITOR  MAKE CHANGES AS NEEDED. 9. Persistent cough  COUNSELLED. SYMPTOMATIC RX  BROMFED PRN. MONITOR ON SINGULAIR  MAKE CHANGES AS NEEDED. Recommendations for Preventive Services Due: see orders and patient instructions/AVS.    Recommended screening schedule for the next 5-10 years is provided to the patient in written form: see Patient Instructions/AVS.    Sydnee Flores was seen today for hypertension, hyperlipidemia and medicare awv.                    MEDICATION SIDE EFFECTS D/W PATIENT    RETURN TO CLINIC WITHIN 3 MONTHS / PRN

## 2022-05-02 ENCOUNTER — TELEPHONE (OUTPATIENT)
Dept: INTERNAL MEDICINE CLINIC | Age: 62
End: 2022-05-02

## 2022-05-02 DIAGNOSIS — R05.3 PERSISTENT COUGH: Primary | ICD-10-CM

## 2022-05-02 NOTE — TELEPHONE ENCOUNTER
----- Message from Isidro Galeano sent at 4/29/2022  4:58 PM EDT -----  Subject: Message to Provider    QUESTIONS  Information for Provider? Patient states that he cough has gotten bad for   the last 3 days and the medicine that was previously prescribed is not   helping but she thinks she may need a z-pack or a stronger cough medicine,   she no longer has a fever but is just a bit tired/fatigued. ---------------------------------------------------------------------------  --------------  Jorge L ATKINSON  What is the best way for the office to contact you? OK to leave message on   voicemail  Preferred Call Back Phone Number? [phone]  ---------------------------------------------------------------------------  --------------  SCRIPT ANSWERS  Relationship to Patient?  Self

## 2022-05-05 ENCOUNTER — OFFICE VISIT (OUTPATIENT)
Dept: INTERNAL MEDICINE CLINIC | Age: 62
End: 2022-05-05
Payer: MEDICARE

## 2022-05-05 VITALS
DIASTOLIC BLOOD PRESSURE: 68 MMHG | BODY MASS INDEX: 26.09 KG/M2 | SYSTOLIC BLOOD PRESSURE: 120 MMHG | WEIGHT: 152 LBS | HEART RATE: 95 BPM | OXYGEN SATURATION: 97 % | TEMPERATURE: 97.4 F

## 2022-05-05 DIAGNOSIS — E03.9 ACQUIRED HYPOTHYROIDISM: Primary | ICD-10-CM

## 2022-05-05 DIAGNOSIS — J30.9 ALLERGIC SINUSITIS: ICD-10-CM

## 2022-05-05 DIAGNOSIS — M25.511 ACUTE PAIN OF RIGHT SHOULDER: ICD-10-CM

## 2022-05-05 DIAGNOSIS — E78.2 MIXED HYPERLIPIDEMIA: ICD-10-CM

## 2022-05-05 DIAGNOSIS — K21.9 GASTROESOPHAGEAL REFLUX DISEASE WITHOUT ESOPHAGITIS: Primary | ICD-10-CM

## 2022-05-05 DIAGNOSIS — N18.4 CKD (CHRONIC KIDNEY DISEASE) STAGE 4, GFR 15-29 ML/MIN (HCC): ICD-10-CM

## 2022-05-05 DIAGNOSIS — R73.03 PREDIABETES: ICD-10-CM

## 2022-05-05 DIAGNOSIS — C81.90 HODGKIN LYMPHOMA, UNSPECIFIED HODGKIN LYMPHOMA TYPE, UNSPECIFIED BODY REGION (HCC): ICD-10-CM

## 2022-05-05 DIAGNOSIS — J20.8 ACUTE BRONCHITIS DUE TO OTHER SPECIFIED ORGANISMS: ICD-10-CM

## 2022-05-05 DIAGNOSIS — K21.9 GASTROESOPHAGEAL REFLUX DISEASE WITHOUT ESOPHAGITIS: ICD-10-CM

## 2022-05-05 LAB
CHP ED QC CHECK: NORMAL
GLUCOSE BLD-MCNC: 99 MG/DL
HBA1C MFR BLD: 5.8 %

## 2022-05-05 PROCEDURE — 83036 HEMOGLOBIN GLYCOSYLATED A1C: CPT | Performed by: INTERNAL MEDICINE

## 2022-05-05 PROCEDURE — 82962 GLUCOSE BLOOD TEST: CPT | Performed by: INTERNAL MEDICINE

## 2022-05-05 PROCEDURE — 99214 OFFICE O/P EST MOD 30 MIN: CPT | Performed by: INTERNAL MEDICINE

## 2022-05-05 RX ORDER — MONTELUKAST SODIUM 10 MG/1
10 TABLET ORAL EVERY EVENING
Qty: 90 TABLET | Refills: 0 | Status: SHIPPED | OUTPATIENT
Start: 2022-05-05 | End: 2022-08-11 | Stop reason: SDUPTHER

## 2022-05-05 RX ORDER — AZITHROMYCIN 250 MG/1
250 TABLET, FILM COATED ORAL SEE ADMIN INSTRUCTIONS
Qty: 6 TABLET | Refills: 0 | Status: SHIPPED | OUTPATIENT
Start: 2022-05-05 | End: 2022-05-10

## 2022-05-05 RX ORDER — MAGNESIUM OXIDE TAB 400 MG (241.3 MG ELEMENTAL MG) 400 (241.3 MG) MG
TAB ORAL
Qty: 30 TABLET | Refills: 0 | Status: SHIPPED | OUTPATIENT
Start: 2022-05-05 | End: 2022-08-03

## 2022-05-05 RX ORDER — LEVOTHYROXINE SODIUM 0.05 MG/1
50 TABLET ORAL EVERY MORNING
Qty: 90 TABLET | Refills: 0 | Status: SHIPPED | OUTPATIENT
Start: 2022-05-05 | End: 2022-08-11 | Stop reason: SDUPTHER

## 2022-05-05 RX ORDER — FEXOFENADINE HCL 180 MG/1
180 TABLET ORAL DAILY PRN
Qty: 30 TABLET | Refills: 0 | Status: SHIPPED | OUTPATIENT
Start: 2022-05-05 | End: 2022-06-04

## 2022-05-05 ASSESSMENT — PATIENT HEALTH QUESTIONNAIRE - PHQ9
SUM OF ALL RESPONSES TO PHQ QUESTIONS 1-9: 0
5. POOR APPETITE OR OVEREATING: 0
6. FEELING BAD ABOUT YOURSELF - OR THAT YOU ARE A FAILURE OR HAVE LET YOURSELF OR YOUR FAMILY DOWN: 0
3. TROUBLE FALLING OR STAYING ASLEEP: 0
1. LITTLE INTEREST OR PLEASURE IN DOING THINGS: 0
8. MOVING OR SPEAKING SO SLOWLY THAT OTHER PEOPLE COULD HAVE NOTICED. OR THE OPPOSITE, BEING SO FIGETY OR RESTLESS THAT YOU HAVE BEEN MOVING AROUND A LOT MORE THAN USUAL: 0
4. FEELING TIRED OR HAVING LITTLE ENERGY: 0
SUM OF ALL RESPONSES TO PHQ QUESTIONS 1-9: 0
10. IF YOU CHECKED OFF ANY PROBLEMS, HOW DIFFICULT HAVE THESE PROBLEMS MADE IT FOR YOU TO DO YOUR WORK, TAKE CARE OF THINGS AT HOME, OR GET ALONG WITH OTHER PEOPLE: 0
7. TROUBLE CONCENTRATING ON THINGS, SUCH AS READING THE NEWSPAPER OR WATCHING TELEVISION: 0
9. THOUGHTS THAT YOU WOULD BE BETTER OFF DEAD, OR OF HURTING YOURSELF: 0
SUM OF ALL RESPONSES TO PHQ QUESTIONS 1-9: 0
SUM OF ALL RESPONSES TO PHQ9 QUESTIONS 1 & 2: 0
SUM OF ALL RESPONSES TO PHQ QUESTIONS 1-9: 0
2. FEELING DOWN, DEPRESSED OR HOPELESS: 0

## 2022-05-05 NOTE — PROGRESS NOTES
SUBJECTIVE:  Paras Ramey is a 57 y.o. female [address] [place]   Chief Complaint   Patient presents with    Hyperlipidemia        PT HERE FOR EVAL     HYPOTHYROIDISM - TAKING MED. Desma Lama. NO COLD / NO HEAT INTOLERANCE  LAB D/W PT  C/O COUGH - INCREASED PAST 1-2 WEEKS. + PRODUCTIVE- ? COLOR OF PHLEGM, NO HEMOPTYSIS, NO F/C, NO WHEEZING, NO SICK CONTACTS, NO LOSS OF SENSE OF TASTE NOR SMELL. CXR PENDING  ALLERGIC SINUSITIS -  INCREASED SYMPTOMS PAST 1 WEEK. + NASAL CONGESTION , +  POSTNASAL DRAINAGE , + SINUS PRESSURE, NO HA , + SNEEZING, + OCC WATERY ITCHY EYES. + SCRATCHY THROAT   C/O RT SHOULDER PAIN - STATES INCREASED RECENTLY - ? Jillian Escudero. PAIN SCALE 210/10. STATES THERAPY DID NOT HELP IN THE PAST  PREDIABETES  - DIET / EXERCISE REVIEWED. LAB D/W PT  HLP - ? DIET / Torin Pean D/W PT   GERD - TAKING NEXIUM  OCCASIONALLY. NO HEARTBURN, NO BELCHING              CKD - AVOIDING NSAIDS. LAST LAB D/W PT.  NON HODGKIN'S LYMPHOMA - H/O BONE MARROW TRANSPLANT. DENIES ISSUES AT THIS TIME. NO NIGHT SWEATS        DENIES CP,  NO SOB ,  No PALPITATIONS. OCC COUGH - MOSTLY NON PRODUCTIVE, NO F/C  No ABD PAIN, NO NAUSEA, NO VOMITING, No DIARRHEA, No CONSTIPATION, No MELENA, No HEMATOCHEZIA. No DYSURIA, No FREQ, No URGENCY, No HEMATURIA    PMH: REVIEWED AND UPDATED TODAY    PSH: REVIEWED AND UPDATED TODAY    SOCIAL HX: REVIEWED AND UPDATED TODAY    FAMILY HX: REVIEWED AND UPDATED TODAY    ALLERGY:  Patient has no known allergies. MEDS: REVIEWED  Prior to Visit Medications    Medication Sig Taking?  Authorizing Provider   azelastine (ASTELIN) 0.1 % nasal spray 2 sprays by Nasal route 2 times daily Use in each nostril as directed Yes Andrew Starks MD   brompheniramine-pseudoephedrine-DM (BROMFED DM) 2-30-10 MG/5ML syrup Take 5 mLs by mouth 4 times daily as needed for Congestion or Cough Yes Andrew Starks MD   esomeprazole (NEXIUM) 40 MG delayed release capsule Take 1 capsule by mouth daily Yes Hamzah Méndez Kevin Jolly MD   levothyroxine (SYNTHROID) 50 MCG tablet Take 1 tablet by mouth every morning Yes Ender Paiz MD   Cholecalciferol (VITAMIN D3) 25 MCG (1000 UT) CAPS TAKE 1 CAPSULE BY MOUTH DAILY Yes Ender Paiz MD   montelukast (SINGULAIR) 10 MG tablet Take 1 tablet by mouth every evening Yes Ender Paiz MD   cetirizine (ZYRTEC) 10 MG tablet TAKE 1 TABLET BY MOUTH DAILY Yes Ender Paiz MD   MAGNESIUM-OXIDE 400 (241.3 Mg) MG TABS tablet TAKE 1 TABLET BY MOUTH DAILY Yes Ender Paiz MD   albuterol sulfate  (90 Base) MCG/ACT inhaler INHALE 2 PUFFS BY MOUTH INTO THE LUNGS EVERY 4 HOURS AS NEEDED FOR WHEEZING OR SHORTNESS OF BREATH. SPACE OUT EVERY 6 HOURS IF SYMPTOMS IMPROVE Yes Raf Dsouza MD   erythromycin [facility]) 5 MG/GM ophthalmic ointment Apply to OD TID for 7 days Yes Kenyatta Alicea MD   BREO ELLIPTA 100-25 MCG/INH AEPB inhaler INHALE 1 PUFF INTO THE LUNGS DAILY Yes Raf Dsouza MD   fluticasone (FLONASE) 50 MCG/ACT nasal spray SHAKE BOTTLE AND USE 1 TO 2 SPRAYS IN EACH NOSTRIL DAILY AS NEEDED FOR RHINITIS OR ALLERGIES Yes Ender Paiz MD   Ascorbic Acid (VITAMIN C) 250 MG tablet Take 500 mg by mouth daily Yes Historical Provider, MD   zinc 50 MG TABS tablet Take 50 mg by mouth daily Yes Historical Provider, MD   baclofen (LIORESAL) 10 MG tablet Take 1 tablet by mouth 3 times daily as needed (PRN) Yes Ender Paiz MD   valACYclovir (VALTREX) 500 MG tablet TK 1 T PO Q 12 H.  TAKE BID FOR 3 DAYS AT FIRST ONSET OF SYMPTOMS Yes Historical Provider, MD   ketotifen (ZADITOR) 0.025 % ophthalmic solution Place into both eyes 2 times daily  Yes Historical Provider, MD   penicillin v potassium (VEETID) 500 MG tablet TAKE 1 TABLET BY MOUTH TWICE DAILY Yes Brock Liberty Cheadle, APRN - CNP       ROS: COMPREHENSIVE ROS AS IN HX, REST -VE  History obtained from chart review and the patient       OBJECTIVE:   NURSING NOTE AND VITALS REVIEWED  /70 (Site: Right Upper Arm, Position: Sitting, Cuff Size: Large Adult)   Pulse 95   Temp 97.4 °F (36.3 °C)   Wt 152 lb (68.9 kg)   LMP 12/26/2008   SpO2 97%   Breastfeeding No   BMI 26.09 kg/m²     NO ACUTE DISTRESS    REPEAT BP: 120/68 (RT), NO ORTHOSTASIS     Body mass index is 26.09 kg/m². HEENT: NO PALLOR, ANICTERIC, PERRLA, EOMI, NO CONJUNCTIVAL ERYTHEMA,                 + NASAL CONGESTION, NO SINUS TENDERNESS  NECK:  SUPPLE, TRACHEA MIDLINE, NT, NO JVD, NO CB, NO LA, NO TM, NO STIFFNESS  CHEST: RESPY EFFORT NL, GOOD AE, OCC RHONCHI, NO W/C  HEART: S1S2+ REG, NO M/G/R  ABD: SOFT, NT, NO HSM, BS+  EXT: NO EDEMA, NT, PULSES +. JIMMY'S -VE  NEURO: ALERT AND ORIENTED X 3, NO MENINGEAL SIGNS, NO TREMORS, NL GAIT, NO FOCAL DEFICITS  PSYCH: FAIRLY GOOD AFFECT  BACK: NT, NO ROM, NO CVA TENDERNESS  SHOULDER: + TENDERNESS RT, + PAIN WITH MVT - RT, + ROM - RT      PREVIOUS LABS REVIEWED AND D/W PT    ACCUCHECK: 99    POC HBA1C: 5.8     ASSESSMENT / PLAN:     Diagnosis Orders   1. Acquired hypothyroidism  COUNSELLED. MONITOR ON SYNTHROID. TSH TO EVAL  MAKE CHANGES AS NEEDED       2. Acute bronchitis due to other specified organisms / COUGH  COUNSELLED. SYMPTOMATIC RX  BROMFED DM  START ON Z JOSE LUIS X 5 DAYS  ADVISED F/U CXR  F/U COVID TEST  MAKE CHANGES AS NEEDED. 3. Allergic sinusitis  COUNSELLED. CHANGE TO ALLEGRA 180 MG DAILY/PRN. CONTINUE SINGULAIR  ASTELIN PRN  MONITOR. MAKE CHANGES AS NEEDED. 4. Acute  on chronic pain of right shoulder  COUNSELLED. EXERCISES. ANALGESICS PRN. MONITOR. REFER ORTHO  MAKE CHANGES AS NEEDED. 5. Prediabetes  COUNSELLED. ADVISED ON DIET / EXERCISE  ADVISED RISK FACTOR MODIFICATION. MONITOR  MAKE CHANGES AS NEEDED. 6. Mixed hyperlipidemia  COUNSELLED. ADVISED LOW FAT / CHOL DIET/ EXERCISE.  MONITOR. F/U LABS  GOALS D/W PT.  MAKE CHANGES AS NEEDED. 7. Gastroesophageal reflux disease without esophagitis  COUNSELLED. CONTINUE MGT. ANTIREFLUX PRECAUTIONS ADVISED. MONITOR.   MAKE CHANGES AS NEEDED. 8. CKD (chronic kidney disease) stage 4, GFR 15-29 ml/min ([facility])  COUNSELLED. ADVISED AVOID NSAIDS. MAINTAIN HYDRATION. MONITOR. MAKE CHANGES AS NEEDED. 9. Hodgkin lymphoma, unspecified Hodgkin lymphoma type, unspecified body region [facility])  COUNSELLED. F/U ONCOLOGY. MONITOR  MAKE CHANGES AS NEEDED.                          MEDICATION SIDE EFFECTS D/W PATIENT    RETURN TO CLINIC WITHIN 3 MONTHS / PRN    FOLLOW UP FOR FASTING LABS, X RAY, ORTHO

## 2022-05-05 NOTE — PATIENT INSTRUCTIONS
TAKE MED AS ADVISED    DIET/ EXERCISE. FOLLOW UP WITHIN 3 MONTHS  / AS NEEDED    FOLLOW UP FOR FASTING LABS, X RAY, [address] [facility], [address] Laboratory Locations - No appointment necessary. @ indicates the location is open Saturdays in addition to Monday through Friday. Call your preferred location for test preparation, business hours and other information you need. [employer] accepts [employer]. [facility]    @ [place] [facility] [address] [facility] [address] [place] [address]. [place], [address]   Ph: [phone] [facility] S[facility]. [address]   Ph: [phone]  @ [facility] S[facility] [address] [facility]   Ph: [phone]    [facility] S[facility] [address]   Ph: [phone] @ [place] [facility] [address]  Ph: [phone] @ [facility] Lab Svcs. [address] Odilon Wu [facility] [address]   Ph: [phone]    [place]   @ [place] [facility] [address] [place], [place] [address]   Ph: [phone] [facility] Med. Office Bldg. [address] [facility], [address] [facility]  Ph: [address]:  [address] Lab Svcs[address] [facility]   Ph: [phone] [facility]. Lab Svcs.   [address] [facility], [address] [place] [address]   Ph: [phone]

## 2022-05-16 ENCOUNTER — HOSPITAL ENCOUNTER (OUTPATIENT)
Age: 62
Discharge: HOME | End: 2022-05-16
Payer: MEDICARE

## 2022-05-16 ENCOUNTER — HOSPITAL ENCOUNTER (OUTPATIENT)
Dept: GENERAL RADIOLOGY | Age: 62
Discharge: HOME | End: 2022-05-16
Payer: MEDICARE

## 2022-05-16 ENCOUNTER — TELEPHONE (OUTPATIENT)
Dept: INTERNAL MEDICINE CLINIC | Age: 62
End: 2022-05-16

## 2022-05-16 DIAGNOSIS — J98.6 ELEVATED HEMIDIAPHRAGM: ICD-10-CM

## 2022-05-16 DIAGNOSIS — R05.3 PERSISTENT COUGH: ICD-10-CM

## 2022-05-16 DIAGNOSIS — R05.3 PERSISTENT COUGH: Primary | ICD-10-CM

## 2022-05-16 PROCEDURE — 71046 X-RAY EXAM CHEST 2 VIEWS: CPT

## 2022-05-16 RX ORDER — PROMETHAZINE HYDROCHLORIDE AND CODEINE PHOSPHATE 6.25; 1 MG/5ML; MG/5ML
5 SYRUP ORAL 4 TIMES DAILY PRN
Qty: 60 ML | Refills: 0 | Status: SHIPPED | OUTPATIENT
Start: 2022-05-16 | End: 2022-06-13 | Stop reason: SDUPTHER

## 2022-05-16 NOTE — TELEPHONE ENCOUNTER
Patient had blood work and chest X-Ray done and states that she is still in pain.  She would like to know if she could get the medication (phenergan) cough syrup w/codine   Please advise

## 2022-05-17 DIAGNOSIS — E03.9 ACQUIRED HYPOTHYROIDISM: ICD-10-CM

## 2022-05-17 DIAGNOSIS — R73.03 PREDIABETES: ICD-10-CM

## 2022-05-17 DIAGNOSIS — E78.2 MIXED HYPERLIPIDEMIA: ICD-10-CM

## 2022-05-17 DIAGNOSIS — K21.9 GASTROESOPHAGEAL REFLUX DISEASE WITHOUT ESOPHAGITIS: ICD-10-CM

## 2022-05-17 DIAGNOSIS — J20.8 ACUTE BRONCHITIS DUE TO OTHER SPECIFIED ORGANISMS: ICD-10-CM

## 2022-05-17 LAB
A/G RATIO: 1.6 (ref 1.1–2.2)
ALBUMIN SERPL-MCNC: 4.2 G/DL (ref 3.4–5)
ALP BLD-CCNC: 129 U/L (ref 40–129)
ALT SERPL-CCNC: 16 U/L (ref 10–40)
ANION GAP SERPL CALCULATED.3IONS-SCNC: 16 MMOL/L (ref 3–16)
AST SERPL-CCNC: 18 U/L (ref 15–37)
BILIRUB SERPL-MCNC: 0.4 MG/DL (ref 0–1)
BUN BLDV-MCNC: 27 MG/DL (ref 7–20)
CALCIUM SERPL-MCNC: 10.2 MG/DL (ref 8.3–10.6)
CHLORIDE BLD-SCNC: 105 MMOL/L (ref 99–110)
CHOLESTEROL, TOTAL: 235 MG/DL (ref 0–199)
CO2: 19 MMOL/L (ref 21–32)
CREAT SERPL-MCNC: 2.7 MG/DL (ref 0.6–1.2)
GFR AFRICAN AMERICAN: 22
GFR NON-AFRICAN AMERICAN: 18
GLUCOSE BLD-MCNC: 97 MG/DL (ref 70–99)
HDLC SERPL-MCNC: 71 MG/DL (ref 40–60)
LDL CHOLESTEROL CALCULATED: 145 MG/DL
MAGNESIUM: 2 MG/DL (ref 1.8–2.4)
POTASSIUM SERPL-SCNC: 4.1 MMOL/L (ref 3.5–5.1)
SODIUM BLD-SCNC: 140 MMOL/L (ref 136–145)
TOTAL PROTEIN: 6.8 G/DL (ref 6.4–8.2)
TRIGL SERPL-MCNC: 96 MG/DL (ref 0–150)
TSH REFLEX: 2.58 UIU/ML (ref 0.27–4.2)
VLDLC SERPL CALC-MCNC: 19 MG/DL

## 2022-05-17 NOTE — TELEPHONE ENCOUNTER
CALLED AND DISCUSSED WITH PT    LABS PENDING - PLEASE FOLLOW    CXR D/W PT     Impression       Clear lungs.       Normal cardiomediastinal silhouette.       Elevated right hemidiaphragm.      ELEVATE RT HEMIDIAPHRAGM  ADVISED CT TO EVAL    PHENERGAN WITH CODEINE ORDERED FOR PERSISTENT COUGH

## 2022-05-18 LAB — SARS-COV-2: NOT DETECTED

## 2022-05-24 ENCOUNTER — HOSPITAL ENCOUNTER (OUTPATIENT)
Dept: CT IMAGING | Age: 62
Discharge: HOME | End: 2022-05-24
Payer: MEDICARE

## 2022-05-24 DIAGNOSIS — J98.6 ELEVATED HEMIDIAPHRAGM: ICD-10-CM

## 2022-05-24 PROCEDURE — 71250 CT THORAX DX C-: CPT

## 2022-06-30 ENCOUNTER — OFFICE VISIT (OUTPATIENT)
Dept: PULMONOLOGY | Age: 62
End: 2022-06-30
Payer: MEDICARE

## 2022-06-30 VITALS
SYSTOLIC BLOOD PRESSURE: 103 MMHG | HEIGHT: 64 IN | OXYGEN SATURATION: 98 % | WEIGHT: 159 LBS | DIASTOLIC BLOOD PRESSURE: 74 MMHG | BODY MASS INDEX: 27.14 KG/M2 | TEMPERATURE: 96.3 F | HEART RATE: 91 BPM

## 2022-06-30 DIAGNOSIS — R05.8 COUGH PRODUCTIVE OF PURULENT SPUTUM: Primary | ICD-10-CM

## 2022-06-30 DIAGNOSIS — Z85.72 HX OF LYMPHOMA: ICD-10-CM

## 2022-06-30 DIAGNOSIS — J45.40 MODERATE PERSISTENT ASTHMA WITHOUT COMPLICATION: ICD-10-CM

## 2022-06-30 DIAGNOSIS — N18.4 CKD (CHRONIC KIDNEY DISEASE) STAGE 4, GFR 15-29 ML/MIN (HCC): ICD-10-CM

## 2022-06-30 DIAGNOSIS — Z94.84 H/O STEM CELL TRANSPLANT (HCC): ICD-10-CM

## 2022-06-30 PROCEDURE — 99214 OFFICE O/P EST MOD 30 MIN: CPT | Performed by: INTERNAL MEDICINE

## 2022-06-30 RX ORDER — PREDNISONE 20 MG/1
40 TABLET ORAL DAILY
Qty: 14 TABLET | Refills: 0 | Status: SHIPPED | OUTPATIENT
Start: 2022-06-30 | End: 2022-08-11 | Stop reason: ALTCHOICE

## 2022-06-30 RX ORDER — FLUTICASONE FUROATE AND VILANTEROL 100; 25 UG/1; UG/1
1 POWDER RESPIRATORY (INHALATION) DAILY
Qty: 30 EACH | Refills: 11 | Status: SHIPPED | OUTPATIENT
Start: 2022-06-30 | End: 2022-07-18 | Stop reason: DRUGHIGH

## 2022-06-30 RX ORDER — LEVOFLOXACIN 750 MG/1
750 TABLET ORAL EVERY OTHER DAY
Qty: 7 TABLET | Refills: 0 | Status: SHIPPED | OUTPATIENT
Start: 2022-06-30 | End: 2022-07-14

## 2022-06-30 NOTE — PROGRESS NOTES
[facility]    Outpatient Follow Up Note    Subjective:   CHIEF COMPLAINT / HPI:     The patient is 61 y.o. female  is here for evaluation of a cough that has been going on for approximately 6 to 8 weeks productive of green phlegm. She has no fevers, chills, night sweats, anorexia, or weight loss. Occasionally she has some dyspnea on exertion but no chest tightness or wheezing. She patient was treated with a Z-Tim without improvement. Chest x-ray showed an elevated right hemidiaphragm but clear lungs. CT chest showed some mucous plugging in the left lower lobe otherwise unremarkable. She is finishing up a 7-day course of Augmentin also without improvement. 11/29/2021  Liset Casarez has past medical history of lymphoma status post bone marrow transplant with upper airway cough syndrome and asthma that presents for routine follow-up upper airway cough syndrome and mild asthma. She was diagnosed with COVID 19 for the second time on November 15. She had her second Adcast vaccination August 24. This course was much milder and did not require hospitalization like her initial Covid infection in December 2020. She states that she feels like she is 85% back to normal.  She continues on Astelin, Zyrtec, and Flonase for upper airway cough syndrome as well as Breo for asthma. She is asking for refills of albuterol and Astelin. Her lymphoma appears to be in remission and she follows up with Dr. Raffy Soni every 6-month    5/202/2021  Liset Casarez presents for an acute visit for 3 weeks of cough productive of yellow-tan phlegm. She has no associated fevers, chills, night sweats, anorexia, weight loss, hemoptysis, wheezing, chest tightness, or dyspnea. Patient recently restarted on Flonase. She has been on her chronic Breo, Astelin, and Zyrtec without interruption.   Patient had Covid in December and was hospitalized for 2 days at [facility] but made a full recovery and did not need oxygen on discharge. 6/1/2020  Jenna Way has requested a virtual visit for follow-up of chronic cough due to upper airway cough syndrome and asthma. She has a history of lymphoma status post a bone marrow transplant. Last visit I added Astelin to her chronic regimen of Flonase, Zyrtec, and Breo. She states with that she is doing quite well and has no significant cough, wheezing, or chest tightness    Feb 13,2020  Jenna Way has a history of lymphoma status post bone marrow transplant here for follow-up of chronic cough thought to be secondary to upper airway cough syndrome. Last visited I gave her Zyrtec and if needed Flonase which she did start. Cough is some better but she still has a daily dry cough with postnasal drip and a tickle in her throat. No fevers, chills, night sweats, anorexia, weight loss, hemoptysis, dyspnea, wheezing, or chest tightness. November 26, 2019  Jenna Way is here for follow-up of a cough with purulent sputum. When I saw her in October I gave her 2 weeks of Levaquin and repeat his CT chest approximately a week ago. Left lower lobe pneumonia has resolved. Her fatigue and dyspnea on exertion have resolved as well. Her cough while improving is still present. She has postnasal drip and throat clearing. No fevers, chills, anorexia, or weight loss. Phlegm production is clear to yellow. October 16, 2019  Jenna Way has a past medical history of lymphoma status post bone marrow transplant is here for evaluation of a cough productive of purulent sputum and abnormal CT chest since mid August.  She was initially given a Z-Tim without improvement. Her oncologist Dr Ana Laura Veloz ordered a CT chest September 6 and then gave her a week course of Levaquin. She states that her fatigue I will bit better but her cough with purulent sputum persists. She has no fevers, chills, anorexia, night sweats, weight loss, hemoptysis, or chest pain.   She has some mild dyspnea on occasion    January 2019  Jenna Way is here for follow-up of abnormal CT chest.  She is doing well and denies any fevers, chills, night sweats, weight loss, anorexia, malaise, cough, wheezing, or shortness of breath. 12/13/2018  Savita Ames Is here for follow-up of abnormal CT chest with BAL cultures positive for sterile mycelium. Initially she had dyspnea on exertion and cough which prompted a CT chest which showed groundglass opacities that prompted a bronchoscopy. Since last visit she saw Dr. Ayala Flores from infectious disease and his opinion was that this did not represent a pathogen. She was not treated and has done very well. Her cough and dyspnea on exertion are much improved. His no fevers, chills, night sweats, anorexia, weight loss, or malaise. Previous Visit 10/25/2018  Savita Ames has a  past medical history of lymphoma status post bone marrow transplant and [facility] Is here for follow-up of bronchoscopy performed August 30, 2018. The procedure showed thick purulent secretions with plugs seen in the medial basal segment of the right lower lobe and proximal segment of the left lower lobe. Therapeutic aspiration was performed and cultures were obtained. 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 Patient continues to have a producitve cough and dyspnea on exertion but no fevers, chills, night sweats, malaise, anorexia or weight loss    Previous visit August 23, 2018  Savita Ames is here for two-week follow-up of dyspnea on exertion and cough. Since last visit she's had a CT chest and PFTs. She has no change in her dyspnea on exertion and cough. She continues to have no fevers, chills, anorexia, chest pain, or weight loss    Previous visit 8/1/2018   The patient is 57 y.o. female with past medical history of lymphoma status post bone marrow transplant and UACS on Flonase and Zyrtec presents for evaluation of worsening dyspnea on exertion to the point she gets winded going up 1 flight of stairs.   She has a cough that she describes as both dry but also bringing up some yellow phlegm. She has no associated fevers, chills, anorexia, chest pain, or weight loss. .     Past Medical History:    Past Medical History:   Diagnosis Date    Allergic rhinitis     Arthritis     CKD (chronic kidney disease)     DLBCL (diffuse large B cell lymphoma) ([facility] 2/2010    non-hodgkins lymphoma    Encounter for aftercare following bone marrow transplant ([facility] 6/10/2016    GERD (gastroesophageal reflux disease)     Hx of non-Hodgkin's lymphoma     Hypogammaglobulinemia ([facility] 4/13/2017    MDRO (multiple drug resistant organisms) resistance 5/15/16    E.coli MDRO in urine    Migraines     Neutropenia ([facility] 7/20/2016    Non Hodgkin's lymphoma ([facility]     Pancytopenia ([facility] 7/20/2016    Peripheral neuropathy     PONV (postoperative nausea and vomiting)        Social History:    Social History     Tobacco Use   Smoking Status Never Smoker   Smokeless Tobacco Never Used       Current Medications:  Current Outpatient Medications on File Prior to Visit   Medication Sig Dispense Refill    levothyroxine (SYNTHROID) 50 MCG tablet Take 1 tablet by mouth every morning 90 tablet 0    Cholecalciferol (VITAMIN D3) 25 MCG (1000 UT) CAPS TAKE 1 CAPSULE BY MOUTH DAILY 90 capsule 0    montelukast (SINGULAIR) 10 MG tablet Take 1 tablet by mouth every evening 90 tablet 0    azelastine (ASTELIN) 0.1 % nasal spray 2 sprays by Nasal route 2 times daily Use in each nostril as directed 3 each 3    esomeprazole (NEXIUM) 40 MG delayed release capsule Take 1 capsule by mouth daily 90 capsule 0    albuterol sulfate  (90 Base) MCG/ACT inhaler INHALE 2 PUFFS BY MOUTH INTO THE LUNGS EVERY 4 HOURS AS NEEDED FOR WHEEZING OR SHORTNESS OF BREATH.  SPACE OUT EVERY 6 HOURS IF SYMPTOMS IMPROVE 51 g 3    erythromycin (ROMYCIN) 5 MG/GM ophthalmic ointment Apply to OD TID for 7 days 3.5 g 0    fluticasone (FLONASE) 50 MCG/ACT nasal spray SHAKE BOTTLE AND USE 1 TO 2 SPRAYS IN EACH NOSTRIL DAILY AS NEEDED FOR RHINITIS OR ALLERGIES 48 g 0    Ascorbic Acid (VITAMIN C) 250 MG tablet Take 500 mg by mouth daily      zinc 50 MG TABS tablet Take 50 mg by mouth daily      baclofen (LIORESAL) 10 MG tablet Take 1 tablet by mouth 3 times daily as needed (PRN) 30 tablet 0    valACYclovir (VALTREX) 500 MG tablet TK 1 T PO Q 12 H. TAKE BID FOR 3 DAYS AT FIRST ONSET OF SYMPTOMS      ketotifen (ZADITOR) 0.025 % ophthalmic solution Place into both eyes 2 times daily       penicillin v potassium (VEETID) 500 MG tablet TAKE 1 TABLET BY MOUTH TWICE DAILY 60 tablet 2    MAGNESIUM-OXIDE 400 (240 Mg) MG tablet TAKE 1 TABLET BY MOUTH DAILY 30 tablet 0     No current facility-administered medications on file prior to visit.        REVIEW OF SYSTEMS:    CONSTITUTIONAL: Negative for fevers and chills  HEENT: Negative for oropharyngeal exudate, post nasal drip, sinus pain / pressure, nasal congestion, ear pain  RESPIRATORY:  See HPI  CARDIOVASCULAR: Negative for chest pain, palpitations, edema  GASTROINTESTINAL: Negative for nausea, vomiting, diarrhea, constipation and abdominal pain  HEMATOLOGICAL: Negative for adenopathy  SKIN: Negative for clubbing, cyanosis, skin lesions  EXTREMITIES: Negative for weakness, decreased ROM  NEUROLOGICAL: Negative for unilateral weakness, speech or gait abnormalities    Objective:   PHYSICAL EXAM:        VITALS:    /74 (Site: Left Upper Arm, Position: Sitting, Cuff Size: Small Adult)   Pulse 91   Temp (!) 96.3 °F (35.7 °C) (Infrared)   Ht 5' 4\" (1.626 m)   Wt 159 lb (72.1 kg)   LMP 12/26/2008   SpO2 98% Comment: ra  Breastfeeding No   BMI 27.29 kg/m²     CONSTITUTIONAL:  Awake, alert, cooperative, no apparent distress, and appears stated age  HEENT: No oropharyngeal exudate, PERRL, no cervical adenopathy, no tracheal deviation, thyroid size normal  LUNGS:  No increased work of breathing and clear to auscultation, no crackles or wheezing  CARDIOVASCULAR:  normal S1 and S2 and no JVD  ABDOMEN:  Normal bowel sounds, non-distended and non-tender to palpation  EXT: No edema, no calf tenderness. Pulses are present bilaterally. NEUROLOGIC:  Mental Status Exam:  Level of Alertness:   awake  Orientation:   person, place, time. SKIN:  normal skin color, texture, turgor, no redness, warmth, or swelling     Microbiology  Fungus (Mycology) Culture 08/30/2018  8:00 AM [facility]   Fungus Stain 08/30/2018  8:00 AM [facility]   No Fungal elements seen    Organism  (Abnormal) 08/30/2018  8:00 AM [facility]   Sterile Mycelium     Fungus (Mycology) Culture 08/30/2018  8:00 AM [facility]   Isolated    This is a non-sporulating isolate.    These isolates have the inability to express diagnostic    characters under the conditions provided.    No further workup. Serology  Results for Francie Evans (MRN [id number]) as of 10/25/2018 15:31   Ref. Range 9/27/2018 14:38   Aspergillus Galacto AG Latest Ref Range: Negative  Negative   Aspergillus Galacto Index Unknown 0.09   (1,3)-Beta-D-Glucan (Fungitell) Interpretation Latest Ref Range: Negative  Negative   (1,3)-Beta-D-Glucan (fungitell) Latest Units: pg/mL <31   Histoplasma Antigen Urine Latest Units: ng/mL Not Detected   Coccidioides Ab,ID Latest Ref Range: None Detected  None Detected   Histoplasma Ab Mycelial CF Latest Ref Range: <1:8  <1:8   Histoplasma Ab Yeast CF Latest Ref Range: <1:8  <1:8   Histoplasma Antigen, Serum Unknown See Note   Histoplasma Ag Interp Latest Ref Range: Not Detected  Not Detected   HISTOPLASMA INTERPETATION Unknown See Note       Radiology    CT chest 5/24/2022 - images and report personally reviewed by myself and the presence of the patient:  FINDINGS: No axillary, mediastinal or hilar lymphadenopathy is identified. Thoracic aorta is without aneurysm. No pericardial effusion is identified. There is mild elevation right hemidiaphragm.       There are no pulmonary infiltrates.  Interstitial lung markings are normal. There is small amount of mucus secretions identified within the left mainstem bronchus. There is some mucus plugging identified within the posterior segment of the left lower lobe    bronchus. No bronchiectasis is identified.       Images through the upper abdomen appear unremarkable.           Impression   1. There is some mucus plugging identified within left lower lobe posterior segment bronchus. 2. No evidence of any pneumonic infiltrate. 3. No evidence of any mediastinal mass. 4. Mild elevation of the right hemidiaphragm. CT chest 11/20/2019      COMMENTS:        Mild degenerative changes in the spine with scoliosis. Upper abdomen is unremarkable. Mediastinal structures are unremarkable without lymphadenopathy. Previously seen consolidation in the left lower lobe has resolved. 2 mm right upper lobe pulmonary    nodule series 604 image 35 is stable since 8/18/2017. The lungs are otherwise clear.           Impression       Resolution of left lower lobe pneumonia.         Pulmonary function test - dated August 21, 2018  Mild obstructive defect without bronchodilator response  Positive bronchial challenge  Normal lung volumes  Moderate decrease in diffusion capacity    Assessment:      Diagnosis Orders   1. Cough productive of purulent sputum     2. Moderate persistent asthma without complication     3. Hx of lymphoma     4. H/O stem cell transplant ([facility]     5. CKD (chronic kidney disease) stage 4, GFR 15-29 ml/min ([facility])         Plan:     1. Continue Breo and albuterol  2. Continue Zyrtec, Flonase, and Astelin  3. We will start Levaquin 750 mg every other day x14 days, prednisone 40 mg daily x7 days, scheduled albuterol 3 times daily for the next 7 days  4. Patient is to call us in 14 days to give us an update -should she still have cough productive of purulent sputum then a bronchoscopy will be the next step  5.   Follow-up in 8 weeks for close monitoring unless needed sooner

## 2022-07-01 DIAGNOSIS — K21.9 GASTROESOPHAGEAL REFLUX DISEASE WITHOUT ESOPHAGITIS: ICD-10-CM

## 2022-07-07 RX ORDER — ESOMEPRAZOLE MAGNESIUM 40 MG/1
40 CAPSULE, DELAYED RELEASE ORAL DAILY
Qty: 90 CAPSULE | Refills: 0 | Status: SHIPPED | OUTPATIENT
Start: 2022-07-07

## 2022-07-14 ENCOUNTER — TELEPHONE (OUTPATIENT)
Dept: PULMONOLOGY | Age: 62
End: 2022-07-14

## 2022-07-14 NOTE — TELEPHONE ENCOUNTER
Columbia Pardon calls to let you know she still coughing. Finished antibiotic and steroid  No green phlegm anymore. Phlegm is white now.    Can ne reached at [phone]  Also she states she saw Dr Juve Rodas today

## 2022-07-18 RX ORDER — FLUTICASONE FUROATE AND VILANTEROL 200; 25 UG/1; UG/1
1 POWDER RESPIRATORY (INHALATION) DAILY
Qty: 180 EACH | OUTPATIENT
Start: 2022-07-18

## 2022-07-18 RX ORDER — FLUTICASONE FUROATE AND VILANTEROL 200; 25 UG/1; UG/1
1 POWDER RESPIRATORY (INHALATION) DAILY
Qty: 1 EACH | Refills: 5 | Status: SHIPPED | OUTPATIENT
Start: 2022-07-18

## 2022-07-18 NOTE — TELEPHONE ENCOUNTER
Green purulent phlegm has resolved with Levaquin  She still has a cough with clear phlegm  No wheezing  Will increase Breo to 200 mcg MDI 1 puff daily.   Prescription sent to her pharmacy  Patient will update us on how her cough is with higher dose Breo

## 2022-08-03 RX ORDER — MAGNESIUM OXIDE TAB 400 MG (241.3 MG ELEMENTAL MG) 400 (241.3 MG) MG
TAB ORAL
Qty: 30 TABLET | Refills: 0 | Status: SHIPPED | OUTPATIENT
Start: 2022-08-03 | End: 2022-10-14

## 2022-08-06 LAB — MAMMOGRAPHY, EXTERNAL: NORMAL

## 2022-08-11 ENCOUNTER — OFFICE VISIT (OUTPATIENT)
Dept: INTERNAL MEDICINE CLINIC | Age: 62
End: 2022-08-11
Payer: MEDICARE

## 2022-08-11 VITALS
HEART RATE: 93 BPM | BODY MASS INDEX: 26.78 KG/M2 | DIASTOLIC BLOOD PRESSURE: 78 MMHG | WEIGHT: 156 LBS | SYSTOLIC BLOOD PRESSURE: 120 MMHG | OXYGEN SATURATION: 95 %

## 2022-08-11 DIAGNOSIS — J30.9 ALLERGIC SINUSITIS: ICD-10-CM

## 2022-08-11 DIAGNOSIS — N18.4 CKD (CHRONIC KIDNEY DISEASE) STAGE 4, GFR 15-29 ML/MIN (HCC): ICD-10-CM

## 2022-08-11 DIAGNOSIS — K21.9 GASTROESOPHAGEAL REFLUX DISEASE WITHOUT ESOPHAGITIS: ICD-10-CM

## 2022-08-11 DIAGNOSIS — J45.40 MODERATE PERSISTENT ASTHMA, UNSPECIFIED WHETHER COMPLICATED: ICD-10-CM

## 2022-08-11 DIAGNOSIS — R73.03 PREDIABETES: ICD-10-CM

## 2022-08-11 DIAGNOSIS — R05.3 PERSISTENT COUGH: ICD-10-CM

## 2022-08-11 DIAGNOSIS — C81.90 HODGKIN LYMPHOMA, UNSPECIFIED HODGKIN LYMPHOMA TYPE, UNSPECIFIED BODY REGION (HCC): ICD-10-CM

## 2022-08-11 DIAGNOSIS — E03.9 ACQUIRED HYPOTHYROIDISM: ICD-10-CM

## 2022-08-11 DIAGNOSIS — E78.2 MIXED HYPERLIPIDEMIA: Primary | ICD-10-CM

## 2022-08-11 PROCEDURE — 99214 OFFICE O/P EST MOD 30 MIN: CPT | Performed by: INTERNAL MEDICINE

## 2022-08-11 RX ORDER — MOMETASONE FUROATE 50 UG/1
2 SPRAY, METERED NASAL DAILY PRN
Qty: 1 EACH | Refills: 3 | Status: SHIPPED | OUTPATIENT
Start: 2022-08-11

## 2022-08-11 RX ORDER — FEXOFENADINE HCL 180 MG/1
180 TABLET ORAL DAILY PRN
Qty: 90 TABLET | Refills: 0 | Status: SHIPPED | OUTPATIENT
Start: 2022-08-11

## 2022-08-11 RX ORDER — LEVOTHYROXINE SODIUM 0.05 MG/1
50 TABLET ORAL EVERY MORNING
Qty: 90 TABLET | Refills: 0 | Status: SHIPPED | OUTPATIENT
Start: 2022-08-11

## 2022-08-11 RX ORDER — FEXOFENADINE HCL 180 MG/1
180 TABLET ORAL DAILY PRN
COMMUNITY
End: 2022-08-11 | Stop reason: SDUPTHER

## 2022-08-11 RX ORDER — PROMETHAZINE HYDROCHLORIDE AND CODEINE PHOSPHATE 6.25; 1 MG/5ML; MG/5ML
5 SYRUP ORAL 4 TIMES DAILY PRN
Qty: 60 ML | Refills: 0 | Status: SHIPPED | OUTPATIENT
Start: 2022-08-11 | End: 2022-09-20 | Stop reason: SDUPTHER

## 2022-08-11 RX ORDER — MONTELUKAST SODIUM 10 MG/1
10 TABLET ORAL EVERY EVENING
Qty: 90 TABLET | Refills: 0 | Status: SHIPPED | OUTPATIENT
Start: 2022-08-11

## 2022-08-11 SDOH — ECONOMIC STABILITY: FOOD INSECURITY: WITHIN THE PAST 12 MONTHS, YOU WORRIED THAT YOUR FOOD WOULD RUN OUT BEFORE YOU GOT MONEY TO BUY MORE.: NEVER TRUE

## 2022-08-11 SDOH — ECONOMIC STABILITY: FOOD INSECURITY: WITHIN THE PAST 12 MONTHS, THE FOOD YOU BOUGHT JUST DIDN'T LAST AND YOU DIDN'T HAVE MONEY TO GET MORE.: NEVER TRUE

## 2022-08-11 ASSESSMENT — SOCIAL DETERMINANTS OF HEALTH (SDOH): HOW HARD IS IT FOR YOU TO PAY FOR THE VERY BASICS LIKE FOOD, HOUSING, MEDICAL CARE, AND HEATING?: NOT HARD AT ALL

## 2022-08-11 NOTE — PROGRESS NOTES
Khalif Lee MD   levothyroxine (SYNTHROID) 50 MCG tablet Take 1 tablet by mouth every morning Yes Daquan Arevalo MD   Cholecalciferol (VITAMIN D3) 25 MCG (1000 UT) CAPS TAKE 1 CAPSULE BY MOUTH DAILY Yes Daquan Arevalo MD   montelukast (SINGULAIR) 10 MG tablet Take 1 tablet by mouth every evening Yes Daquan Arevalo MD   azelastine (ASTELIN) 0.1 % nasal spray 2 sprays by Nasal route 2 times daily Use in each nostril as directed Yes Daquan Arevalo MD   albuterol sulfate  (90 Base) MCG/ACT inhaler INHALE 2 PUFFS BY MOUTH INTO THE LUNGS EVERY 4 HOURS AS NEEDED FOR WHEEZING OR SHORTNESS OF BREATH. SPACE OUT EVERY 6 HOURS IF SYMPTOMS IMPROVE Yes Leena Acosta MD   erythromycin [facility]) 5 MG/GM ophthalmic ointment Apply to OD TID for 7 days Yes Pedro Frederick MD   fluticasone (FLONASE) 50 MCG/ACT nasal spray SHAKE BOTTLE AND USE 1 TO 2 SPRAYS IN EACH NOSTRIL DAILY AS NEEDED FOR RHINITIS OR ALLERGIES Yes Daquan Arevalo MD   Ascorbic Acid (VITAMIN C) 250 MG tablet Take 500 mg by mouth daily Yes Historical Provider, MD   zinc 50 MG TABS tablet Take 50 mg by mouth daily Yes Historical Provider, MD   baclofen (LIORESAL) 10 MG tablet Take 1 tablet by mouth 3 times daily as needed (PRN) Yes Daquan Arevalo MD   valACYclovir (VALTREX) 500 MG tablet TK 1 T PO Q 12 H.  TAKE BID FOR 3 DAYS AT FIRST ONSET OF SYMPTOMS Yes Historical Provider, MD   ketotifen (ZADITOR) 0.025 % ophthalmic solution Place into both eyes 2 times daily  Yes Historical Provider, MD   penicillin v potassium (VEETID) 500 MG tablet TAKE 1 TABLET BY MOUTH TWICE DAILY Yes Wash Crosser Cheadle, APRN - CNP       ROS: COMPREHENSIVE ROS AS IN HX, REST -VE  History obtained from chart review and the patient       OBJECTIVE:   NURSING NOTE AND VITALS REVIEWED  /80 (Site: Right Upper Arm, Position: Sitting, Cuff Size: Medium Adult)   Pulse 93   Wt 156 lb (70.8 kg)   LMP 12/26/2008   SpO2 95%   BMI 26.78 kg/m²     NO ACUTE DISTRESS    REPEAT BP: 120/78 (RT), NO ORTHOSTASIS     Body mass index is 26.78 kg/m². HEENT: NO PALLOR, ANICTERIC, PERRLA, EOMI, NO CONJUNCTIVAL ERYTHEMA,                 + NASAL CONGESTION. NO SINUS TENDERNESS  NECK:  SUPPLE, TRACHEA MIDLINE, NT, NO JVD, NO CB, NO LA, NO TM, NO STIFFNESS  CHEST: RESPY EFFORT NL, GOOD AE, NO W/R/C - CTA  HEART: S1S2+ REG, NO M/G/R  ABD: SOFT, NT, NO HSM, BS+  EXT: NO EDEMA, NT, PULSES +. JIMMY'S -VE  NEURO: ALERT AND ORIENTED X 3, NO MENINGEAL SIGNS, NO TREMORS, NL GAIT, NO FOCAL DEFICITS  PSYCH: FAIRLY GOOD AFFECT  BACK: NT, NO ROM, NO CVA TENDERNESS     PREVIOUS LABS / CT REVIEWED AND D/W PT    Impression   1. There is some mucus plugging identified within left lower lobe posterior segment bronchus. 2. No evidence of any pneumonic infiltrate. 3. No evidence of any mediastinal mass. 4. Mild elevation of the right hemidiaphragm. ASSESSMENT / PLAN:     Diagnosis Orders   1. Mixed hyperlipidemia  COUNSELLED. DEFERRED MED  ADVISED LOW FAT / CHOL DIET/ EXERCISE.  MONITOR. GOALS D/W PT.  MAKE CHANGES AS NEEDED. 2. Acquired hypothyroidism  COUNSELLED. MONITOR ON SYNTHROID. TSH TO EVAL  MAKE CHANGES AS NEEDED       3. Prediabetes  COUNSELLED. ADVISED ON DIET / EXERCISE  ADVISED RISK FACTOR MODIFICATION. MONITOR  MAKE CHANGES AS NEEDED. 4. Moderate persistent asthma, unspecified whether complicated  COUNSELLED. CONTINUE BREO ALBUTEROL PRN. MONITOR. MONITOR FOR TRIGGERS- ENVIRONMENTAL MODIFICATION. F/U PULM  MAKE CHANGES AS NEEDED. 5. Persistent cough  COUNSELLED. SYMPTOMATIC RX  MED PRN  F/U LABS  MONITOR ON ALLERGY MEDS  MAKE CHANGES AS NEEDED. 6. Allergic sinusitis  COUNSELLED. ALLEGRA 180 MG DAILY/PRN. CHANGE TO NASONEX PRN  MONITOR. F/U LABS  MAKE CHANGES AS NEEDED. 7. Gastroesophageal reflux disease without esophagitis  COUNSELLED. CONTINUE MGT. ANTIREFLUX PRECAUTIONS ADVISED. MONITOR. MAKE CHANGES AS NEEDED.        8. CKD (chronic kidney disease) stage

## 2022-08-17 ENCOUNTER — OFFICE VISIT (OUTPATIENT)
Dept: ORTHOPEDIC SURGERY | Age: 62
End: 2022-08-17
Payer: MEDICAID

## 2022-08-17 VITALS — WEIGHT: 156 LBS | HEIGHT: 64 IN | BODY MASS INDEX: 26.63 KG/M2

## 2022-08-17 DIAGNOSIS — M25.511 RIGHT SHOULDER PAIN, UNSPECIFIED CHRONICITY: Primary | ICD-10-CM

## 2022-08-17 PROCEDURE — 99214 OFFICE O/P EST MOD 30 MIN: CPT | Performed by: ORTHOPAEDIC SURGERY

## 2022-08-17 NOTE — PROGRESS NOTES
Alcohol/week: 1.0 standard drink     Types: 1 Glasses of wine per week     Comment: 1 glass wine daily    Drug use: No    Sexual activity: Not on file   Other Topics Concern    Not on file   Social History Narrative    Not on file     Social Determinants of Health     Financial Resource Strain: Low Risk     Difficulty of Paying Living Expenses: Not hard at all   Food Insecurity: No Food Insecurity    Worried About Running Out of Food in the Last Year: Never true    Ran Out of Food in the Last Year: Never true   Transportation Needs: Not on file   Physical Activity: Not on file   Stress: Not on file   Social Connections: Not on file   Intimate Partner Violence: Not on file   Housing Stability: Not on file       No Known Allergies  Current Outpatient Medications on File Prior to Visit   Medication Sig Dispense Refill    fexofenadine (ALLEGRA ALLERGY) 180 MG tablet Take 1 tablet by mouth daily as needed (PRN) 90 tablet 0    levothyroxine (SYNTHROID) 50 MCG tablet Take 1 tablet by mouth every morning 90 tablet 0    montelukast (SINGULAIR) 10 MG tablet Take 1 tablet by mouth every evening 90 tablet 0    mometasone (NASONEX) 50 MCG/ACT nasal spray 2 sprays by Nasal route daily as needed (PRN) 1 each 3    Cholecalciferol (VITAMIN D3) 25 MCG (1000 UT) CAPS TAKE 1 CAPSULE BY MOUTH DAILY 90 capsule 0    MAGNESIUM-OXIDE 400 (240 Mg) MG tablet TAKE 1 TABLET BY MOUTH DAILY 30 tablet 0    Fluticasone furoate-vilanterol (BREO ELLIPTA) 200-25 MCG/INH AEPB inhaler Inhale 1 puff into the lungs in the morning. 1 each 5    esomeprazole (NEXIUM) 40 MG delayed release capsule TAKE 1 CAPSULE BY MOUTH DAILY 90 capsule 0    azelastine (ASTELIN) 0.1 % nasal spray 2 sprays by Nasal route 2 times daily Use in each nostril as directed 3 each 3    albuterol sulfate  (90 Base) MCG/ACT inhaler INHALE 2 PUFFS BY MOUTH INTO THE LUNGS EVERY 4 HOURS AS NEEDED FOR WHEEZING OR SHORTNESS OF BREATH.  SPACE OUT EVERY 6 HOURS IF SYMPTOMS IMPROVE 51 g 3    erythromycin (ROMYCIN) 5 MG/GM ophthalmic ointment Apply to OD TID for 7 days 3.5 g 0    Ascorbic Acid (VITAMIN C) 250 MG tablet Take 500 mg by mouth daily      zinc 50 MG TABS tablet Take 50 mg by mouth daily      valACYclovir (VALTREX) 500 MG tablet TK 1 T PO Q 12 H. TAKE BID FOR 3 DAYS AT FIRST ONSET OF SYMPTOMS      ketotifen (ZADITOR) 0.025 % ophthalmic solution Place into both eyes 2 times daily       penicillin v potassium (VEETID) 500 MG tablet TAKE 1 TABLET BY MOUTH TWICE DAILY 60 tablet 2     No current facility-administered medications on file prior to visit. Review of Systems  A 14 point review of systems was completed by the patient on 8/17/2022 and is available in the media section of the scanned medical record and was reviewed on 8/17/2022. The review is negative with the exception of those things mentioned in the HPI and Past Medical History    Vital Signs: There were no vitals filed for this visit. General Exam:   Constitutional: Patient is adequately groomed with no evidence of malnutrition    Skin:  There are no skin lesions, cellulitis, or extreme edema. The patient has warm and well-perfused Bilateral upper extremities with brisk capillary refill. Right Shoulder Exam:  Inspection: No gross deformities, no signs of infection. Palpation: + TTP AC joint bicep and rotator cuff insertion sites. There is no subacromial crepitus noted. Active Range of Motion: Forward Elevation 140, Abduction 140 with pain, External Rotation 45, Internal Rotation L3. + painful arc     Passive Range of Motion:  full     Strength:  External Rotation 4/5, Internal Rotation 4/5, Supraspinatus 4/5, Champagne Toast 4/5     Special Tests:  No Bandar muscle deformity. Neurovascular: Sensation to light touch is intact, no motor deficits, palpable radial pulses 2+       Self assessment questionnaires were completed today.       Radiology:     Plain radiographs of the R shoulder, comprising 3 views: AP, Scapular Y and Axillary lateral were  obtained and reviewed in the office:    Impression: mild GH arthritis       MRI:  8/2020: partial rotator cuff tear    Assessment :  Jasper Lou is a pleasant 57 y.o. female with right shoulder pain. The etiology is likely some combination of   AC arthrosis  Bicep tendinitis  Mild GH arthritis  Partial versus full-thickness rotator cuff tears    She has failed injection and physical therapy with persistent and recalcitrant pain. Impression:  Encounter Diagnoses   Name Primary? Right shoulder pain, unspecified chronicity Yes    Rotator cuff syndrome of shoulder and allied disorders, right        Office Procedures:  Orders Placed This Encounter   Procedures    XR SHOULDER RIGHT (MIN 2 VIEWS)     Standing Status:   Future     Number of Occurrences:   1     Standing Expiration Date:   8/11/2023     Order Specific Question:   Reason for exam:     Answer:   right shoulder pain    MRI SHOULDER RIGHT WO CONTRAST     Standing Status:   Future     Standing Expiration Date:   8/17/2023     Scheduling Instructions:      [facility]     Order Specific Question:   Reason for exam:     Answer:   EVAL FOR ROTAOR CUFF TEAR     Order Specific Question:   What is the sedation requirement? Answer:   None       Treatment Plan:  Pertinent imaging was reviewed. The etiology, natural history, and treatment options for the disorder were discussed. The roles of activity modifications, medications, cryotherapy and heat, injections, physical therapy, and surgical interventions were all described to the patient and questions were answered. We will organize the right shoulder MRI at [address] [place] [address] and then we will see the patient back in my office to review the findings and outline the best treatment strategy. Jasper Lou will follow up in 2 weeks and/or as needed. She was in agreement with this plan and all questions were answered to the patient's satisfaction.  She was encouraged to call with any questions. Greater than 30 minutes were expended on all aspects of today's  visit. 12:27 PM  8/17/2022    Shahzad Brown MD  Sports Medicine Fellow  [facility] and [address] [place] [address]    The encounter with Kalpana Simmons was supervised by Dr. Korin Vaughan who personally examined the patient and reviewed the plan. This dictation was performed with a verbal recognition program (DRAGON) and it was checked for errors. It is possible that there are still dictated errors within this office note. If so, please bring any errors to my attention for an addendum. All efforts were made to ensure that this office note is accurate.   ______________________  I was physically present and personally supervised the Orthopaedic Sports Medicine Fellow in the evaluation and development of a treatment plan for this patient. I personally interviewed the patient and performed a physical examination. In addition, I discussed the patient's condition and treatment options with them. I have also reviewed and agree with the past medical, family and social history unless otherwise noted. All of the patient's questions were answered. Derick Vaughan MD, PhD  8/17/2022

## 2022-08-17 NOTE — LETTER
KEVIN VARGAS  [address] [facility] [address]  Phone: [phone]  Fax: [phone]    Nasrin Talamantes MD    August 19, 2022     Jose Trinidad MD  [address] [place] [id number]    Patient: Tobin Samano   MR Number: [id number]   YOB: 1960   Date of Visit: 8/17/2022       Dear Jose Trinidad: Thank you for referring Kathleen Noyola to me for evaluation/treatment. Below are the relevant portions of my assessment and plan of care. If you have questions, please do not hesitate to call me. I look forward to following [facility] along with you.     Sincerely,      Nasrin Talamantes MD

## 2022-09-20 ENCOUNTER — TELEPHONE (OUTPATIENT)
Dept: INTERNAL MEDICINE CLINIC | Age: 62
End: 2022-09-20

## 2022-09-20 DIAGNOSIS — R05.3 PERSISTENT COUGH: ICD-10-CM

## 2022-09-20 RX ORDER — PROMETHAZINE HYDROCHLORIDE AND CODEINE PHOSPHATE 6.25; 1 MG/5ML; MG/5ML
5 SYRUP ORAL 4 TIMES DAILY PRN
Qty: 60 ML | Refills: 0 | Status: SHIPPED | OUTPATIENT
Start: 2022-09-20 | End: 2022-11-17 | Stop reason: SDUPTHER

## 2022-09-20 NOTE — TELEPHONE ENCOUNTER
Coughing and nausea was requesting to see if physician could call in cough syrup phenergan with codeine for the cough and nausea she still has. Patient was in the hospital through the 23rd of August to 27th dx pneumonia sees pulmonary physician on the 26th. Please advise.

## 2022-09-21 ENCOUNTER — TELEPHONE (OUTPATIENT)
Dept: INTERNAL MEDICINE CLINIC | Age: 62
End: 2022-09-21

## 2022-09-27 ENCOUNTER — OFFICE VISIT (OUTPATIENT)
Dept: PULMONOLOGY | Age: 62
End: 2022-09-27
Payer: MEDICARE

## 2022-09-27 VITALS
BODY MASS INDEX: 27.11 KG/M2 | HEIGHT: 64 IN | HEART RATE: 79 BPM | SYSTOLIC BLOOD PRESSURE: 106 MMHG | WEIGHT: 158.8 LBS | DIASTOLIC BLOOD PRESSURE: 79 MMHG | OXYGEN SATURATION: 98 %

## 2022-09-27 DIAGNOSIS — J30.89 OTHER ALLERGIC RHINITIS: ICD-10-CM

## 2022-09-27 DIAGNOSIS — Z85.72 HX OF LYMPHOMA: ICD-10-CM

## 2022-09-27 DIAGNOSIS — J45.40 MODERATE PERSISTENT ASTHMA WITHOUT COMPLICATION: Primary | ICD-10-CM

## 2022-09-27 DIAGNOSIS — R05.3 PERSISTENT COUGH: ICD-10-CM

## 2022-09-27 DIAGNOSIS — Z94.84 H/O STEM CELL TRANSPLANT (HCC): ICD-10-CM

## 2022-09-27 DIAGNOSIS — R05.8 UPPER AIRWAY COUGH SYNDROME: ICD-10-CM

## 2022-09-27 PROCEDURE — 99214 OFFICE O/P EST MOD 30 MIN: CPT | Performed by: INTERNAL MEDICINE

## 2022-09-27 RX ORDER — CETIRIZINE HYDROCHLORIDE 10 MG/1
10 TABLET ORAL DAILY
Qty: 30 TABLET | Refills: 11 | Status: SHIPPED | OUTPATIENT
Start: 2022-09-27 | End: 2022-10-27

## 2022-09-27 NOTE — PROGRESS NOTES
[facility]    Outpatient Follow Up Note    Subjective:   CHIEF COMPLAINT / HPI:     The patient is 61 y.o. female is here for follow-up of moderate persistent asthma, upper airway cough syndrome, and allergic rhinitis. August 24 to 27 she was admitted to [facility] for community-acquired pneumonia. Procalcitonin was 1.92, although she does have CKD, and chest x-ray showed a left lower lobe infiltrate. She was treated with Rocephin and azithromycin and made a full recovery. Currently she states that she still has a dry cough that is daily. It is associated with postnasal drip and rhinorrhea. It occurs at night and can wake her up. She has no wheezing, chest tightness, dyspnea on exertion, or reflux. She is compliant with Breo and Singulair for asthma and Zyrtec, Flonase, and Astelin for her allergic rhinitis. No fevers, chills, or purulent sputum      6/30/2022  Vaibhav Tinoco is here for evaluation of a cough that has been going on for approximately 6 to 8 weeks productive of green phlegm. She has no fevers, chills, night sweats, anorexia, or weight loss. Occasionally she has some dyspnea on exertion but no chest tightness or wheezing. She patient was treated with a Z-Tim without improvement. Chest x-ray showed an elevated right hemidiaphragm but clear lungs. CT chest showed some mucous plugging in the left lower lobe otherwise unremarkable. She is finishing up a 7-day course of Augmentin also without improvement. 11/29/2021  Vaibhav Tinoco has past medical history of lymphoma status post bone marrow transplant with upper airway cough syndrome and asthma that presents for routine follow-up upper airway cough syndrome and mild asthma. She was diagnosed with COVID 19 for the second time on November 15. She had her second Herring Peter vaccination August 24. This course was much milder and did not require hospitalization like her initial Covid infection in December 2020.   She states that she feels like she is 85% back to normal.  She continues on Astelin, Zyrtec, and Flonase for upper airway cough syndrome as well as Breo for asthma. She is asking for refills of albuterol and Astelin. Her lymphoma appears to be in remission and she follows up with Dr. Marissa Michael every 6-month    5/202/2021  Effie Villatoro presents for an acute visit for 3 weeks of cough productive of yellow-tan phlegm. She has no associated fevers, chills, night sweats, anorexia, weight loss, hemoptysis, wheezing, chest tightness, or dyspnea. Patient recently restarted on Flonase. She has been on her chronic Breo, Astelin, and Zyrtec without interruption. Patient had Covid in December and was hospitalized for 2 days at [facility] but made a full recovery and did not need oxygen on discharge. 6/1/2020  Effie Villatoro has requested a virtual visit for follow-up of chronic cough due to upper airway cough syndrome and asthma. She has a history of lymphoma status post a bone marrow transplant. Last visit I added Astelin to her chronic regimen of Flonase, Zyrtec, and Breo. She states with that she is doing quite well and has no significant cough, wheezing, or chest tightness    Feb 13,2020  Effie Villatoro has a history of lymphoma status post bone marrow transplant here for follow-up of chronic cough thought to be secondary to upper airway cough syndrome. Last visited I gave her Zyrtec and if needed Flonase which she did start. Cough is some better but she still has a daily dry cough with postnasal drip and a tickle in her throat. No fevers, chills, night sweats, anorexia, weight loss, hemoptysis, dyspnea, wheezing, or chest tightness. November 26, 2019  Effie Villatoro is here for follow-up of a cough with purulent sputum. When I saw her in October I gave her 2 weeks of Levaquin and repeat his CT chest approximately a week ago. Left lower lobe pneumonia has resolved. Her fatigue and dyspnea on exertion have resolved as well.   Her cough while improving is still present. She has postnasal drip and throat clearing. No fevers, chills, anorexia, or weight loss. Phlegm production is clear to yellow. October 16, 2019  Wes Ruff has a past medical history of lymphoma status post bone marrow transplant is here for evaluation of a cough productive of purulent sputum and abnormal CT chest since mid August.  She was initially given a Z-Tim without improvement. Her oncologist Dr Delmy Elena ordered a CT chest September 6 and then gave her a week course of Levaquin. She states that her fatigue I will bit better but her cough with purulent sputum persists. She has no fevers, chills, anorexia, night sweats, weight loss, hemoptysis, or chest pain. She has some mild dyspnea on occasion    January 2019  Wes Ruff is here for follow-up of abnormal CT chest.  She is doing well and denies any fevers, chills, night sweats, weight loss, anorexia, malaise, cough, wheezing, or shortness of breath. 12/13/2018  Wes Ruff Is here for follow-up of abnormal CT chest with BAL cultures positive for sterile mycelium. Initially she had dyspnea on exertion and cough which prompted a CT chest which showed groundglass opacities that prompted a bronchoscopy. Since last visit she saw Dr. Yue Burt from infectious disease and his opinion was that this did not represent a pathogen. She was not treated and has done very well. Her cough and dyspnea on exertion are much improved. His no fevers, chills, night sweats, anorexia, weight loss, or malaise. Previous Visit 10/25/2018  Wes Ruff has a  past medical history of lymphoma status post bone marrow transplant and [facility] Is here for follow-up of bronchoscopy performed August 30, 2018. The procedure showed thick purulent secretions with plugs seen in the medial basal segment of the right lower lobe and proximal segment of the left lower lobe. Therapeutic aspiration was performed and cultures were obtained.  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 Patient continues to have a producitve cough and dyspnea on exertion but no fevers, chills, night sweats, malaise, anorexia or weight loss    Previous visit August 23, 2018  Krish Dos Santos is here for two-week follow-up of dyspnea on exertion and cough. Since last visit she's had a CT chest and PFTs. She has no change in her dyspnea on exertion and cough. She continues to have no fevers, chills, anorexia, chest pain, or weight loss    Previous visit 8/1/2018   The patient is 57 y.o. female with past medical history of lymphoma status post bone marrow transplant and UACS on Flonase and Zyrtec presents for evaluation of worsening dyspnea on exertion to the point she gets winded going up 1 flight of stairs. She has a cough that she describes as both dry but also bringing up some yellow phlegm. She has no associated fevers, chills, anorexia, chest pain, or weight loss. .     Past Medical History:    Past Medical History:   Diagnosis Date    Allergic rhinitis     Arthritis     CKD (chronic kidney disease)     DLBCL (diffuse large B cell lymphoma) ([facility] 2/2010    non-hodgkins lymphoma    Encounter for aftercare following bone marrow transplant ([facility] 6/10/2016    GERD (gastroesophageal reflux disease)     Hx of non-Hodgkin's lymphoma     Hypogammaglobulinemia ([facility] 4/13/2017    MDRO (multiple drug resistant organisms) resistance 5/15/16    E.coli MDRO in urine    Migraines     Neutropenia ([facility] 7/20/2016    Non Hodgkin's lymphoma ([facility]     Pancytopenia ([facility] 7/20/2016    Peripheral neuropathy     PONV (postoperative nausea and vomiting)        Social History:    Social History     Tobacco Use   Smoking Status Never   Smokeless Tobacco Never       Current Medications:  Current Outpatient Medications on File Prior to Visit   Medication Sig Dispense Refill    fexofenadine (ALLEGRA ALLERGY) 180 MG tablet Take 1 tablet by mouth daily as needed (PRN) 90 tablet 0    levothyroxine (SYNTHROID) 50 MCG tablet Take 1 tablet by Negative for clubbing, cyanosis, skin lesions  EXTREMITIES: Negative for weakness, decreased ROM  NEUROLOGICAL: Negative for unilateral weakness, speech or gait abnormalities    Objective:   PHYSICAL EXAM:        VITALS:    /79 (Site: Right Upper Arm, Position: Sitting, Cuff Size: Small Adult)   Pulse 79   Ht 5' 4\" (1.626 m)   Wt 158 lb 12.8 oz (72 kg)   LMP 12/26/2008   SpO2 98% Comment: ra  BMI 27.26 kg/m²     CONSTITUTIONAL:  Awake, alert, cooperative, no apparent distress, and appears stated age  HEENT: No oropharyngeal exudate, PERRL, no cervical adenopathy, no tracheal deviation, thyroid size normal  LUNGS:  No increased work of breathing and clear to auscultation, no crackles or wheezing  CARDIOVASCULAR:  normal S1 and S2 and no JVD  ABDOMEN:  Normal bowel sounds, non-distended and non-tender to palpation  EXT: No edema, no calf tenderness. Pulses are present bilaterally. NEUROLOGIC:  Mental Status Exam:  Level of Alertness:   awake  Orientation:   person, place, time. SKIN:  normal skin color, texture, turgor, no redness, warmth, or swelling     Microbiology  Fungus (Mycology) Culture 08/30/2018  8:00 AM [facility]   Fungus Stain 08/30/2018  8:00 AM 15 [facility]   No Fungal elements seen    Organism  (Abnormal) 08/30/2018  8:00 AM 15 [facility]   Sterile Mycelium     Fungus (Mycology) Culture 08/30/2018  8:00 AM 15 [facility]   Isolated    This is a non-sporulating isolate. These isolates have the inability to express diagnostic    characters under the conditions provided. No further workup. Serology  Results for Lia Ferrer (MRN [id number]) as of 10/25/2018 15:31   Ref.  Range 9/27/2018 14:38   Aspergillus Galacto AG Latest Ref Range: Negative  Negative   Aspergillus Galacto Index Unknown 0.09   (1,3)-Beta-D-Glucan (Fungitell) Interpretation Latest Ref Range: Negative  Negative   (1,3)-Beta-D-Glucan (fungitell) Latest Units: pg/mL <31 Histoplasma Antigen Urine Latest Units: ng/mL Not Detected   Coccidioides Ab,ID Latest Ref Range: None Detected  None Detected   Histoplasma Ab Mycelial CF Latest Ref Range: <1:8  <1:8   Histoplasma Ab Yeast CF Latest Ref Range: <1:8  <1:8   Histoplasma Antigen, Serum Unknown See Note   Histoplasma Ag Interp Latest Ref Range: Not Detected  Not Detected   HISTOPLASMA INTERPETATION Unknown See Note       Radiology    CT chest 5/24/2022 - images and report personally reviewed by myself and the presence of the patient:  FINDINGS: No axillary, mediastinal or hilar lymphadenopathy is identified. Thoracic aorta is without aneurysm. No pericardial effusion is identified. There is mild elevation right hemidiaphragm. There are no pulmonary infiltrates. Interstitial lung markings are normal. There is small amount of mucus secretions identified within the left mainstem bronchus. There is some mucus plugging identified within the posterior segment of the left lower lobe    bronchus. No bronchiectasis is identified. Images through the upper abdomen appear unremarkable. Impression   1. There is some mucus plugging identified within left lower lobe posterior segment bronchus. 2. No evidence of any pneumonic infiltrate. 3. No evidence of any mediastinal mass. 4. Mild elevation of the right hemidiaphragm. CT chest 11/20/2019      COMMENTS:        Mild degenerative changes in the spine with scoliosis. Upper abdomen is unremarkable. Mediastinal structures are unremarkable without lymphadenopathy. Previously seen consolidation in the left lower lobe has resolved. 2 mm right upper lobe pulmonary    nodule series 604 image 35 is stable since 8/18/2017. The lungs are otherwise clear. Impression       Resolution of left lower lobe pneumonia.          Pulmonary function test - dated August 21, 2018  Mild obstructive defect without bronchodilator response  Positive bronchial challenge  Normal lung volumes  Moderate decrease in diffusion capacity    Assessment:      Diagnosis Orders   1. Moderate persistent asthma without complication        2. Upper airway cough syndrome        3. Other allergic rhinitis        4. Persistent cough        5. Hx of lymphoma        6. H/O stem cell transplant ([facility]              Plan:     1. Continue Breo, Singulair and albuterol  2. Continue Zyrtec, Flonase, and Astelin. Add Benadryl 25 mg prior to bed for better control of upper airway cough syndrome and nocturnal cough  3. Continue Nexium 40 mg daily as needed  4.   Follow-up in 4 months for close monitoring unless needed sooner

## 2022-10-10 NOTE — TELEPHONE ENCOUNTER
Medication:   Requested Prescriptions     Pending Prescriptions Disp Refills    MAGNESIUM-OXIDE 400 (240 Mg) MG tablet [Pharmacy Med Name: MAG-OXIDE 400MG TABLETS] 30 tablet 0     Sig: TAKE 1 TABLET BY MOUTH DAILY        Last Filled: 08/03/22    Patient Phone Number: [phone] (home) [phone] (work)    Last appt: 8/11/2022   Next appt: 10/11/2022

## 2022-10-14 RX ORDER — MAGNESIUM OXIDE TAB 400 MG (241.3 MG ELEMENTAL MG) 400 (241.3 MG) MG
TAB ORAL
Qty: 30 TABLET | Refills: 0 | Status: SHIPPED | OUTPATIENT
Start: 2022-10-14

## 2022-11-17 ENCOUNTER — TELEPHONE (OUTPATIENT)
Dept: INTERNAL MEDICINE CLINIC | Age: 62
End: 2022-11-17

## 2022-11-17 DIAGNOSIS — R05.3 PERSISTENT COUGH: ICD-10-CM

## 2022-11-17 RX ORDER — PROMETHAZINE HYDROCHLORIDE AND CODEINE PHOSPHATE 6.25; 1 MG/5ML; MG/5ML
5 SYRUP ORAL 4 TIMES DAILY PRN
Qty: 60 ML | Refills: 0 | Status: SHIPPED | OUTPATIENT
Start: 2022-11-17 | End: 2022-11-20

## 2022-11-17 NOTE — TELEPHONE ENCOUNTER
----- Message from Tamara Villar sent at 11/17/2022  9:36 AM EST -----  Subject: Message to Provider    QUESTIONS  Information for Provider? Patient is calling because she wants to be   prescribed more cough syrup. She mentioned that her cough has gotten   worse. Please contact the patient to advise.   ---------------------------------------------------------------------------  --------------  Rachel Batista INFO  [phone]; OK to leave message on voicemail  ---------------------------------------------------------------------------  --------------  SCRIPT ANSWERS  Relationship to Patient?  Self

## 2022-11-18 ENCOUNTER — TELEPHONE (OUTPATIENT)
Dept: INTERNAL MEDICINE CLINIC | Age: 62
End: 2022-11-18

## 2022-11-18 NOTE — TELEPHONE ENCOUNTER
INFORMED PT PROVIDER WASN'T IN TODAY SHE WILL RETURN Monday.  PT ALSO STATES THAT SHE TOOK THE RX TO [facility]
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
## 2022-11-21 DIAGNOSIS — R05.3 PERSISTENT COUGH: ICD-10-CM

## 2022-11-22 RX ORDER — ALBUTEROL SULFATE 90 UG/1
AEROSOL, METERED RESPIRATORY (INHALATION)
Qty: 8.5 G | Refills: 0 | Status: SHIPPED | OUTPATIENT
Start: 2022-11-22

## 2022-11-22 RX ORDER — MAGNESIUM OXIDE TAB 400 MG (241.3 MG ELEMENTAL MG) 400 (241.3 MG) MG
TAB ORAL
Qty: 30 TABLET | Refills: 0 | Status: SHIPPED | OUTPATIENT
Start: 2022-11-22

## 2022-12-03 ENCOUNTER — TELEPHONE (OUTPATIENT)
Dept: INTERNAL MEDICINE CLINIC | Age: 62
End: 2022-12-03

## 2022-12-03 DIAGNOSIS — J01.90 ACUTE SINUSITIS, RECURRENCE NOT SPECIFIED, UNSPECIFIED LOCATION: Primary | ICD-10-CM

## 2022-12-03 RX ORDER — AZITHROMYCIN 250 MG/1
250 TABLET, FILM COATED ORAL SEE ADMIN INSTRUCTIONS
Qty: 6 TABLET | Refills: 0 | Status: SHIPPED | OUTPATIENT
Start: 2022-12-03 | End: 2022-12-08

## 2022-12-03 NOTE — PROGRESS NOTES
Returned [facility] call. Pt c/o sore throat, nasal congestion, fever, sinus pressure, headache. Tested negative for Covid with home test. Has not been tested for flu.

## 2022-12-16 ENCOUNTER — TELEPHONE (OUTPATIENT)
Dept: PULMONOLOGY | Age: 62
End: 2022-12-16

## 2022-12-16 RX ORDER — PREDNISONE 20 MG/1
40 TABLET ORAL DAILY
Qty: 14 TABLET | Refills: 0 | Status: SHIPPED | OUTPATIENT
Start: 2022-12-16

## 2022-12-16 RX ORDER — LEVOFLOXACIN 250 MG/1
250 TABLET ORAL DAILY
Qty: 7 TABLET | Refills: 0 | Status: SHIPPED | OUTPATIENT
Start: 2022-12-16 | End: 2022-12-23

## 2022-12-16 NOTE — TELEPHONE ENCOUNTER
Emile Razo was diagnosed with the flu on December 1 and stated she felt better by December 8. Currently though she has a cough productive of yellow sputum with some dyspnea. No fevers, chills, chest pain, chest tightness, or wheezing. He did complete a Z-Tim 2 weeks ago that did not help    Will give prednisone 40 mg daily x7 days and Levaquin 250 mg daily x7 days adjusted for a creatinine clearance of 22    If she fails to improve with this therapy she should call us back.

## 2022-12-20 ENCOUNTER — TELEPHONE (OUTPATIENT)
Dept: INTERNAL MEDICINE CLINIC | Age: 62
End: 2022-12-20

## 2022-12-20 NOTE — TELEPHONE ENCOUNTER
Patient needs a refill on promethazine codeine 6.25-10 mg. This will have to be sent to the [facility], they said you can call it in you do not have to write a prescription. Patient would like to get this in a 30 day supply.   Please advise

## 2022-12-22 ENCOUNTER — OFFICE VISIT (OUTPATIENT)
Dept: INTERNAL MEDICINE CLINIC | Age: 62
End: 2022-12-22
Payer: MEDICARE

## 2022-12-22 VITALS
DIASTOLIC BLOOD PRESSURE: 80 MMHG | TEMPERATURE: 98.4 F | HEART RATE: 88 BPM | BODY MASS INDEX: 27.72 KG/M2 | OXYGEN SATURATION: 95 % | SYSTOLIC BLOOD PRESSURE: 120 MMHG | WEIGHT: 162.4 LBS | HEIGHT: 64 IN

## 2022-12-22 DIAGNOSIS — J30.9 ALLERGIC SINUSITIS: ICD-10-CM

## 2022-12-22 DIAGNOSIS — N18.4 CKD (CHRONIC KIDNEY DISEASE) STAGE 4, GFR 15-29 ML/MIN (HCC): ICD-10-CM

## 2022-12-22 DIAGNOSIS — E03.9 ACQUIRED HYPOTHYROIDISM: Primary | ICD-10-CM

## 2022-12-22 DIAGNOSIS — E78.2 MIXED HYPERLIPIDEMIA: ICD-10-CM

## 2022-12-22 DIAGNOSIS — R73.03 PREDIABETES: ICD-10-CM

## 2022-12-22 DIAGNOSIS — N89.8 VAGINAL ITCHING: ICD-10-CM

## 2022-12-22 DIAGNOSIS — C81.90 HODGKIN LYMPHOMA, UNSPECIFIED HODGKIN LYMPHOMA TYPE, UNSPECIFIED BODY REGION (HCC): ICD-10-CM

## 2022-12-22 DIAGNOSIS — J45.40 MODERATE PERSISTENT ASTHMA, UNSPECIFIED WHETHER COMPLICATED: ICD-10-CM

## 2022-12-22 DIAGNOSIS — R05.3 PERSISTENT COUGH: ICD-10-CM

## 2022-12-22 PROCEDURE — 99214 OFFICE O/P EST MOD 30 MIN: CPT | Performed by: INTERNAL MEDICINE

## 2022-12-22 RX ORDER — MONTELUKAST SODIUM 10 MG/1
10 TABLET ORAL EVERY EVENING
Qty: 90 TABLET | Refills: 0 | Status: SHIPPED | OUTPATIENT
Start: 2022-12-22

## 2022-12-22 RX ORDER — MOMETASONE FUROATE 50 UG/1
2 SPRAY, METERED NASAL DAILY PRN
Qty: 1 EACH | Refills: 3 | Status: SHIPPED | OUTPATIENT
Start: 2022-12-22

## 2022-12-22 RX ORDER — PROMETHAZINE HYDROCHLORIDE AND CODEINE PHOSPHATE 6.25; 1 MG/5ML; MG/5ML
5 SYRUP ORAL 4 TIMES DAILY PRN
Qty: 60 ML | Refills: 0 | Status: SHIPPED | OUTPATIENT
Start: 2022-12-22 | End: 2022-12-25

## 2022-12-22 RX ORDER — FEXOFENADINE HCL 180 MG/1
180 TABLET ORAL DAILY PRN
Qty: 90 TABLET | Refills: 0 | Status: SHIPPED | OUTPATIENT
Start: 2022-12-22

## 2022-12-22 RX ORDER — LEVOTHYROXINE SODIUM 0.05 MG/1
50 TABLET ORAL EVERY MORNING
Qty: 90 TABLET | Refills: 0 | Status: SHIPPED | OUTPATIENT
Start: 2022-12-22

## 2022-12-22 RX ORDER — FLUCONAZOLE 150 MG/1
150 TABLET ORAL ONCE
Qty: 1 TABLET | Refills: 0 | Status: SHIPPED | OUTPATIENT
Start: 2022-12-22 | End: 2022-12-22

## 2022-12-22 RX ORDER — ESTRADIOL 10 UG/1
INSERT VAGINAL
COMMUNITY
Start: 2022-10-10

## 2022-12-22 RX ORDER — CETIRIZINE HYDROCHLORIDE 10 MG/1
TABLET ORAL
COMMUNITY
Start: 2022-11-20

## 2022-12-22 NOTE — PROGRESS NOTES
SUBJECTIVE:  Luigi Engel is a 60 y.o. female [address]   Chief Complaint   Patient presents with    Follow-up    Medication Refill    Cough      PT HERE FOR EVAL        HYPOTHYROIDISM - TAKING MED. DENIES FATIGUE. NO COLD / NO HEAT INTOLERANCE  LAB D/W PT  HLP - ? DIET / EXERCISE COMPLIANCE. UNCONTROLLED -  LAB D/W PT   PREDIABETES  - DIET / EXERCISE REVIEWED. LAB D/W PT  ASTHMA - DENIES WHEEZING, NO SOB, NO INCREASED INHALER USE. ON INHALERS PER PULM            CKD - AVOIDING NSAIDS. LAST LAB D/W PT.  COUGH - PERSISTENT. + PRODUCTIVE- ? COLOR OF PHLEGM, NO HEMOPTYSIS, NO F/C, NO WHEEZING. S/P ATB PER  PULM EVAL  ALLERGIC SINUSITIS -  NO NASAL CONGESTION , +  POSTNASAL DRAINAGE , ? SINUS PRESSURE, NO HA , ? SNEEZING, + OCC WATERY ITCHY EYES. OCC SCRATCHY THROAT. NON HODGKIN'S LYMPHOMA - H/O BONE MARROW TRANSPLANT. DENIES ISSUES AT THIS TIME. NO NIGHT SWEATS  C/O VAGINAL ITCHING - NOTED SINCE ON ATB. ? NO DISCHARGE   RECENT INFLUENZA A - SEEN IN ER 12/6/22     DENIES CP,  OCC SOB, No PALPITATIONS. No ABD PAIN, NO NAUSEA, NO VOMITING, No DIARRHEA, No CONSTIPATION, No MELENA, No HEMATOCHEZIA. No DYSURIA, No FREQ, No URGENCY, No HEMATURIA      PMH: REVIEWED AND UPDATED TODAY    PSH: REVIEWED AND UPDATED TODAY    SOCIAL HX: REVIEWED AND UPDATED TODAY    FAMILY HX: REVIEWED AND UPDATED TODAY    ALLERGY:  Patient has no known allergies. MEDS: REVIEWED  Prior to Visit Medications    Medication Sig Taking?  Authorizing Provider   RONALDO GREEN 200-25 MCG/ACT AEPB inhaler INHALE 1 PUFF INTO THE LUNGS IN THE MORNING Yes Historical Provider, MD   cetirizine (ZYRTEC) 10 MG tablet TAKE 1 TABLET BY MOUTH DAILY Yes Historical Provider, MD   Estradiol (VAGIFEM) 10 MCG TABS vaginal tablet INSERT 1 TABLET VAGINALLY TWICE WEEKLY WITH APPLICATOR Yes Historical Provider, MD   predniSONE (DELTASONE) 20 MG tablet Take 2 tablets by mouth daily Yes Corwin Moreno MD   levoFLOXacin (LEVAQUIN) 250 MG tablet Take 1 tablet by mouth daily for 7 days Yes Breanna Helm MD   MAGNESIUM-OXIDE 400 (240 Mg) MG tablet TAKE 1 TABLET BY MOUTH DAILY Yes Victorino Duncan MD   albuterol sulfate HFA (PROVENTIL;VENTOLIN;PROAIR) 108 (90 Base) MCG/ACT inhaler INHALE 2 PUFFS INTO THE LUNGS EVERY 6 HOURS AS NEEDED, SPACE OUT TO EVERY 6 HOURS AS SYMPTOMS IMPROVE Yes Victorino Duncan MD   BREO ELLIPTA 100-25 MCG/INH AEPB inhaler INHALE 1 PUFF INTO THE LUNGS DAILY Yes Breanna Helm MD   fexofenadine (ALLEGRA ALLERGY) 180 MG tablet Take 1 tablet by mouth daily as needed (PRN) Yes Victorino Duncan MD   levothyroxine (SYNTHROID) 50 MCG tablet Take 1 tablet by mouth every morning Yes Victorino Duncan MD   montelukast (SINGULAIR) 10 MG tablet Take 1 tablet by mouth every evening Yes Victorino Duncan MD   mometasone (NASONEX) 50 MCG/ACT nasal spray 2 sprays by Nasal route daily as needed (PRN) Yes Victorino Duncan MD   Cholecalciferol (VITAMIN D3) 25 MCG (1000 UT) CAPS TAKE 1 CAPSULE BY MOUTH DAILY Yes Victorino Duncan MD   esomeprazole ([address]) 40 MG delayed release capsule TAKE 1 CAPSULE BY MOUTH DAILY Yes Victorino Duncan MD   azelastine (ASTELIN) 0.1 % nasal spray 2 sprays by Nasal route 2 times daily Use in each nostril as directed Yes Victorino Duncan MD   erythromycin [facility]) 5 MG/GM ophthalmic ointment Apply to OD TID for 7 days Yes Vivian Ma MD   Ascorbic Acid (VITAMIN C) 250 MG tablet Take 500 mg by mouth daily Yes Historical Provider, MD   zinc 50 MG TABS tablet Take 50 mg by mouth daily Yes Historical Provider, MD   valACYclovir (VALTREX) 500 MG tablet TK 1 T PO Q 12 H.  TAKE BID FOR 3 DAYS AT FIRST ONSET OF SYMPTOMS Yes Historical Provider, MD   ketotifen (ZADITOR) 0.025 % ophthalmic solution Place into both eyes 2 times daily  Yes Historical Provider, MD   penicillin v potassium (VEETID) 500 MG tablet TAKE 1 TABLET BY MOUTH TWICE DAILY Yes Candee Battle Cheadle, APRN - CNP       ROS: COMPREHENSIVE ROS AS IN HX, REST -VE  History obtained from chart review and the patient       OBJECTIVE:   NURSING NOTE AND VITALS REVIEWED  /80 (Site: Right Upper Arm, Position: Sitting, Cuff Size: Small Adult)   Pulse 88   Temp 98.4 °F (36.9 °C) (Oral)   Ht 5' 4\" (1.626 m)   Wt 162 lb 6.4 oz (73.7 kg)   LMP 12/26/2008   SpO2 95%   BMI 27.88 kg/m²     + COUGHING OTHERWISE, NO ACUTE DISTRESS    REPEAT BP: 120/80 (RT), NO ORTHOSTASIS     Body mass index is 27.88 kg/m². HEENT: NO PALLOR, ANICTERIC, PERRLA, EOMI, NO CONJUNCTIVAL ERYTHEMA,                 + NASAL CONGESTION, NO SINUS TENDERNESS  NECK:  SUPPLE, TRACHEA MIDLINE, NT, NO JVD, NO CB, NO LA, NO TM, NO STIFFNESS  CHEST: RESPY EFFORT NL, DECREASED AE, NO W/R/C  HEART: S1S2+ REG, NO M/G/R  ABD: SOFT, NT, NO HSM, BS+  EXT: NO EDEMA, NT, PULSES +. JIMMY'S -VE  NEURO: ALERT AND ORIENTED X 3, NO MENINGEAL SIGNS, NO TREMORS, NL GAIT, NO FOCAL DEFICITS  PSYCH: FAIRLY GOOD AFFECT  BACK: NT, NO ROM, NO CVA TENDERNESS  PELVIC: DEFERRED    PREVIOUS LABS REVIEWED AND D/W PT / LAST LABS PENDING    ASSESSMENT / PLAN:     Diagnosis Orders   1. Acquired hypothyroidism  COUNSELLED. MONITOR ON SYNTHROID. TSH TO EVAL  MAKE CHANGES AS NEEDED       2. Mixed hyperlipidemia  COUNSELLED. ADVISED LOW FAT / CHOL DIET/ EXERCISE.  MONITOR. GOALS D/W PT.  MAKE CHANGES AS NEEDED. 3. Prediabetes  COUNSELLED. ADVISED ON DIET / EXERCISE  ADVISED RISK FACTOR MODIFICATION. MONITOR  MAKE CHANGES AS NEEDED. 4. Moderate persistent asthma, unspecified whether complicated  COUNSELLED. CONTINUE BREO. ALBUTEROL PRN. MONITOR. MONITOR FOR TRIGGERS- ENVIRONMENTAL MODIFICATION. F/U PULM  MAKE CHANGES AS NEEDED. 5. CKD (chronic kidney disease) stage 4, GFR 15-29 ml/min ([facility])  COUNSELLED. ADVISED AVOID NSAIDS. MAINTAIN HYDRATION. MONITOR. MAKE CHANGES AS NEEDED. 6. Persistent cough  COUNSELLED.  SYMPTOMATIC RX  promethazine-codeine (PHENERGAN WITH CODEINE) 6.25-10 MG/5ML PRN  ABNORMAL BREATH SOUND  CT CHEST TO EVAL  MAKE CHANGES AS NEEDED. 7. Allergic sinusitis  COUNSELLED. ADVISED ALLEGRA 180 MG DAILY/PRN, SINGULAIR  ADVISED USE OF NASONEX. MONITOR. MAKE CHANGES AS NEEDED. 8. Hodgkin lymphoma, unspecified Hodgkin lymphoma type, unspecified body region [facility])  COUNSELLED. PT ENCOURAGED. MONITOR. F/U HEM / ONC  MAKE CHANGES AS NEEDED. 9. Vaginal itching  COUNSELLED. RX FOR CANDIDIASIS - DIFLUCAN 150 MG ONCE  F/U IF PERSISTENT  MAKE CHANGES AS NEEDED.                           MEDICATION SIDE EFFECTS D/W PATIENT      RETURN TO CLINIC WITHIN 3 MONTHS / PRN    FOLLOW UP FOR CAT SCAN

## 2022-12-22 NOTE — PATIENT INSTRUCTIONS
TAKE MED AS ADVISED    DIET/ EXERCISE. FOLLOW UP WITHIN 3 MONTHS / AS NEEDED    FOLLOW UP FOR CAT 18 [facility] Laboratory Locations - No appointment necessary. @ indicates the location is open Saturdays in addition to Monday through Friday. Call your preferred location for test preparation, business hours and other information you need. [employer] accepts [employer]. [facility]    @ [place] [facility] [address] [facility] [address] [place] [address]. [place], [address]   Ph: [phone] [facility] Lab Svcs. [address]   Ph: [phone]  @ [facility] Sv[facility]. [address] [facility]   Ph: [phone]    [facility] S[facility]. [address]   Ph: [phone] @ [place] [facility] [address] [facility], [address] [facility]  Ph: [phone] @ [facility] Lab Svcs. [address]   Ph: [phone]     [facility] [place] [address]  Ph: [phone]    [place]   @ [place] [facility] [address] [place], [place] [address]   Ph: [phone] [address] Med. Office Bldg. [address], [address]  Ph: [phone] [facility],    [facility] [address] Sv[address] [facility]   Ph: [phone] [facility]. Lab Svcs.   [address] [facility], [address] [facility]   Ph: [phone]

## 2022-12-29 NOTE — TELEPHONE ENCOUNTER
Medication:   Requested Prescriptions     Pending Prescriptions Disp Refills    Cholecalciferol (VITAMIN D3) 25 MCG (1000 UT) CAPS [Pharmacy Med Name: VITAMIN D 1,000IU LIQ SFTGL CAPS] 90 capsule 0     Sig: TAKE 1 CAPSULE BY MOUTH DAILY        Last Filled: 8/11/22    Patient Phone Number: [phone] (home) [phone] (work)    Last appt: 12/22/2022   Next appt: 1/4/2023

## 2022-12-30 ENCOUNTER — TELEPHONE (OUTPATIENT)
Dept: INTERNAL MEDICINE CLINIC | Age: 62
End: 2022-12-30

## 2022-12-30 ENCOUNTER — HOSPITAL ENCOUNTER (OUTPATIENT)
Dept: CT IMAGING | Age: 62
Discharge: HOME | End: 2022-12-30
Payer: MEDICARE

## 2022-12-30 DIAGNOSIS — R93.89 ABNORMAL CT OF THE CHEST: ICD-10-CM

## 2022-12-30 DIAGNOSIS — J18.9 MULTIFOCAL PNEUMONIA: Primary | ICD-10-CM

## 2022-12-30 DIAGNOSIS — R05.3 PERSISTENT COUGH: ICD-10-CM

## 2022-12-30 DIAGNOSIS — C81.90 HODGKIN LYMPHOMA, UNSPECIFIED HODGKIN LYMPHOMA TYPE, UNSPECIFIED BODY REGION (HCC): ICD-10-CM

## 2022-12-30 PROCEDURE — 71250 CT THORAX DX C-: CPT

## 2022-12-30 RX ORDER — AZITHROMYCIN 250 MG/1
250 TABLET, FILM COATED ORAL SEE ADMIN INSTRUCTIONS
Qty: 6 TABLET | Refills: 0 | Status: SHIPPED | OUTPATIENT
Start: 2022-12-30 | End: 2023-01-04

## 2022-12-30 NOTE — TELEPHONE ENCOUNTER
ABN FINDING ON CT D/W PT  S/P RECENT ATB - LEVOQUIN  PER PULM - COMPLETED 12/24    STATES COUGH PERSISTENT  WILL START ON Z JOSE LUIS X 5 DAYS    F/U CXR TO EVAL  PT VERBALIZED UNDERSTANDING

## 2023-01-19 ENCOUNTER — OFFICE VISIT (OUTPATIENT)
Dept: PULMONOLOGY | Age: 63
End: 2023-01-19
Payer: MEDICARE

## 2023-01-19 VITALS
HEART RATE: 88 BPM | DIASTOLIC BLOOD PRESSURE: 81 MMHG | BODY MASS INDEX: 27.49 KG/M2 | OXYGEN SATURATION: 89 % | WEIGHT: 161 LBS | HEIGHT: 64 IN | SYSTOLIC BLOOD PRESSURE: 110 MMHG

## 2023-01-19 DIAGNOSIS — R05.3 PERSISTENT COUGH: ICD-10-CM

## 2023-01-19 DIAGNOSIS — R05.8 UPPER AIRWAY COUGH SYNDROME: ICD-10-CM

## 2023-01-19 DIAGNOSIS — Z94.84 H/O STEM CELL TRANSPLANT (HCC): ICD-10-CM

## 2023-01-19 DIAGNOSIS — J45.40 MODERATE PERSISTENT ASTHMA WITHOUT COMPLICATION: ICD-10-CM

## 2023-01-19 DIAGNOSIS — Z85.72 HX OF LYMPHOMA: ICD-10-CM

## 2023-01-19 DIAGNOSIS — J30.89 OTHER ALLERGIC RHINITIS: ICD-10-CM

## 2023-01-19 DIAGNOSIS — R05.3 CHRONIC COUGH: Primary | ICD-10-CM

## 2023-01-19 PROCEDURE — 99214 OFFICE O/P EST MOD 30 MIN: CPT | Performed by: INTERNAL MEDICINE

## 2023-01-19 NOTE — PROGRESS NOTES
[facility]    Outpatient Follow Up Note    Subjective:   CHIEF COMPLAINT / HPI:     The patient is 61 y.o. female here for follow-up of moderate persistent asthma, upper airway cough syndrome, and allergic rhinitis with a persistent dry cough. Last visit in September I added benadryl at night to see if that helped as she still seemed to have allergic symptoms with postnasal drip especially worse at night with a cough that would wake her up. She recently had the Flu 12/6/2022 for which she was seen in the ER. She initially felt better but by Dec 8th had a cough productive of yellow sputum with some dyspnea. No fevers, chills, chest pain, chest tightness, or wheezing. She called me and I gave her prednisone and levaquin x 7 days with improvement in symptoms but cough returned once off prednisone. She had a CT Chest 12/30 by PCP to david for persistent cough which showed mild ASD, likely residual from Flu. Currently only resp complaint is of her persistent dry cough that was present prior to her Flu       9/27/2022  Kirke Severance is here for follow-up of moderate persistent asthma, upper airway cough syndrome, and allergic rhinitis. August 24 to 27 she was admitted to [facility] for community-acquired pneumonia. Procalcitonin was 1.92, although she does have CKD, and chest x-ray showed a left lower lobe infiltrate. She was treated with Rocephin and azithromycin and made a full recovery. Currently she states that she still has a dry cough that is daily. It is associated with postnasal drip and rhinorrhea. It occurs at night and can wake her up. She has no wheezing, chest tightness, dyspnea on exertion, or reflux. She is compliant with Breo and Singulair for asthma and Zyrtec, Flonase, and Astelin for her allergic rhinitis.   No fevers, chills, or purulent sputum      6/30/2022  Kirke Severance is here for evaluation of a cough that has been going on for approximately 6 to 8 weeks productive of green phlegm. She has no fevers, chills, night sweats, anorexia, or weight loss. Occasionally she has some dyspnea on exertion but no chest tightness or wheezing. She patient was treated with a Z-Tim without improvement. Chest x-ray showed an elevated right hemidiaphragm but clear lungs. CT chest showed some mucous plugging in the left lower lobe otherwise unremarkable. She is finishing up a 7-day course of Augmentin also without improvement. 11/29/2021  Parvin Silva has past medical history of lymphoma status post bone marrow transplant with upper airway cough syndrome and asthma that presents for routine follow-up upper airway cough syndrome and mild asthma. She was diagnosed with COVID 19 for the second time on November 15. She had her second Critical Media vaccination August 24. This course was much milder and did not require hospitalization like her initial Covid infection in December 2020. She states that she feels like she is 85% back to normal.  She continues on Astelin, Zyrtec, and Flonase for upper airway cough syndrome as well as Breo for asthma. She is asking for refills of albuterol and Astelin. Her lymphoma appears to be in remission and she follows up with Dr. Brandon Freed every 6-month    5/202/2021  Parvin Silva presents for an acute visit for 3 weeks of cough productive of yellow-tan phlegm. She has no associated fevers, chills, night sweats, anorexia, weight loss, hemoptysis, wheezing, chest tightness, or dyspnea. Patient recently restarted on Flonase. She has been on her chronic Breo, Astelin, and Zyrtec without interruption. Patient had Covid in December and was hospitalized for 2 days at [facility] but made a full recovery and did not need oxygen on discharge. 6/1/2020  Parvin Silva has requested a virtual visit for follow-up of chronic cough due to upper airway cough syndrome and asthma. She has a history of lymphoma status post a bone marrow transplant.   Last visit I added Astelin to her chronic regimen of Flonase, Zyrtec, and Breo. She states with that she is doing quite well and has no significant cough, wheezing, or chest tightness    Feb 13,2020  Jennifer Villatoro has a history of lymphoma status post bone marrow transplant here for follow-up of chronic cough thought to be secondary to upper airway cough syndrome. Last visited I gave her Zyrtec and if needed Flonase which she did start. Cough is some better but she still has a daily dry cough with postnasal drip and a tickle in her throat. No fevers, chills, night sweats, anorexia, weight loss, hemoptysis, dyspnea, wheezing, or chest tightness. November 26, 2019  Jennifer Villatoro is here for follow-up of a cough with purulent sputum. When I saw her in October I gave her 2 weeks of Levaquin and repeat his CT chest approximately a week ago. Left lower lobe pneumonia has resolved. Her fatigue and dyspnea on exertion have resolved as well. Her cough while improving is still present. She has postnasal drip and throat clearing. No fevers, chills, anorexia, or weight loss. Phlegm production is clear to yellow. October 16, 2019  Jennifer Villatoro has a past medical history of lymphoma status post bone marrow transplant is here for evaluation of a cough productive of purulent sputum and abnormal CT chest since mid August.  She was initially given a Z-Itm without improvement. Her oncologist Dr Kathie Ferrari ordered a CT chest September 6 and then gave her a week course of Levaquin. She states that her fatigue I will bit better but her cough with purulent sputum persists. She has no fevers, chills, anorexia, night sweats, weight loss, hemoptysis, or chest pain. She has some mild dyspnea on occasion    January 2019  Jennifer Villatoro is here for follow-up of abnormal CT chest.  She is doing well and denies any fevers, chills, night sweats, weight loss, anorexia, malaise, cough, wheezing, or shortness of breath.      12/13/2018  Jennifer Villatoro Is here for follow-up of abnormal CT chest with BAL cultures positive for sterile mycelium. Initially she had dyspnea on exertion and cough which prompted a CT chest which showed groundglass opacities that prompted a bronchoscopy. Since last visit she saw Dr. Veronica Nair from infectious disease and his opinion was that this did not represent a pathogen. She was not treated and has done very well. Her cough and dyspnea on exertion are much improved. His no fevers, chills, night sweats, anorexia, weight loss, or malaise. Previous Visit 10/25/2018  Nandini Abel has a  past medical history of lymphoma status post bone marrow transplant and [facility] Is here for follow-up of bronchoscopy performed August 30, 2018. The procedure showed thick purulent secretions with plugs seen in the medial basal segment of the right lower lobe and proximal segment of the left lower lobe. Therapeutic aspiration was performed and cultures were obtained. 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 Patient continues to have a producitve cough and dyspnea on exertion but no fevers, chills, night sweats, malaise, anorexia or weight loss    Previous visit August 23, 2018  Nandini Abel is here for two-week follow-up of dyspnea on exertion and cough. Since last visit she's had a CT chest and PFTs. She has no change in her dyspnea on exertion and cough. She continues to have no fevers, chills, anorexia, chest pain, or weight loss    Previous visit 8/1/2018   The patient is 60 y.o. female with past medical history of lymphoma status post bone marrow transplant and UACS on Flonase and Zyrtec presents for evaluation of worsening dyspnea on exertion to the point she gets winded going up 1 flight of stairs. She has a cough that she describes as both dry but also bringing up some yellow phlegm. She has no associated fevers, chills, anorexia, chest pain, or weight loss. .     Past Medical History:    Past Medical History:   Diagnosis Date    Allergic rhinitis     Arthritis     CKD (chronic kidney disease) DLBCL (diffuse large B cell lymphoma) ([facility] 2/2010    non-hodgkins lymphoma    Encounter for aftercare following bone marrow transplant ([facility] 6/10/2016    GERD (gastroesophageal reflux disease)     Hx of non-Hodgkin's lymphoma     Hypogammaglobulinemia ([facility] 4/13/2017    MDRO (multiple drug resistant organisms) resistance 5/15/16    E.coli MDRO in urine    Migraines     Neutropenia ([facility] 7/20/2016    Non Hodgkin's lymphoma ([facility]     Pancytopenia ([facility] 7/20/2016    Peripheral neuropathy     PONV (postoperative nausea and vomiting)        Social History:    Social History     Tobacco Use   Smoking Status Never   Smokeless Tobacco Never       Current Medications:  Current Outpatient Medications on File Prior to Visit   Medication Sig Dispense Refill    Cholecalciferol (VITAMIN D3) 25 MCG (1000 UT) CAPS TAKE 1 CAPSULE BY MOUTH DAILY 90 capsule 0    BREO ELLIPTA 200-25 MCG/ACT AEPB inhaler INHALE 1 PUFF INTO THE LUNGS IN THE MORNING      cetirizine (ZYRTEC) 10 MG tablet TAKE 1 TABLET BY MOUTH DAILY      Estradiol (VAGIFEM) 10 MCG TABS vaginal tablet INSERT 1 TABLET VAGINALLY TWICE WEEKLY WITH APPLICATOR      fexofenadine (ALLEGRA ALLERGY) 180 MG tablet Take 1 tablet by mouth daily as needed (PRN) 90 tablet 0    levothyroxine (SYNTHROID) 50 MCG tablet Take 1 tablet by mouth every morning 90 tablet 0    montelukast (SINGULAIR) 10 MG tablet Take 1 tablet by mouth every evening 90 tablet 0    mometasone (NASONEX) 50 MCG/ACT nasal spray 2 sprays by Nasal route daily as needed (PRN) 1 each 3    predniSONE (DELTASONE) 20 MG tablet Take 2 tablets by mouth daily 14 tablet 0    MAGNESIUM-OXIDE 400 (240 Mg) MG tablet TAKE 1 TABLET BY MOUTH DAILY 30 tablet 0    albuterol sulfate HFA (PROVENTIL;VENTOLIN;PROAIR) 108 (90 Base) MCG/ACT inhaler INHALE 2 PUFFS INTO THE LUNGS EVERY 6 HOURS AS NEEDED, SPACE OUT TO EVERY 6 HOURS AS SYMPTOMS IMPROVE 8.5 g 0    BREO ELLIPTA 100-25 MCG/INH AEPB inhaler INHALE 1 PUFF INTO THE LUNGS DAILY 60 each 5    esomeprazole (NEXIUM) 40 MG delayed release capsule TAKE 1 CAPSULE BY MOUTH DAILY 90 capsule 0    azelastine (ASTELIN) 0.1 % nasal spray 2 sprays by Nasal route 2 times daily Use in each nostril as directed 3 each 3    erythromycin (ROMYCIN) 5 MG/GM ophthalmic ointment Apply to OD TID for 7 days 3.5 g 0    Ascorbic Acid (VITAMIN C) 250 MG tablet Take 500 mg by mouth daily      zinc 50 MG TABS tablet Take 50 mg by mouth daily      valACYclovir (VALTREX) 500 MG tablet TK 1 T PO Q 12 H. TAKE BID FOR 3 DAYS AT FIRST ONSET OF SYMPTOMS      ketotifen (ZADITOR) 0.025 % ophthalmic solution Place into both eyes 2 times daily       penicillin v potassium (VEETID) 500 MG tablet TAKE 1 TABLET BY MOUTH TWICE DAILY 60 tablet 2     No current facility-administered medications on file prior to visit.       REVIEW OF SYSTEMS:    CONSTITUTIONAL: Negative for fevers and chills  HEENT: Negative for oropharyngeal exudate, post nasal drip, sinus pain / pressure, nasal congestion, ear pain  RESPIRATORY:  See HPI  CARDIOVASCULAR: Negative for chest pain, palpitations, edema  GASTROINTESTINAL: Negative for nausea, vomiting, diarrhea, constipation and abdominal pain  HEMATOLOGICAL: Negative for adenopathy  SKIN: Negative for clubbing, cyanosis, skin lesions  EXTREMITIES: Negative for weakness, decreased ROM  NEUROLOGICAL: Negative for unilateral weakness, speech or gait abnormalities    Objective:   PHYSICAL EXAM:        VITALS:    /81 (Site: Right Upper Arm, Position: Sitting, Cuff Size: Large Adult)   Pulse 88   Ht 5' 4\" (1.626 m)   Wt 161 lb (73 kg)   LMP 12/26/2008   SpO2 (!) 89%   BMI 27.64 kg/m²     CONSTITUTIONAL:  Awake, alert, cooperative, no apparent distress, and appears stated age  HEENT: No oropharyngeal exudate, PERRL, no cervical adenopathy, no tracheal deviation, thyroid size normal  LUNGS:  No increased work of breathing and clear to auscultation, no crackles or wheezing  CARDIOVASCULAR:  normal S1 and S2  and no JVD  ABDOMEN:  Normal bowel sounds, non-distended and non-tender to palpation  EXT: No edema, no calf tenderness. Pulses are present bilaterally. NEUROLOGIC:  Mental Status Exam:  Level of Alertness:   awake  Orientation:   person, place, time. SKIN:  normal skin color, texture, turgor, no redness, warmth, or swelling     Microbiology  Fungus (Mycology) Culture 08/30/2018  8:00 AM 15 [facility]   Fungus Stain 08/30/2018  8:00 AM 15 [facility]   No Fungal elements seen    Organism  (Abnormal) 08/30/2018  8:00 AM 15 [facility]   Sterile Mycelium     Fungus (Mycology) Culture 08/30/2018  8:00 AM 15 [facility]   Isolated    This is a non-sporulating isolate. These isolates have the inability to express diagnostic    characters under the conditions provided. No further workup. Serology  Results for Arden Stinson (MRN [id number]) as of 10/25/2018 15:31   Ref. Range 9/27/2018 14:38   Aspergillus Galacto AG Latest Ref Range: Negative  Negative   Aspergillus Galacto Index Unknown 0.09   (1,3)-Beta-D-Glucan (Fungitell) Interpretation Latest Ref Range: Negative  Negative   (1,3)-Beta-D-Glucan (fungitell) Latest Units: pg/mL <31   Histoplasma Antigen Urine Latest Units: ng/mL Not Detected   Coccidioides Ab,ID Latest Ref Range: None Detected  None Detected   Histoplasma Ab Mycelial CF Latest Ref Range: <1:8  <1:8   Histoplasma Ab Yeast CF Latest Ref Range: <1:8  <1:8   Histoplasma Antigen, Serum Unknown See Note   Histoplasma Ag Interp Latest Ref Range: Not Detected  Not Detected   HISTOPLASMA INTERPETATION Unknown See Note       Radiology  CT Chest 12/30/2022  FINDINGS:       LOWER NECK AND AXILLA: No lymphadenopathy. HEART AND GREAT VESSELS: The heart is normal in size. Thoracic aorta and main pulmonary artery are normal in caliber. MEDIASTINUM AND GORDON: No mass or lymphadenopathy.        LUNGS AND AIRWAYS: Hazy opacities of the base of the right upper lobe and the lingula. No suspicious pulmonary nodule or mass. Major airways are patent. PLEURA: No pleural effusion or pneumothorax. CHEST WALL AND SOFT TISSUES: Unremarkable. UPPER ABDOMEN: No acute abnormality. BONES: No acute or suspicious abnormality. Impression   Early/mild multilobar pneumonia. CT chest 5/24/2022 - images and report personally reviewed by myself and the presence of the patient:  FINDINGS: No axillary, mediastinal or hilar lymphadenopathy is identified. Thoracic aorta is without aneurysm. No pericardial effusion is identified. There is mild elevation right hemidiaphragm. There are no pulmonary infiltrates. Interstitial lung markings are normal. There is small amount of mucus secretions identified within the left mainstem bronchus. There is some mucus plugging identified within the posterior segment of the left lower lobe    bronchus. No bronchiectasis is identified. Images through the upper abdomen appear unremarkable. Impression   1. There is some mucus plugging identified within left lower lobe posterior segment bronchus. 2. No evidence of any pneumonic infiltrate. 3. No evidence of any mediastinal mass. 4. Mild elevation of the right hemidiaphragm. CT chest 11/20/2019      COMMENTS:        Mild degenerative changes in the spine with scoliosis. Upper abdomen is unremarkable. Mediastinal structures are unremarkable without lymphadenopathy. Previously seen consolidation in the left lower lobe has resolved. 2 mm right upper lobe pulmonary    nodule series 604 image 35 is stable since 8/18/2017. The lungs are otherwise clear. Impression       Resolution of left lower lobe pneumonia.          Pulmonary function test - dated August 21, 2018  Mild obstructive defect without bronchodilator response  Positive bronchial challenge  Normal lung volumes  Moderate decrease in diffusion capacity    Assessment:      Diagnosis Orders 1. Chronic cough        2. Moderate persistent asthma without complication        3. Upper airway cough syndrome        4. Other allergic rhinitis        5. Persistent cough        6. Hx of lymphoma        7. H/O stem cell transplant ([facility]                Plan:     1. Continue Breo, Singulair and albuterol  2. Continue Zyrtec, Flonase, and Astelin. Add Benadryl 25 mg prior to bed for better control of upper airway cough syndrome and nocturnal cough. If no improvement with that then will intensify asthma therapy  3. Continue Nexium 40 mg daily as needed  4.   Follow-up in 3 months for close monitoring unless needed sooner

## 2023-02-09 ENCOUNTER — OFFICE VISIT (OUTPATIENT)
Dept: INTERNAL MEDICINE CLINIC | Age: 63
End: 2023-02-09
Payer: MEDICARE

## 2023-02-09 VITALS
DIASTOLIC BLOOD PRESSURE: 80 MMHG | WEIGHT: 162 LBS | OXYGEN SATURATION: 94 % | HEART RATE: 78 BPM | SYSTOLIC BLOOD PRESSURE: 118 MMHG | BODY MASS INDEX: 27.81 KG/M2

## 2023-02-09 DIAGNOSIS — C81.90 HODGKIN LYMPHOMA, UNSPECIFIED HODGKIN LYMPHOMA TYPE, UNSPECIFIED BODY REGION (HCC): ICD-10-CM

## 2023-02-09 DIAGNOSIS — R05.3 PERSISTENT COUGH: ICD-10-CM

## 2023-02-09 DIAGNOSIS — J30.9 ALLERGIC SINUSITIS: ICD-10-CM

## 2023-02-09 DIAGNOSIS — J45.40 MODERATE PERSISTENT ASTHMA, UNSPECIFIED WHETHER COMPLICATED: ICD-10-CM

## 2023-02-09 DIAGNOSIS — R73.03 PREDIABETES: ICD-10-CM

## 2023-02-09 DIAGNOSIS — E03.9 ACQUIRED HYPOTHYROIDISM: ICD-10-CM

## 2023-02-09 DIAGNOSIS — E78.2 MIXED HYPERLIPIDEMIA: Primary | ICD-10-CM

## 2023-02-09 DIAGNOSIS — N18.4 CKD (CHRONIC KIDNEY DISEASE) STAGE 4, GFR 15-29 ML/MIN (HCC): ICD-10-CM

## 2023-02-09 PROCEDURE — 99214 OFFICE O/P EST MOD 30 MIN: CPT | Performed by: INTERNAL MEDICINE

## 2023-02-09 RX ORDER — LEVOTHYROXINE SODIUM 0.05 MG/1
50 TABLET ORAL EVERY MORNING
Qty: 90 TABLET | Refills: 0 | Status: SHIPPED | OUTPATIENT
Start: 2023-02-09

## 2023-02-09 RX ORDER — MONTELUKAST SODIUM 10 MG/1
10 TABLET ORAL EVERY EVENING
Qty: 90 TABLET | Refills: 0 | Status: SHIPPED | OUTPATIENT
Start: 2023-02-09

## 2023-02-09 ASSESSMENT — PATIENT HEALTH QUESTIONNAIRE - PHQ9
5. POOR APPETITE OR OVEREATING: 0
1. LITTLE INTEREST OR PLEASURE IN DOING THINGS: 0
9. THOUGHTS THAT YOU WOULD BE BETTER OFF DEAD, OR OF HURTING YOURSELF: 0
4. FEELING TIRED OR HAVING LITTLE ENERGY: 0
2. FEELING DOWN, DEPRESSED OR HOPELESS: 0
7. TROUBLE CONCENTRATING ON THINGS, SUCH AS READING THE NEWSPAPER OR WATCHING TELEVISION: 0
SUM OF ALL RESPONSES TO PHQ QUESTIONS 1-9: 0
6. FEELING BAD ABOUT YOURSELF - OR THAT YOU ARE A FAILURE OR HAVE LET YOURSELF OR YOUR FAMILY DOWN: 0
8. MOVING OR SPEAKING SO SLOWLY THAT OTHER PEOPLE COULD HAVE NOTICED. OR THE OPPOSITE, BEING SO FIGETY OR RESTLESS THAT YOU HAVE BEEN MOVING AROUND A LOT MORE THAN USUAL: 0
SUM OF ALL RESPONSES TO PHQ QUESTIONS 1-9: 0
3. TROUBLE FALLING OR STAYING ASLEEP: 0
SUM OF ALL RESPONSES TO PHQ9 QUESTIONS 1 & 2: 0

## 2023-02-09 NOTE — PROGRESS NOTES
SUBJECTIVE:  Amina Alexandre is a 60 y.o. female HERE FOR   Chief Complaint   Patient presents with    Follow-up      PT HERE FOR EVAL      HLP - ? DIET / EXERCISE COMPLIANCE. PREVIOUS LAB D/W PT    HYPOTHYROIDISM - TAKING MED. OCC FATIGUE. NO COLD / NO HEAT INTOLERANCE    PREDIABETES  - DIET / EXERCISE REVIEWED. LAB D/W PT       CKD - AVOIDING NSAIDS. PREVIOUS LAB D/W PT.  COUGH - PERSISTENT. + PRODUCTIVE- ? COLOR OF PHLEGM, NO HEMOPTYSIS, NO F/C, NO WHEEZING. FOLLOWING ALSO WITH PULM. PT ON INCREASED BREO DOSE  ALLERGIC SINUSITIS -  NO NASAL CONGESTION , +  POSTNASAL DRAINAGE , NO SINUS PRESSURE, OCC HA , + SNEEZING, + OCC WATERY ITCHY EYES. OCC SCRATCHY THROAT. ASTHMA - DENIES WHEEZING, NO SOB, NO INCREASED INHALER USE. ON INHALERS PER PULM       NON HODGKIN'S LYMPHOMA - H/O BONE MARROW TRANSPLANT. ? NIGHT SWEATS         DENIES CP,  OCC SOB, No PALPITATIONS. No ABD PAIN, NO NAUSEA, NO VOMITING, No DIARRHEA, No CONSTIPATION, No MELENA, No HEMATOCHEZIA. No DYSURIA, No FREQ, No URGENCY, No HEMATURIA     PMH: REVIEWED AND UPDATED TODAY    PSH: REVIEWED AND UPDATED TODAY    SOCIAL HX: REVIEWED AND UPDATED TODAY    FAMILY HX: REVIEWED AND UPDATED TODAY    ALLERGY:  Patient has no known allergies. MEDS: REVIEWED  Prior to Visit Medications    Medication Sig Taking?  Authorizing Provider   Cholecalciferol (VITAMIN D3) 25 MCG (1000 UT) CAPS TAKE 1 CAPSULE BY MOUTH DAILY Yes Marjorie Feliz MD   BREO ELLIPTA 200-25 MCG/ACT AEPB inhaler INHALE 1 PUFF INTO THE LUNGS IN THE MORNING Yes Historical Provider, MD   cetirizine (ZYRTEC) 10 MG tablet TAKE 1 TABLET BY MOUTH DAILY Yes Historical Provider, MD   Estradiol (VAGIFEM) 10 MCG TABS vaginal tablet INSERT 1 TABLET VAGINALLY TWICE WEEKLY WITH APPLICATOR Yes Historical Provider, MD   fexofenadine (ALLEGRA ALLERGY) 180 MG tablet Take 1 tablet by mouth daily as needed (PRN) Yes Marjorie Feliz MD   levothyroxine (SYNTHROID) 50 MCG tablet Take 1 tablet by mouth every morning Yes Silvino Montano MD   montelukast (SINGULAIR) 10 MG tablet Take 1 tablet by mouth every evening Yes Silvino Montano MD   mometasone (NASONEX) 50 MCG/ACT nasal spray 2 sprays by Nasal route daily as needed (PRN) Yes Silvino Montano MD   predniSONE (DELTASONE) 20 MG tablet Take 2 tablets by mouth daily Yes Keena Beebe MD   MAGNESIUM-OXIDE 400 (240 Mg) MG tablet TAKE 1 TABLET BY MOUTH DAILY Yes Silvino Montano MD   albuterol sulfate HFA (PROVENTIL;VENTOLIN;PROAIR) 108 (90 Base) MCG/ACT inhaler INHALE 2 PUFFS INTO THE LUNGS EVERY 6 HOURS AS NEEDED, SPACE OUT TO EVERY 6 HOURS AS SYMPTOMS IMPROVE Yes Silvino Montano MD   BREO ELLIPTA 100-25 MCG/INH AEPB inhaler INHALE 1 PUFF INTO THE LUNGS DAILY Yes Keena Beebe MD   esomeprazole ([address]) 40 MG delayed release capsule TAKE 1 CAPSULE BY MOUTH DAILY Yes Silvino Montano MD   azelastine (ASTELIN) 0.1 % nasal spray 2 sprays by Nasal route 2 times daily Use in each nostril as directed Yes Silvino Montano MD   erythromycin [facility]) 5 MG/GM ophthalmic ointment Apply to OD TID for 7 days Yes Alejandra Ruffin MD   Ascorbic Acid (VITAMIN C) 250 MG tablet Take 500 mg by mouth daily Yes Historical Provider, MD   zinc 50 MG TABS tablet Take 50 mg by mouth daily Yes Historical Provider, MD   valACYclovir (VALTREX) 500 MG tablet TK 1 T PO Q 12 H.  TAKE BID FOR 3 DAYS AT FIRST ONSET OF SYMPTOMS Yes Historical Provider, MD   ketotifen (ZADITOR) 0.025 % ophthalmic solution Place into both eyes 2 times daily  Yes Historical Provider, MD   penicillin v potassium (VEETID) 500 MG tablet TAKE 1 TABLET BY MOUTH TWICE DAILY Yes Vanda Ralphs Cheadle, APRN - CNP       ROS: COMPREHENSIVE ROS AS IN HX, REST -VE  History obtained from chart review and the patient       OBJECTIVE:   NURSING NOTE AND VITALS REVIEWED  /86   Pulse (!) 104   Wt 162 lb (73.5 kg)   LMP 12/26/2008   SpO2 94%   BMI 27.81 kg/m²     NO ACUTE DISTRESS    REPEAT BP:  118/80 (RT) , NO ORTHOSTASIS REPEAT PULSE: 78 - MANUAL    Body mass index is 27.81 kg/m². HEENT: NO PALLOR, ANICTERIC, PERRLA, EOMI, NO CONJUNCTIVAL ERYTHEMA,                 + NASAL CONGESTION, NO SINUS TENDERNESS  NECK:  SUPPLE, TRACHEA MIDLINE, NT, NO JVD, NO CB, NO LA, NO TM, NO STIFFNESS  CHEST: RESPY EFFORT NL, GOOD AE, NO W/R/C - CTA  HEART: S1S2+ REG, NO M/G/R  ABD: SOFT, NT, NO HSM, BS+  EXT: NO EDEMA, NT, PULSES +. JIMMY'S -VE  NEURO: ALERT AND ORIENTED X 3, NO MENINGEAL SIGNS, NO TREMORS, NL GAIT, NO FOCAL DEFICITS  PSYCH: FAIRLY GOOD AFFECT  BACK: NT, NO ROM, NO CVA TENDERNESS     PREVIOUS LABS REVIEWED AND D/W PT / LAST LABS PENDING    ASSESSMENT / PLAN:     Diagnosis Orders   1. Mixed hyperlipidemia  COUNSELLED. ADVISED LOW FAT / CHOL DIET/ EXERCISE.  MONITOR. F/U LABS  GOALS D/W PT.  MAKE CHANGES AS NEEDED. 2. Acquired hypothyroidism  COUNSELLED. MONITOR ON SYNTHROID.  MAKE CHANGES AS NEEDED       3. Prediabetes  COUNSELLED. ADVISED ON DIET / EXERCISE  ADVISED RISK FACTOR MODIFICATION. MONITOR  MAKE CHANGES AS NEEDED. 4. CKD (chronic kidney disease) stage 4, GFR 15-29 ml/min ([facility])  COUNSELLED. ADVISED AVOID NSAIDS. MAINTAIN HYDRATION. MONITOR. MAKE CHANGES AS NEEDED. 5. Persistent cough  COUNSELLED. SYMPTOMATIC RX  MONITOR ON SINGULAIR 10 MG  F/U PULM  MAKE CHANGES AS NEEDED. 6. Allergic sinusitis  COUNSELLED. SYMPTOMATIC RX  montelukast (SINGULAIR) 10 MG   CONTINUE OTHER MEDS. MONITOR  MAKE CHANGES AS NEEDED. 7.  Moderate persistent asthma, unspecified whether complicated  COUNSELLED. CONTINUE BREO. ALBUTEROL PRN. MONITOR. MONITOR FOR TRIGGERS- ENVIRONMENTAL MODIFICATION. F/U PULM  MAKE CHANGES AS NEEDED. 8. Hodgkin lymphoma, unspecified Hodgkin lymphoma type, unspecified body region [facility])  COUNSELLED. PT ENCOURAGED  F/U HEM / ONC. MONITOR  MAKE CHANGES AS NEEDED.                          MEDICATION SIDE EFFECTS D/W PATIENT      RETURN TO CLINIC WITHIN 3 MONTHS / PRN    FOLLOW UP FOR FASTING LABS

## 2023-02-09 NOTE — PATIENT INSTRUCTIONS
TAKE MED AS ADVISED    DIET/ EXERCISE. FOLLOW UP WITHIN 3 MONTHS / AS NEEDED    FOLLOW UP FOR FASTING [facility] Laboratory Locations - No appointment necessary. @ indicates the location is open Saturdays in addition to Monday through Friday. Call your preferred location for test preparation, business hours and other information you need. [employer] accepts [employer]. [facility]     @ [address] [facility] [address] [facility]. [place], [address] [facility] [address]    Ph: [address]   [address] [facility] [place], [address]    Ph: [phone]   @ [address] [facility]    [facility]    Ph: [address]    Ph: [phone]  @ [address] [place] [address]   Ph: [phone]  @ [address] [place] [address]. Odilon Wu [address]    Ph: [address] [place] [address] [facility] 19   Ph: [phone]    [place]    @ [facility]. [address] [place] [address]    Ph: [phone]  [facility]   [address] [facility], [address] [facility]   Ph: [address] [address]: [address] [facility] Javon Ortega    Ph: [phone]   [facility]   [address] [facility]. Javon Garcia   Ph: [address] [place] [address] Med.  [address] [facility] [place] [address]    Ph: [phone]

## 2023-02-15 ENCOUNTER — TELEPHONE (OUTPATIENT)
Dept: INTERNAL MEDICINE CLINIC | Age: 63
End: 2023-02-15

## 2023-02-15 NOTE — TELEPHONE ENCOUNTER
Patient is requesting something for cough that she has hasnt been able to stop coughing please advise.

## 2023-02-16 RX ORDER — BROMPHENIRAMINE MALEATE, PSEUDOEPHEDRINE HYDROCHLORIDE, AND DEXTROMETHORPHAN HYDROBROMIDE 2; 30; 10 MG/5ML; MG/5ML; MG/5ML
5 SYRUP ORAL 4 TIMES DAILY PRN
Qty: 240 EACH | Refills: 0 | Status: SHIPPED | OUTPATIENT
Start: 2023-02-16

## 2023-02-16 NOTE — TELEPHONE ENCOUNTER
Patient called today stating she didn't receive a call I verified number and it is correct states she would like a call this evening if possible in regards to this persistent cough.

## 2023-02-17 NOTE — TELEPHONE ENCOUNTER
CALLED AND D/W PT  STATES COUGH INCREASED WITH WEATHER CHANGE  ADVISED BROMFED DM PRN  CONTINUE SINGULAIR  F/U PULM  PT VERBALIZED UNDERSTANDING

## 2023-02-22 ENCOUNTER — TELEPHONE (OUTPATIENT)
Dept: ORTHOPEDIC SURGERY | Age: 63
End: 2023-02-22

## 2023-02-27 ENCOUNTER — OFFICE VISIT (OUTPATIENT)
Dept: ORTHOPEDIC SURGERY | Age: 63
End: 2023-02-27
Payer: MEDICARE

## 2023-02-27 VITALS — WEIGHT: 162 LBS | HEIGHT: 64 IN | BODY MASS INDEX: 27.66 KG/M2

## 2023-02-27 DIAGNOSIS — M75.21 BICEPS TENDINITIS OF RIGHT UPPER EXTREMITY: ICD-10-CM

## 2023-02-27 DIAGNOSIS — M75.41 ROTATOR CUFF IMPINGEMENT SYNDROME OF RIGHT SHOULDER: ICD-10-CM

## 2023-02-27 DIAGNOSIS — M25.511 RIGHT SHOULDER PAIN, UNSPECIFIED CHRONICITY: Primary | ICD-10-CM

## 2023-02-27 PROCEDURE — 99214 OFFICE O/P EST MOD 30 MIN: CPT | Performed by: PHYSICIAN ASSISTANT

## 2023-02-27 RX ORDER — FLUTICASONE FUROATE AND VILANTEROL 100; 25 UG/1; UG/1
POWDER RESPIRATORY (INHALATION)
COMMUNITY
Start: 2023-02-13

## 2023-02-27 NOTE — PROGRESS NOTES
[facility]  History and Physical  Shoulder Pain    Date:  2023    Name:  True Waldrop  Address:  [address] [place] [address] [address] [address]    :  1960      Age:   60 y.o.    SSN:  [id number]      Medical Record Number:  [id number]    Reason for Visit:    Shoulder Pain (F/U RIGHT SHOULDER)      HPI:   True Waldrop is a 60 y.o. female who presents to our office today for follow up of the right shoulder pain. This patient is a longtime patient of ours. She has been intermittently treated for right shoulder pain. Patient has been diagnosed with mild glenohumeral osteoarthritis, with biceps and rotator cuff tendinitis versus a small tear. Patient has extensive conservative treatment including physical therapy with Lashanda yost as well as corticosteroid injections in the past.  Patient is unable to take any oral NSAIDs due to her kidney disease. Patient generally takes Tylenol for comfort. Pain Assessment  Location of Pain: Shoulder  Location Modifiers: Right  Severity of Pain: 5  Quality of Pain: Throbbing, Aching  Duration of Pain: Persistent  Frequency of Pain: Constant  Aggravating Factors: Other (Comment), Stretching, Straightening, Exercise  Limiting Behavior: Some  Relieving Factors: Rest, Other (Comment)  Work-Related Injury: No  Are there other pain locations you wish to document?: No        Review of Systems:  A 14 point review of systems available in the scanned medical record as documented by the patient and reviewed on 2023. The review is negative with the exception of those things mentioned in the History of Present Illness and Past Medical History. Past Medical History:  Patient's medications, allergies, past medical, surgical, social and family histories were reviewed and updated as appropriate. Allergies:  No Known Allergies    Physical Exam:  There were no vitals filed for this visit.   General: True Waldrop is a healthy and well appearing 60 y.o. female who is sitting comfortably in our office in acute distress. Skin:  There are no skin lesions, cellulitis, or extreme edema. The patient has warm and well-perfused Bilateral upper extremities with brisk capillary refill. Eyes: Extra-ocular muscles intact  Mouth: Oral mucosa moist. No perioral lesions  Pulm: Respirations unlabored and regular. Neuro: Alert and oriented x3    right Shoulder Exam:  Inspection:  No gross deformities, no signs of infection. Palpation:  There is no crepitus. Tenderness to palpation over the rotator cuff footprint is exquisite with tenderness over the bicipital groove and mild tenderness at the [facility] joint. Non-tender to palpation over the posterior joint line. Active Range of Motion: Forward elevation of 140, abduction of 140, external rotation with elbow at the side 60, internal rotation to the back is T12    Passive Range of Motion: Passively forward elevation can be further increased to 150 with pain. Strength: External rotation with resistance with elbow at the side 5/5, internal rotation with resistance with elbow at the side 5/5, Champagne toast testing +4/5, Jobes test +4/5    Special Tests: Positive Neer's, positive Blount, pain over the bicipital groove with cross body adduction, no Bandar muscle deformity. Neurovascular: Sensation to light touch is intact, no motor deficits, palpable radial pulses 2+    Additional Examinations:    Examination of the contralateral extremity does not show any tenderness, deformity or injury. Range of motion is unremarkable. There is no gross instability. There are no rashes, ulcerations or lesions. Strength and tone are normal.      Radiographic:  X-rays of her right shoulder from August 17, 2022 was reviewed which did not show any acute bony abnormalities. Did show some mild degenerative changes within the glenohumeral joint and at the [facility] joint.     Assessment:  Mis Cabrera is a 60 y.o. female with persistent right shoulder pain for nearly 3 years despite conservative treatment for [facility] joint arthritis, rotator cuff impingement versus a tear and biceps tendinitis. Impression:  Encounter Diagnoses   Name Primary? Right shoulder pain, unspecified chronicity Yes    Rotator cuff impingement syndrome of right shoulder     Biceps tendinitis of right upper extremity        Office Procedures:  Orders Placed This Encounter   Procedures    MRI SHOULDER RIGHT WO CONTRAST     Standing Status:   Future     Standing Expiration Date:   2/27/2024     Order Specific Question:   What is the sedation requirement? Answer:   None       Plan:   Patient has failed conservative treatment and we recommend obtaining an MRI of her shoulders. She has continued to persist symptoms despite doing some home excise program that was provided to her by a physical therapist.  Once the MRI is completed we will see her back in our office. Patient is not a candidate for oral NSAIDs due to her kidney disease. Recommend not doing another corticosteroid injection until we have the results of the MRI. Cadence Lamonte will follow up in 2 weeks and/or as needed. They were in agreement with this plan and all questions were answered to the patient's satisfaction. They were encouraged to call with any questions. 2/27/2023  10:38 AM      Verónica Cantu PA-C  Orthopaedic Sports Medicine Physician Assistant    This dictation was performed with a verbal recognition program [facility]) and it was checked for errors. It is possible that there are still dictated errors within this office note. If so, please bring any errors to my attention for an addendum. All efforts were made to ensure that this office note is accurate.

## 2023-02-28 ENCOUNTER — TELEPHONE (OUTPATIENT)
Dept: ORTHOPEDIC SURGERY | Age: 63
End: 2023-02-28

## 2023-02-28 NOTE — TELEPHONE ENCOUNTER
[facility]  Crisis/Safety Plan    Name:  Mariel Carty  MRN:  [id number]  Date:  20[birth year]    Warning signs that a crisis may be developing for me or I may be at risk:  1) heart beating fast  2) nausea  3) panicky    Coping strategies I can use on my own (relaxation, physical activity, etc):  1) listen to music  2) watching Marshal movies  3)     Ways I can make my environment safe:  1) no guns or weapons  2) keep medications locked and secure  3)    Things I want to tell myself when I feel a crisis developin) I am safe  2) I am thankful  3) I can ask for help    People I can contact for support or distraction (and their phone numbers):  1) Usman at [facility]  2) Shemran, therapist at [facility]  3)    If I’m not able to reach my support people, or the above strategies don’t help, I can contact the following professionals, agencies, or hotlines:  1) Crisis Call Center ():  [phone] -OR- [phone]  2) Crisis Text Line ():  Text CARE TO 017633  3) [facility] [phone]  4) [facility] [phone]  5) [facility] [phone]    Ciara Horowitz R.N.       General Question     Subject: Keysha Stewart FOR MRI FOR WRONG FACILITY  Patient and /or Facility Request: Richie Founatin  Contact Number: [phone]    ARIE CALLED THE PT TO LET THEM KNOW THAT THE AUTHORIZATION FOR THE MRI WAS FOR [facility] INSTEAD OF [facility]. CAN THIS BE CHANGED TO [facility]?   PLEASE CALL THE PT BACK ASAP

## 2023-03-08 ENCOUNTER — TELEPHONE (OUTPATIENT)
Dept: PULMONOLOGY | Age: 63
End: 2023-03-08

## 2023-03-08 DIAGNOSIS — R05.3 CHRONIC COUGH: Primary | ICD-10-CM

## 2023-03-08 NOTE — TELEPHONE ENCOUNTER
Cayetano Christina says that she is using Breo, albuterol rescue inhaler 3 X / day , and nasal spray since appt on 01/20/2023, but cough continues, productive of white/yellow phlegm. Also wheezing. Headache, which she thinks is coming from coughing. Having chills, then hot feeling. SOB more than her usual.     She can be reached at [phone]. Pharm: [address] in [facility]. She says that this can wait until tomorrow morning; Dr. Apryl Kapadia is not available until then.

## 2023-03-10 ENCOUNTER — OFFICE VISIT (OUTPATIENT)
Dept: PULMONOLOGY | Age: 63
End: 2023-03-10

## 2023-03-10 ENCOUNTER — HOSPITAL ENCOUNTER (OUTPATIENT)
Dept: GENERAL RADIOLOGY | Age: 63
Discharge: HOME | End: 2023-03-10
Payer: MEDICARE

## 2023-03-10 VITALS
BODY MASS INDEX: 27.31 KG/M2 | SYSTOLIC BLOOD PRESSURE: 110 MMHG | DIASTOLIC BLOOD PRESSURE: 80 MMHG | WEIGHT: 160 LBS | OXYGEN SATURATION: 95 % | HEART RATE: 93 BPM | HEIGHT: 64 IN

## 2023-03-10 DIAGNOSIS — J22 LRTI (LOWER RESPIRATORY TRACT INFECTION): Primary | ICD-10-CM

## 2023-03-10 DIAGNOSIS — R05.3 CHRONIC COUGH: ICD-10-CM

## 2023-03-10 DIAGNOSIS — Z94.84 H/O STEM CELL TRANSPLANT (HCC): ICD-10-CM

## 2023-03-10 DIAGNOSIS — Z85.72 HX OF LYMPHOMA: ICD-10-CM

## 2023-03-10 DIAGNOSIS — J45.41 MODERATE PERSISTENT ASTHMA WITH ACUTE EXACERBATION: ICD-10-CM

## 2023-03-10 PROCEDURE — 71046 X-RAY EXAM CHEST 2 VIEWS: CPT

## 2023-03-10 RX ORDER — LEVOFLOXACIN 750 MG/1
TABLET ORAL
Qty: 5 TABLET | Refills: 0 | Status: SHIPPED | OUTPATIENT
Start: 2023-03-10

## 2023-03-10 RX ORDER — PREDNISONE 20 MG/1
40 TABLET ORAL DAILY
Qty: 14 TABLET | Refills: 0 | Status: SHIPPED | OUTPATIENT
Start: 2023-03-10

## 2023-03-10 NOTE — PROGRESS NOTES
[facility] Pulmonary and Critical Care    Outpatient Follow Up Note    Subjective:   CHIEF COMPLAINT / HPI:     The patient is 61 y.o. female here for acute evaluation of cough with purulent phlegm and MCDONALD. No fevers, chills, night sweats or weight loss. She had similar symptoms in January and improved with Prednsione and levaquin but have recurred over the last 2 weeks. Pt had CXR this morning    1/19/2023  for follow-up of moderate persistent asthma, upper airway cough syndrome, and allergic rhinitis with a persistent dry cough. Last visit in September I added benadryl at night to see if that helped as she still seemed to have allergic symptoms with postnasal drip especially worse at night with a cough that would wake her up. She recently had the Flu 12/6/2022 for which she was seen in the ER. She initially felt better but by Dec 8th had a cough productive of yellow sputum with some dyspnea. No fevers, chills, chest pain, chest tightness, or wheezing. She called me and I gave her prednisone and levaquin x 7 days with improvement in symptoms but cough returned once off prednisone. She had a CT Chest 12/30 by PCP to david for persistent cough which showed mild ASD, likely residual from Flu. Currently only resp complaint is of her persistent dry cough that was present prior to her Flu       9/27/2022  Naveen Victoria is here for follow-up of moderate persistent asthma, upper airway cough syndrome, and allergic rhinitis. August 24 to 27 she was admitted to [facility] for community-acquired pneumonia. Procalcitonin was 1.92, although she does have CKD, and chest x-ray showed a left lower lobe infiltrate. She was treated with Rocephin and azithromycin and made a full recovery. Currently she states that she still has a dry cough that is daily. It is associated with postnasal drip and rhinorrhea. It occurs at night and can wake her up. She has no wheezing, chest tightness, dyspnea on exertion, or reflux.    She is compliant with Breo and Singulair for asthma and Zyrtec, Flonase, and Astelin for her allergic rhinitis. No fevers, chills, or purulent sputum      6/30/2022  Jocelin Tellez is here for evaluation of a cough that has been going on for approximately 6 to 8 weeks productive of green phlegm. She has no fevers, chills, night sweats, anorexia, or weight loss. Occasionally she has some dyspnea on exertion but no chest tightness or wheezing. She patient was treated with a Z-Tim without improvement. Chest x-ray showed an elevated right hemidiaphragm but clear lungs. CT chest showed some mucous plugging in the left lower lobe otherwise unremarkable. She is finishing up a 7-day course of Augmentin also without improvement. 11/29/2021  Jocelin Tellez has past medical history of lymphoma status post bone marrow transplant with upper airway cough syndrome and asthma that presents for routine follow-up upper airway cough syndrome and mild asthma. She was diagnosed with COVID 19 for the second time on November 15. She had her second Herring [employer] vaccination August 24. This course was much milder and did not require hospitalization like her initial Covid infection in December 2020. She states that she feels like she is 85% back to normal.  She continues on Astelin, Zyrtec, and Flonase for upper airway cough syndrome as well as Breo for asthma. She is asking for refills of albuterol and Astelin. Her lymphoma appears to be in remission and she follows up with Dr. Iona Batista every 6-month    5/202/2021  Jocelin Tellez presents for an acute visit for 3 weeks of cough productive of yellow-tan phlegm. She has no associated fevers, chills, night sweats, anorexia, weight loss, hemoptysis, wheezing, chest tightness, or dyspnea. Patient recently restarted on Flonase. She has been on her chronic Breo, Astelin, and Zyrtec without interruption.   Patient had Covid in December and was hospitalized for 2 days at [facility] but made a full recovery and did not need oxygen on discharge. 6/1/2020  Lily Fischer has requested a virtual visit for follow-up of chronic cough due to upper airway cough syndrome and asthma. She has a history of lymphoma status post a bone marrow transplant. Last visit I added Astelin to her chronic regimen of Flonase, Zyrtec, and Breo. She states with that she is doing quite well and has no significant cough, wheezing, or chest tightness    Feb 13,2020  Lily Fischer has a history of lymphoma status post bone marrow transplant here for follow-up of chronic cough thought to be secondary to upper airway cough syndrome. Last visited I gave her Zyrtec and if needed Flonase which she did start. Cough is some better but she still has a daily dry cough with postnasal drip and a tickle in her throat. No fevers, chills, night sweats, anorexia, weight loss, hemoptysis, dyspnea, wheezing, or chest tightness. November 26, 2019  Lily Fischer is here for follow-up of a cough with purulent sputum. When I saw her in October I gave her 2 weeks of Levaquin and repeat his CT chest approximately a week ago. Left lower lobe pneumonia has resolved. Her fatigue and dyspnea on exertion have resolved as well. Her cough while improving is still present. She has postnasal drip and throat clearing. No fevers, chills, anorexia, or weight loss. Phlegm production is clear to yellow. October 16, 2019  Lily Fischer has a past medical history of lymphoma status post bone marrow transplant is here for evaluation of a cough productive of purulent sputum and abnormal CT chest since mid August.  She was initially given a Z-Tim without improvement. Her oncologist Dr Vinicio Ochoa ordered a CT chest September 6 and then gave her a week course of Levaquin. She states that her fatigue I will bit better but her cough with purulent sputum persists. She has no fevers, chills, anorexia, night sweats, weight loss, hemoptysis, or chest pain.   She has some mild dyspnea on occasion    January 2019  Lily Fischer is here for follow-up of abnormal CT chest.  She is doing well and denies any fevers, chills, night sweats, weight loss, anorexia, malaise, cough, wheezing, or shortness of breath. 12/13/2018  Lily Fischer Is here for follow-up of abnormal CT chest with BAL cultures positive for sterile mycelium. Initially she had dyspnea on exertion and cough which prompted a CT chest which showed groundglass opacities that prompted a bronchoscopy. Since last visit she saw Dr. Mukund Alaniz from infectious disease and his opinion was that this did not represent a pathogen. She was not treated and has done very well. Her cough and dyspnea on exertion are much improved. His no fevers, chills, night sweats, anorexia, weight loss, or malaise. Previous Visit 10/25/2018  Lily Fischer has a  past medical history of lymphoma status post bone marrow transplant and [facility] Is here for follow-up of bronchoscopy performed August 30, 2018. The procedure showed thick purulent secretions with plugs seen in the medial basal segment of the right lower lobe and proximal segment of the left lower lobe. Therapeutic aspiration was performed and cultures were obtained. 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 Patient continues to have a producitve cough and dyspnea on exertion but no fevers, chills, night sweats, malaise, anorexia or weight loss    Previous visit August 23, 2018  Lily Fischer is here for two-week follow-up of dyspnea on exertion and cough. Since last visit she's had a CT chest and PFTs. She has no change in her dyspnea on exertion and cough. She continues to have no fevers, chills, anorexia, chest pain, or weight loss    Previous visit 8/1/2018   The patient is 60 y.o. female with past medical history of lymphoma status post bone marrow transplant and [facility] on Flonase and Zyrtec presents for evaluation of worsening dyspnea on exertion to the point she gets winded going up 1 flight of stairs.   She has a cough that she describes as both dry but also bringing up some yellow phlegm. She has no associated fevers, chills, anorexia, chest pain, or weight loss. .     Past Medical History:    Past Medical History:   Diagnosis Date    Allergic rhinitis     Arthritis     CKD (chronic kidney disease)     DLBCL (diffuse large B cell lymphoma) ([facility] 2/2010    non-hodgkins lymphoma    Encounter for aftercare following bone marrow transplant ([facility] 6/10/2016    GERD (gastroesophageal reflux disease)     Hx of non-Hodgkin's lymphoma     Hypogammaglobulinemia ([facility] 4/13/2017    MDRO (multiple drug resistant organisms) resistance 5/15/16    E.coli MDRO in urine    Migraines     Neutropenia ([facility] 7/20/2016    Non Hodgkin's lymphoma ([facility]     Pancytopenia ([facility] 7/20/2016    Peripheral neuropathy     PONV (postoperative nausea and vomiting)        Social History:    Social History     Tobacco Use   Smoking Status Never   Smokeless Tobacco Never       Current Medications:  Current Outpatient Medications on File Prior to Visit   Medication Sig Dispense Refill    fluticasone furoate-vilanterol (BREO ELLIPTA) 100-25 MCG/ACT inhaler INHALE 1 PUFF INTO THE LUNGS DAILY      brompheniramine-pseudoephedrine-DM (BROMFED DM) 2-30-10 MG/5ML syrup Take 5 mLs by mouth 4 times daily as needed for Cough 240 each 0    levothyroxine (SYNTHROID) 50 MCG tablet Take 1 tablet by mouth every morning 90 tablet 0    montelukast (SINGULAIR) 10 MG tablet Take 1 tablet by mouth every evening 90 tablet 0    Cholecalciferol (VITAMIN D3) 25 MCG (1000 UT) CAPS TAKE 1 CAPSULE BY MOUTH DAILY 90 capsule 0    cetirizine (ZYRTEC) 10 MG tablet TAKE 1 TABLET BY MOUTH DAILY      Estradiol (VAGIFEM) 10 MCG TABS vaginal tablet INSERT 1 TABLET VAGINALLY TWICE WEEKLY WITH APPLICATOR      mometasone (NASONEX) 50 MCG/ACT nasal spray 2 sprays by Nasal route daily as needed (PRN) 1 each 3    MAGNESIUM-OXIDE 400 (240 Mg) MG tablet TAKE 1 TABLET BY MOUTH DAILY 30 tablet 0    albuterol sulfate HFA (PROVENTIL;VENTOLIN;PROAIR) 108 (90 Base) MCG/ACT inhaler INHALE 2 PUFFS INTO THE LUNGS EVERY 6 HOURS AS NEEDED, SPACE OUT TO EVERY 6 HOURS AS SYMPTOMS IMPROVE 8.5 g 0    esomeprazole (NEXIUM) 40 MG delayed release capsule TAKE 1 CAPSULE BY MOUTH DAILY 90 capsule 0    azelastine (ASTELIN) 0.1 % nasal spray 2 sprays by Nasal route 2 times daily Use in each nostril as directed 3 each 3    Ascorbic Acid (VITAMIN C) 250 MG tablet Take 500 mg by mouth daily      zinc 50 MG TABS tablet Take 50 mg by mouth daily      valACYclovir (VALTREX) 500 MG tablet TK 1 T PO Q 12 H. TAKE BID FOR 3 DAYS AT FIRST ONSET OF SYMPTOMS      ketotifen (ZADITOR) 0.025 % ophthalmic solution Place into both eyes 2 times daily       penicillin v potassium (VEETID) 500 MG tablet TAKE 1 TABLET BY MOUTH TWICE DAILY 60 tablet 2    erythromycin (ROMYCIN) 5 MG/GM ophthalmic ointment Apply to OD TID for 7 days (Patient not taking: Reported on 3/10/2023) 3.5 g 0     No current facility-administered medications on file prior to visit.        REVIEW OF SYSTEMS:    CONSTITUTIONAL: Negative for fevers and chills  HEENT: Negative for oropharyngeal exudate, post nasal drip, sinus pain / pressure, nasal congestion, ear pain  RESPIRATORY:  See HPI  CARDIOVASCULAR: Negative for chest pain, palpitations, edema  GASTROINTESTINAL: Negative for nausea, vomiting, diarrhea, constipation and abdominal pain  HEMATOLOGICAL: Negative for adenopathy  SKIN: Negative for clubbing, cyanosis, skin lesions  EXTREMITIES: Negative for weakness, decreased ROM  NEUROLOGICAL: Negative for unilateral weakness, speech or gait abnormalities    Objective:   PHYSICAL EXAM:        VITALS:    /80 (Site: Left Upper Arm, Position: Sitting, Cuff Size: Large Adult)   Pulse 93   Ht 5' 4\" (1.626 m)   Wt 160 lb (72.6 kg)   LMP 12/26/2008   SpO2 95% Comment: ra  BMI 27.46 kg/m²     CONSTITUTIONAL:  Awake, alert, cooperative, no apparent distress, and appears stated age  HEENT: No oropharyngeal exudate, PERRL, no cervical adenopathy, no tracheal deviation, thyroid size normal  LUNGS:  No increased work of breathing and clear to auscultation, no crackles or wheezing  CARDIOVASCULAR:  normal S1 and S2 and no JVD  ABDOMEN:  Normal bowel sounds, non-distended and non-tender to palpation  EXT: No edema, no calf tenderness. Pulses are present bilaterally. NEUROLOGIC:  Mental Status Exam:  Level of Alertness:   awake  Orientation:   person, place, time. SKIN:  normal skin color, texture, turgor, no redness, warmth, or swelling     Microbiology  Fungus (Mycology) Culture 08/30/2018  8:00 AM 15 [facility]   Fungus Stain 08/30/2018  8:00 AM 15 [facility]   No Fungal elements seen    Organism  (Abnormal) 08/30/2018  8:00 AM 15 [facility]   Sterile Mycelium     Fungus (Mycology) Culture 08/30/2018  8:00 AM 15 [facility]   Isolated    This is a non-sporulating isolate. These isolates have the inability to express diagnostic    characters under the conditions provided. No further workup. Serology  Results for Jeannette Blanca (MRN [id number]) as of 10/25/2018 15:31   Ref. Range 9/27/2018 14:38   Aspergillus Galacto AG Latest Ref Range: Negative  Negative   Aspergillus Galacto Index Unknown 0.09   (1,3)-Beta-D-Glucan (Fungitell) Interpretation Latest Ref Range: Negative  Negative   (1,3)-Beta-D-Glucan (fungitell) Latest Units: pg/mL <31   Histoplasma Antigen Urine Latest Units: ng/mL Not Detected   Coccidioides Ab,ID Latest Ref Range: None Detected  None Detected   Histoplasma Ab Mycelial CF Latest Ref Range: <1:8  <1:8   Histoplasma Ab Yeast CF Latest Ref Range: <1:8  <1:8   Histoplasma Antigen, Serum Unknown See Note   Histoplasma Ag Interp Latest Ref Range: Not Detected  Not Detected   HISTOPLASMA INTERPETATION Unknown See Note       Radiology  CXR 3/10/2023     Comparison: 5/16/2022       Trachea is midline.  Heart and mediastinal contour are normal.       Lungs and pleural surfaces are clear.   Bony structures are normal           Impression   1. No acute cardiopulmonary abnormalities. CT Chest 12/30/2022  FINDINGS:       LOWER NECK AND AXILLA: No lymphadenopathy. HEART AND GREAT VESSELS: The heart is normal in size. Thoracic aorta and main pulmonary artery are normal in caliber. MEDIASTINUM AND GORDON: No mass or lymphadenopathy. LUNGS AND AIRWAYS: Hazy opacities of the base of the right upper lobe and the lingula. No suspicious pulmonary nodule or mass. Major airways are patent. PLEURA: No pleural effusion or pneumothorax. CHEST WALL AND SOFT TISSUES: Unremarkable. UPPER ABDOMEN: No acute abnormality. BONES: No acute or suspicious abnormality. Impression   Early/mild multilobar pneumonia. CT chest 5/24/2022 - images and report personally reviewed by myself and the presence of the patient:  FINDINGS: No axillary, mediastinal or hilar lymphadenopathy is identified. Thoracic aorta is without aneurysm. No pericardial effusion is identified. There is mild elevation right hemidiaphragm. There are no pulmonary infiltrates. Interstitial lung markings are normal. There is small amount of mucus secretions identified within the left mainstem bronchus. There is some mucus plugging identified within the posterior segment of the left lower lobe    bronchus. No bronchiectasis is identified. Images through the upper abdomen appear unremarkable. Impression   1. There is some mucus plugging identified within left lower lobe posterior segment bronchus. 2. No evidence of any pneumonic infiltrate. 3. No evidence of any mediastinal mass. 4. Mild elevation of the right hemidiaphragm. CT chest 11/20/2019      COMMENTS:        Mild degenerative changes in the spine with scoliosis. Upper abdomen is unremarkable. Mediastinal structures are unremarkable without lymphadenopathy.  Previously seen consolidation in the left lower lobe has resolved. 2 mm right upper lobe pulmonary    nodule series 604 image 35 is stable since 8/18/2017. The lungs are otherwise clear. Impression       Resolution of left lower lobe pneumonia. Pulmonary function test - dated August 21, 2018  Mild obstructive defect without bronchodilator response  Positive bronchial challenge  Normal lung volumes  Moderate decrease in diffusion capacity    Assessment:      Diagnosis Orders   1. LRTI (lower respiratory tract infection)        2. Moderate persistent asthma with acute exacerbation        3. H/O stem cell transplant ([facility]        4. Hx of lymphoma                  Plan:     1. Continue Breo, Singulair and albuterol  2. Continue Zyrtec, Flonase, and Astelin. 3.  Continue Nexium 40 mg daily as needed  4. Prednisone 40 mg daily x 7 days and levaquin 750 mg QOD x 8 days. 5. Follow-up in 3 months for close monitoring unless needed sooner.  Pt to call in 2 weeks to let us know how she is doing

## 2023-03-16 ENCOUNTER — TELEPHONE (OUTPATIENT)
Dept: PULMONOLOGY | Age: 63
End: 2023-03-16

## 2023-03-16 DIAGNOSIS — R05.3 PERSISTENT COUGH: ICD-10-CM

## 2023-03-16 RX ORDER — PROMETHAZINE HYDROCHLORIDE AND CODEINE PHOSPHATE 6.25; 1 MG/5ML; MG/5ML
5 SYRUP ORAL 3 TIMES DAILY PRN
Qty: 180 ML | Refills: 0 | Status: SHIPPED | OUTPATIENT
Start: 2023-03-16 | End: 2023-03-23

## 2023-03-16 NOTE — TELEPHONE ENCOUNTER
Hong Tidwell may have a postinfectious cough. She has tried [employer] in the past without improvement.   I will give her a limited supply of Phenergan with codeine cough syrup  It has been printed out and will be at the  for her to

## 2023-03-16 NOTE — TELEPHONE ENCOUNTER
UPDATE--  Patient has been on antibiotics and steroids for 8/9 days now. She says her breathing is 85% better but the cough ha worsen. Once she feels the itch in he throat she coughs for 2 hours.     Callback # [phone]

## 2023-03-21 DIAGNOSIS — M25.511 RIGHT SHOULDER PAIN, UNSPECIFIED CHRONICITY: Primary | ICD-10-CM

## 2023-03-21 RX ORDER — DIAZEPAM 5 MG/1
TABLET ORAL
Qty: 2 TABLET | Refills: 0 | Status: SHIPPED | OUTPATIENT
Start: 2023-03-21 | End: 2023-04-21

## 2023-03-24 ENCOUNTER — TELEPHONE (OUTPATIENT)
Dept: INTERNAL MEDICINE CLINIC | Age: 63
End: 2023-03-24

## 2023-03-24 DIAGNOSIS — K21.9 GASTROESOPHAGEAL REFLUX DISEASE WITHOUT ESOPHAGITIS: ICD-10-CM

## 2023-03-27 RX ORDER — ESOMEPRAZOLE MAGNESIUM 40 MG/1
40 CAPSULE, DELAYED RELEASE ORAL DAILY
Qty: 90 CAPSULE | Refills: 0 | OUTPATIENT
Start: 2023-03-27

## 2023-03-27 RX ORDER — ESOMEPRAZOLE MAGNESIUM 40 MG/1
40 CAPSULE, DELAYED RELEASE ORAL DAILY PRN
Qty: 90 CAPSULE | Refills: 0 | Status: SHIPPED | OUTPATIENT
Start: 2023-03-27

## 2023-04-17 ENCOUNTER — TELEPHONE (OUTPATIENT)
Dept: PULMONOLOGY | Age: 63
End: 2023-04-17

## 2023-04-17 NOTE — TELEPHONE ENCOUNTER
Dr Chico Pacheco patient   Please advise patient is calling in with the following     SXS  -cough  -thick yellow phlegm    -not concerned about her normal coughing, concerned about the phlegm    Has patient taking any OTC medicine?  no      Has patient taking maintenance Medications? yes    Duration of patients sxs? Saturday    Pharmacy ?   [facility]    Callback # ?  [phone]

## 2023-04-18 RX ORDER — PREDNISONE 10 MG/1
TABLET ORAL
Qty: 20 TABLET | Refills: 0 | Status: SHIPPED | OUTPATIENT
Start: 2023-04-18

## 2023-04-18 NOTE — TELEPHONE ENCOUNTER
Spoke to Digna and she's having similar symptoms as when she saw Dr. Devin Mcdonald last month, with phlegm production, but not as much shortness of breath. She thinks she's improving. I put in an rx for prednisone to take if she doesn't continue to improve. Orders Placed This Encounter   Medications    predniSONE (DELTASONE) 10 MG tablet     Sig: Take 4 tablets by mouth for 5 days.      Dispense:  20 tablet     Refill:  0

## 2023-04-21 RX ORDER — FLUTICASONE FUROATE AND VILANTEROL 200; 25 UG/1; UG/1
POWDER RESPIRATORY (INHALATION)
Qty: 60 EACH | Refills: 5 | Status: SHIPPED | OUTPATIENT
Start: 2023-04-21

## 2023-05-01 ENCOUNTER — OFFICE VISIT (OUTPATIENT)
Dept: PULMONOLOGY | Age: 63
End: 2023-05-01
Payer: MEDICARE

## 2023-05-01 VITALS
RESPIRATION RATE: 16 BRPM | HEART RATE: 86 BPM | BODY MASS INDEX: 28.17 KG/M2 | SYSTOLIC BLOOD PRESSURE: 104 MMHG | HEIGHT: 64 IN | OXYGEN SATURATION: 96 % | DIASTOLIC BLOOD PRESSURE: 70 MMHG | WEIGHT: 165 LBS

## 2023-05-01 DIAGNOSIS — J45.40 ASTHMA, MODERATE PERSISTENT, WELL-CONTROLLED: ICD-10-CM

## 2023-05-01 DIAGNOSIS — Z94.84 H/O STEM CELL TRANSPLANT (HCC): ICD-10-CM

## 2023-05-01 DIAGNOSIS — R05.8 UPPER AIRWAY COUGH SYNDROME: Primary | ICD-10-CM

## 2023-05-01 DIAGNOSIS — K21.9 GASTROESOPHAGEAL REFLUX DISEASE WITHOUT ESOPHAGITIS: ICD-10-CM

## 2023-05-01 PROCEDURE — 99214 OFFICE O/P EST MOD 30 MIN: CPT | Performed by: INTERNAL MEDICINE

## 2023-05-01 NOTE — PROGRESS NOTES
[facility] Pulmonary and Critical Care    Outpatient Follow Up Note    Subjective:   CHIEF COMPLAINT / HPI:     The patient is 61 y.o. female is here for follow-up of moderate persistent asthma, upper airway cough syndrome, allergic rhinitis, and recent lower respiratory tract infection. I saw her in March and gave her Levaquin, prednisone, and codeine cough syrup. She improved for about a month and then developed symptoms again and was given prednisone by Dr. Bhargav Ribera. She states that she is 85 to 90% better and occasionally has a cough, sometimes dry and sometimes brings up mostly clear phlegm. No significant dyspnea on exertion, wheezing, or chest tightness. She continues with her Breo and nasal sprays as well as Singulair. She does use her albuterol on a daily basis. 3/10/2023  Sherryle Kempf is here for acute evaluation of cough with purulent phlegm and MCDONALD. No fevers, chills, night sweats or weight loss. She had similar symptoms in January and improved with Prednsione and levaquin but have recurred over the last 2 weeks. Pt had CXR this morning    1/19/2023  for follow-up of moderate persistent asthma, upper airway cough syndrome, and allergic rhinitis with a persistent dry cough. Last visit in September I added benadryl at night to see if that helped as she still seemed to have allergic symptoms with postnasal drip especially worse at night with a cough that would wake her up. She recently had the Flu 12/6/2022 for which she was seen in the ER. She initially felt better but by Dec 8th had a cough productive of yellow sputum with some dyspnea. No fevers, chills, chest pain, chest tightness, or wheezing. She called me and I gave her prednisone and levaquin x 7 days with improvement in symptoms but cough returned once off prednisone. She had a CT Chest 12/30 by PCP to [facility] for persistent cough which showed mild ASD, likely residual from Flu.  Currently only resp complaint is of her persistent dry cough

## 2023-05-09 ENCOUNTER — TELEPHONE (OUTPATIENT)
Dept: INTERNAL MEDICINE CLINIC | Age: 63
End: 2023-05-09

## 2023-05-10 ENCOUNTER — OFFICE VISIT (OUTPATIENT)
Dept: ORTHOPEDIC SURGERY | Age: 63
End: 2023-05-10
Payer: COMMERCIAL

## 2023-05-10 VITALS — WEIGHT: 165 LBS | HEIGHT: 64 IN | BODY MASS INDEX: 28.17 KG/M2

## 2023-05-10 DIAGNOSIS — M75.21 BICEPS TENDINITIS OF RIGHT UPPER EXTREMITY: ICD-10-CM

## 2023-05-10 DIAGNOSIS — M25.511 RIGHT SHOULDER PAIN, UNSPECIFIED CHRONICITY: Primary | ICD-10-CM

## 2023-05-10 DIAGNOSIS — M75.41 ROTATOR CUFF IMPINGEMENT SYNDROME OF RIGHT SHOULDER: ICD-10-CM

## 2023-05-10 DIAGNOSIS — M19.011 PRIMARY OSTEOARTHRITIS OF RIGHT SHOULDER: ICD-10-CM

## 2023-05-10 PROCEDURE — 99213 OFFICE O/P EST LOW 20 MIN: CPT | Performed by: PHYSICIAN ASSISTANT

## 2023-05-16 ENCOUNTER — TELEPHONE (OUTPATIENT)
Dept: PULMONOLOGY | Age: 63
End: 2023-05-16

## 2023-05-16 DIAGNOSIS — J22 LRTI (LOWER RESPIRATORY TRACT INFECTION): Primary | ICD-10-CM

## 2023-05-16 RX ORDER — PREDNISONE 10 MG/1
TABLET ORAL
Qty: 20 TABLET | Refills: 0 | Status: SHIPPED | OUTPATIENT
Start: 2023-05-16

## 2023-05-16 RX ORDER — AZITHROMYCIN 250 MG/1
TABLET, FILM COATED ORAL
Qty: 6 TABLET | Refills: 0 | Status: SHIPPED | OUTPATIENT
Start: 2023-05-16

## 2023-05-16 NOTE — TELEPHONE ENCOUNTER
Dr Norma Bishop, can you address this.     Dr Sharyle Scrivener patient called with complaints of having   dark green phlegm  Hurts to breath  Started Sunday night    Patients hasnt tried [place] [id number] [address][id number] [facility], [address] [place] [address] -  [phone]

## 2023-05-16 NOTE — TELEPHONE ENCOUNTER
Patient reports that she improved after the steroid burst I gave last month and then became abruptly ill a couple days ago. She hasn't checked her temperature but is having chills, fatigue and is short of breath. She hasn't called [facility] where she got her allogenic bone marrow transplant. I ordered a zpack and steroids again, but I want her to check if she has fever, as [facility] often will bring patient's in if they are febrile. In the meantime, I want her to get a sputum culture and a chest xray. Orders Placed This Encounter   Medications    predniSONE (DELTASONE) 10 MG tablet     Sig: Take 4 tablets by mouth for 5 days. Dispense:  20 tablet     Refill:  0    azithromycin (ZITHROMAX) 250 MG tablet     Sig: Take 500 mg by mouth the first day then 250 mg for the remaining four days     Dispense:  6 tablet     Refill:  0         Diagnosis Orders   1.  LRTI (lower respiratory tract infection)  Culture, Respiratory    XR CHEST (2 VW)

## 2023-05-17 ENCOUNTER — TELEPHONE (OUTPATIENT)
Dept: PULMONOLOGY | Age: 63
End: 2023-05-17

## 2023-05-17 ENCOUNTER — HOSPITAL ENCOUNTER (OUTPATIENT)
Dept: GENERAL RADIOLOGY | Age: 63
Discharge: HOME | End: 2023-05-17
Payer: COMMERCIAL

## 2023-05-17 DIAGNOSIS — J22 LRTI (LOWER RESPIRATORY TRACT INFECTION): ICD-10-CM

## 2023-05-17 LAB
AVERAGE GLUCOSE: 117
HBA1C MFR BLD: 5.7 %

## 2023-05-17 PROCEDURE — 71046 X-RAY EXAM CHEST 2 VIEWS: CPT

## 2023-05-17 RX ORDER — MOXIFLOXACIN HYDROCHLORIDE 400 MG/1
400 TABLET ORAL DAILY
Qty: 10 TABLET | Refills: 0 | Status: SHIPPED | OUTPATIENT
Start: 2023-05-17 | End: 2023-05-27

## 2023-05-17 NOTE — RESULT ENCOUNTER NOTE
I agree with radiology, that patient has new infiltrates on her chest xray. I ordered her azithromycin yesterday, but she will need broader coverage. I placed an order for her to start Moxifloxacin. We are also reaching out to [facility] to establish who she is going to follow with now that Dr. Raegan Juarez has left the practice. They should be aware, and will likely want to see her serially to make sure she doesn't need admission for IV abx. I called Reida Merlin but got her voicemail. I left a message to call the office back. I want her to stop the azithro and start the other antibiotic. She can continue the steroids.

## 2023-05-17 NOTE — TELEPHONE ENCOUNTER
Dick Flynn has gone to a [facility] lab for resp culture. She asked order to be faxed to [phone]. See attached media.

## 2023-05-23 DIAGNOSIS — R05.3 PERSISTENT COUGH: ICD-10-CM

## 2023-05-23 DIAGNOSIS — J30.9 ALLERGIC SINUSITIS: ICD-10-CM

## 2023-05-23 RX ORDER — MONTELUKAST SODIUM 10 MG/1
10 TABLET ORAL EVERY EVENING
Qty: 90 TABLET | Refills: 0 | Status: SHIPPED | OUTPATIENT
Start: 2023-05-23

## 2023-06-06 ENCOUNTER — TELEPHONE (OUTPATIENT)
Dept: PULMONOLOGY | Age: 63
End: 2023-06-06

## 2023-06-06 DIAGNOSIS — J18.9 PNEUMONIA OF BOTH LOWER LOBES DUE TO INFECTIOUS ORGANISM: Primary | ICD-10-CM

## 2023-06-06 NOTE — TELEPHONE ENCOUNTER
I called Surya Afshankandice to check up on her on how she was doing with her recent pneumonia. She did get admitted to [facility] May 17 through 21 and had a CT chest that showed bibasilar pneumonia. She was treated with broad-spectrum antibiotics and made a full recovery and feels like she is doing well. I would like to get a chest x-ray mid July and have her see me at 9:40 AM on July 17.   Patient is aware of the order for the chest x-ray and appointment but please add her onto my schedule

## 2023-06-26 ENCOUNTER — COMMUNITY OUTREACH (OUTPATIENT)
Dept: INTERNAL MEDICINE CLINIC | Age: 63
End: 2023-06-26

## 2023-06-30 DIAGNOSIS — K21.9 GASTROESOPHAGEAL REFLUX DISEASE WITHOUT ESOPHAGITIS: ICD-10-CM

## 2023-07-06 RX ORDER — ESOMEPRAZOLE MAGNESIUM 40 MG/1
CAPSULE, DELAYED RELEASE ORAL
Qty: 90 CAPSULE | Refills: 0 | Status: SHIPPED | OUTPATIENT
Start: 2023-07-06

## 2023-07-17 ENCOUNTER — TELEPHONE (OUTPATIENT)
Dept: PULMONOLOGY | Age: 63
End: 2023-07-17

## 2023-07-17 ENCOUNTER — HOSPITAL ENCOUNTER (OUTPATIENT)
Age: 63
Discharge: HOME | End: 2023-07-17
Payer: COMMERCIAL

## 2023-07-17 ENCOUNTER — HOSPITAL ENCOUNTER (OUTPATIENT)
Dept: GENERAL RADIOLOGY | Age: 63
Discharge: HOME | End: 2023-07-17
Payer: COMMERCIAL

## 2023-07-17 ENCOUNTER — OFFICE VISIT (OUTPATIENT)
Dept: PULMONOLOGY | Age: 63
End: 2023-07-17
Payer: COMMERCIAL

## 2023-07-17 VITALS
DIASTOLIC BLOOD PRESSURE: 78 MMHG | BODY MASS INDEX: 27.14 KG/M2 | OXYGEN SATURATION: 98 % | SYSTOLIC BLOOD PRESSURE: 118 MMHG | WEIGHT: 159 LBS | HEIGHT: 64 IN | HEART RATE: 84 BPM | RESPIRATION RATE: 16 BRPM

## 2023-07-17 DIAGNOSIS — J18.9 PNEUMONIA OF BOTH LOWER LOBES DUE TO INFECTIOUS ORGANISM: ICD-10-CM

## 2023-07-17 DIAGNOSIS — R05.3 PERSISTENT COUGH: ICD-10-CM

## 2023-07-17 DIAGNOSIS — J45.40 ASTHMA, MODERATE PERSISTENT, POORLY-CONTROLLED: ICD-10-CM

## 2023-07-17 DIAGNOSIS — J30.89 NON-SEASONAL ALLERGIC RHINITIS, UNSPECIFIED TRIGGER: ICD-10-CM

## 2023-07-17 DIAGNOSIS — J18.9 PNEUMONIA OF BOTH LOWER LOBES DUE TO INFECTIOUS ORGANISM: Primary | ICD-10-CM

## 2023-07-17 PROCEDURE — 99214 OFFICE O/P EST MOD 30 MIN: CPT | Performed by: INTERNAL MEDICINE

## 2023-07-17 PROCEDURE — 71046 X-RAY EXAM CHEST 2 VIEWS: CPT

## 2023-07-17 RX ORDER — MONTELUKAST SODIUM 10 MG/1
10 TABLET ORAL EVERY EVENING
Qty: 90 TABLET | Refills: 3 | Status: SHIPPED | OUTPATIENT
Start: 2023-07-17

## 2023-07-17 RX ORDER — FLUTICASONE FUROATE AND VILANTEROL 200; 25 UG/1; UG/1
POWDER RESPIRATORY (INHALATION)
Qty: 3 EACH | Refills: 3 | Status: SHIPPED | OUTPATIENT
Start: 2023-07-17

## 2023-07-17 RX ORDER — AZELASTINE 1 MG/ML
2 SPRAY, METERED NASAL 2 TIMES DAILY
Qty: 3 EACH | Refills: 3 | Status: SHIPPED | OUTPATIENT
Start: 2023-07-17

## 2023-07-17 RX ORDER — ALBUTEROL SULFATE 90 UG/1
2 AEROSOL, METERED RESPIRATORY (INHALATION) EVERY 4 HOURS PRN
Qty: 6 EACH | Refills: 3 | Status: SHIPPED | OUTPATIENT
Start: 2023-07-17 | End: 2024-07-16

## 2023-07-17 NOTE — PROGRESS NOTES
Persistent cough  montelukast (SINGULAIR) 10 MG tablet      3. Non-seasonal allergic rhinitis, unspecified trigger        4. Asthma, moderate persistent, poorly-controlled            Plan:     1. Continue Breo, Singulair and albuterol. Start Spiriva to obtain more optimal asthma control  2. Continue Zyrtec, Flonase, and Astelin. 3.  Continue Nexium 40 mg daily as needed  4. Chest x-ray today shows resolution of bibasilar pneumonia  5.  Follow-up in 6 weeks or sooner if needed

## 2023-07-17 NOTE — TELEPHONE ENCOUNTER
Submitted PA for [facility]  [facility] (Key: [id number]  STATUS: PENDING. Follow up done daily; if no response in three days we will refax for status check. If another three days goes by with no response we will call the insurance for status.

## 2023-07-18 NOTE — RESULT ENCOUNTER NOTE
Jerzy Baron,    Your chest x-ray shows that the pneumonia has resolved  The comment of mild atelectasis by radiology is a little bit of collapse of the tiny air sacs which we see from time to time and is not significant    Dr. Radha Fernandez

## 2023-08-05 DIAGNOSIS — E03.9 ACQUIRED HYPOTHYROIDISM: ICD-10-CM

## 2023-08-06 NOTE — FLOWSHEET NOTE
The [address], [facility]      Physical Therapy  Cancellation/No-show Note  Patient Name:  Brynn Garner  :  1960   Date:  3/10/2021  Cancelled visits to date: 3  No-shows to date: 1    For today's appointment patient:  [x]  Cancelled  []  Rescheduled appointment  []  No-show     Reason given by patient:  []  Patient ill  []  Conflicting appointment  []  No transportation    []  Conflict with work  []  No reason given  []  Other:     Comments:      Electronically signed by: Rena Nava PT, OMT-C [employer]

## 2023-08-07 RX ORDER — LEVOTHYROXINE SODIUM 0.05 MG/1
50 TABLET ORAL EVERY MORNING
Qty: 90 TABLET | Refills: 0 | Status: SHIPPED | OUTPATIENT
Start: 2023-08-07

## 2023-08-10 ENCOUNTER — TELEPHONE (OUTPATIENT)
Dept: PULMONOLOGY | Age: 63
End: 2023-08-10

## 2023-08-10 RX ORDER — AZITHROMYCIN 250 MG/1
TABLET, FILM COATED ORAL
Qty: 6 TABLET | Refills: 0 | Status: SHIPPED | OUTPATIENT
Start: 2023-08-10

## 2023-08-10 RX ORDER — PREDNISONE 10 MG/1
TABLET ORAL
Qty: 20 TABLET | Refills: 0 | Status: SHIPPED | OUTPATIENT
Start: 2023-08-10

## 2023-08-10 NOTE — TELEPHONE ENCOUNTER
-PATIENT OF DR. Radha Fernandez-    Please advise patient is calling in with the following     Sx:  - dry cough   -sore throat  -headache    Has patient taking any OTC medicine?  no      Has patient taking maintenance Medications? Yes, inhalers not helping     Duration of patients sxs? Few days    Pharmacy ?   [facility]    If prescribed codeine- needs sent to [facility] # ?  [phone]

## 2023-08-10 NOTE — TELEPHONE ENCOUNTER
Her combination of symptoms sounds like an upper respiratory infection, probably viral.  I'm writing for treatment, calling this a flare, but if she hasn't already she should get swabbed to see if she has COVID. It's going around again. Orders Placed This Encounter   Medications    azithromycin (ZITHROMAX) 250 MG tablet     Sig: Take 500 mg by mouth the first day then 250 mg for the remaining four days     Dispense:  6 tablet     Refill:  0    predniSONE (DELTASONE) 10 MG tablet     Sig: Take 4 tablets by mouth for 5 days.      Dispense:  20 tablet     Refill:  0

## 2023-08-23 ENCOUNTER — HOSPITAL ENCOUNTER (OUTPATIENT)
Dept: CT IMAGING | Age: 63
Discharge: HOME | End: 2023-08-23
Attending: OBSTETRICS & GYNECOLOGY
Payer: COMMERCIAL

## 2023-08-23 DIAGNOSIS — Z94.81 STATUS POST BONE MARROW TRANSPLANT (HCC): ICD-10-CM

## 2023-08-23 DIAGNOSIS — Z13.820 SCREENING FOR OSTEOPOROSIS: ICD-10-CM

## 2023-08-23 LAB
PERFORMED ON: ABNORMAL
POC CREATININE: 2.7 MG/DL (ref 0.6–1.2)
POC SAMPLE TYPE: ABNORMAL

## 2023-08-23 PROCEDURE — 2500000003 HC RX 250 WO HCPCS: Performed by: OBSTETRICS & GYNECOLOGY

## 2023-08-23 PROCEDURE — 74176 CT ABD & PELVIS W/O CONTRAST: CPT

## 2023-08-23 PROCEDURE — 82565 ASSAY OF CREATININE: CPT

## 2023-08-23 RX ADMIN — BARIUM SULFATE 900 ML: 20 SUSPENSION ORAL at 12:29

## 2023-08-31 ENCOUNTER — TELEPHONE (OUTPATIENT)
Dept: PULMONOLOGY | Age: 63
End: 2023-08-31

## 2023-08-31 RX ORDER — BENZONATATE 100 MG/1
100 CAPSULE ORAL 3 TIMES DAILY PRN
Qty: 30 CAPSULE | Refills: 0 | Status: SHIPPED | OUTPATIENT
Start: 2023-08-31 | End: 2023-09-10

## 2023-08-31 NOTE — TELEPHONE ENCOUNTER
I called the patient back- she tested NEG for covid via home test.  She said she did finish both the z-cassia and prednisone

## 2023-08-31 NOTE — TELEPHONE ENCOUNTER
I will send in some Tessalon Perles to her [address] in [place], on [address] [address]  I do have some questions -Dr. Jahaira Viramontes on August 10 recommend that she do a COVID test which I agree with been a good idea. Did she do 1 and if so what were the results? There is mention that she may not take the prednisone that he prescribed. Can you ask her if she did or did not take it?   I imagine she took the azithromycin prescribed but please verify that as well

## 2023-08-31 NOTE — TELEPHONE ENCOUNTER
Can we see if Dr. Vasyl Garcia is available this time? I just wrote for a burst of steroids and a Z pack 3 weeks ago, and he knows her better and is seeing her next week.

## 2023-08-31 NOTE — TELEPHONE ENCOUNTER
Eduardo Toro is one of Dr Allegra Silva patients. Eduardo Toro called with complaints of a worsening cough over the last 2-3 weeks.   A few weeks ago she was given a z-cassia and prednisone,    Eduardo Toro is requesting a script for a cough syrup to be sent to her pharmacy

## 2023-09-20 ENCOUNTER — TELEPHONE (OUTPATIENT)
Dept: PULMONOLOGY | Age: 63
End: 2023-09-20

## 2023-09-20 NOTE — TELEPHONE ENCOUNTER
Per Dr. Graham Headings, an office visit to discuss ongoing cough is suggested. Appt made for 09/25/2023 at 3:40 PM.  Minal Solo is agreeable.

## 2023-09-25 ENCOUNTER — OFFICE VISIT (OUTPATIENT)
Dept: PULMONOLOGY | Age: 63
End: 2023-09-25
Payer: COMMERCIAL

## 2023-09-25 VITALS
BODY MASS INDEX: 27.59 KG/M2 | HEIGHT: 64 IN | WEIGHT: 161.6 LBS | HEART RATE: 99 BPM | DIASTOLIC BLOOD PRESSURE: 76 MMHG | OXYGEN SATURATION: 96 % | SYSTOLIC BLOOD PRESSURE: 114 MMHG

## 2023-09-25 DIAGNOSIS — R93.89 ABNORMAL CT OF THE CHEST: ICD-10-CM

## 2023-09-25 DIAGNOSIS — R05.3 REFRACTORY CHRONIC COUGH: ICD-10-CM

## 2023-09-25 DIAGNOSIS — T17.500A MUCUS PLUGGING OF BRONCHI: ICD-10-CM

## 2023-09-25 DIAGNOSIS — J45.41 MODERATE PERSISTENT ASTHMA WITH ACUTE EXACERBATION: Primary | ICD-10-CM

## 2023-09-25 PROCEDURE — 99214 OFFICE O/P EST MOD 30 MIN: CPT | Performed by: INTERNAL MEDICINE

## 2023-09-25 RX ORDER — PROMETHAZINE HYDROCHLORIDE AND CODEINE PHOSPHATE 6.25; 1 MG/5ML; MG/5ML
5 SYRUP ORAL NIGHTLY PRN
Qty: 240 ML | Refills: 1 | Status: SHIPPED | OUTPATIENT
Start: 2023-09-25 | End: 2024-09-24

## 2023-09-26 ENCOUNTER — TELEPHONE (OUTPATIENT)
Dept: PULMONOLOGY | Age: 63
End: 2023-09-26

## 2023-09-26 NOTE — TELEPHONE ENCOUNTER
Patient has been set up for a Bronchoscopy      Where: [facility]   Day: [address] 9/28/2023  Arrive: 12 PM  Procedure Time: 2 PM    - Stop ALL blood thinners 5 days prior to your procedure   - Nothing to eat or drink after midnight prior to your procedure  - Arrive 1 hour and 30 minutes before BRONCHOSCOPY BAL  - Sign in with Admitting/Registration at the Main Entrance   - Please have transportation arrangements from hospital after you procedure     Patient was given the above instructions at the time of her visit and I went over them again with her when I called to confirm procedure appt. Pt verbalized understanding.

## 2023-09-27 DIAGNOSIS — T78.40XD ALLERGY, SUBSEQUENT ENCOUNTER: Primary | ICD-10-CM

## 2023-09-27 RX ORDER — CETIRIZINE HYDROCHLORIDE 10 MG/1
TABLET ORAL
Qty: 30 TABLET | Refills: 5 | Status: SHIPPED | OUTPATIENT
Start: 2023-09-27

## 2023-09-27 NOTE — PROGRESS NOTES
has some mild dyspnea on occasion    January 2019  [facility] is here for follow-up of abnormal CT chest.  She is doing well and denies any fevers, chills, night sweats, weight loss, anorexia, malaise, cough, wheezing, or shortness of breath. 12/13/2018  [facility] Is here for follow-up of abnormal CT chest with BAL cultures positive for sterile mycelium. Initially she had dyspnea on exertion and cough which prompted a CT chest which showed groundglass opacities that prompted a bronchoscopy. Since last visit she saw Dr. Fredo Lloyd from infectious disease and his opinion was that this did not represent a pathogen. She was not treated and has done very well. Her cough and dyspnea on exertion are much improved. His no fevers, chills, night sweats, anorexia, weight loss, or malaise. Previous Visit 10/25/2018  [facility] has a  past medical history of lymphoma status post bone marrow transplant and [facility] Is here for follow-up of bronchoscopy performed August 30, 2018. The procedure showed thick purulent secretions with plugs seen in the medial basal segment of the right lower lobe and proximal segment of the left lower lobe. Therapeutic aspiration was performed and cultures were obtained. 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 Patient continues to have a producitve cough and dyspnea on exertion but no fevers, chills, night sweats, malaise, anorexia or weight loss    Previous visit August 23, 2018  [facility] is here for two-week follow-up of dyspnea on exertion and cough. Since last visit she's had a CT chest and PFTs. She has no change in her dyspnea on exertion and cough.   She continues to have no fevers, chills, anorexia, chest pain, or weight loss    Previous visit 8/1/2018   The patient is 60 y.o. female with past medical history of lymphoma status post bone marrow transplant and UACS on Flonase and Zyrtec presents for evaluation of worsening dyspnea on exertion to the point she gets winded going up 1 flight of

## 2023-09-28 ENCOUNTER — ANESTHESIA (OUTPATIENT)
Dept: ENDOSCOPY | Age: 63
End: 2023-09-28
Payer: COMMERCIAL

## 2023-09-28 ENCOUNTER — ANESTHESIA EVENT (OUTPATIENT)
Dept: ENDOSCOPY | Age: 63
End: 2023-09-28
Payer: COMMERCIAL

## 2023-09-28 ENCOUNTER — HOSPITAL ENCOUNTER (OUTPATIENT)
Age: 63
Setting detail: OUTPATIENT SURGERY
Discharge: HOME | End: 2023-09-28
Attending: INTERNAL MEDICINE | Admitting: INTERNAL MEDICINE
Payer: COMMERCIAL

## 2023-09-28 VITALS
OXYGEN SATURATION: 93 % | TEMPERATURE: 97.2 F | DIASTOLIC BLOOD PRESSURE: 76 MMHG | HEART RATE: 85 BPM | WEIGHT: 160 LBS | RESPIRATION RATE: 20 BRPM | BODY MASS INDEX: 27.31 KG/M2 | SYSTOLIC BLOOD PRESSURE: 120 MMHG | HEIGHT: 64 IN

## 2023-09-28 LAB
APPEARANCE BRONCH: ABNORMAL
CLOT SPEC QL: ABNORMAL
COLOR BRONCH: ABNORMAL
LYMPHOCYTES NFR BRONCH MANUAL: 19 % (ref 5–10)
MACROPHAGES NFR BRONCH MANUAL: 30 % (ref 90–95)
NEUTROPHILS NFR BRONCH MANUAL: 51 % (ref 5–10)
NUC CELL # BRONCH MANUAL: 215 /CUMM
RBC # FLD MANUAL: 8500 /CUMM
TOTAL CELLS COUNTED BRONCH: 100

## 2023-09-28 PROCEDURE — 87305 ASPERGILLUS AG IA: CPT

## 2023-09-28 PROCEDURE — 87633 RESP VIRUS 12-25 TARGETS: CPT

## 2023-09-28 PROCEDURE — 7100000010 HC PHASE II RECOVERY - FIRST 15 MIN: Performed by: INTERNAL MEDICINE

## 2023-09-28 PROCEDURE — 87206 SMEAR FLUORESCENT/ACID STAI: CPT

## 2023-09-28 PROCEDURE — 87252 VIRUS INOCULATION TISSUE: CPT

## 2023-09-28 PROCEDURE — 7100000011 HC PHASE II RECOVERY - ADDTL 15 MIN: Performed by: INTERNAL MEDICINE

## 2023-09-28 PROCEDURE — 2580000003 HC RX 258: Performed by: ANESTHESIOLOGY

## 2023-09-28 PROCEDURE — 3609010800 HC BRONCHOSCOPY ALVEOLAR LAVAGE: Performed by: INTERNAL MEDICINE

## 2023-09-28 PROCEDURE — 87102 FUNGUS ISOLATION CULTURE: CPT

## 2023-09-28 PROCEDURE — 3700000001 HC ADD 15 MINUTES (ANESTHESIA): Performed by: INTERNAL MEDICINE

## 2023-09-28 PROCEDURE — 87116 MYCOBACTERIA CULTURE: CPT

## 2023-09-28 PROCEDURE — 3700000000 HC ANESTHESIA ATTENDED CARE: Performed by: INTERNAL MEDICINE

## 2023-09-28 PROCEDURE — 31645 BRNCHSC W/THER ASPIR 1ST: CPT | Performed by: INTERNAL MEDICINE

## 2023-09-28 PROCEDURE — 6360000002 HC RX W HCPCS

## 2023-09-28 PROCEDURE — 6360000002 HC RX W HCPCS: Performed by: ANESTHESIOLOGY

## 2023-09-28 PROCEDURE — 87205 SMEAR GRAM STAIN: CPT

## 2023-09-28 PROCEDURE — 2500000003 HC RX 250 WO HCPCS: Performed by: INTERNAL MEDICINE

## 2023-09-28 PROCEDURE — 87254 VIRUS INOCULATION SHELL VIA: CPT

## 2023-09-28 PROCEDURE — 89051 BODY FLUID CELL COUNT: CPT

## 2023-09-28 PROCEDURE — 87081 CULTURE SCREEN ONLY: CPT

## 2023-09-28 PROCEDURE — 87070 CULTURE OTHR SPECIMN AEROBIC: CPT

## 2023-09-28 PROCEDURE — 31624 DX BRONCHOSCOPE/LAVAGE: CPT | Performed by: INTERNAL MEDICINE

## 2023-09-28 PROCEDURE — 2500000003 HC RX 250 WO HCPCS

## 2023-09-28 RX ORDER — LIDOCAINE HYDROCHLORIDE 20 MG/ML
INJECTION, SOLUTION EPIDURAL; INFILTRATION; INTRACAUDAL; PERINEURAL PRN
Status: DISCONTINUED | OUTPATIENT
Start: 2023-09-28 | End: 2023-09-28 | Stop reason: ALTCHOICE

## 2023-09-28 RX ORDER — SODIUM CHLORIDE, SODIUM LACTATE, POTASSIUM CHLORIDE, CALCIUM CHLORIDE 600; 310; 30; 20 MG/100ML; MG/100ML; MG/100ML; MG/100ML
INJECTION, SOLUTION INTRAVENOUS CONTINUOUS
Status: DISCONTINUED | OUTPATIENT
Start: 2023-09-28 | End: 2023-09-28 | Stop reason: HOSPADM

## 2023-09-28 RX ORDER — APREPITANT 40 MG/1
80 CAPSULE ORAL ONCE
Status: COMPLETED | OUTPATIENT
Start: 2023-09-28 | End: 2023-09-28

## 2023-09-28 RX ORDER — APREPITANT 40 MG/1
40 CAPSULE ORAL ONCE
Status: DISCONTINUED | OUTPATIENT
Start: 2023-09-28 | End: 2023-09-28

## 2023-09-28 RX ORDER — SODIUM CHLORIDE 0.9 % (FLUSH) 0.9 %
5-40 SYRINGE (ML) INJECTION PRN
Status: DISCONTINUED | OUTPATIENT
Start: 2023-09-28 | End: 2023-09-28 | Stop reason: HOSPADM

## 2023-09-28 RX ORDER — LIDOCAINE HYDROCHLORIDE 20 MG/ML
INJECTION, SOLUTION EPIDURAL; INFILTRATION; INTRACAUDAL; PERINEURAL PRN
Status: DISCONTINUED | OUTPATIENT
Start: 2023-09-28 | End: 2023-09-28 | Stop reason: SDUPTHER

## 2023-09-28 RX ORDER — PROPOFOL 10 MG/ML
INJECTION, EMULSION INTRAVENOUS PRN
Status: DISCONTINUED | OUTPATIENT
Start: 2023-09-28 | End: 2023-09-28 | Stop reason: SDUPTHER

## 2023-09-28 RX ORDER — LIDOCAINE HYDROCHLORIDE 10 MG/ML
1 INJECTION, SOLUTION EPIDURAL; INFILTRATION; INTRACAUDAL; PERINEURAL
Status: DISCONTINUED | OUTPATIENT
Start: 2023-09-28 | End: 2023-09-28 | Stop reason: HOSPADM

## 2023-09-28 RX ORDER — SODIUM CHLORIDE 0.9 % (FLUSH) 0.9 %
5-40 SYRINGE (ML) INJECTION EVERY 12 HOURS SCHEDULED
Status: DISCONTINUED | OUTPATIENT
Start: 2023-09-28 | End: 2023-09-28 | Stop reason: HOSPADM

## 2023-09-28 RX ADMIN — APREPITANT 80 MG: 40 CAPSULE ORAL at 14:33

## 2023-09-28 RX ADMIN — PROPOFOL 40 MG: 10 INJECTION, EMULSION INTRAVENOUS at 15:32

## 2023-09-28 RX ADMIN — PROPOFOL 40 MG: 10 INJECTION, EMULSION INTRAVENOUS at 15:38

## 2023-09-28 RX ADMIN — PROPOFOL 60 MG: 10 INJECTION, EMULSION INTRAVENOUS at 15:16

## 2023-09-28 RX ADMIN — SODIUM CHLORIDE, POTASSIUM CHLORIDE, SODIUM LACTATE AND CALCIUM CHLORIDE: 600; 310; 30; 20 INJECTION, SOLUTION INTRAVENOUS at 13:30

## 2023-09-28 RX ADMIN — PROPOFOL 40 MG: 10 INJECTION, EMULSION INTRAVENOUS at 15:26

## 2023-09-28 RX ADMIN — PROPOFOL 20 MG: 10 INJECTION, EMULSION INTRAVENOUS at 15:43

## 2023-09-28 RX ADMIN — PROPOFOL 60 MG: 10 INJECTION, EMULSION INTRAVENOUS at 15:21

## 2023-09-28 RX ADMIN — LIDOCAINE HYDROCHLORIDE 100 MG: 20 INJECTION, SOLUTION EPIDURAL; INFILTRATION; INTRACAUDAL; PERINEURAL at 15:16

## 2023-09-28 RX ADMIN — SODIUM CHLORIDE, POTASSIUM CHLORIDE, SODIUM LACTATE AND CALCIUM CHLORIDE: 600; 310; 30; 20 INJECTION, SOLUTION INTRAVENOUS at 15:34

## 2023-09-28 ASSESSMENT — PAIN SCALES - GENERAL
PAINLEVEL_OUTOF10: 0

## 2023-09-28 ASSESSMENT — ENCOUNTER SYMPTOMS: SHORTNESS OF BREATH: 1

## 2023-09-28 NOTE — PROCEDURES
PROCEDURE:  BRONCHOSCOPY WITH BRONCHOALVEOLAR LAVAGE AND THERAPEUTIC ASPIRATION OF MUCUS PLUGS     The risks and benefits as well as alternatives to the procedure have been discussed with the patient. The patient understands and agrees to proceed. DESCRIPTION OF PROCEDURE: A time out was taken. Type of sedation used: MAC    The scope was passed with ease via the mouth. A complete airway inspection was performed. No endobronchial lesions were identified. There were thick, white secretions throughout all airways. Therapeutic aspiration was performed. Washings were obtained throughout the airways and sent for AFB culture. A Bronchoalveolar lavage was obtained from the left lower lobe with 120 ml instilled and 30 ml recovered. BAL was sent for immunocompromised panel    EBL 0    The patient tolerated the procedure well. Recovery will be per endoscopy protocol.     FOLLOW UP:   in my office in a few weeks

## 2023-09-28 NOTE — DISCHARGE INSTRUCTIONS
appreciate you taking a few minutes of your time to complete this survey. Again, thank you for choosing the [facility]   Bronchoscopy: What to Expect at [address]     Bronchoscopy lets your doctor look at your airway through a tube called a bronchoscope. Afterward, you may feel tired for 1 or 2 days. Your mouth may feel very dry for several hours after the procedure. You may also have a sore throat and a hoarse voice for a few days. Sucking on throat lozenges or gargling with warm salt water may help soothe your sore throat. If a sample of tissue (biopsy) was taken, you may spit up a small amount of blood or have bloody saliva. This is normal.  Ask your doctor when you can drive again. Do not smoke for at least 24 hours. This care sheet gives you a general idea about how long it will take for you to recover. But each person recovers at a different pace. Follow the steps below to get better as quickly as possible. How can you care for yourself at home? Activity    Do not eat anything for 2 hours after the procedure. Rest when you feel tired. Getting enough sleep will help you recover. Avoid strenuous activities, such as bicycle riding, jogging, weight lifting, or aerobic exercise, until your doctor says it is okay. Ask your doctor when you can drive again. Diet    You can eat your normal diet. If your stomach is upset, try bland, low-fat foods like plain rice, broiled chicken, toast, and yogurt. If it is painful to swallow, start out with cold drinks, flavored ice pops, and ice cream. Next, try soft foods like pudding, yogurt, canned or cooked fruit, scrambled eggs, and mashed potatoes. Avoid eating hard or scratchy foods like chips or raw vegetables. Avoid orange or tomato juice and other acidic foods that can sting the throat. Drink plenty of fluids to avoid becoming dehydrated (unless your doctor tells you not to).    Medicines    Take pain medicines exactly as you have questions about a medical condition or this instruction, always ask your healthcare professional. [address] any warranty or liability for your use of this information.

## 2023-09-28 NOTE — ANESTHESIA POSTPROCEDURE EVALUATION
Department of Anesthesiology  Postprocedure Note    Patient: Tenisha Sampson  MRN: [id number]  YOB: 1960  Date of evaluation: 9/28/2023      Procedure Summary     Date: 09/28/23 Room / Location: [facility] / The [facility] [facility]    Anesthesia Start: 3891 Anesthesia Stop: 1600    Procedure: BRONCHOSCOPY WITH BRONCHOALVEOLAR LAVAGE OF THE LEFT LUNG (Left) Diagnosis:       Chronic cough      (Chronic cough [R05.3])    Surgeons: Daniel Finnegan MD Responsible Provider: Bia Goodrich DO    Anesthesia Type: MAC ASA Status: 2          Anesthesia Type: No value filed.     Gayla Phase I: Gayla Score: 10    Gayla Phase II: Gayla Score: 10      Anesthesia Post Evaluation    Patient location during evaluation: PACU  Patient participation: complete - patient participated  Level of consciousness: awake and awake and alert  Pain score: 0  Airway patency: patent  Nausea & Vomiting: no nausea and no vomiting  Complications: no  Cardiovascular status: hemodynamically stable  Respiratory status: acceptable  Hydration status: euvolemic  Pain management: adequate and satisfactory to patient

## 2023-09-28 NOTE — ANESTHESIA PRE PROCEDURE
Department of Anesthesiology  Preprocedure Note       Name:  Nimisha Vasquez   Age:  60 y.o.  :  1960                                          MRN:  [id number]         Date:  2023      Surgeon: Aric Andino):  Mare Amador MD    Procedure: Procedure(s):  BRONCHOSCOPY WITH BRONCHOALVEOLAR LAVAGE OF THE LEFT LUNG    Medications prior to admission:   Prior to Admission medications    Medication Sig Start Date End Date Taking? Authorizing Provider   cetirizine (ZYRTEC) 10 MG tablet TAKE 1 TABLET BY MOUTH DAILY 23   Mare Amador MD   Cholecalciferol (VITAMIN D3) 25 MCG (1000 UT) CAPS TAKE 1 CAPSULE BY MOUTH DAILY 23   Kashmir Sanchez MD   Benzonatate (TESSALON PERLES PO) Take by mouth    Historical Provider, MD   promethazine-codeine (PHENERGAN WITH CODEINE) 6.25-10 MG/5ML syrup Take 5 mLs by mouth nightly as needed for Cough. Max Daily Amount: 5 mLs 23  Mare Amador MD   azithromycin (ZITHROMAX) 250 MG tablet Take 500 mg by mouth the first day then 250 mg for the remaining four days 8/10/23   Yeny Claudio MD   predniSONE (DELTASONE) 10 MG tablet Take 4 tablets by mouth for 5 days.  8/10/23   Yeny Claudio MD   levothyroxine (SYNTHROID) 50 MCG tablet TAKE 1 TABLET BY MOUTH EVERY MORNING 23   Kashmir Sanchez MD   azelastine (ASTELIN) 0.1 % nasal spray 2 sprays by Nasal route 2 times daily Use in each nostril as directed 23   Mare Amador MD   fluticasone furoate-vilanterol (BREO ELLIPTA) 200-25 MCG/ACT AEPB inhaler INHALE 1 PUFF INTO THE LUNGS IN THE MORNING 23   Mare Amador MD   montelukast (SINGULAIR) 10 MG tablet Take 1 tablet by mouth every evening 23   Mare Amador MD   albuterol sulfate HFA (PROVENTIL HFA) 108 (90 Base) MCG/ACT inhaler Inhale 2 puffs into the lungs every 4 hours as needed for Wheezing or Shortness of Breath 23  Mare Amador MD   tiotropium (SPIRIVA RESPIMAT) 2.5 MCG/ACT AERS inhaler Inhale 2 puffs into the lungs daily

## 2023-09-28 NOTE — H&P
H&P  PROCEDURE:  BRONCHOSCOPY WITH BRONCHOALVEOLAR LAVAGE LEFT LOWER LOBE    HPI:    The patient is 61 y.o. female is here bronchosocopy to evaluate persistent cough associated with increasing dyspnea on exertion and chest congestion in the setting of LLL GGO and mucus plugging. Last saw her in July I started Spiriva with thought she had suboptimal control of her asthma. Unfortunately this did not significantly help her. In the interim sounds like she had lower respite tract infection and was treated with azithromycin and prednisone. She had a negative COVID test.  She got very short-term partial relief with that but symptoms quickly returned. No fevers, chills, hemoptysis, anorexia, or weight loss. Towards the end of August she had a CT chest abdomen pelvis ordered by her oncologist for surveillance of her non-Hodgkin's lymphoma which showed multiple more peripheral mucous plugging with opacities. She is asking for a codeine-based cough syrup as Tessalon Perles are ineffective    Allergies and medications have been reviewed    HENT: Airway patent and reviewed  Cardiovascular: Normal rate, regular rhythm, normal heart sounds. Pulmonary/Chest: No wheezes. + rhonchi. No rales. Abdominal: Soft. Bowel sounds are normal. No distension. ASA CLASS      II. Mild systemic disease    Mallampati: Per anesthesia    Sedation plan:      Sedation per anesthesia       Post Procedure Plan   Return to same level of care     The risks and benefits as well as alternatives to the procedure have been discussed with the patient. The patient understands and agrees to proceed.      A/P     Will proceed with bronchoscopy to obtain BAL LLL to assess for infection as well as therapeutic aspiration of multiple mucous plugs    Milton Marrero MD stated

## 2023-09-29 LAB
ACID FAST STN SPEC QL: NORMAL
ACID FAST STN SPEC QL: NORMAL
LOEFFLER MB STN SPEC: NORMAL
PNEUMONIA PANEL MOLECULAR: NORMAL
REPORT: NORMAL

## 2023-09-30 LAB
BACTERIA SPEC RESP CULT: NORMAL
GRAM STN SPEC: NORMAL

## 2023-10-05 ENCOUNTER — TELEPHONE (OUTPATIENT)
Dept: ORTHOPEDIC SURGERY | Age: 63
End: 2023-10-05

## 2023-10-05 NOTE — TELEPHONE ENCOUNTER
Emailed the request from [employer] for a Risk Adjustment to [employer] with [facility].

## 2023-10-06 LAB
ASPERGILLUS GALACTO AG: NEGATIVE
FINAL REPORT: NORMAL
GALACTOMANNAN AG SERPL IA-ACNC: 0.23

## 2023-10-07 LAB
FINAL REPORT: NORMAL
PRELIMINARY: NORMAL

## 2023-10-09 LAB
FUNGUS SPEC CULT: NORMAL
LOEFFLER MB STN SPEC: NORMAL

## 2023-10-10 LAB
ACID FAST STN SPEC QL: NORMAL
ACID FAST STN SPEC QL: NORMAL
MYCOBACTERIUM SPEC CULT: NORMAL
MYCOBACTERIUM SPEC CULT: NORMAL

## 2023-10-11 LAB — FINAL REPORT: NORMAL

## 2023-10-16 LAB
FUNGUS SPEC CULT: NORMAL
LOEFFLER MB STN SPEC: NORMAL

## 2023-10-18 ENCOUNTER — TELEPHONE (OUTPATIENT)
Dept: PULMONOLOGY | Age: 63
End: 2023-10-18

## 2023-10-18 NOTE — TELEPHONE ENCOUNTER
Eleonora Vanessa recalls that blood work was ordered at her  last appt and she intends to go to lab for this. Per chart, there does not appear to be an outstanding order. Per chart, lab orders appear to be completed. Is another blood test needed? If so, does she need to obtain this prior to appt on 11/02, or can she have it done same day as appt?

## 2023-10-18 NOTE — TELEPHONE ENCOUNTER
Anthony Peters was informed that no labs are needed at this time, per Dr. Ulysses Outlaw. Appt confirmed for 11/02/2023.

## 2023-10-23 LAB
FUNGUS SPEC CULT: NORMAL
LOEFFLER MB STN SPEC: NORMAL

## 2023-10-25 ENCOUNTER — TELEPHONE (OUTPATIENT)
Dept: PULMONOLOGY | Age: 63
End: 2023-10-25

## 2023-10-25 NOTE — TELEPHONE ENCOUNTER
Submitted PA for [facility]  [facility]: [id number]  STATUS: PENDING. Follow up done daily; if no response in three days we will refax for status check. If another three days goes by with no response we will call the insurance for status.

## 2023-10-30 LAB
FUNGUS SPEC CULT: NORMAL
LOEFFLER MB STN SPEC: NORMAL

## 2023-11-02 ENCOUNTER — OFFICE VISIT (OUTPATIENT)
Dept: PULMONOLOGY | Age: 63
End: 2023-11-02
Payer: MEDICARE

## 2023-11-02 VITALS
OXYGEN SATURATION: 95 % | DIASTOLIC BLOOD PRESSURE: 76 MMHG | WEIGHT: 166 LBS | SYSTOLIC BLOOD PRESSURE: 112 MMHG | BODY MASS INDEX: 28.34 KG/M2 | HEIGHT: 64 IN | HEART RATE: 94 BPM

## 2023-11-02 DIAGNOSIS — T17.500A MUCUS PLUGGING OF BRONCHI: ICD-10-CM

## 2023-11-02 DIAGNOSIS — J45.40 ASTHMA, MODERATE PERSISTENT, WELL-CONTROLLED: Primary | ICD-10-CM

## 2023-11-02 DIAGNOSIS — J30.89 NON-SEASONAL ALLERGIC RHINITIS, UNSPECIFIED TRIGGER: ICD-10-CM

## 2023-11-02 LAB
ALBUMIN SERPL-MCNC: 4.6 G/DL (ref 3.4–5)
ANION GAP SERPL CALCULATED.3IONS-SCNC: 11 MMOL/L (ref 3–16)
BUN SERPL-MCNC: 28 MG/DL (ref 7–20)
CALCIUM SERPL-MCNC: 10 MG/DL (ref 8.3–10.6)
CHLORIDE SERPL-SCNC: 103 MMOL/L (ref 99–110)
CO2 SERPL-SCNC: 23 MMOL/L (ref 21–32)
CREAT SERPL-MCNC: 2.9 MG/DL (ref 0.6–1.2)
CREAT UR-MCNC: 34.4 MG/DL (ref 28–259)
GFR SERPLBLD CREATININE-BSD FMLA CKD-EPI: 18 ML/MIN/{1.73_M2}
GLUCOSE SERPL-MCNC: 85 MG/DL (ref 70–99)
MAGNESIUM SERPL-MCNC: 2 MG/DL (ref 1.8–2.4)
PHOSPHATE SERPL-MCNC: 3.7 MG/DL (ref 2.5–4.9)
POTASSIUM SERPL-SCNC: 5.8 MMOL/L (ref 3.5–5.1)
PROT UR-MCNC: 10 MG/DL
PROT/CREAT UR-RTO: 0.3 MG/DL
SODIUM SERPL-SCNC: 137 MMOL/L (ref 136–145)

## 2023-11-02 PROCEDURE — 99214 OFFICE O/P EST MOD 30 MIN: CPT | Performed by: INTERNAL MEDICINE

## 2023-11-02 RX ORDER — ALBUTEROL SULFATE 2.5 MG/3ML
2.5 SOLUTION RESPIRATORY (INHALATION) 2 TIMES DAILY
Qty: 120 EACH | Refills: 11 | Status: SHIPPED | OUTPATIENT
Start: 2023-11-02

## 2023-11-02 RX ORDER — FLUTICASONE FUROATE AND VILANTEROL 200; 25 UG/1; UG/1
POWDER RESPIRATORY (INHALATION)
Qty: 3 EACH | Refills: 3 | Status: CANCELLED | OUTPATIENT
Start: 2023-11-02

## 2023-11-02 RX ORDER — FLUTICASONE FUROATE, UMECLIDINIUM BROMIDE AND VILANTEROL TRIFENATATE 200; 62.5; 25 UG/1; UG/1; UG/1
1 POWDER RESPIRATORY (INHALATION) DAILY
Qty: 1 EACH | Refills: 11 | Status: SHIPPED | OUTPATIENT
Start: 2023-11-02

## 2023-11-02 RX ORDER — CHLORAL HYDRATE 500 MG
CAPSULE ORAL
COMMUNITY

## 2023-11-02 NOTE — PROGRESS NOTES
Mycelium has been isolated on fungus culture. Patient continues to have a producitve cough and dyspnea on exertion but no fevers, chills, night sweats, malaise, anorexia or weight loss    Previous visit August 23, 2018  Celeste Hay is here for two-week follow-up of dyspnea on exertion and cough. Since last visit she's had a CT chest and PFTs. She has no change in her dyspnea on exertion and cough. She continues to have no fevers, chills, anorexia, chest pain, or weight loss    Previous visit 8/1/2018   The patient is 60 y.o. female with past medical history of lymphoma status post bone marrow transplant and UACS on Flonase and Zyrtec presents for evaluation of worsening dyspnea on exertion to the point she gets winded going up 1 flight of stairs. She has a cough that she describes as both dry but also bringing up some yellow phlegm. She has no associated fevers, chills, anorexia, chest pain, or weight loss. .     Past Medical History:    Past Medical History:   Diagnosis Date    Allergic rhinitis     Arthritis     CKD (chronic kidney disease)     DLBCL (diffuse large B cell lymphoma) ([facility]) 02/2010    non-hodgkins lymphoma    Encounter for aftercare following bone marrow transplant ([facility]) 06/10/2016    GERD (gastroesophageal reflux disease)     Hx of non-Hodgkin's lymphoma     Hypogammaglobulinemia ([facility]) 04/13/2017    MDRO (multiple drug resistant organisms) resistance 05/15/2016    E.coli MDRO in urine    Migraines     Neutropenia ([facility]) 07/20/2016    Non Hodgkin's lymphoma ([facility])     Pancytopenia ([facility]) 07/20/2016    Peripheral neuropathy     PONV (postoperative nausea and vomiting)     Thyroid disease        Social History:    Social History     Tobacco Use   Smoking Status Never   Smokeless Tobacco Never       Current Medications:  Current Outpatient Medications on File Prior to Visit   Medication Sig Dispense Refill    Omega-3 Fatty Acids (OMEGA-3 FISH OIL) 1000 MG CAPS Take by mouth      cetirizine (ZYRTEC) 10 MG

## 2023-11-09 ENCOUNTER — TELEPHONE (OUTPATIENT)
Dept: PULMONOLOGY | Age: 63
End: 2023-11-09

## 2023-11-09 NOTE — TELEPHONE ENCOUNTER
Pt was seen in the office on 11/2/23  For the last couple of days she has been coughing w/greenish phlegm. She is also complaining of a sore throat. She does not think the sore throat is from the cough.      Isaiah in [place]

## 2024-01-08 ENCOUNTER — TELEPHONE (OUTPATIENT)
Dept: PULMONOLOGY | Age: 64
End: 2024-01-08

## 2024-01-08 RX ORDER — PREDNISONE 20 MG/1
40 TABLET ORAL DAILY
Qty: 14 TABLET | Refills: 0 | Status: SHIPPED | OUTPATIENT
Start: 2024-01-08

## 2024-01-08 NOTE — TELEPHONE ENCOUNTER
Cough X 2 weeks.  Had been doing well, then cough came.  Uses inhaler, and 2 different nasal sprays.  Coughing all day and all night.  Dry cough, like an itch in throat. No fever.  No sick exposures. .   She still has some cough syrup with codeine which she uses at night; it is helpful.  She wants  something to use during day so that she is not sleepy.  Asks if prednisone would help.     She can be reached at [phone]     Pharm: [address]

## 2024-01-08 NOTE — TELEPHONE ENCOUNTER
This sounds like it may be a flare of her asthma given symptoms of just a dry cough  I will give her a prednisone burst 40 mg daily x 7 days  Should the cough not improve or additional symptoms emerge she should call us back

## 2024-01-27 ENCOUNTER — HOSPITAL ENCOUNTER (INPATIENT)
Age: 64
LOS: 6 days | Discharge: HOME | End: 2024-02-02
Attending: EMERGENCY MEDICINE | Admitting: INTERNAL MEDICINE
Payer: MEDICARE

## 2024-01-27 ENCOUNTER — APPOINTMENT (OUTPATIENT)
Dept: GENERAL RADIOLOGY | Age: 64
End: 2024-01-27
Payer: MEDICARE

## 2024-01-27 ENCOUNTER — APPOINTMENT (OUTPATIENT)
Dept: CT IMAGING | Age: 64
End: 2024-01-27
Payer: MEDICARE

## 2024-01-27 DIAGNOSIS — I50.22 CHRONIC SYSTOLIC CONGESTIVE HEART FAILURE (HCC): ICD-10-CM

## 2024-01-27 DIAGNOSIS — J38.5 LARYNGOSPASM: ICD-10-CM

## 2024-01-27 DIAGNOSIS — R05.3 PERSISTENT COUGH: ICD-10-CM

## 2024-01-27 DIAGNOSIS — J18.9 PNEUMONIA OF LEFT LOWER LOBE DUE TO INFECTIOUS ORGANISM: Primary | ICD-10-CM

## 2024-01-27 DIAGNOSIS — E03.9 ACQUIRED HYPOTHYROIDISM: ICD-10-CM

## 2024-01-27 PROBLEM — J98.4 PNEUMONITIS: Status: ACTIVE | Noted: 2024-01-27

## 2024-01-27 LAB
ALBUMIN SERPL-MCNC: 2.9 G/DL (ref 3.4–5)
ALBUMIN SERPL-MCNC: 4 G/DL (ref 3.4–5)
ALBUMIN/GLOB SERPL: 1.5 {RATIO} (ref 1.1–2.2)
ALP SERPL-CCNC: 67 U/L (ref 40–129)
ALP SERPL-CCNC: 89 U/L (ref 40–129)
ALT SERPL-CCNC: 18 U/L (ref 10–40)
ALT SERPL-CCNC: 23 U/L (ref 10–40)
ANION GAP SERPL CALCULATED.3IONS-SCNC: 14 MMOL/L (ref 3–16)
ANION GAP SERPL CALCULATED.3IONS-SCNC: 8 MMOL/L (ref 3–16)
APTT BLD: 32.3 SEC (ref 22.7–35.9)
AST SERPL-CCNC: 19 U/L (ref 15–37)
AST SERPL-CCNC: 21 U/L (ref 15–37)
BACTERIA URNS QL MICRO: ABNORMAL /HPF
BASE EXCESS BLDV CALC-SCNC: 0.6 MMOL/L (ref -2–3)
BASOPHILS # BLD: 0 K/UL (ref 0–0.2)
BASOPHILS # BLD: 0.1 K/UL (ref 0–0.2)
BASOPHILS NFR BLD: 0.3 %
BASOPHILS NFR BLD: 0.3 %
BILIRUB DIRECT SERPL-MCNC: 1.1 MG/DL (ref 0–0.3)
BILIRUB INDIRECT SERPL-MCNC: 1 MG/DL (ref 0–1)
BILIRUB SERPL-MCNC: 1.6 MG/DL (ref 0–1)
BILIRUB SERPL-MCNC: 2.1 MG/DL (ref 0–1)
BILIRUB UR QL STRIP.AUTO: NEGATIVE
BUN SERPL-MCNC: 20 MG/DL (ref 7–20)
BUN SERPL-MCNC: 23 MG/DL (ref 7–20)
CALCIUM SERPL-MCNC: 10 MG/DL (ref 8.3–10.6)
CALCIUM SERPL-MCNC: 8.8 MG/DL (ref 8.3–10.6)
CHLORIDE SERPL-SCNC: 102 MMOL/L (ref 99–110)
CHLORIDE SERPL-SCNC: 104 MMOL/L (ref 99–110)
CLARITY UR: CLEAR
CO2 BLDV-SCNC: 26 MMOL/L
CO2 SERPL-SCNC: 22 MMOL/L (ref 21–32)
CO2 SERPL-SCNC: 22 MMOL/L (ref 21–32)
COHGB MFR BLDV: 1.5 % (ref 0–1.5)
COLOR UR: YELLOW
CREAT SERPL-MCNC: 2.5 MG/DL (ref 0.6–1.2)
CREAT SERPL-MCNC: 2.8 MG/DL (ref 0.6–1.2)
DEPRECATED RDW RBC AUTO: 16.5 % (ref 12.4–15.4)
DEPRECATED RDW RBC AUTO: 16.6 % (ref 12.4–15.4)
EKG ATRIAL RATE: 101 BPM
EKG DIAGNOSIS: NORMAL
EKG P AXIS: 78 DEGREES
EKG P-R INTERVAL: 162 MS
EKG Q-T INTERVAL: 332 MS
EKG QRS DURATION: 82 MS
EKG QTC CALCULATION (BAZETT): 430 MS
EKG R AXIS: 7 DEGREES
EKG T AXIS: 61 DEGREES
EKG VENTRICULAR RATE: 101 BPM
EOSINOPHIL # BLD: 0 K/UL (ref 0–0.6)
EOSINOPHIL # BLD: 0 K/UL (ref 0–0.6)
EOSINOPHIL NFR BLD: 0 %
EOSINOPHIL NFR BLD: 0 %
EPI CELLS #/AREA URNS HPF: ABNORMAL /HPF (ref 0–5)
FLUAV RNA RESP QL NAA+PROBE: NOT DETECTED
FLUBV RNA RESP QL NAA+PROBE: NOT DETECTED
GFR SERPLBLD CREATININE-BSD FMLA CKD-EPI: 18 ML/MIN/{1.73_M2}
GFR SERPLBLD CREATININE-BSD FMLA CKD-EPI: 21 ML/MIN/{1.73_M2}
GLUCOSE SERPL-MCNC: 159 MG/DL (ref 70–99)
GLUCOSE SERPL-MCNC: 160 MG/DL (ref 70–99)
GLUCOSE UR STRIP.AUTO-MCNC: 100 MG/DL
HCO3 BLDV-SCNC: 24.6 MMOL/L (ref 24–28)
HCT VFR BLD AUTO: 42.1 % (ref 36–48)
HCT VFR BLD AUTO: 51.3 % (ref 36–48)
HGB BLD-MCNC: 13.3 G/DL (ref 12–16)
HGB BLD-MCNC: 16.2 G/DL (ref 12–16)
HGB UR QL STRIP.AUTO: ABNORMAL
KETONES UR STRIP.AUTO-MCNC: NEGATIVE MG/DL
LACTATE BLDV-SCNC: 1.6 MMOL/L (ref 0.4–1.9)
LDH SERPL L TO P-CCNC: 133 U/L (ref 100–190)
LEUKOCYTE ESTERASE UR QL STRIP.AUTO: ABNORMAL
LYMPHOCYTES # BLD: 1.6 K/UL (ref 1–5.1)
LYMPHOCYTES # BLD: 1.7 K/UL (ref 1–5.1)
LYMPHOCYTES NFR BLD: 6.2 %
LYMPHOCYTES NFR BLD: 8.8 %
MAGNESIUM SERPL-MCNC: 1.4 MG/DL (ref 1.8–2.4)
MCH RBC QN AUTO: 26.4 PG (ref 26–34)
MCH RBC QN AUTO: 26.7 PG (ref 26–34)
MCHC RBC AUTO-ENTMCNC: 31.6 G/DL (ref 31–36)
MCHC RBC AUTO-ENTMCNC: 31.7 G/DL (ref 31–36)
MCV RBC AUTO: 83.5 FL (ref 80–100)
MCV RBC AUTO: 84.4 FL (ref 80–100)
METHGB MFR BLDV: 0.2 % (ref 0–1.5)
MONOCYTES # BLD: 0.6 K/UL (ref 0–1.3)
MONOCYTES # BLD: 1.4 K/UL (ref 0–1.3)
MONOCYTES NFR BLD: 3.2 %
MONOCYTES NFR BLD: 5 %
NEUTROPHILS # BLD: 15.8 K/UL (ref 1.7–7.7)
NEUTROPHILS # BLD: 24.5 K/UL (ref 1.7–7.7)
NEUTROPHILS NFR BLD: 87.7 %
NEUTROPHILS NFR BLD: 88.5 %
NITRITE UR QL STRIP.AUTO: NEGATIVE
PCO2 BLDV: 37 MMHG (ref 41–51)
PH BLDV: 7.43 [PH] (ref 7.35–7.45)
PH UR STRIP.AUTO: 7 [PH] (ref 5–8)
PHOSPHATE SERPL-MCNC: 2 MG/DL (ref 2.5–4.9)
PLATELET # BLD AUTO: 120 K/UL (ref 135–450)
PLATELET # BLD AUTO: 98 K/UL (ref 135–450)
PMV BLD AUTO: 9.3 FL (ref 5–10.5)
PMV BLD AUTO: 9.4 FL (ref 5–10.5)
PO2 BLDV: 45.5 MMHG (ref 25–40)
POTASSIUM SERPL-SCNC: 3.7 MMOL/L (ref 3.5–5.1)
POTASSIUM SERPL-SCNC: 3.8 MMOL/L (ref 3.5–5.1)
PROCALCITONIN SERPL IA-MCNC: 4.63 NG/ML (ref 0–0.15)
PROT SERPL-MCNC: 4.9 G/DL (ref 6.4–8.2)
PROT SERPL-MCNC: 6.7 G/DL (ref 6.4–8.2)
PROT UR STRIP.AUTO-MCNC: 100 MG/DL
RBC # BLD AUTO: 4.99 M/UL (ref 4–5.2)
RBC # BLD AUTO: 6.14 M/UL (ref 4–5.2)
RBC #/AREA URNS HPF: ABNORMAL /HPF (ref 0–4)
RENAL EPI CELLS #/AREA UR COMP ASSIST: ABNORMAL /HPF (ref 0–1)
SAO2 % BLDV: 83 %
SARS-COV-2 RNA RESP QL NAA+PROBE: NOT DETECTED
SODIUM SERPL-SCNC: 134 MMOL/L (ref 136–145)
SODIUM SERPL-SCNC: 138 MMOL/L (ref 136–145)
SP GR UR STRIP.AUTO: 1.02 (ref 1–1.03)
UA COMPLETE W REFLEX CULTURE PNL UR: ABNORMAL
UA DIPSTICK W REFLEX MICRO PNL UR: YES
URATE SERPL-MCNC: 4.4 MG/DL (ref 2.6–6)
URN SPEC COLLECT METH UR: ABNORMAL
UROBILINOGEN UR STRIP-ACNC: 0.2 E.U./DL
WBC # BLD AUTO: 18 K/UL (ref 4–11)
WBC # BLD AUTO: 27.6 K/UL (ref 4–11)
WBC #/AREA URNS HPF: ABNORMAL /HPF (ref 0–5)

## 2024-01-27 PROCEDURE — 1200000000 HC SEMI PRIVATE

## 2024-01-27 PROCEDURE — 80053 COMPREHEN METABOLIC PANEL: CPT

## 2024-01-27 PROCEDURE — 83605 ASSAY OF LACTIC ACID: CPT

## 2024-01-27 PROCEDURE — 87040 BLOOD CULTURE FOR BACTERIA: CPT

## 2024-01-27 PROCEDURE — 81001 URINALYSIS AUTO W/SCOPE: CPT

## 2024-01-27 PROCEDURE — 94640 AIRWAY INHALATION TREATMENT: CPT

## 2024-01-27 PROCEDURE — 99285 EMERGENCY DEPT VISIT HI MDM: CPT

## 2024-01-27 PROCEDURE — 2060000000 HC ICU INTERMEDIATE R&B

## 2024-01-27 PROCEDURE — 83615 LACTATE (LD) (LDH) ENZYME: CPT

## 2024-01-27 PROCEDURE — 83735 ASSAY OF MAGNESIUM: CPT

## 2024-01-27 PROCEDURE — 85730 THROMBOPLASTIN TIME PARTIAL: CPT

## 2024-01-27 PROCEDURE — 2580000003 HC RX 258

## 2024-01-27 PROCEDURE — 6370000000 HC RX 637 (ALT 250 FOR IP)

## 2024-01-27 PROCEDURE — 84550 ASSAY OF BLOOD/URIC ACID: CPT

## 2024-01-27 PROCEDURE — 36415 COLL VENOUS BLD VENIPUNCTURE: CPT

## 2024-01-27 PROCEDURE — 6360000002 HC RX W HCPCS: Performed by: EMERGENCY MEDICINE

## 2024-01-27 PROCEDURE — 6370000000 HC RX 637 (ALT 250 FOR IP): Performed by: INTERNAL MEDICINE

## 2024-01-27 PROCEDURE — 82803 BLOOD GASES ANY COMBINATION: CPT

## 2024-01-27 PROCEDURE — 93005 ELECTROCARDIOGRAM TRACING: CPT | Performed by: EMERGENCY MEDICINE

## 2024-01-27 PROCEDURE — 2580000003 HC RX 258: Performed by: INTERNAL MEDICINE

## 2024-01-27 PROCEDURE — 84145 PROCALCITONIN (PCT): CPT

## 2024-01-27 PROCEDURE — 84100 ASSAY OF PHOSPHORUS: CPT

## 2024-01-27 PROCEDURE — 6360000002 HC RX W HCPCS: Performed by: INTERNAL MEDICINE

## 2024-01-27 PROCEDURE — 87636 SARSCOV2 & INF A&B AMP PRB: CPT

## 2024-01-27 PROCEDURE — 6360000002 HC RX W HCPCS

## 2024-01-27 PROCEDURE — 71250 CT THORAX DX C-: CPT

## 2024-01-27 PROCEDURE — 71045 X-RAY EXAM CHEST 1 VIEW: CPT

## 2024-01-27 PROCEDURE — 96375 TX/PRO/DX INJ NEW DRUG ADDON: CPT

## 2024-01-27 PROCEDURE — 99222 1ST HOSP IP/OBS MODERATE 55: CPT | Performed by: INTERNAL MEDICINE

## 2024-01-27 PROCEDURE — 96365 THER/PROPH/DIAG IV INF INIT: CPT

## 2024-01-27 PROCEDURE — 85025 COMPLETE CBC W/AUTO DIFF WBC: CPT

## 2024-01-27 RX ORDER — IPRATROPIUM BROMIDE AND ALBUTEROL SULFATE 2.5; .5 MG/3ML; MG/3ML
1 SOLUTION RESPIRATORY (INHALATION) ONCE
Status: COMPLETED | OUTPATIENT
Start: 2024-01-27 | End: 2024-01-27

## 2024-01-27 RX ORDER — SODIUM CHLORIDE 0.9 % (FLUSH) 0.9 %
5-40 SYRINGE (ML) INJECTION EVERY 12 HOURS SCHEDULED
Status: DISCONTINUED | OUTPATIENT
Start: 2024-01-27 | End: 2024-02-02 | Stop reason: HOSPADM

## 2024-01-27 RX ORDER — SODIUM CHLORIDE 9 MG/ML
INJECTION, SOLUTION INTRAVENOUS PRN
Status: DISCONTINUED | OUTPATIENT
Start: 2024-01-27 | End: 2024-02-02 | Stop reason: HOSPADM

## 2024-01-27 RX ORDER — PROMETHAZINE HYDROCHLORIDE AND CODEINE PHOSPHATE 6.25; 1 MG/5ML; MG/5ML
5 SYRUP ORAL NIGHTLY PRN
Status: DISCONTINUED | OUTPATIENT
Start: 2024-01-27 | End: 2024-02-02 | Stop reason: HOSPADM

## 2024-01-27 RX ORDER — ALBUTEROL SULFATE 2.5 MG/3ML
2.5 SOLUTION RESPIRATORY (INHALATION) 2 TIMES DAILY
Status: DISCONTINUED | OUTPATIENT
Start: 2024-01-27 | End: 2024-01-28

## 2024-01-27 RX ORDER — SODIUM CHLORIDE, SODIUM LACTATE, POTASSIUM CHLORIDE, AND CALCIUM CHLORIDE .6; .31; .03; .02 G/100ML; G/100ML; G/100ML; G/100ML
30 INJECTION, SOLUTION INTRAVENOUS ONCE
Status: COMPLETED | OUTPATIENT
Start: 2024-01-27 | End: 2024-01-27

## 2024-01-27 RX ORDER — POTASSIUM CHLORIDE 29.8 MG/ML
20 INJECTION INTRAVENOUS PRN
Status: DISCONTINUED | OUTPATIENT
Start: 2024-01-27 | End: 2024-01-27

## 2024-01-27 RX ORDER — MAGNESIUM SULFATE IN WATER 40 MG/ML
4000 INJECTION, SOLUTION INTRAVENOUS PRN
Status: DISCONTINUED | OUTPATIENT
Start: 2024-01-27 | End: 2024-01-27

## 2024-01-27 RX ORDER — PANTOPRAZOLE SODIUM 40 MG/1
40 TABLET, DELAYED RELEASE ORAL
Status: DISCONTINUED | OUTPATIENT
Start: 2024-01-28 | End: 2024-02-02 | Stop reason: HOSPADM

## 2024-01-27 RX ORDER — PROCHLORPERAZINE EDISYLATE 5 MG/ML
5 INJECTION INTRAMUSCULAR; INTRAVENOUS ONCE
Status: COMPLETED | OUTPATIENT
Start: 2024-01-27 | End: 2024-01-27

## 2024-01-27 RX ORDER — ACETAMINOPHEN 325 MG/1
650 TABLET ORAL ONCE
Status: COMPLETED | OUTPATIENT
Start: 2024-01-27 | End: 2024-01-27

## 2024-01-27 RX ORDER — MONTELUKAST SODIUM 10 MG/1
10 TABLET ORAL EVERY EVENING
Status: DISCONTINUED | OUTPATIENT
Start: 2024-01-27 | End: 2024-02-02 | Stop reason: HOSPADM

## 2024-01-27 RX ORDER — SODIUM CHLORIDE 0.9 % (FLUSH) 0.9 %
5-40 SYRINGE (ML) INJECTION PRN
Status: DISCONTINUED | OUTPATIENT
Start: 2024-01-27 | End: 2024-02-02 | Stop reason: HOSPADM

## 2024-01-27 RX ORDER — POTASSIUM CHLORIDE 7.45 MG/ML
10 INJECTION INTRAVENOUS PRN
Status: DISCONTINUED | OUTPATIENT
Start: 2024-01-27 | End: 2024-01-27

## 2024-01-27 RX ORDER — SODIUM CHLORIDE 9 MG/ML
INJECTION, SOLUTION INTRAVENOUS CONTINUOUS
Status: DISCONTINUED | OUTPATIENT
Start: 2024-01-27 | End: 2024-01-31 | Stop reason: SDUPTHER

## 2024-01-27 RX ORDER — ACETAMINOPHEN 325 MG/1
650 TABLET ORAL EVERY 4 HOURS PRN
Status: DISCONTINUED | OUTPATIENT
Start: 2024-01-27 | End: 2024-01-30

## 2024-01-27 RX ORDER — SODIUM CHLORIDE 9 MG/ML
500 INJECTION, SOLUTION INTRAVENOUS CONTINUOUS PRN
Status: DISCONTINUED | OUTPATIENT
Start: 2024-01-27 | End: 2024-02-02 | Stop reason: HOSPADM

## 2024-01-27 RX ADMIN — SODIUM CHLORIDE: 9 INJECTION, SOLUTION INTRAVENOUS at 15:37

## 2024-01-27 RX ADMIN — CEFEPIME 1000 MG: 1 INJECTION, POWDER, FOR SOLUTION INTRAMUSCULAR; INTRAVENOUS at 15:39

## 2024-01-27 RX ADMIN — SODIUM CHLORIDE, POTASSIUM CHLORIDE, SODIUM LACTATE AND CALCIUM CHLORIDE 2178 ML: 600; 310; 30; 20 INJECTION, SOLUTION INTRAVENOUS at 11:06

## 2024-01-27 RX ADMIN — CEFEPIME 2000 MG: 2 INJECTION, POWDER, FOR SOLUTION INTRAVENOUS at 11:24

## 2024-01-27 RX ADMIN — IPRATROPIUM BROMIDE AND ALBUTEROL SULFATE 1 DOSE: 2.5; .5 SOLUTION RESPIRATORY (INHALATION) at 11:04

## 2024-01-27 RX ADMIN — ACETAMINOPHEN 650 MG: 325 TABLET ORAL at 17:57

## 2024-01-27 RX ADMIN — ACETAMINOPHEN 650 MG: 325 TABLET ORAL at 12:09

## 2024-01-27 RX ADMIN — VANCOMYCIN HYDROCHLORIDE 1750 MG: 10 INJECTION, POWDER, LYOPHILIZED, FOR SOLUTION INTRAVENOUS at 13:22

## 2024-01-27 RX ADMIN — MONTELUKAST 10 MG: 10 TABLET, FILM COATED ORAL at 17:57

## 2024-01-27 RX ADMIN — ACETAMINOPHEN 650 MG: 325 TABLET ORAL at 23:09

## 2024-01-27 RX ADMIN — SODIUM CHLORIDE, PRESERVATIVE FREE 5 ML: 5 INJECTION INTRAVENOUS at 20:02

## 2024-01-27 RX ADMIN — AZITHROMYCIN MONOHYDRATE 500 MG: 500 INJECTION, POWDER, LYOPHILIZED, FOR SOLUTION INTRAVENOUS at 15:45

## 2024-01-27 RX ADMIN — PROCHLORPERAZINE EDISYLATE 5 MG: 5 INJECTION INTRAMUSCULAR; INTRAVENOUS at 12:09

## 2024-01-27 ASSESSMENT — LIFESTYLE VARIABLES
HOW MANY STANDARD DRINKS CONTAINING ALCOHOL DO YOU HAVE ON A TYPICAL DAY: 1 OR 2
HOW OFTEN DO YOU HAVE A DRINK CONTAINING ALCOHOL: MONTHLY OR LESS

## 2024-01-27 ASSESSMENT — PAIN SCALES - GENERAL
PAINLEVEL_OUTOF10: 9
PAINLEVEL_OUTOF10: 8

## 2024-01-27 ASSESSMENT — PAIN DESCRIPTION - DESCRIPTORS
DESCRIPTORS: DISCOMFORT
DESCRIPTORS: ACHING

## 2024-01-27 ASSESSMENT — PAIN DESCRIPTION - LOCATION
LOCATION: GENERALIZED
LOCATION: HEAD

## 2024-01-27 ASSESSMENT — PAIN - FUNCTIONAL ASSESSMENT: PAIN_FUNCTIONAL_ASSESSMENT: 0-10

## 2024-01-27 NOTE — PLAN OF CARE
Problem: Pain  Goal: Verbalizes/displays adequate comfort level or baseline comfort level  Outcome: Progressing- patient with no complaints of pain upon transfer to [facility]. Care continues.     Problem: Safety - Adult  Goal: Free from fall injury  Outcome: Progressing  + Screening for Orthostasis and/or + High Fall Risk per TAVARES/ABCDS: Explained fall risk precautions to pt and family and rationale behind their use to keep the patient safe. Pt bed is in low position, side rails up, call light and belongings are in reach. Fall wristband applied and present on pts wrist.  Bed alarm on.  Pt encouraged to call for assistance. Will continue with hourly rounds for PO intake, pain needs, toileting and repositioning as needed.      Problem: Respiratory - Adult  Goal: Achieves optimal ventilation and oxygenation  Outcome: Progressing- patient on 3L NC to maintain saturation above 90.      Problem: Infection - Adult  Goal: Absence of infection during hospitalization  Outcome: Progressing- patient fevered this evening. PRN tylenol administered. Care continues.

## 2024-01-27 NOTE — ED PROVIDER NOTES
THE [facility]  EMERGENCY DEPARTMENT ENCOUNTER          EM RESIDENT NOTE     Date of evaluation: 1/27/2024    Chief Complaint     Generalized Body Aches (Pt reports body aches, nausea, headache, dizzy, lightheaded, cough (yellow/white phlegm).)    History of Present Illness     Itzel Fenton is a 63 y.o. female with significant past medical history of CKD, moderate persistent asthma, and diffuse large B-cell lymphoma s/p BMT (on Pen VK for ppx) who presents the emergency department for evaluation of sick symptoms. She endorses nausea, body aches and headache since approximately 4 PM yesterday.  Denies any episodes of vomiting or abdominal pain but does report decreased p.o. intake and persistent nausea despite ODT Zofran. She reports that since her transplant she has had a persistent cough however over the last several days its become productive. She endorses shortness of breath which is been worsening since onset. She endorses subjective fevers and chills but has not taken her temperature. Denies any known sick contacts.     Past Medical, Surgical, Family, and Social History     She has a past medical history of Allergic rhinitis, Arthritis, CKD (chronic kidney disease), DLBCL (diffuse large B cell lymphoma) (HCC), Encounter for aftercare following bone marrow transplant (HCC), GERD (gastroesophageal reflux disease), Hx of non-Hodgkin's lymphoma, Hypogammaglobulinemia (HCC), MDRO (multiple drug resistant organisms) resistance, Migraines, Neutropenia (HCC), Non Hodgkin's lymphoma (HCC), Pancytopenia (HCC), Peripheral neuropathy, PONV (postoperative nausea and vomiting), and Thyroid disease.  She has a past surgical history that includes Tonsillectomy; Hysterectomy (2/2010); Colonoscopy; other surgical history (10/08/2012); lymph node biopsy (6/2011); lymph node biopsy (5/2012); other surgical history (Right, 05/10/2016); bone marrow transplant (05/16/2016[place] [facility] (N/A,     Clarity, UA Clear Clear    Glucose, Ur 100 (A) Negative mg/dL    Bilirubin Urine Negative Negative    Ketones, Urine Negative Negative mg/dL    Specific Gravity, UA 1.020 1.005 - 1.030    Blood, Urine SMALL (A) Negative    pH, UA 7.0 5.0 - 8.0    Protein,  (A) Negative mg/dL    Urobilinogen, Urine 0.2 <2.0 E.U./dL    Nitrite, Urine Negative Negative    Leukocyte Esterase, Urine TRACE (A) Negative    Microscopic Examination YES     Urine Type NotGiven    Blood gas, venous (Lab)   Result Value Ref Range    pH, Thony 7.432 7.350 - 7.450    pCO2, Thony 37.0 (L) 41.0 - 51.0 mmHg    pO2, Thony 45.5 (H) 25.0 - 40.0 mmHg    HCO3, Venous 24.6 24.0 - 28.0 mmol/L    Base Excess, Thony 0.6 -2.0 - 3.0 mmol/L    O2 Sat, Thony 83 Not established %    Carboxyhemoglobin 1.5 0.0 - 1.5 %    MetHgb, Thony 0.2 0.0 - 1.5 %    TC02 (Calc), Thony 26 mmol/L   EKG 12 Lead   Result Value Ref Range    Ventricular Rate 101 BPM    Atrial Rate 101 BPM    P-R Interval 162 ms    QRS Duration 82 ms    Q-T Interval 332 ms    QTc Calculation (Bazett) 430 ms    P Axis 78 degrees    R Axis 7 degrees    T Axis 61 degrees    Diagnosis       EKG performed in ER and to be interpreted by ER physician.Confirmed by MD, ER ([id number]),  MICH SHARMA ([id number]) on 1/27/2024 10:55:39 AM     EKG   Interpreted by emergency department physician  Rhythm: sinus tachycardia  Rate: tachycardia  Axis: normal  Ectopy: none  Conduction: normal  ST Segments: flattening in  v5 and v6  T Waves: normal  Q Waves: none  Clinical Impression: sinus tachycardia, inverted P-waves in aVL, aVR, v1 and v2.     Adri Alarcon MD    ED BEDSIDE ULTRASOUND:  No results found.    RECENT VITALS:  BP: 102/75, Temp: 99.6 °F (37.6 °C), Pulse: (!) 102,Respirations: 18, SpO2: 94 %     Procedures     Procedures    MEDICAL DECISION MAKING / ASSESSMENT / PLAN     INITIAL VITALS: BP: 91/63, Temp: 99.6 °F (37.6 °C), Pulse: (!) 118, Respirations: 16, SpO2: (!) 88 %    Itzel Fenton is a 63 y.o.

## 2024-01-27 NOTE — ED PROVIDER NOTES
ED Attending Attestation Note     Date of evaluation: 1/27/2024    This patient was seen by the resident.  I have seen and examined the patient, agree with the workup, evaluation, management and diagnosis. The care plan has been discussed.      Briefly, Itzel Fenton is a 63 y.o. female with a PMH inclusive of DLBCL s/p BMT who presents for evaluation of body aches, nausea, fevers and chills. On PEN VK for ppx.     Notable exam findings include nontoxic in appearance, hypoxic on room air but no respiratory distress.    Assessment/ Medical Decision Making:     Patient noted to have borderline temp, tachycardia, hypoxia and labs ultimately show leukocytosis, elevated procalcitonin all concerning for sepsis.  Suspect respiratory source given her hypoxia and history of recurrent pneumonia.  Chest x-ray is clear but CT does demonstrate pneumonia which is consistent with her exam.  Considered meningitis however she has normal mental status, no nuchal rigidity and this would not explain her hypoxia so do not feel that she needs lumbar puncture at this time.  Will admit for ongoing antibiotics and monitoring of respiratory status.    Critical Care:  Due to the immediate potential for life-threatening deterioration due to sepsis, I spent 38 minutes providing critical care.  This time excludes time spent performing procedures but includes time spent on direct patient care, history retrieval, review of the chart, and discussions with patient, family, and consultant(s).       Brenda Bush MD  01/27/24 [id number]

## 2024-01-27 NOTE — PROGRESS NOTES
4 Eyes Skin Assessment     NAME:  Itzel Fenton  YOB: 1960  MEDICAL RECORD NUMBER:  [id number]    The patient is being assessed for  Admission    I agree that at least one RN has performed a thorough Head to Toe Skin Assessment on the patient. ALL assessment sites listed below have been assessed.      Areas assessed by both nurses:    Head, Face, Ears, Shoulders, Back, Chest, Arms, Elbows, Hands, Sacrum. Buttock, Coccyx, Ischium, Legs. Feet and Heels, Under Medical Devices , and Other          Does the Patient have a Wound? No noted wound(s)       Keanu Prevention initiated by RN: No  Wound Care Orders initiated by RN: No    Pressure Injury (Stage 3,4, Unstageable, DTI, NWPT, and Complex wounds) if present, place Wound referral order by RN under : No    New Ostomies, if present place, Ostomy referral order under : No     Nurse 1 eSignature: Electronically signed by Peyton Alejandra RN, RN on 1/27/24 at 5:19 PM EST    **SHARE this note so that the co-signing nurse can place an eSignature**    Nurse 2 eSignature: Electronically signed by Sheela Leblanc RN on 1/27/24 at 5:21 PM EST

## 2024-01-27 NOTE — PROGRESS NOTES
Pharmacy Note - Renal Dosing    Cefepime ordered for treatment of suspected sepsis possibly from respiratory source. Per [facility] Renal Dose Adjustment Policy, cefepime 2g IV EI q8h will be changed to cefepime 1g IV EI q12h.     Estimated Creatinine Clearance: Estimated Creatinine Clearance: 20 mL/min (A) (based on SCr of 2.8 mg/dL (H)).  Dialysis Status, HERMINIA, CKD: CKD  BMI: Body mass index is 27.32 kg/m².    Rationale for Adjustment: Agent is renally eliminated.    Pharmacy will continue to monitor renal function, cultures and sensitivities (where available) and adjust dose as necessary.      Please call with any questions.    Tess Ortega, [facility]  [phone]

## 2024-01-27 NOTE — PROGRESS NOTES
Patient admitted to [facility] via stretcher from EDfor diagnosis of pneumonitis.     Patient and  oriented to patient room including call light and bed controls.  Admission assessment completed - see admission flowsheet documentation.  Patient is a medium fall risk.  Safety measures instituted per policy.    Patient and  oriented to unit policies and procedures including: pain management practices, unit safety precautions, family rapid response, q4h vital signs and assessments, daily 4am lab draws, weekly chest x-rays, daily chlorhexidine bathing, standing transfusion orders, and routine central line care.  Also discussed use of call light and how to get in touch with nursing staff.  Stressed the importance of calling out immediately for any changes in condition including but not limited to: pain, chills, fever, nausea, vomiting, diarrhea, chest pain, sob/rivers, assistance with toileting, bleeding, or any other symptoms that are out of the ordinary for the patient.  Patient verbalizes understanding of all instructions and will call for assistance as needed.

## 2024-01-28 LAB
REPORT: NORMAL
RESP PATH DNA+RNA PNL NPH NAA+NON-PROBE: NORMAL

## 2024-01-28 PROCEDURE — 6360000002 HC RX W HCPCS: Performed by: INTERNAL MEDICINE

## 2024-01-28 PROCEDURE — 6360000002 HC RX W HCPCS: Performed by: NURSE PRACTITIONER

## 2024-01-28 PROCEDURE — 87641 MR-STAPH DNA AMP PROBE: CPT

## 2024-01-28 PROCEDURE — 6370000000 HC RX 637 (ALT 250 FOR IP): Performed by: INTERNAL MEDICINE

## 2024-01-28 PROCEDURE — 0202U NFCT DS 22 TRGT SARS-COV-2: CPT

## 2024-01-28 PROCEDURE — 94640 AIRWAY INHALATION TREATMENT: CPT

## 2024-01-28 PROCEDURE — 2060000000 HC ICU INTERMEDIATE R&B

## 2024-01-28 PROCEDURE — 2700000000 HC OXYGEN THERAPY PER DAY

## 2024-01-28 PROCEDURE — 6370000000 HC RX 637 (ALT 250 FOR IP): Performed by: NURSE PRACTITIONER

## 2024-01-28 PROCEDURE — 99233 SBSQ HOSP IP/OBS HIGH 50: CPT | Performed by: INTERNAL MEDICINE

## 2024-01-28 PROCEDURE — 94669 MECHANICAL CHEST WALL OSCILL: CPT

## 2024-01-28 PROCEDURE — 2580000003 HC RX 258: Performed by: INTERNAL MEDICINE

## 2024-01-28 PROCEDURE — 94761 N-INVAS EAR/PLS OXIMETRY MLT: CPT

## 2024-01-28 RX ORDER — BUDESONIDE 0.5 MG/2ML
0.5 INHALANT ORAL
Status: DISCONTINUED | OUTPATIENT
Start: 2024-01-28 | End: 2024-02-02 | Stop reason: HOSPADM

## 2024-01-28 RX ORDER — ENOXAPARIN SODIUM 100 MG/ML
30 INJECTION SUBCUTANEOUS DAILY
Status: DISCONTINUED | OUTPATIENT
Start: 2024-01-28 | End: 2024-02-02 | Stop reason: HOSPADM

## 2024-01-28 RX ORDER — ENOXAPARIN SODIUM 100 MG/ML
40 INJECTION SUBCUTANEOUS DAILY
Status: DISCONTINUED | OUTPATIENT
Start: 2024-01-28 | End: 2024-01-28 | Stop reason: DRUGHIGH

## 2024-01-28 RX ORDER — DIMETHICONE, CAMPHOR (SYNTHETIC), MENTHOL, AND PHENOL 1.1; .5; .625; .5 G/100G; G/100G; G/100G; G/100G
OINTMENT TOPICAL PRN
Status: DISCONTINUED | OUTPATIENT
Start: 2024-01-28 | End: 2024-02-02 | Stop reason: HOSPADM

## 2024-01-28 RX ORDER — PROCHLORPERAZINE EDISYLATE 5 MG/ML
10 INJECTION INTRAMUSCULAR; INTRAVENOUS EVERY 6 HOURS PRN
Status: DISCONTINUED | OUTPATIENT
Start: 2024-01-28 | End: 2024-02-02 | Stop reason: HOSPADM

## 2024-01-28 RX ORDER — ALBUTEROL SULFATE 2.5 MG/3ML
2.5 SOLUTION RESPIRATORY (INHALATION)
Status: DISCONTINUED | OUTPATIENT
Start: 2024-01-28 | End: 2024-02-01

## 2024-01-28 RX ORDER — ARFORMOTEROL TARTRATE 15 UG/2ML
15 SOLUTION RESPIRATORY (INHALATION)
Status: DISCONTINUED | OUTPATIENT
Start: 2024-01-28 | End: 2024-02-02 | Stop reason: HOSPADM

## 2024-01-28 RX ORDER — ONDANSETRON HYDROCHLORIDE 8 MG/1
8 TABLET, FILM COATED ORAL EVERY 8 HOURS PRN
Status: DISCONTINUED | OUTPATIENT
Start: 2024-01-28 | End: 2024-02-02 | Stop reason: HOSPADM

## 2024-01-28 RX ORDER — PROCHLORPERAZINE MALEATE 10 MG
10 TABLET ORAL EVERY 6 HOURS PRN
Status: DISCONTINUED | OUTPATIENT
Start: 2024-01-28 | End: 2024-02-02 | Stop reason: HOSPADM

## 2024-01-28 RX ORDER — ALBUTEROL SULFATE 2.5 MG/3ML
2.5 SOLUTION RESPIRATORY (INHALATION) EVERY 6 HOURS PRN
Status: DISCONTINUED | OUTPATIENT
Start: 2024-01-28 | End: 2024-02-02 | Stop reason: HOSPADM

## 2024-01-28 RX ADMIN — TIOTROPIUM BROMIDE INHALATION SPRAY 2 PUFF: 3.12 SPRAY, METERED RESPIRATORY (INHALATION) at 07:52

## 2024-01-28 RX ADMIN — BUDESONIDE INHALATION 500 MCG: 0.5 SUSPENSION RESPIRATORY (INHALATION) at 10:10

## 2024-01-28 RX ADMIN — SODIUM CHLORIDE, PRESERVATIVE FREE 10 ML: 5 INJECTION INTRAVENOUS at 07:48

## 2024-01-28 RX ADMIN — ALBUTEROL SULFATE 2.5 MG: 2.5 SOLUTION RESPIRATORY (INHALATION) at 21:09

## 2024-01-28 RX ADMIN — ARFORMOTEROL TARTRATE 15 MCG: 15 SOLUTION RESPIRATORY (INHALATION) at 21:09

## 2024-01-28 RX ADMIN — ONDANSETRON HYDROCHLORIDE 8 MG: 8 TABLET, FILM COATED ORAL at 07:46

## 2024-01-28 RX ADMIN — ALBUTEROL SULFATE 2.5 MG: 2.5 SOLUTION RESPIRATORY (INHALATION) at 15:05

## 2024-01-28 RX ADMIN — ACETAMINOPHEN 650 MG: 325 TABLET ORAL at 03:17

## 2024-01-28 RX ADMIN — MONTELUKAST 10 MG: 10 TABLET, FILM COATED ORAL at 17:52

## 2024-01-28 RX ADMIN — ARFORMOTEROL TARTRATE 15 MCG: 15 SOLUTION RESPIRATORY (INHALATION) at 10:10

## 2024-01-28 RX ADMIN — SODIUM CHLORIDE: 9 INJECTION, SOLUTION INTRAVENOUS at 02:55

## 2024-01-28 RX ADMIN — PROCHLORPERAZINE EDISYLATE 10 MG: 5 INJECTION INTRAMUSCULAR; INTRAVENOUS at 17:52

## 2024-01-28 RX ADMIN — ENOXAPARIN SODIUM 30 MG: 100 INJECTION SUBCUTANEOUS at 10:37

## 2024-01-28 RX ADMIN — PETROLATUM, MENTHOL, UNSPECIFIED FORM, CAMPHOR (SYNTHETIC), AND PHENOL: 59.14; 1; 1; .6 PASTE TOPICAL at 03:31

## 2024-01-28 RX ADMIN — AZITHROMYCIN MONOHYDRATE 500 MG: 500 INJECTION, POWDER, LYOPHILIZED, FOR SOLUTION INTRAVENOUS at 15:23

## 2024-01-28 RX ADMIN — SODIUM CHLORIDE, PRESERVATIVE FREE 10 ML: 5 INJECTION INTRAVENOUS at 19:52

## 2024-01-28 RX ADMIN — CEFEPIME 1000 MG: 1 INJECTION, POWDER, FOR SOLUTION INTRAMUSCULAR; INTRAVENOUS at 17:11

## 2024-01-28 RX ADMIN — PANTOPRAZOLE SODIUM 40 MG: 40 TABLET, DELAYED RELEASE ORAL at 06:19

## 2024-01-28 RX ADMIN — ONDANSETRON HYDROCHLORIDE 8 MG: 8 TABLET, FILM COATED ORAL at 15:48

## 2024-01-28 RX ADMIN — ALBUTEROL SULFATE 2.5 MG: 2.5 SOLUTION RESPIRATORY (INHALATION) at 10:10

## 2024-01-28 RX ADMIN — BUDESONIDE INHALATION 500 MCG: 0.5 SUSPENSION RESPIRATORY (INHALATION) at 21:09

## 2024-01-28 RX ADMIN — CEFEPIME 1000 MG: 1 INJECTION, POWDER, FOR SOLUTION INTRAMUSCULAR; INTRAVENOUS at 02:56

## 2024-01-28 RX ADMIN — ACETAMINOPHEN 650 MG: 325 TABLET ORAL at 23:27

## 2024-01-28 RX ADMIN — ACETAMINOPHEN 650 MG: 325 TABLET ORAL at 16:31

## 2024-01-28 ASSESSMENT — PAIN DESCRIPTION - ORIENTATION: ORIENTATION: INNER

## 2024-01-28 ASSESSMENT — PAIN DESCRIPTION - DESCRIPTORS: DESCRIPTORS: ACHING

## 2024-01-28 ASSESSMENT — PAIN DESCRIPTION - PAIN TYPE: TYPE: ACUTE PAIN

## 2024-01-28 ASSESSMENT — PAIN DESCRIPTION - LOCATION: LOCATION: OTHER (COMMENT)

## 2024-01-28 ASSESSMENT — PAIN DESCRIPTION - ONSET: ONSET: GRADUAL

## 2024-01-28 ASSESSMENT — PAIN SCALES - GENERAL: PAINLEVEL_OUTOF10: 6

## 2024-01-28 ASSESSMENT — PAIN - FUNCTIONAL ASSESSMENT: PAIN_FUNCTIONAL_ASSESSMENT: ACTIVITIES ARE NOT PREVENTED

## 2024-01-28 ASSESSMENT — PAIN DESCRIPTION - FREQUENCY: FREQUENCY: INTERMITTENT

## 2024-01-28 NOTE — PLAN OF CARE
Problem: Pain  Goal: Verbalizes/displays adequate comfort level or baseline comfort level  Outcome: Progressing- patient with one complaint of pain with breathing during shift. Relief of pain reported after breathing treatments with respiratory. Patient also complaining of nausea, zofran administered x2.Care continues.      Problem: Respiratory - Adult  Goal: Achieves optimal ventilation and oxygenation  Outcome: Progressing- patient weaned down to 2.5 L oxygen to keep sats at goal.      Problem: Infection - Adult  Goal: Absence of infection during hospitalization  Outcome: Progressing- patient reached 37.5 C, tylenol administered per MAR. Care continues.

## 2024-01-28 NOTE — RT PROTOCOL NOTE
RT Nebulizer Bronchodilator Protocol Note    There is a bronchodilator order in the chart from a provider indicating to follow the RT Bronchodilator Protocol and there is an “Initiate RT Bronchodilator Protocol” order as well (see protocol at bottom of note).    CXR Findings:  XR CHEST PORTABLE    Result Date: 1/27/2024  No acute chest process.      The findings from the last RT Protocol Assessment were as follows:  Smoking: None or smoker <15 pack years  Respiratory Pattern: Dyspnea on exertion or RR 21-25 bpm  Breath Sounds: Slightly diminished and/or crackles  Cough: Strong, productive  Indication for Bronchodilator Therapy: Decreased or absent breath sounds  Bronchodilator Assessment Score: 5    Albuterol TID and Q4prn for pts benefit    Aerosolized bronchodilator medication orders have been revised according to the RT Nebulizer Bronchodilator Protocol below.    Respiratory Therapist to perform RT Therapy Protocol Assessment initially then follow the protocol.  Repeat RT Therapy Protocol Assessment PRN for score 0-3 or on second treatment, BID, and PRN for scores above 3.    No Indications - adjust the frequency to every 6 hours PRN wheezing or bronchospasm, if no treatments needed after 48 hours then discontinue using Per Protocol order mode.     If indication present, adjust the RT bronchodilator orders based on the Bronchodilator Assessment Score as indicated below.  If a patient is on this medication at home then do not decrease Frequency below that used at home.    0-3 - enter or revise RT bronchodilator order(s) to equivalent RT Bronchodilator order with Frequency of every 4 hours PRN for wheezing or increased work of breathing using Per Protocol order mode.       4-6 - enter or revise RT Bronchodilator order(s) to two equivalent RT bronchodilator orders with one order with BID Frequency and one order with Frequency of every 4 hours PRN wheezing or increased work of breathing using Per Protocol order mode.

## 2024-01-28 NOTE — PROGRESS NOTES
Pulmonary Medicine Follow-up Note    CC: Pneumonia and mucous plugging    SUBJECTIVE / INTERVAL HISTORY:  Was febrile overnight, oxygen requirement stable at 3.5 L/min.  Still feeling not overall great, reported she has not been able to bring much sputum out.    ROS:  Denies headache, nausea or chest pain.    24HR INTAKE/OUTPUT:    Intake/Output Summary (Last 24 hours) at 2024 0935  Last data filed at 2024 0749  Gross per 24 hour   Intake 4798 ml   Output 1000 ml   Net 3798 ml        albuterol  2.5 mg Nebulization BID    pantoprazole  40 mg Oral QAM AC    montelukast  10 mg Oral QPM    tiotropium  2 puff Inhalation Daily RT    sodium chloride flush  5-40 mL IntraVENous 2 times per day    Saline Mouthwash  15 mL Swish & Spit 4x Daily AC & HS    cefepime  1,000 mg IntraVENous Q12H    azithromycin  500 mg IntraVENous Q24H       PHYSICAL EXAMINATION:  /73   Pulse (!) 108   Temp 99.3 °F (37.4 °C) (Oral)   Resp 22   Ht 1.626 m (5' 4\")   Wt 75.9 kg (167 lb 6.4 oz)   LMP 2008   SpO2 93%   BMI 28.73 kg/m²   CURRENT PULSE OXIMETRY:  SpO2: 93 %  24HR PULSE OXIMETRY RANGE:  SpO2  Av.6 %  Min: 88 %  Max: 98 %     Gen: No distress. Speaking in full sentences with no accessory muscle use  HEENT: PERRL, EOMI, OP nl  Lung: Diminished breath sounds bilaterally, no wheezing, no crackles  CV: RRR without M/R/R  Abd: +BS, soft, NT/ND  Ext: No edema.  Neuro: Alert and oriented, follows simple commands, nonfocal    DATA  CBC:   Recent Labs     24  1100 24   WBC 27.6* 18.0*   HGB 16.2* 13.3   HCT 51.3* 42.1   MCV 83.5 84.4   * 98*     BMP:   Recent Labs     24  1100 24    134*   K 3.8 3.7    104   CO2 22 22   PHOS  --  2.0*   BUN 23* 20   CREATININE 2.8* 2.5*     No results for input(s): \"PHART\", \"CFB5AZA\", \"PO2ART\" in the last 72 hours.  LIVER PROFILE:   Recent Labs     24  1100 24  2202   AST 21 19   ALT 23 18   BILIDIR  --  1.1*    BILITOT 1.6* 2.1*   ALKPHOS 89 67     Procalcitonin:    Lab Results   Component Value Date/Time    PROCAL 4.63 01/27/2024 11:00 AM       CXR REVIEWED BY ME AND SHOWED:  CT CHEST WO CONTRAST   Final Result      Mucous plugging in the lung bases most extensively the left lower lobe with   patchy airspace consolidation within the inferior aspect of the left lower lobe   consistent with pneumonia.      Mild airspace disease within the inferior right middle lobe.      Groundglass and tree-in-bud noncalcified nodular densities within the inferior   right upper lobe and right lower lobe, most consistent with small airways   infection.      XR CHEST PORTABLE   Final Result      No acute chest process.           ASSESSMENT:  Left lower lobe pneumonia  Bilateral left more than right mucous plugging  Moderate persistent asthma, no exacerbation  History of bone marrow transplant    PLAN:  Provide O2 supplementation to keep SpO2 between 88-92% if needed.    Continue broad spectrum antibiotics - Vanco/Cefepime/Azithro  Follow cultures  Antipyretics   Brovana/Pulmicort/Spiriva  Albuterol   Incentive inspirometer, Flutter valve / vibraPAP device  Out of bed and up to chair as tolerated   Adding MetaNebs with bronchodilators hopefully to improve airway clearance.    Filipe Infante \"Phillip\" Mary Johnson MD  Pulmonary and Critical Care Medicine

## 2024-01-28 NOTE — PROGRESS NOTES
Pharmacist Review and Automatic Dose Adjustment of Prophylactic Enoxaparin    The reviewing pharmacist has made an adjustment to the ordered enoxaparin dose or converted to UFH per the approved [facility] protocol and table as defined below.    Plan / Rationale: Based upon the patient's weight and renal function, the ordered dose of enoxaparin 40 mg SQ daily has been converted to 30 mg SQ daily.    Thank you,  Palma Gurdeep, [facility]  1/28/2024, 9:53 AM      Itzel Fenton is a 63 y.o. female.     Recent Labs     01/27/24  1100 01/27/24  2202   CREATININE 2.8* 2.5*       Estimated Creatinine Clearance: 23 mL/min (A) (based on SCr of 2.5 mg/dL (H)).    Recent Labs     01/27/24  1100 01/27/24  2202   HGB 16.2* 13.3   HCT 51.3* 42.1   * 98*     No results for input(s): \"INR\" in the last 72 hours.    Height:   Ht Readings from Last 1 Encounters:   01/27/24 1.626 m (5' 4\")     Weight:  Wt Readings from Last 1 Encounters:   01/28/24 75.9 kg (167 lb 6.4 oz)

## 2024-01-28 NOTE — PLAN OF CARE
Problem: Pain  Goal: Verbalizes/displays adequate comfort level or baseline comfort level  Outcome: Progressing  Verbalizes/displays adequate comfort level or baseline comfort level:   Encourage patient to monitor pain and request assistance   Assess pain using appropriate pain scale   Administer analgesics based on type and severity of pain and evaluate response   Implement non-pharmacological measures as appropriate and evaluate response     Problem: Respiratory - Adult  Goal: Achieves optimal ventilation and oxygenation  Achieves optimal ventilation and oxygenation:   Assess for changes in respiratory status   Assess for changes in mentation and behavior   Position to facilitate oxygenation and minimize respiratory effort   Encourage broncho-pulmonary hygiene including cough, deep breathe, incentive spirometry   Respiratory therapy support as indicated   Assess and instruct to report shortness of breath or any respiratory difficulty     Problem: Infection - Adult  Goal: Absence of infection during hospitalization  Outcome: Progressing  Absence of infection during hospitalization:   Assess and monitor for signs and symptoms of infection   Monitor lab/diagnostic results   Administer medications as ordered   Instruct and encourage patient and family to use good hand hygiene technique

## 2024-01-28 NOTE — H&P
[facility] History and Physical       Attending Physician: Bruno Dickey, *    Primary Care: Adria aSldivar       Referring MD: No referring provider defined for this encounter.    Name: Itzel Fenton :  1960  MRN:  [id number]    Admission: 2024      Date: 2024    Reason for Admission: Sepsis, pneumonia    History of Present Illness:   Ms. 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  Patient also has h/o mucous plugging and recurrent pneumonias.    Patient presented to the ER on 24 with generalized body aches, nausea and cough.  She was noted to be hypoxic in the ER.  CT chest showed pna and mucous plugging.  She was admitted for further evaluation and treatment.      Past Surgical History:   Procedure Laterality Date    BONE MARROW TRANSPLANT  2016    BONE MARROW TRANSPLANT      BRONCHOSCOPY  2018    BRONCHOSCOPY THERAPUTIC ASPIRATION INITIAL WITH MUCUS PLUG SENT FOR BACTERIAL CULTURE performed by James Ma MD at [facility] END[facility]COPY    BRONCHOSCOPY Left 2023    BRONCHOSCOPY WITH BRONCHOALVEOLAR LAVAGE OF THE LEFT LUNG performed by James Ma MD at [facility] ENDOSCOPY     SECTION      x 5    COLONOSCOPY      COLONOSCOPY N/A 2021    COLONOSCOPY POLYPECTOMY SNARE/COLD BIOPSY performed by Dhaval Lockhart MD at [facility]    HYSTERECTOMY (CERVIX STATUS UNKNOWN)  2010    with oophorectomy    LYMPH NODE BIOPSY  2011    R external iliac node -non diagnostic    LYMPH NODE BIOPSY  2012    R external node - Dr Tolliver - [facility]    OTHER SURGICAL HISTORY  10/08/2012    INSERTION NEOSTAR CATHETER and REMOVAL        OTHER SURGICAL HISTORY Right 05/10/2016    INSERTION TRIPLE LUMEN DORANTES CENTRAL LINE    PAIN MANAGEMENT PROCEDURE Left 2021    LEFT L2, L3 TRANSFORAMINAL EPIDURAL STEROID INJECTION WITH FLUOROSCOPY (24344, [id number]) performed by Vickey Urban MD at [facility]    FL BRNCHSC W/BRNCL

## 2024-01-29 LAB
ALBUMIN SERPL-MCNC: 2.8 G/DL (ref 3.4–5)
ALP SERPL-CCNC: 89 U/L (ref 40–129)
ALT SERPL-CCNC: 16 U/L (ref 10–40)
ANION GAP SERPL CALCULATED.3IONS-SCNC: 9 MMOL/L (ref 3–16)
APTT BLD: 36.5 SEC (ref 22.7–35.9)
AST SERPL-CCNC: 14 U/L (ref 15–37)
BASOPHILS # BLD: 0.1 K/UL (ref 0–0.2)
BASOPHILS NFR BLD: 0.4 %
BILIRUB DIRECT SERPL-MCNC: 0.7 MG/DL (ref 0–0.3)
BILIRUB INDIRECT SERPL-MCNC: 0.6 MG/DL (ref 0–1)
BILIRUB SERPL-MCNC: 1.3 MG/DL (ref 0–1)
BUN SERPL-MCNC: 16 MG/DL (ref 7–20)
CALCIUM SERPL-MCNC: 9.1 MG/DL (ref 8.3–10.6)
CHLORIDE SERPL-SCNC: 108 MMOL/L (ref 99–110)
CO2 SERPL-SCNC: 20 MMOL/L (ref 21–32)
CREAT SERPL-MCNC: 2.4 MG/DL (ref 0.6–1.2)
DEPRECATED RDW RBC AUTO: 16.4 % (ref 12.4–15.4)
EOSINOPHIL # BLD: 0.2 K/UL (ref 0–0.6)
EOSINOPHIL NFR BLD: 1.1 %
GFR SERPLBLD CREATININE-BSD FMLA CKD-EPI: 22 ML/MIN/{1.73_M2}
GLUCOSE SERPL-MCNC: 102 MG/DL (ref 70–99)
HCT VFR BLD AUTO: 38.9 % (ref 36–48)
HGB BLD-MCNC: 12.5 G/DL (ref 12–16)
IGG SERPL-MCNC: 525 MG/DL (ref 700–1600)
INR PPP: 1.39 (ref 0.84–1.16)
LDH SERPL L TO P-CCNC: 153 U/L (ref 100–190)
LYMPHOCYTES # BLD: 0.9 K/UL (ref 1–5.1)
LYMPHOCYTES NFR BLD: 6.2 %
MAGNESIUM SERPL-MCNC: 1.6 MG/DL (ref 1.8–2.4)
MCH RBC QN AUTO: 26.9 PG (ref 26–34)
MCHC RBC AUTO-ENTMCNC: 32.3 G/DL (ref 31–36)
MCV RBC AUTO: 83.5 FL (ref 80–100)
MONOCYTES # BLD: 0.6 K/UL (ref 0–1.3)
MONOCYTES NFR BLD: 3.9 %
MRSA DNA SPEC QL NAA+PROBE: NORMAL
NEUTROPHILS # BLD: 13.3 K/UL (ref 1.7–7.7)
NEUTROPHILS NFR BLD: 88.4 %
PHOSPHATE SERPL-MCNC: 2.1 MG/DL (ref 2.5–4.9)
PLATELET # BLD AUTO: 84 K/UL (ref 135–450)
PMV BLD AUTO: 9.8 FL (ref 5–10.5)
POTASSIUM SERPL-SCNC: 3.3 MMOL/L (ref 3.5–5.1)
PROT SERPL-MCNC: 5.4 G/DL (ref 6.4–8.2)
PROTHROMBIN TIME: 17 SEC (ref 11.5–14.8)
RBC # BLD AUTO: 4.66 M/UL (ref 4–5.2)
SODIUM SERPL-SCNC: 137 MMOL/L (ref 136–145)
URATE SERPL-MCNC: 4 MG/DL (ref 2.6–6)
WBC # BLD AUTO: 15 K/UL (ref 4–11)

## 2024-01-29 PROCEDURE — 94669 MECHANICAL CHEST WALL OSCILL: CPT

## 2024-01-29 PROCEDURE — 85025 COMPLETE CBC W/AUTO DIFF WBC: CPT

## 2024-01-29 PROCEDURE — 99232 SBSQ HOSP IP/OBS MODERATE 35: CPT | Performed by: INTERNAL MEDICINE

## 2024-01-29 PROCEDURE — 2060000000 HC ICU INTERMEDIATE R&B

## 2024-01-29 PROCEDURE — 87449 NOS EACH ORGANISM AG IA: CPT

## 2024-01-29 PROCEDURE — 87305 ASPERGILLUS AG IA: CPT

## 2024-01-29 PROCEDURE — 94640 AIRWAY INHALATION TREATMENT: CPT

## 2024-01-29 PROCEDURE — 85730 THROMBOPLASTIN TIME PARTIAL: CPT

## 2024-01-29 PROCEDURE — 6360000002 HC RX W HCPCS: Performed by: NURSE PRACTITIONER

## 2024-01-29 PROCEDURE — 6360000002 HC RX W HCPCS: Performed by: INTERNAL MEDICINE

## 2024-01-29 PROCEDURE — 85610 PROTHROMBIN TIME: CPT

## 2024-01-29 PROCEDURE — 2580000003 HC RX 258: Performed by: NURSE PRACTITIONER

## 2024-01-29 PROCEDURE — 6370000000 HC RX 637 (ALT 250 FOR IP): Performed by: INTERNAL MEDICINE

## 2024-01-29 PROCEDURE — 83735 ASSAY OF MAGNESIUM: CPT

## 2024-01-29 PROCEDURE — 82784 ASSAY IGA/IGD/IGG/IGM EACH: CPT

## 2024-01-29 PROCEDURE — 84550 ASSAY OF BLOOD/URIC ACID: CPT

## 2024-01-29 PROCEDURE — 36415 COLL VENOUS BLD VENIPUNCTURE: CPT

## 2024-01-29 PROCEDURE — 6370000000 HC RX 637 (ALT 250 FOR IP): Performed by: NURSE PRACTITIONER

## 2024-01-29 PROCEDURE — 80048 BASIC METABOLIC PNL TOTAL CA: CPT

## 2024-01-29 PROCEDURE — 87070 CULTURE OTHR SPECIMN AEROBIC: CPT

## 2024-01-29 PROCEDURE — 84100 ASSAY OF PHOSPHORUS: CPT

## 2024-01-29 PROCEDURE — 80076 HEPATIC FUNCTION PANEL: CPT

## 2024-01-29 PROCEDURE — 87205 SMEAR GRAM STAIN: CPT

## 2024-01-29 PROCEDURE — 2580000003 HC RX 258: Performed by: INTERNAL MEDICINE

## 2024-01-29 PROCEDURE — 83615 LACTATE (LD) (LDH) ENZYME: CPT

## 2024-01-29 RX ORDER — POTASSIUM CHLORIDE 7.45 MG/ML
10 INJECTION INTRAVENOUS PRN
Status: DISCONTINUED | OUTPATIENT
Start: 2024-01-29 | End: 2024-01-29

## 2024-01-29 RX ORDER — POTASSIUM CHLORIDE 20 MEQ/1
40 TABLET, EXTENDED RELEASE ORAL ONCE
Status: COMPLETED | OUTPATIENT
Start: 2024-01-29 | End: 2024-01-29

## 2024-01-29 RX ORDER — POTASSIUM CHLORIDE 20 MEQ/1
40 TABLET, EXTENDED RELEASE ORAL PRN
Status: DISCONTINUED | OUTPATIENT
Start: 2024-01-29 | End: 2024-01-29

## 2024-01-29 RX ADMIN — ARFORMOTEROL TARTRATE 15 MCG: 15 SOLUTION RESPIRATORY (INHALATION) at 20:30

## 2024-01-29 RX ADMIN — ACETAMINOPHEN 650 MG: 325 TABLET ORAL at 08:51

## 2024-01-29 RX ADMIN — MONTELUKAST 10 MG: 10 TABLET, FILM COATED ORAL at 17:09

## 2024-01-29 RX ADMIN — AZITHROMYCIN MONOHYDRATE 500 MG: 500 INJECTION, POWDER, LYOPHILIZED, FOR SOLUTION INTRAVENOUS at 15:08

## 2024-01-29 RX ADMIN — BUDESONIDE INHALATION 500 MCG: 0.5 SUSPENSION RESPIRATORY (INHALATION) at 10:10

## 2024-01-29 RX ADMIN — SODIUM CHLORIDE, PRESERVATIVE FREE 10 ML: 5 INJECTION INTRAVENOUS at 08:53

## 2024-01-29 RX ADMIN — ARFORMOTEROL TARTRATE 15 MCG: 15 SOLUTION RESPIRATORY (INHALATION) at 10:12

## 2024-01-29 RX ADMIN — PANTOPRAZOLE SODIUM 40 MG: 40 TABLET, DELAYED RELEASE ORAL at 05:28

## 2024-01-29 RX ADMIN — ALBUTEROL SULFATE 2.5 MG: 2.5 SOLUTION RESPIRATORY (INHALATION) at 20:30

## 2024-01-29 RX ADMIN — TIOTROPIUM BROMIDE INHALATION SPRAY 2 PUFF: 3.12 SPRAY, METERED RESPIRATORY (INHALATION) at 10:12

## 2024-01-29 RX ADMIN — CEFEPIME 1000 MG: 1 INJECTION, POWDER, FOR SOLUTION INTRAMUSCULAR; INTRAVENOUS at 17:11

## 2024-01-29 RX ADMIN — BUDESONIDE INHALATION 500 MCG: 0.5 SUSPENSION RESPIRATORY (INHALATION) at 20:30

## 2024-01-29 RX ADMIN — ALBUTEROL SULFATE 2.5 MG: 2.5 SOLUTION RESPIRATORY (INHALATION) at 10:10

## 2024-01-29 RX ADMIN — ACETAMINOPHEN 650 MG: 325 TABLET ORAL at 23:15

## 2024-01-29 RX ADMIN — PROMETHAZINE HYDROCHLORIDE AND CODEINE PHOSPHATE 5 ML: 6.25; 1 SOLUTION ORAL at 23:33

## 2024-01-29 RX ADMIN — CEFEPIME 1000 MG: 1 INJECTION, POWDER, FOR SOLUTION INTRAMUSCULAR; INTRAVENOUS at 05:27

## 2024-01-29 RX ADMIN — POTASSIUM CHLORIDE 40 MEQ: 1500 TABLET, EXTENDED RELEASE ORAL at 10:50

## 2024-01-29 RX ADMIN — ALBUTEROL SULFATE 2.5 MG: 2.5 SOLUTION RESPIRATORY (INHALATION) at 16:16

## 2024-01-29 RX ADMIN — SODIUM CHLORIDE, PRESERVATIVE FREE 5 ML: 5 INJECTION INTRAVENOUS at 19:53

## 2024-01-29 RX ADMIN — ENOXAPARIN SODIUM 30 MG: 100 INJECTION SUBCUTANEOUS at 08:51

## 2024-01-29 RX ADMIN — SODIUM CHLORIDE: 9 INJECTION, SOLUTION INTRAVENOUS at 17:06

## 2024-01-29 RX ADMIN — ACETAMINOPHEN 650 MG: 325 TABLET ORAL at 03:50

## 2024-01-29 ASSESSMENT — PAIN SCALES - WONG BAKER
WONGBAKER_NUMERICALRESPONSE: 0

## 2024-01-29 ASSESSMENT — PAIN DESCRIPTION - ONSET
ONSET: GRADUAL
ONSET: ON-GOING

## 2024-01-29 ASSESSMENT — PAIN DESCRIPTION - PAIN TYPE
TYPE: ACUTE PAIN
TYPE: ACUTE PAIN

## 2024-01-29 ASSESSMENT — PAIN DESCRIPTION - ORIENTATION
ORIENTATION: MID
ORIENTATION: MID

## 2024-01-29 ASSESSMENT — PAIN DESCRIPTION - DESCRIPTORS
DESCRIPTORS: ACHING
DESCRIPTORS: ACHING

## 2024-01-29 ASSESSMENT — PAIN DESCRIPTION - LOCATION
LOCATION: HEAD
LOCATION: HEAD

## 2024-01-29 ASSESSMENT — PAIN SCALES - GENERAL
PAINLEVEL_OUTOF10: 0
PAINLEVEL_OUTOF10: 9
PAINLEVEL_OUTOF10: 3
PAINLEVEL_OUTOF10: 2

## 2024-01-29 ASSESSMENT — PAIN - FUNCTIONAL ASSESSMENT
PAIN_FUNCTIONAL_ASSESSMENT: ACTIVITIES ARE NOT PREVENTED
PAIN_FUNCTIONAL_ASSESSMENT: ACTIVITIES ARE NOT PREVENTED

## 2024-01-29 ASSESSMENT — PAIN DESCRIPTION - FREQUENCY
FREQUENCY: INTERMITTENT
FREQUENCY: INTERMITTENT

## 2024-01-29 NOTE — RT PROTOCOL NOTE
RT Inhaler-Nebulizer Bronchodilator Protocol Note    There is a bronchodilator order in the chart from a provider indicating to follow the RT Bronchodilator Protocol and there is an “Initiate RT Inhaler-Nebulizer Bronchodilator Protocol” order as well (see protocol at bottom of note).    CXR Findings:  No results found.    The findings from the last RT Protocol Assessment were as follows:   History Pulmonary Disease: None or smoker <15 pack years  Respiratory Pattern: Dyspnea on exertion or RR 21-25 bpm  Breath Sounds: Slightly diminished and/or crackles  Cough: Strong, productive  Indication for Bronchodilator Therapy: Decreased or absent breath sounds  Bronchodilator Assessment Score: 5    Aerosolized bronchodilator medication orders have been revised according to the RT Inhaler-Nebulizer Bronchodilator Protocol below.    Respiratory Therapist to perform RT Therapy Protocol Assessment initially then follow the protocol.  Repeat RT Therapy Protocol Assessment PRN for score 0-3 or on second treatment, BID, and PRN for scores above 3.    No Indications - adjust the frequency to every 6 hours PRN wheezing or bronchospasm, if no treatments needed after 48 hours then discontinue using Per Protocol order mode.     If indication present, adjust the RT bronchodilator orders based on the Bronchodilator Assessment Score as indicated below.  Use Inhaler orders unless patient has one or more of the following: on home nebulizer, not able to hold breath for 10 seconds, is not alert and oriented, cannot activate and use MDI correctly, or respiratory rate 25 breaths per minute or more, then use the equivalent nebulizer order(s) with same Frequency and PRN reasons based on the score.  If a patient is on this medication at home then do not decrease Frequency below that used at home.    0-3 - enter or revise RT bronchodilator order(s) to equivalent RT Bronchodilator order with Frequency of every 4 hours PRN for wheezing or increased

## 2024-01-29 NOTE — PLAN OF CARE
Problem: Pain  Goal: Verbalizes/displays adequate comfort level or baseline comfort level  Outcome: Progressing  Verbalizes/displays adequate comfort level or baseline comfort level:   Encourage patient to monitor pain and request assistance   Assess pain using appropriate pain scale   Administer analgesics based on type and severity of pain and evaluate response   Implement non-pharmacological measures as appropriate and evaluate response     Problem: Respiratory - Adult  Goal: Achieves optimal ventilation and oxygenation  Outcome: Progressing  Achieves optimal ventilation and oxygenation:   Assess for changes in respiratory status   Assess for changes in mentation and behavior   Position to facilitate oxygenation and minimize respiratory effort   Encourage broncho-pulmonary hygiene including cough, deep breathe, incentive spirometry   Assess and instruct to report shortness of breath or any respiratory difficulty   Respiratory therapy support as indicated     Problem: Infection - Adult  Goal: Absence of infection during hospitalization  Outcome: Progressing  Absence of infection during hospitalization:   Assess and monitor for signs and symptoms of infection   Monitor lab/diagnostic results   Administer medications as ordered   Instruct and encourage patient and family to use good hand hygiene technique

## 2024-01-29 NOTE — PROGRESS NOTES
[facility] Progress Note    2024     Itzel Fenton    MRN: [id number]    : 1960    Referring MD: No referring provider defined for this encounter.      SUBJECTIVE:  Feeling SOB with productive cough with greenish sputum. Also has headache.    ECOG PS:  (1) Restricted in physically strenuous activity, ambulatory and able to do work of light nature    KPS: 80% Normal activity with effort; some signs or symptoms of disease    Isolation: None    Medications    Scheduled Meds:   enoxaparin  30 mg SubCUTAneous Daily    budesonide  0.5 mg Nebulization BID RT    arformoterol tartrate  15 mcg Nebulization BID RT    albuterol  2.5 mg Nebulization TID RT    pantoprazole  40 mg Oral QAM AC    montelukast  10 mg Oral QPM    tiotropium  2 puff Inhalation Daily RT    sodium chloride flush  5-40 mL IntraVENous 2 times per day    Saline Mouthwash  15 mL Swish & Spit 4x Daily AC & HS    cefepime  1,000 mg IntraVENous Q12H    azithromycin  500 mg IntraVENous Q24H     Continuous Infusions:   sodium chloride      sodium chloride      sodium chloride 100 mL/hr at 24 0255     PRN Meds:.medicated lip ointment, ondansetron, albuterol, prochlorperazine **OR** prochlorperazine, promethazine-codeine, sodium chloride, sodium chloride flush, sodium chloride, magnesium hydroxide, Saline Mouthwash, alteplase (CATHFLO) 2 mg in sterile water 2 mL injection, acetaminophen    ROS:  As noted above, otherwise remainder of 10-point ROS negative    Physical Exam:     I&O:    Intake/Output Summary (Last 24 hours) at 2024 0813  Last data filed at 2024 0626  Gross per 24 hour   Intake 3079 ml   Output 2985 ml   Net 94 ml       Vital Signs:  /81   Pulse 96   Temp 99.8 °F (37.7 °C) (Oral)   Resp 16   Ht 1.626 m (5' 4\")   Wt 75.9 kg (167 lb 6.4 oz)   LMP 2008   SpO2 94%   BMI 28.73 kg/m²     Weight:    Wt Readings from Last 3 Encounters:   24 75.9 kg (167 lb 6.4 oz)   23 75.3 kg (166 lb)   23

## 2024-01-29 NOTE — PROGRESS NOTES
Pulmonary Medicine Follow-up Note    CC: Pneumonia and mucous plugging    SUBJECTIVE / INTERVAL HISTORY:  States she feels a little bit better today, was able to tolerate MetaNebs without problems stating that it has helped her improve airway clearance.  No fever today so far.    ROS:  Denies headache, nausea or chest pain.    24HR INTAKE/OUTPUT:    Intake/Output Summary (Last 24 hours) at 2024 1103  Last data filed at 2024 1036  Gross per 24 hour   Intake 3079 ml   Output 3685 ml   Net -606 ml          enoxaparin  30 mg SubCUTAneous Daily    budesonide  0.5 mg Nebulization BID RT    arformoterol tartrate  15 mcg Nebulization BID RT    albuterol  2.5 mg Nebulization TID RT    pantoprazole  40 mg Oral QAM AC    montelukast  10 mg Oral QPM    tiotropium  2 puff Inhalation Daily RT    sodium chloride flush  5-40 mL IntraVENous 2 times per day    Saline Mouthwash  15 mL Swish & Spit 4x Daily AC & HS    cefepime  1,000 mg IntraVENous Q12H    azithromycin  500 mg IntraVENous Q24H       PHYSICAL EXAMINATION:  /79   Pulse 94   Temp 98.4 °F (36.9 °C) (Oral)   Resp 16   Ht 1.626 m (5' 4\")   Wt 75.9 kg (167 lb 6.4 oz)   LMP 2008   SpO2 95%   BMI 28.73 kg/m²   CURRENT PULSE OXIMETRY:  SpO2: 95 %  24HR PULSE OXIMETRY RANGE:  SpO2  Av.8 %  Min: 94 %  Max: 96 %     Gen: No distress. Speaking in full sentences with no accessory muscle use  HEENT: PERRL, EOMI, OP nl  Lung: Diminished breath sounds bilaterally, no wheezing, no crackles  CV: RRR without M/R/R  Abd: +BS, soft, NT/ND  Ext: No edema.  Neuro: Alert and oriented, follows simple commands, nonfocal    DATA  CBC:   Recent Labs     24  1100 24  0351   WBC 27.6* 18.0* 15.0*   HGB 16.2* 13.3 12.5   HCT 51.3* 42.1 38.9   MCV 83.5 84.4 83.5   * 98* 84*       BMP:   Recent Labs     24  1100 24  0351    134* 137   K 3.8 3.7 3.3*    104 108   CO2 22 22 20*   PHOS  --  2.0* 2.1*    BUN 23* 20 16   CREATININE 2.8* 2.5* 2.4*       No results for input(s): \"PHART\", \"SPK2BCG\", \"PO2ART\" in the last 72 hours.  LIVER PROFILE:   Recent Labs     01/27/24  1100 01/27/24  2202 01/29/24  0351   AST 21 19 14*   ALT 23 18 16   BILIDIR  --  1.1* 0.7*   BILITOT 1.6* 2.1* 1.3*   ALKPHOS 89 67 89       Procalcitonin:    Lab Results   Component Value Date/Time    PROCAL 4.63 01/27/2024 11:00 AM       CXR REVIEWED BY ME AND SHOWED:  CT CHEST WO CONTRAST   Final Result      Mucous plugging in the lung bases most extensively the left lower lobe with   patchy airspace consolidation within the inferior aspect of the left lower lobe   consistent with pneumonia.      Mild airspace disease within the inferior right middle lobe.      Groundglass and tree-in-bud noncalcified nodular densities within the inferior   right upper lobe and right lower lobe, most consistent with small airways   infection.      XR CHEST PORTABLE   Final Result      No acute chest process.           ASSESSMENT:  Left lower lobe pneumonia  Bilateral left more than right mucous plugging  Moderate persistent asthma, no exacerbation  History of bone marrow transplant    PLAN:  Provide O2 supplementation to keep SpO2 between 88-92% if needed.    Continue broad spectrum antibiotics - Vanco/Cefepime/Azithro  Follow cultures  Antipyretics   Brovana/Pulmicort/Spiriva  Albuterol   Incentive inspirometer, Flutter valve / vibraPAP device  Continue MetaNebs with albuterol  Out of bed and up to chair as tolerated     Filipe Infante \"Phillip\" Mary Johnson MD  Pulmonary and Critical Care Medicine

## 2024-01-29 NOTE — PROGRESS NOTES
01/29/24 1555   Encounter Summary   Encounter Overview/Reason  Spiritual/Emotional Needs   Service Provided For: Patient   Referral/Consult From: Nurse  (Taya Deleon, RN)   Support System Children;Family members   Last Encounter  01/29/24  (conversation, support, prayer, ke 1/29/24)   Complexity of Encounter High   Begin Time 1515   End Time  1535   Total Time Calculated 20 min   Spiritual/Emotional needs   Type Spiritual Support   Assessment/Intervention/Outcome   Assessment Concerns with suffering;Hopeful;Tearful   Intervention Active listening;Explored/Affirmed feelings, thoughts, concerns;Prayer (assurance of)/[place];Sustaining Presence/Ministry of presence   Outcome Acceptance;Coping;Encouraged;Expressed Gratitude     Rev Phillip Baptiste Chaplain

## 2024-01-29 NOTE — PLAN OF CARE
Problem: Respiratory - Adult  Goal: Achieves optimal ventilation and oxygenation  Outcome: Progressing- was able to wean patient off oxygen onto room air. Ambulated in hallway, did well. O2 dropped to 88% on RA after ambulation but was able to recover on her own up to 94% on RA.      Problem: Infection - Adult  Goal: Absence of infection during hospitalization  Outcome: Progressing  Patient's hemoglobin this AM:   Recent Labs     01/29/24  0351   HGB 12.5     Patient's platelet count this AM:   Recent Labs     01/29/24  0351   PLT 84*    Thrombocytopenia Precautions in place.  Patient showing no signs or symptoms of active bleeding.  Transfusion not indicated at this time.  Patient verbalizes understanding of all instructions. Will continue to assess and implement POC. Call light within reach and hourly rounding in place.

## 2024-01-30 ENCOUNTER — APPOINTMENT (OUTPATIENT)
Dept: GENERAL RADIOLOGY | Age: 64
End: 2024-01-30
Payer: MEDICARE

## 2024-01-30 LAB
ANION GAP SERPL CALCULATED.3IONS-SCNC: 11 MMOL/L (ref 3–16)
BASE EXCESS BLDV CALC-SCNC: 0 MMOL/L (ref -3–3)
BASOPHILS # BLD: 0 K/UL (ref 0–0.2)
BASOPHILS NFR BLD: 0.3 %
BUN SERPL-MCNC: 14 MG/DL (ref 7–20)
CALCIUM SERPL-MCNC: 9.8 MG/DL (ref 8.3–10.6)
CHLORIDE SERPL-SCNC: 110 MMOL/L (ref 99–110)
CO2 BLDV-SCNC: 25 MMOL/L
CO2 SERPL-SCNC: 21 MMOL/L (ref 21–32)
CREAT SERPL-MCNC: 2.4 MG/DL (ref 0.6–1.2)
DEPRECATED RDW RBC AUTO: 16.8 % (ref 12.4–15.4)
EOSINOPHIL # BLD: 0.2 K/UL (ref 0–0.6)
EOSINOPHIL NFR BLD: 1.6 %
GFR SERPLBLD CREATININE-BSD FMLA CKD-EPI: 22 ML/MIN/{1.73_M2}
GLUCOSE SERPL-MCNC: 111 MG/DL (ref 70–99)
HCO3 BLDV-SCNC: 24.1 MMOL/L (ref 23–29)
HCT VFR BLD AUTO: 42 % (ref 36–48)
HGB BLD-MCNC: 13.5 G/DL (ref 12–16)
LACTATE BLDV-SCNC: 1.4 MMOL/L (ref 0.4–2)
LEGIONELLA AG UR QL: NORMAL
LYMPHOCYTES # BLD: 1.8 K/UL (ref 1–5.1)
LYMPHOCYTES NFR BLD: 14.7 %
MCH RBC QN AUTO: 27.1 PG (ref 26–34)
MCHC RBC AUTO-ENTMCNC: 32 G/DL (ref 31–36)
MCV RBC AUTO: 84.5 FL (ref 80–100)
MONOCYTES # BLD: 0.8 K/UL (ref 0–1.3)
MONOCYTES NFR BLD: 6.4 %
NEUTROPHILS # BLD: 9.3 K/UL (ref 1.7–7.7)
NEUTROPHILS NFR BLD: 77 %
NT-PROBNP SERPL-MCNC: 3107 PG/ML (ref 0–124)
PCO2 BLDV: 37.4 MM HG (ref 40–50)
PERFORMED ON: ABNORMAL
PH BLDV: 7.42 [PH] (ref 7.35–7.45)
PLATELET # BLD AUTO: 112 K/UL (ref 135–450)
PMV BLD AUTO: 10.2 FL (ref 5–10.5)
PO2 BLDV: 44 MM HG
POC SAMPLE TYPE: ABNORMAL
POTASSIUM SERPL-SCNC: 3.9 MMOL/L (ref 3.5–5.1)
PROCALCITONIN SERPL IA-MCNC: 5.13 NG/ML (ref 0–0.15)
RBC # BLD AUTO: 4.97 M/UL (ref 4–5.2)
S PNEUM AG UR QL: NORMAL
SAO2 % BLDV: 81 %
SODIUM SERPL-SCNC: 142 MMOL/L (ref 136–145)
WBC # BLD AUTO: 12.1 K/UL (ref 4–11)

## 2024-01-30 PROCEDURE — 80048 BASIC METABOLIC PNL TOTAL CA: CPT

## 2024-01-30 PROCEDURE — 6370000000 HC RX 637 (ALT 250 FOR IP): Performed by: STUDENT IN AN ORGANIZED HEALTH CARE EDUCATION/TRAINING PROGRAM

## 2024-01-30 PROCEDURE — 71045 X-RAY EXAM CHEST 1 VIEW: CPT

## 2024-01-30 PROCEDURE — 82803 BLOOD GASES ANY COMBINATION: CPT

## 2024-01-30 PROCEDURE — 85025 COMPLETE CBC W/AUTO DIFF WBC: CPT

## 2024-01-30 PROCEDURE — 2580000003 HC RX 258: Performed by: INTERNAL MEDICINE

## 2024-01-30 PROCEDURE — 6360000002 HC RX W HCPCS: Performed by: INTERNAL MEDICINE

## 2024-01-30 PROCEDURE — 84145 PROCALCITONIN (PCT): CPT

## 2024-01-30 PROCEDURE — 2580000003 HC RX 258: Performed by: NURSE PRACTITIONER

## 2024-01-30 PROCEDURE — 94640 AIRWAY INHALATION TREATMENT: CPT

## 2024-01-30 PROCEDURE — 99232 SBSQ HOSP IP/OBS MODERATE 35: CPT | Performed by: INTERNAL MEDICINE

## 2024-01-30 PROCEDURE — 83605 ASSAY OF LACTIC ACID: CPT

## 2024-01-30 PROCEDURE — 83880 ASSAY OF NATRIURETIC PEPTIDE: CPT

## 2024-01-30 PROCEDURE — 6370000000 HC RX 637 (ALT 250 FOR IP): Performed by: INTERNAL MEDICINE

## 2024-01-30 PROCEDURE — 94669 MECHANICAL CHEST WALL OSCILL: CPT

## 2024-01-30 PROCEDURE — 2060000000 HC ICU INTERMEDIATE R&B

## 2024-01-30 PROCEDURE — 36415 COLL VENOUS BLD VENIPUNCTURE: CPT

## 2024-01-30 PROCEDURE — 6370000000 HC RX 637 (ALT 250 FOR IP): Performed by: NURSE PRACTITIONER

## 2024-01-30 PROCEDURE — 6360000002 HC RX W HCPCS: Performed by: NURSE PRACTITIONER

## 2024-01-30 RX ORDER — POLYETHYLENE GLYCOL 3350 17 G/17G
17 POWDER, FOR SOLUTION ORAL DAILY
Status: DISCONTINUED | OUTPATIENT
Start: 2024-01-30 | End: 2024-02-02 | Stop reason: HOSPADM

## 2024-01-30 RX ORDER — FUROSEMIDE 10 MG/ML
20 INJECTION INTRAMUSCULAR; INTRAVENOUS ONCE
Status: COMPLETED | OUTPATIENT
Start: 2024-01-30 | End: 2024-01-31

## 2024-01-30 RX ORDER — ACETAMINOPHEN 325 MG/1
650 TABLET ORAL EVERY 4 HOURS PRN
Status: DISCONTINUED | OUTPATIENT
Start: 2024-01-30 | End: 2024-02-02 | Stop reason: HOSPADM

## 2024-01-30 RX ORDER — DIPHENHYDRAMINE HYDROCHLORIDE, ZINC ACETATE 2; .1 G/100G; G/100G
CREAM TOPICAL 3 TIMES DAILY PRN
Status: DISCONTINUED | OUTPATIENT
Start: 2024-01-30 | End: 2024-02-02 | Stop reason: HOSPADM

## 2024-01-30 RX ADMIN — ACETAMINOPHEN 650 MG: 325 TABLET ORAL at 22:04

## 2024-01-30 RX ADMIN — ALBUTEROL SULFATE 2.5 MG: 2.5 SOLUTION RESPIRATORY (INHALATION) at 09:06

## 2024-01-30 RX ADMIN — CEFEPIME 1000 MG: 1 INJECTION, POWDER, FOR SOLUTION INTRAMUSCULAR; INTRAVENOUS at 17:39

## 2024-01-30 RX ADMIN — SODIUM CHLORIDE, PRESERVATIVE FREE 10 ML: 5 INJECTION INTRAVENOUS at 09:39

## 2024-01-30 RX ADMIN — BUDESONIDE INHALATION 500 MCG: 0.5 SUSPENSION RESPIRATORY (INHALATION) at 09:06

## 2024-01-30 RX ADMIN — ALBUTEROL SULFATE 2.5 MG: 2.5 SOLUTION RESPIRATORY (INHALATION) at 20:51

## 2024-01-30 RX ADMIN — ENOXAPARIN SODIUM 30 MG: 100 INJECTION SUBCUTANEOUS at 09:39

## 2024-01-30 RX ADMIN — ALBUTEROL SULFATE 2.5 MG: 2.5 SOLUTION RESPIRATORY (INHALATION) at 14:22

## 2024-01-30 RX ADMIN — ARFORMOTEROL TARTRATE 15 MCG: 15 SOLUTION RESPIRATORY (INHALATION) at 09:06

## 2024-01-30 RX ADMIN — POLYETHYLENE GLYCOL 3350 17 G: 17 POWDER, FOR SOLUTION ORAL at 12:39

## 2024-01-30 RX ADMIN — ARFORMOTEROL TARTRATE 15 MCG: 15 SOLUTION RESPIRATORY (INHALATION) at 20:50

## 2024-01-30 RX ADMIN — BUDESONIDE INHALATION 500 MCG: 0.5 SUSPENSION RESPIRATORY (INHALATION) at 20:51

## 2024-01-30 RX ADMIN — PANTOPRAZOLE SODIUM 40 MG: 40 TABLET, DELAYED RELEASE ORAL at 05:23

## 2024-01-30 RX ADMIN — TIOTROPIUM BROMIDE INHALATION SPRAY 2 PUFF: 3.12 SPRAY, METERED RESPIRATORY (INHALATION) at 09:07

## 2024-01-30 RX ADMIN — DIPHENHYDRAMINE HYDROCHLORIDE, ZINC ACETATE: 2; .1 CREAM TOPICAL at 04:50

## 2024-01-30 RX ADMIN — CEFEPIME 1000 MG: 1 INJECTION, POWDER, FOR SOLUTION INTRAMUSCULAR; INTRAVENOUS at 04:49

## 2024-01-30 RX ADMIN — AZITHROMYCIN MONOHYDRATE 500 MG: 500 INJECTION, POWDER, LYOPHILIZED, FOR SOLUTION INTRAVENOUS at 15:11

## 2024-01-30 RX ADMIN — SODIUM CHLORIDE: 9 INJECTION, SOLUTION INTRAVENOUS at 12:41

## 2024-01-30 RX ADMIN — MONTELUKAST 10 MG: 10 TABLET, FILM COATED ORAL at 17:37

## 2024-01-30 ASSESSMENT — PAIN DESCRIPTION - PAIN TYPE: TYPE: ACUTE PAIN

## 2024-01-30 ASSESSMENT — PAIN SCALES - GENERAL
PAINLEVEL_OUTOF10: 3
PAINLEVEL_OUTOF10: 0

## 2024-01-30 ASSESSMENT — PAIN DESCRIPTION - LOCATION: LOCATION: HEAD

## 2024-01-30 ASSESSMENT — PAIN DESCRIPTION - ORIENTATION: ORIENTATION: ANTERIOR

## 2024-01-30 ASSESSMENT — PAIN DESCRIPTION - FREQUENCY: FREQUENCY: INTERMITTENT

## 2024-01-30 ASSESSMENT — PAIN DESCRIPTION - DESCRIPTORS: DESCRIPTORS: POUNDING

## 2024-01-30 ASSESSMENT — PAIN - FUNCTIONAL ASSESSMENT: PAIN_FUNCTIONAL_ASSESSMENT: ACTIVITIES ARE NOT PREVENTED

## 2024-01-30 ASSESSMENT — PAIN DESCRIPTION - ONSET: ONSET: ON-GOING

## 2024-01-30 NOTE — PLAN OF CARE
Problem: Safety - Adult  Goal: Free from fall injury  Outcome: Progressing   Orthostatic vital signs obtained at start of shift - see flowsheet for details.  Pt meets criteria for orthostasis.  Pt is a High fall risk. See Lindsay Fall Score and ABCDS Injury Risk assessments. SCAR Ortiz notified of positive orthostatic vitals, patient asymptomatic.  + Screening for Orthostasis and/or + High Fall Risk per LINDSAY/ABCDS: Explained fall risk precautions to pt and family and rationale behind their use to keep the patient safe. Pt bed is in low position, side rails up, call light and belongings are in reach. Fall wristband applied and present on pts wrist.  Bed alarm on.  Pt encouraged to call for assistance. Will continue with hourly rounds for PO intake, pain needs, toileting and repositioning as needed.     Problem: Respiratory - Adult  Goal: Achieves optimal ventilation and oxygenation  Outcome: Progressing- remains on RA, O2 sats above 92%     Problem: Infection - Adult  Goal: Absence of infection during hospitalization  Outcome: Progressing  CVC site remains free of signs/symptoms of infection. No drainage, edema, erythema, pain, or warmth noted at site. Dressing changes continue per protocol and on an as needed basis - see flowsheet.

## 2024-01-30 NOTE — PROGRESS NOTES
11/02/23 75.3 kg (166 lb)   09/28/23 72.6 kg (160 lb)       General: Awake, alert and oriented.  HEENT: normocephalic, PERRL, no scleral erythema or icterus, Oral mucosa moist and intact, throat clear  NECK: supple   BACK: Straight   SKIN: warm dry and intact without lesions rashes or masses  CHEST: Basilar b/l crackles.  CV: Normal S1 S2, RRR, no MRG  ABD: NT ND normoactive BS  EXTREMITIES: without edema, denies calf tenderness  NEURO: CN II - XII grossly intact  CATHETER: PIV    Data    CBC:   Recent Labs     01/27/24 2202 01/29/24  0351 01/30/24  0412   WBC 18.0* 15.0* 12.1*   HGB 13.3 12.5 13.5   HCT 42.1 38.9 42.0   MCV 84.4 83.5 84.5   PLT 98* 84* 112*       BMP/Mag:  Recent Labs     01/27/24 2202 01/29/24 0351 01/30/24  0412   * 137 142   K 3.7 3.3* 3.9    108 110   CO2 22 20* 21   PHOS 2.0* 2.1*  --    BUN 20 16 14   CREATININE 2.5* 2.4* 2.4*   MG 1.40* 1.60*  --        LIVP:   Recent Labs     01/27/24  1100 01/27/24 2202 01/29/24 0351   AST 21 19 14*   ALT 23 18 16   BILIDIR  --  1.1* 0.7*   BILITOT 1.6* 2.1* 1.3*   ALKPHOS 89 67 89       Coags:   Recent Labs     01/27/24 2202 01/29/24  0351   PROTIME  --  17.0*   INR  --  1.39*   APTT 32.3 36.5*       Uric Acid   Recent Labs     01/27/24 2202 01/29/24  0351   LABURIC 4.4 4.0         PROBLEM LIST:           1. 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 Asthma      TREATMENT:            1. S/p Allo 2016      ASSESSMENT AND PLAN:           1. DLBCL:  S/p Allo transplant 5/16/16    - With no gross evidence of active dis  - Cont to follow expectantly   - CBC stable, clinically no B symptoms     2. ID: Sepsis/Pneumonia POA - gram neg vs atypical, Tmax 100.6  - Pancx 1/27/24 pending  - Cont Cefepime Day +4 (1/27/24)  - Cont Azithromycin Day +4 (1/27/24)  - Pulmonology consulted - appreciate recs    - Cont prophylactic Pen-VK indefinitely once off IV abx  - IgG 1/29/24 525  - Procalcitonin 4.63 1/27/24  - RVP 1/28/24 Neg  - MRSA  1/28/24 Neg  - Rapid COVID/Flu 1/27: Neg  - Check legionella, strep pneumo 1/29 - Neg  - Procal 4.63--  - CT chest 1/27/24:    Mucous plugging in the lung bases most extensively the left lower lobe with patchy airspace consolidation within the inferior aspect of the left lower lobe   consistent with pneumonia. Mild airspace disease within the inferior right middle lobe.   Groundglass and tree-in-bud noncalcified nodular densities within the inferior   right upper lobe and right lower lobe, most consistent with small airways   infection   -Request pulm consult for recurrent pneumonia due to mucus plugging and bronchiectasis. Consider incentive spirometery, chest PT and follow up on Pulm recs. Continue with inhaled steroids, alb and singulair.    3. GVHD: vaginal and stable   - Continue premarin cream   - Followed by Dr. Sherman  Dry eyes: like GVHD  - cont topical eye moisturizer     3. Heme: Thrombocytopenia - chronic and stable, leukocytosis r/t infection improving  - No transfusion today    Orthostatic positive- Encourage hydration. Repeat orthostatic tomorrow.     4. Metabolic/CKD: E-lytes & renal fxn stable  - Baseline Cr 2.5-2.7  - Cont IVFs: NS at 50mL/hr    6. GI / Nutrition:    Nutrition:    - Cont low microbial diet  - Dietician to follow closely      7. Pulmonary: Pna POA; h/o multiple pna and mucous plugging  - Encourage IS & ambulation  - Cont supplemental O2 to maintain SpO2 >92% - currently on room air  - Followed by Dr. Ma in OP  - Cont singulair  - Cont albuterol BID  - Cont Spiriva daily        - DVT Prophylaxis: Platelets >50,000 cells/dL, - daily lovenox prophylaxis ordered  Contraindications to pharmacologic prophylaxis: None  Contraindications to mechanical prophylaxis: None     - Disposition: Stable    Diana Kaur, APRN - CNP   Donte Gan MD

## 2024-01-30 NOTE — PLAN OF CARE
Problem: Pain  Goal: Verbalizes/displays adequate comfort level or baseline comfort level  Outcome: Progressing  Verbalizes/displays adequate comfort level or baseline comfort level:   Encourage patient to monitor pain and request assistance   Assess pain using appropriate pain scale   Administer analgesics based on type and severity of pain and evaluate response   Implement non-pharmacological measures as appropriate and evaluate response     Problem: Respiratory - Adult  Goal: Achieves optimal ventilation and oxygenation  Outcome: Progressing  Achieves optimal ventilation and oxygenation:   Assess for changes in respiratory status   Assess for changes in mentation and behavior   Position to facilitate oxygenation and minimize respiratory effort   Respiratory therapy support as indicated   Assess and instruct to report shortness of breath or any respiratory difficulty

## 2024-01-30 NOTE — PROGRESS NOTES
Pulmonary Medicine Follow-up Note    CC: Pneumonia and mucous plugging    SUBJECTIVE / INTERVAL HISTORY:  Patient now on room air, tolerating MetaNebs.  Compliant with flutter valve use, asked if he was possible to continue using 1 more day MetaNebs.    ROS:  Denies headache, nausea or chest pain.    24HR INTAKE/OUTPUT:    Intake/Output Summary (Last 24 hours) at 2024 0915  Last data filed at 2024 0523  Gross per 24 hour   Intake 2337 ml   Output 1650 ml   Net 687 ml          enoxaparin  30 mg SubCUTAneous Daily    budesonide  0.5 mg Nebulization BID RT    arformoterol tartrate  15 mcg Nebulization BID RT    albuterol  2.5 mg Nebulization TID RT    pantoprazole  40 mg Oral QAM AC    montelukast  10 mg Oral QPM    tiotropium  2 puff Inhalation Daily RT    sodium chloride flush  5-40 mL IntraVENous 2 times per day    Saline Mouthwash  15 mL Swish & Spit 4x Daily AC & HS    cefepime  1,000 mg IntraVENous Q12H    azithromycin  500 mg IntraVENous Q24H       PHYSICAL EXAMINATION:  /66   Pulse 81   Temp 98.4 °F (36.9 °C) (Oral)   Resp 18   Ht 1.626 m (5' 4\")   Wt 77.1 kg (170 lb)   LMP 2008   SpO2 96%   BMI 29.18 kg/m²   CURRENT PULSE OXIMETRY:  SpO2: 96 %  24HR PULSE OXIMETRY RANGE:  SpO2  Av.1 %  Min: 93 %  Max: 97 %     Gen: No distress. Speaking in full sentences with no accessory muscle use  HEENT: PERRL, EOMI, OP nl  Lung: Diminished breath sounds bilaterally, no wheezing, no crackles  CV: RRR without M/R/R  Abd: +BS, soft, NT/ND  Ext: No edema.  Neuro: Alert and oriented, follows simple commands, nonfocal    DATA  CBC:   Recent Labs     24  03524  0412   WBC 18.0* 15.0* 12.1*   HGB 13.3 12.5 13.5   HCT 42.1 38.9 42.0   MCV 84.4 83.5 84.5   PLT 98* 84* 112*       BMP:   Recent Labs     24  0351 24  0412   * 137 142   K 3.7 3.3* 3.9    108 110   CO2 22 20* 21   PHOS 2.0* 2.1*  --    BUN 20 16 14   CREATININE 2.5*  2.4* 2.4*       No results for input(s): \"PHART\", \"LOT5CJB\", \"PO2ART\" in the last 72 hours.  LIVER PROFILE:   Recent Labs     01/27/24  1100 01/27/24  2202 01/29/24  0351   AST 21 19 14*   ALT 23 18 16   BILIDIR  --  1.1* 0.7*   BILITOT 1.6* 2.1* 1.3*   ALKPHOS 89 67 89       Procalcitonin:    Lab Results   Component Value Date/Time    PROCAL 4.63 01/27/2024 11:00 AM       CXR REVIEWED BY ME AND SHOWED:  CT CHEST WO CONTRAST   Final Result      Mucous plugging in the lung bases most extensively the left lower lobe with   patchy airspace consolidation within the inferior aspect of the left lower lobe   consistent with pneumonia.      Mild airspace disease within the inferior right middle lobe.      Groundglass and tree-in-bud noncalcified nodular densities within the inferior   right upper lobe and right lower lobe, most consistent with small airways   infection.      XR CHEST PORTABLE   Final Result      No acute chest process.           ASSESSMENT:  Left lower lobe pneumonia  Bilateral left more than right mucous plugging  Moderate persistent asthma, no exacerbation  History of bone marrow transplant    PLAN:  Provide O2 supplementation to keep SpO2 between 88-92% if needed.    Continue broad spectrum antibiotics - Vanco/Cefepime/Azithro  Follow cultures  Antipyretics   Brovana/Pulmicort/Spiriva  Albuterol   Incentive inspirometer, Flutter valve / vibraPAP device  Okay to continue 1 more day of MetaNebs, okay to discontinue tomorrow  Out of bed and up to chair as tolerated     Filipe Infante \"Phillip\" Mary Johnson MD  Pulmonary and Critical Care Medicine

## 2024-01-31 LAB
(1,3)-BETA-D-GLUCAN (FUNGITELL) INTERPRETATION: NEGATIVE
1,3 BETA GLUCAN SER-MCNC: 46 PG/ML
ALBUMIN SERPL-MCNC: 3.5 G/DL (ref 3.4–5)
ALP SERPL-CCNC: 118 U/L (ref 40–129)
ALT SERPL-CCNC: 23 U/L (ref 10–40)
ANION GAP SERPL CALCULATED.3IONS-SCNC: 15 MMOL/L (ref 3–16)
AST SERPL-CCNC: 24 U/L (ref 15–37)
BACTERIA BLD CULT ORG #2: NORMAL
BACTERIA BLD CULT: NORMAL
BACTERIA SPEC RESP CULT: NORMAL
BASOPHILS # BLD: 0 K/UL (ref 0–0.2)
BASOPHILS NFR BLD: 0.6 %
BILIRUB DIRECT SERPL-MCNC: 0.3 MG/DL (ref 0–0.3)
BILIRUB INDIRECT SERPL-MCNC: 0.7 MG/DL (ref 0–1)
BILIRUB SERPL-MCNC: 1 MG/DL (ref 0–1)
BUN SERPL-MCNC: 14 MG/DL (ref 7–20)
CALCIUM SERPL-MCNC: 10 MG/DL (ref 8.3–10.6)
CHLORIDE SERPL-SCNC: 101 MMOL/L (ref 99–110)
CO2 SERPL-SCNC: 22 MMOL/L (ref 21–32)
CORTIS SERPL-MCNC: 14 UG/DL
CREAT SERPL-MCNC: 2.5 MG/DL (ref 0.6–1.2)
D DIMER: 0.71 UG/ML FEU (ref 0–0.6)
DEPRECATED RDW RBC AUTO: 16.7 % (ref 12.4–15.4)
EOSINOPHIL # BLD: 0.2 K/UL (ref 0–0.6)
EOSINOPHIL NFR BLD: 2.4 %
GFR SERPLBLD CREATININE-BSD FMLA CKD-EPI: 21 ML/MIN/{1.73_M2}
GLUCOSE SERPL-MCNC: 150 MG/DL (ref 70–99)
GRAM STN SPEC: NORMAL
HCT VFR BLD AUTO: 42.5 % (ref 36–48)
HGB BLD-MCNC: 13.8 G/DL (ref 12–16)
INR PPP: 1.04 (ref 0.84–1.16)
LACTATE BLDV-SCNC: 1.3 MMOL/L (ref 0.4–2)
LDH SERPL L TO P-CCNC: 187 U/L (ref 100–190)
LYMPHOCYTES # BLD: 1.8 K/UL (ref 1–5.1)
LYMPHOCYTES NFR BLD: 24.4 %
MAGNESIUM SERPL-MCNC: 1.5 MG/DL (ref 1.8–2.4)
MCH RBC QN AUTO: 27.5 PG (ref 26–34)
MCHC RBC AUTO-ENTMCNC: 32.4 G/DL (ref 31–36)
MCV RBC AUTO: 84.8 FL (ref 80–100)
MONOCYTES # BLD: 0.9 K/UL (ref 0–1.3)
MONOCYTES NFR BLD: 12.1 %
NEUTROPHILS # BLD: 4.5 K/UL (ref 1.7–7.7)
NEUTROPHILS NFR BLD: 60.5 %
PHOSPHATE SERPL-MCNC: 1.9 MG/DL (ref 2.5–4.9)
PLATELET # BLD AUTO: 145 K/UL (ref 135–450)
PMV BLD AUTO: 9.3 FL (ref 5–10.5)
POTASSIUM SERPL-SCNC: 3.3 MMOL/L (ref 3.5–5.1)
PROCALCITONIN SERPL IA-MCNC: 3.46 NG/ML (ref 0–0.15)
PROT SERPL-MCNC: 6.6 G/DL (ref 6.4–8.2)
PROTHROMBIN TIME: 13.6 SEC (ref 11.5–14.8)
RBC # BLD AUTO: 5.01 M/UL (ref 4–5.2)
SODIUM SERPL-SCNC: 138 MMOL/L (ref 136–145)
TROPONIN, HIGH SENSITIVITY: 9 NG/L (ref 0–14)
URATE SERPL-MCNC: 4.4 MG/DL (ref 2.6–6)
WBC # BLD AUTO: 7.4 K/UL (ref 4–11)

## 2024-01-31 PROCEDURE — 93356 MYOCRD STRAIN IMG SPCKL TRCK: CPT

## 2024-01-31 PROCEDURE — 82533 TOTAL CORTISOL: CPT

## 2024-01-31 PROCEDURE — 6370000000 HC RX 637 (ALT 250 FOR IP): Performed by: NURSE PRACTITIONER

## 2024-01-31 PROCEDURE — 6360000002 HC RX W HCPCS: Performed by: NURSE PRACTITIONER

## 2024-01-31 PROCEDURE — 85379 FIBRIN DEGRADATION QUANT: CPT

## 2024-01-31 PROCEDURE — 6360000002 HC RX W HCPCS: Performed by: INTERNAL MEDICINE

## 2024-01-31 PROCEDURE — 94640 AIRWAY INHALATION TREATMENT: CPT

## 2024-01-31 PROCEDURE — 36415 COLL VENOUS BLD VENIPUNCTURE: CPT

## 2024-01-31 PROCEDURE — 85610 PROTHROMBIN TIME: CPT

## 2024-01-31 PROCEDURE — 2580000003 HC RX 258: Performed by: NURSE PRACTITIONER

## 2024-01-31 PROCEDURE — 6370000000 HC RX 637 (ALT 250 FOR IP): Performed by: INTERNAL MEDICINE

## 2024-01-31 PROCEDURE — 6370000000 HC RX 637 (ALT 250 FOR IP): Performed by: STUDENT IN AN ORGANIZED HEALTH CARE EDUCATION/TRAINING PROGRAM

## 2024-01-31 PROCEDURE — 99231 SBSQ HOSP IP/OBS SF/LOW 25: CPT | Performed by: INTERNAL MEDICINE

## 2024-01-31 PROCEDURE — 2060000000 HC ICU INTERMEDIATE R&B

## 2024-01-31 PROCEDURE — 93005 ELECTROCARDIOGRAM TRACING: CPT | Performed by: NURSE PRACTITIONER

## 2024-01-31 PROCEDURE — 94761 N-INVAS EAR/PLS OXIMETRY MLT: CPT

## 2024-01-31 PROCEDURE — 2500000003 HC RX 250 WO HCPCS: Performed by: NURSE PRACTITIONER

## 2024-01-31 PROCEDURE — 2580000003 HC RX 258: Performed by: INTERNAL MEDICINE

## 2024-01-31 PROCEDURE — 6360000002 HC RX W HCPCS: Performed by: STUDENT IN AN ORGANIZED HEALTH CARE EDUCATION/TRAINING PROGRAM

## 2024-01-31 PROCEDURE — 80076 HEPATIC FUNCTION PANEL: CPT

## 2024-01-31 PROCEDURE — 84550 ASSAY OF BLOOD/URIC ACID: CPT

## 2024-01-31 PROCEDURE — 84100 ASSAY OF PHOSPHORUS: CPT

## 2024-01-31 PROCEDURE — 80048 BASIC METABOLIC PNL TOTAL CA: CPT

## 2024-01-31 PROCEDURE — 85025 COMPLETE CBC W/AUTO DIFF WBC: CPT

## 2024-01-31 PROCEDURE — 93306 TTE W/DOPPLER COMPLETE: CPT

## 2024-01-31 PROCEDURE — 83735 ASSAY OF MAGNESIUM: CPT

## 2024-01-31 PROCEDURE — 84484 ASSAY OF TROPONIN QUANT: CPT

## 2024-01-31 PROCEDURE — 83615 LACTATE (LD) (LDH) ENZYME: CPT

## 2024-01-31 PROCEDURE — 84145 PROCALCITONIN (PCT): CPT

## 2024-01-31 PROCEDURE — 83605 ASSAY OF LACTIC ACID: CPT

## 2024-01-31 RX ORDER — SODIUM CHLORIDE, SODIUM LACTATE, POTASSIUM CHLORIDE, AND CALCIUM CHLORIDE .6; .31; .03; .02 G/100ML; G/100ML; G/100ML; G/100ML
500 INJECTION, SOLUTION INTRAVENOUS ONCE
Status: COMPLETED | OUTPATIENT
Start: 2024-01-31 | End: 2024-01-31

## 2024-01-31 RX ORDER — LANOLIN ALCOHOL/MO/W.PET/CERES
400 CREAM (GRAM) TOPICAL 2 TIMES DAILY
Status: DISCONTINUED | OUTPATIENT
Start: 2024-01-31 | End: 2024-02-02 | Stop reason: HOSPADM

## 2024-01-31 RX ORDER — SODIUM CHLORIDE 9 MG/ML
INJECTION, SOLUTION INTRAVENOUS CONTINUOUS
Status: DISCONTINUED | OUTPATIENT
Start: 2024-01-31 | End: 2024-02-01

## 2024-01-31 RX ADMIN — FUROSEMIDE 20 MG: 10 INJECTION, SOLUTION INTRAMUSCULAR; INTRAVENOUS at 01:52

## 2024-01-31 RX ADMIN — PANTOPRAZOLE SODIUM 40 MG: 40 TABLET, DELAYED RELEASE ORAL at 06:24

## 2024-01-31 RX ADMIN — ENOXAPARIN SODIUM 30 MG: 100 INJECTION SUBCUTANEOUS at 10:11

## 2024-01-31 RX ADMIN — CEFEPIME 1000 MG: 1 INJECTION, POWDER, FOR SOLUTION INTRAMUSCULAR; INTRAVENOUS at 09:01

## 2024-01-31 RX ADMIN — Medication 400 MG: at 21:40

## 2024-01-31 RX ADMIN — TIOTROPIUM BROMIDE INHALATION SPRAY 2 PUFF: 3.12 SPRAY, METERED RESPIRATORY (INHALATION) at 08:29

## 2024-01-31 RX ADMIN — SODIUM CHLORIDE: 9 INJECTION, SOLUTION INTRAVENOUS at 15:16

## 2024-01-31 RX ADMIN — SODIUM CHLORIDE: 9 INJECTION, SOLUTION INTRAVENOUS at 10:12

## 2024-01-31 RX ADMIN — BUDESONIDE INHALATION 500 MCG: 0.5 SUSPENSION RESPIRATORY (INHALATION) at 08:29

## 2024-01-31 RX ADMIN — SODIUM CHLORIDE, POTASSIUM CHLORIDE, SODIUM LACTATE AND CALCIUM CHLORIDE 500 ML: 600; 310; 30; 20 INJECTION, SOLUTION INTRAVENOUS at 12:56

## 2024-01-31 RX ADMIN — ARFORMOTEROL TARTRATE 15 MCG: 15 SOLUTION RESPIRATORY (INHALATION) at 08:29

## 2024-01-31 RX ADMIN — PROCHLORPERAZINE EDISYLATE 10 MG: 5 INJECTION INTRAMUSCULAR; INTRAVENOUS at 09:11

## 2024-01-31 RX ADMIN — MONTELUKAST 10 MG: 10 TABLET, FILM COATED ORAL at 18:41

## 2024-01-31 RX ADMIN — ALBUTEROL SULFATE 2.5 MG: 2.5 SOLUTION RESPIRATORY (INHALATION) at 08:28

## 2024-01-31 RX ADMIN — Medication 400 MG: at 12:39

## 2024-01-31 RX ADMIN — AZITHROMYCIN MONOHYDRATE 500 MG: 500 INJECTION, POWDER, LYOPHILIZED, FOR SOLUTION INTRAVENOUS at 15:17

## 2024-01-31 RX ADMIN — POTASSIUM PHOSPHATE, MONOBASIC AND POTASSIUM PHOSPHATE, DIBASIC 30 MMOL: 224; 236 INJECTION, SOLUTION, CONCENTRATE INTRAVENOUS at 15:21

## 2024-01-31 RX ADMIN — PROMETHAZINE HYDROCHLORIDE AND CODEINE PHOSPHATE 5 ML: 6.25; 1 SOLUTION ORAL at 23:12

## 2024-01-31 RX ADMIN — ACETAMINOPHEN 650 MG: 325 TABLET ORAL at 09:14

## 2024-01-31 ASSESSMENT — PAIN DESCRIPTION - FREQUENCY: FREQUENCY: INTERMITTENT

## 2024-01-31 ASSESSMENT — PAIN SCALES - GENERAL
PAINLEVEL_OUTOF10: 3
PAINLEVEL_OUTOF10: 0

## 2024-01-31 ASSESSMENT — PAIN DESCRIPTION - LOCATION: LOCATION: HEAD

## 2024-01-31 ASSESSMENT — PAIN DESCRIPTION - DESCRIPTORS: DESCRIPTORS: PATIENT UNABLE TO DESCRIBE

## 2024-01-31 ASSESSMENT — PAIN DESCRIPTION - PAIN TYPE: TYPE: ACUTE PAIN

## 2024-01-31 ASSESSMENT — PAIN - FUNCTIONAL ASSESSMENT: PAIN_FUNCTIONAL_ASSESSMENT: ACTIVITIES ARE NOT PREVENTED

## 2024-01-31 ASSESSMENT — PAIN DESCRIPTION - ORIENTATION: ORIENTATION: ANTERIOR

## 2024-01-31 ASSESSMENT — PAIN DESCRIPTION - ONSET: ONSET: PROGRESSIVE

## 2024-01-31 NOTE — PROGRESS NOTES
oz)   LMP 12/26/2008   SpO2 94%   BMI 27.40 kg/m²     Weight:    Wt Readings from Last 3 Encounters:   01/31/24 72.4 kg (159 lb 9.6 oz)   11/02/23 75.3 kg (166 lb)   09/28/23 72.6 kg (160 lb)       General: Awake, alert and oriented.  HEENT: normocephalic, PERRL, no scleral erythema or icterus, Oral mucosa moist and intact, throat clear  NECK: supple   BACK: Straight   SKIN: warm dry and intact without lesions rashes or masses  CHEST: Basilar b/l crackles.  CV: Normal S1 S2, RRR, no MRG  ABD: NT ND normoactive BS  EXTREMITIES: without edema, denies calf tenderness  NEURO: CN II - XII grossly intact  CATHETER: PIV    Data    CBC:   Recent Labs     01/29/24 0351 01/30/24 0412 01/31/24 0349   WBC 15.0* 12.1* 7.4   HGB 12.5 13.5 13.8   HCT 38.9 42.0 42.5   MCV 83.5 84.5 84.8   PLT 84* 112* 145       BMP/Mag:  Recent Labs     01/29/24  0351 01/30/24 0412 01/31/24 0349    142 138   K 3.3* 3.9 3.3*    110 101   CO2 20* 21 22   PHOS 2.1*  --  1.9*   BUN 16 14 14   CREATININE 2.4* 2.4* 2.5*   MG 1.60*  --  1.50*       LIVP:   Recent Labs     01/29/24  0351 01/31/24 0349   AST 14* 24   ALT 16 23   BILIDIR 0.7* 0.3   BILITOT 1.3* 1.0   ALKPHOS 89 118       Coags:   Recent Labs     01/29/24  0351 01/31/24 0349   PROTIME 17.0* 13.6   INR 1.39* 1.04   APTT 36.5*  --        Uric Acid   Recent Labs     01/29/24  0351 01/31/24 0349   LABURIC 4.0 4.4         PROBLEM LIST:           1. 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 Asthma      TREATMENT:            1. S/p Allo 2016      ASSESSMENT AND PLAN:           1. DLBCL:  S/p Allo transplant 5/16/16    - With no gross evidence of active dis  - Cont to follow expectantly   - CBC stable, clinically no B symptoms     2. ID: Sepsis/Pneumonia POA - gram neg vs atypical, Tmax 100.6  - Pancx 1/27/24 pending  - Cont Cefepime Day +5 (1/27/24)  - Cont Azithromycin Day +5 (1/27/24)  - Pulmonology consulted - appreciate recs    - Cont prophylactic Pen-VK

## 2024-01-31 NOTE — PROGRESS NOTES
Pt's BP at 1241 84/58 MAP 67. BP retaken with the pt lying flat, BP 79/60 MAP 66. Notified Diana Kaur NP. 500 mL LR bolus ordered. Will reassess BP upon completion of bolus. Bed alarm on, call light within reach, educated pt to call for assistance to get up out of bed.

## 2024-01-31 NOTE — PLAN OF CARE
Problem: Infection - Adult  Goal: Absence of infection during hospitalization  1/31/2024 0521 by Margarita Vance RN  Outcome: Progressing  Flowsheets (Taken 1/31/2024 0521)  Absence of infection during hospitalization:   Monitor lab/diagnostic results   Assess and monitor for signs and symptoms of infection   Monitor all insertion sites i.e., indwelling lines, tubes and drains   [place] appropriate cooling/warming therapies per order   Administer medications as ordered   Monitor endotracheal (as able) and nasal secretions for changes in amount and color   Identify and instruct in appropriate isolation precautions for identified infection/condition   Instruct and encourage patient and family to use good hand hygiene technique  Note: Pt afebrile.  No signs/sx of infection this shift.  Peripheral line site remains free of signs/symptoms of infection. No drainage, edema, erythema, pain, or warmth noted at site.     Problem: Hematologic - Adult  Goal: Maintains hematologic stability  Outcome: Progressing  Flowsheets (Taken 1/31/2024 0521)  Maintains hematologic stability:   Assess for signs and symptoms of bleeding or hemorrhage   Monitor labs for bleeding or clotting disorders   Administer blood products/factors as ordered  Note: Patient's hemoglobin this AM:   Recent Labs     01/31/24  0349   HGB 13.8     Patient's platelet count this AM:   Recent Labs     01/31/24  0349       Thrombocytopenia not present at this time.  Patient showing no signs or symptoms of active bleeding.  Transfusion not indicated at this time.  Patient verbalizes understanding of all instructions. Will continue to assess and implement POC. Call light within reach and hourly rounding in place.

## 2024-01-31 NOTE — PROGRESS NOTES
Pulmonary Medicine Follow-up Note    CC: Pneumonia and mucous plugging    SUBJECTIVE / INTERVAL HISTORY:  Doing well overall, no issues reported.   - O2 Flow Rate (L/min): 0 L/min    ROS:  Denies headache, nausea or chest pain.    24HR INTAKE/OUTPUT:    Intake/Output Summary (Last 24 hours) at 2024 1517  Last data filed at 2024 1256  Gross per 24 hour   Intake 2290 ml   Output 1500 ml   Net 790 ml          potassium phosphate IVPB (CENTRAL LINE)  30 mmol IntraVENous Once    magnesium oxide  400 mg Oral BID    polyethylene glycol  17 g Oral Daily    enoxaparin  30 mg SubCUTAneous Daily    budesonide  0.5 mg Nebulization BID RT    arformoterol tartrate  15 mcg Nebulization BID RT    albuterol  2.5 mg Nebulization TID RT    pantoprazole  40 mg Oral QAM AC    montelukast  10 mg Oral QPM    tiotropium  2 puff Inhalation Daily RT    sodium chloride flush  5-40 mL IntraVENous 2 times per day    Saline Mouthwash  15 mL Swish & Spit 4x Daily AC & HS    cefepime  1,000 mg IntraVENous Q12H    azithromycin  500 mg IntraVENous Q24H       PHYSICAL EXAMINATION:  BP 90/76   Pulse 79   Temp 97.4 °F (36.3 °C) (Oral)   Resp 18   Ht 1.626 m (5' 4\")   Wt 72.4 kg (159 lb 9.6 oz)   LMP 2008   SpO2 93%   BMI 27.40 kg/m²   CURRENT PULSE OXIMETRY:  SpO2: 93 %  24HR PULSE OXIMETRY RANGE:  SpO2  Av.4 %  Min: 92 %  Max: 99 %     Gen: No distress. Speaking in full sentences with no accessory muscle use  HEENT: PERRL, EOMI, OP nl  Lung: Diminished breath sounds bilaterally, no wheezing, no crackles  CV: RRR without M/R/R  Abd: +BS, soft, NT/ND  Ext: No edema.  Neuro: Alert and oriented, follows simple commands, nonfocal    DATA  CBC:   Recent Labs     24  0351 24  0412 24  0349   WBC 15.0* 12.1* 7.4   HGB 12.5 13.5 13.8   HCT 38.9 42.0 42.5   MCV 83.5 84.5 84.8   PLT 84* 112* 145       BMP:   Recent Labs     24  0351 24  0412 24  0349    142 138   K 3.3* 3.9 3.3*    110  101   CO2 20* 21 22   PHOS 2.1*  --  1.9*   BUN 16 14 14   CREATININE 2.4* 2.4* 2.5*       No results for input(s): \"PHART\", \"PHD6LAV\", \"PO2ART\" in the last 72 hours.  LIVER PROFILE:   Recent Labs     01/29/24  0351 01/31/24  0349   AST 14* 24   ALT 16 23   BILIDIR 0.7* 0.3   BILITOT 1.3* 1.0   ALKPHOS 89 118       Procalcitonin:    Lab Results   Component Value Date/Time    PROCAL 5.13 01/30/2024 08:29 PM       CXR REVIEWED BY ME AND SHOWED:  XR CHEST PORTABLE   Final Result   Left basilar infiltrate and pleural effusion.      Electronically signed by Jessica Aburto      CT CHEST WO CONTRAST   Final Result      Mucous plugging in the lung bases most extensively the left lower lobe with   patchy airspace consolidation within the inferior aspect of the left lower lobe   consistent with pneumonia.      Mild airspace disease within the inferior right middle lobe.      Groundglass and tree-in-bud noncalcified nodular densities within the inferior   right upper lobe and right lower lobe, most consistent with small airways   infection.      XR CHEST PORTABLE   Final Result      No acute chest process.           ASSESSMENT:  Left lower lobe pneumonia  Bilateral left more than right mucous plugging  Moderate persistent asthma, no exacerbation  History of bone marrow transplant    PLAN:  Provide O2 supplementation to keep SpO2 between 88-92% if needed.    Continue broad spectrum antibiotics - Vanco/Cefepime/Azithro  Follow cultures  Antipyretics   Brovana/Pulmicort/Spiriva  Albuterol   Incentive inspirometer, Flutter valve / vibraPAP device  Okay to continue 1 more day of MetaNebs, okay to discontinue tomorrow  Out of bed and up to chair as tolerated     Pulmonary/Critical Care will sign off. Please call us back if there is need for re-evaluation    Filipe Infante \"Phillip\" Mary Johnson MD  Pulmonary and Critical Care Medicine

## 2024-01-31 NOTE — PROGRESS NOTES
This RN messaged Dr. Peter Adler: Itzel in 3526, is complaining of SOB and feels like her throut is closing up. I found her crouched over. No fever. He Sp02 was at 87%, and she is now on 4L O2. Her blood pressure is 115/83. Pulse is 109. Do you want me to put in an order for a stat CXR or anything else? Thanks.  See new orders. See MAR. See results.   When CXR results came in, this RN spoke to Dr. Adler on phone. See new orders.

## 2024-02-01 ENCOUNTER — APPOINTMENT (OUTPATIENT)
Dept: GENERAL RADIOLOGY | Age: 64
End: 2024-02-01
Payer: MEDICARE

## 2024-02-01 PROBLEM — J38.3 VOCAL CORD DYSFUNCTION: Status: ACTIVE | Noted: 2024-02-01

## 2024-02-01 PROBLEM — R05.3 PERSISTENT COUGH: Status: ACTIVE | Noted: 2024-02-01

## 2024-02-01 PROBLEM — J38.5 LARYNGOSPASM: Status: ACTIVE | Noted: 2024-02-01

## 2024-02-01 PROBLEM — J18.9 PNEUMONIA OF LEFT LOWER LOBE DUE TO INFECTIOUS ORGANISM: Status: ACTIVE | Noted: 2024-02-01

## 2024-02-01 LAB
ANION GAP SERPL CALCULATED.3IONS-SCNC: 12 MMOL/L (ref 3–16)
APTT BLD: 111.9 SEC (ref 22.7–35.9)
BASOPHILS # BLD: 0 K/UL (ref 0–0.2)
BASOPHILS NFR BLD: 0 %
BUN SERPL-MCNC: 18 MG/DL (ref 7–20)
CALCIUM SERPL-MCNC: 9.3 MG/DL (ref 8.3–10.6)
CHLORIDE SERPL-SCNC: 106 MMOL/L (ref 99–110)
CO2 SERPL-SCNC: 23 MMOL/L (ref 21–32)
CREAT SERPL-MCNC: 2.3 MG/DL (ref 0.6–1.2)
DEPRECATED RDW RBC AUTO: 16.6 % (ref 12.4–15.4)
EKG ATRIAL RATE: 91 BPM
EKG DIAGNOSIS: NORMAL
EKG P AXIS: 58 DEGREES
EKG P-R INTERVAL: 148 MS
EKG Q-T INTERVAL: 368 MS
EKG QRS DURATION: 92 MS
EKG QTC CALCULATION (BAZETT): 452 MS
EKG R AXIS: -3 DEGREES
EKG T AXIS: 39 DEGREES
EKG VENTRICULAR RATE: 91 BPM
EOSINOPHIL # BLD: 0.4 K/UL (ref 0–0.6)
EOSINOPHIL NFR BLD: 6 %
GALACTOMANNAN AG SERPL IA-ACNC: 0.08
GALACTOMANNAN AG SERPL QL IA: NEGATIVE
GFR SERPLBLD CREATININE-BSD FMLA CKD-EPI: 23 ML/MIN/{1.73_M2}
GLUCOSE SERPL-MCNC: 94 MG/DL (ref 70–99)
HCT VFR BLD AUTO: 40 % (ref 36–48)
HGB BLD-MCNC: 12.9 G/DL (ref 12–16)
LYMPHOCYTES # BLD: 3.1 K/UL (ref 1–5.1)
LYMPHOCYTES NFR BLD: 47 %
MCH RBC QN AUTO: 27.1 PG (ref 26–34)
MCHC RBC AUTO-ENTMCNC: 32.2 G/DL (ref 31–36)
MCV RBC AUTO: 84.3 FL (ref 80–100)
METAMYELOCYTES NFR BLD MANUAL: 1 %
MONOCYTES # BLD: 0.5 K/UL (ref 0–1.3)
MONOCYTES NFR BLD: 8 %
MYELOCYTES NFR BLD MANUAL: 1 %
NEUTROPHILS # BLD: 2.6 K/UL (ref 1.7–7.7)
NEUTROPHILS NFR BLD: 37 %
PLATELET # BLD AUTO: 171 K/UL (ref 135–450)
PLATELET BLD QL SMEAR: ADEQUATE
PMV BLD AUTO: 9 FL (ref 5–10.5)
POTASSIUM SERPL-SCNC: 3.3 MMOL/L (ref 3.5–5.1)
RBC # BLD AUTO: 4.75 M/UL (ref 4–5.2)
RBC MORPH BLD: NORMAL
SLIDE REVIEW: ABNORMAL
SODIUM SERPL-SCNC: 141 MMOL/L (ref 136–145)
WBC # BLD AUTO: 6.6 K/UL (ref 4–11)

## 2024-02-01 PROCEDURE — 6360000002 HC RX W HCPCS: Performed by: NURSE PRACTITIONER

## 2024-02-01 PROCEDURE — 93010 ELECTROCARDIOGRAM REPORT: CPT | Performed by: INTERNAL MEDICINE

## 2024-02-01 PROCEDURE — 6370000000 HC RX 637 (ALT 250 FOR IP): Performed by: NURSE PRACTITIONER

## 2024-02-01 PROCEDURE — 80048 BASIC METABOLIC PNL TOTAL CA: CPT

## 2024-02-01 PROCEDURE — 6360000002 HC RX W HCPCS: Performed by: INTERNAL MEDICINE

## 2024-02-01 PROCEDURE — 2580000003 HC RX 258: Performed by: INTERNAL MEDICINE

## 2024-02-01 PROCEDURE — 6370000000 HC RX 637 (ALT 250 FOR IP): Performed by: INTERNAL MEDICINE

## 2024-02-01 PROCEDURE — A4217 STERILE WATER/SALINE, 500 ML: HCPCS | Performed by: INTERNAL MEDICINE

## 2024-02-01 PROCEDURE — 99232 SBSQ HOSP IP/OBS MODERATE 35: CPT | Performed by: INTERNAL MEDICINE

## 2024-02-01 PROCEDURE — 99221 1ST HOSP IP/OBS SF/LOW 40: CPT | Performed by: OTOLARYNGOLOGY

## 2024-02-01 PROCEDURE — 85025 COMPLETE CBC W/AUTO DIFF WBC: CPT

## 2024-02-01 PROCEDURE — 2060000000 HC ICU INTERMEDIATE R&B

## 2024-02-01 PROCEDURE — 71045 X-RAY EXAM CHEST 1 VIEW: CPT

## 2024-02-01 PROCEDURE — 36415 COLL VENOUS BLD VENIPUNCTURE: CPT

## 2024-02-01 PROCEDURE — 85730 THROMBOPLASTIN TIME PARTIAL: CPT

## 2024-02-01 RX ORDER — LEVOTHYROXINE SODIUM 0.05 MG/1
50 TABLET ORAL DAILY
Status: DISCONTINUED | OUTPATIENT
Start: 2024-02-01 | End: 2024-02-02 | Stop reason: HOSPADM

## 2024-02-01 RX ORDER — POTASSIUM CHLORIDE 7.45 MG/ML
10 INJECTION INTRAVENOUS PRN
Status: DISCONTINUED | OUTPATIENT
Start: 2024-02-01 | End: 2024-02-02 | Stop reason: HOSPADM

## 2024-02-01 RX ORDER — ALBUTEROL SULFATE 2.5 MG/3ML
2.5 SOLUTION RESPIRATORY (INHALATION) EVERY 6 HOURS PRN
Status: DISCONTINUED | OUTPATIENT
Start: 2024-02-01 | End: 2024-02-02 | Stop reason: HOSPADM

## 2024-02-01 RX ORDER — POTASSIUM CHLORIDE 20 MEQ/1
40 TABLET, EXTENDED RELEASE ORAL PRN
Status: DISCONTINUED | OUTPATIENT
Start: 2024-02-01 | End: 2024-02-02 | Stop reason: HOSPADM

## 2024-02-01 RX ORDER — 0.9 % SODIUM CHLORIDE 0.9 %
500 INTRAVENOUS SOLUTION INTRAVENOUS ONCE
Status: COMPLETED | OUTPATIENT
Start: 2024-02-01 | End: 2024-02-02

## 2024-02-01 RX ORDER — LEVOTHYROXINE SODIUM 0.05 MG/1
50 TABLET ORAL DAILY
Status: DISCONTINUED | OUTPATIENT
Start: 2024-02-01 | End: 2024-02-01

## 2024-02-01 RX ADMIN — Medication 400 MG: at 08:19

## 2024-02-01 RX ADMIN — LEVOTHYROXINE SODIUM 50 MCG: 50 TABLET ORAL at 12:53

## 2024-02-01 RX ADMIN — CEFEPIME 1000 MG: 1 INJECTION, POWDER, FOR SOLUTION INTRAMUSCULAR; INTRAVENOUS at 01:06

## 2024-02-01 RX ADMIN — PANTOPRAZOLE SODIUM 40 MG: 40 TABLET, DELAYED RELEASE ORAL at 06:55

## 2024-02-01 RX ADMIN — MONTELUKAST 10 MG: 10 TABLET, FILM COATED ORAL at 17:38

## 2024-02-01 RX ADMIN — CEFEPIME 1000 MG: 1 INJECTION, POWDER, FOR SOLUTION INTRAMUSCULAR; INTRAVENOUS at 12:56

## 2024-02-01 RX ADMIN — SODIUM CHLORIDE 15 ML: 900 IRRIGANT IRRIGATION at 22:08

## 2024-02-01 RX ADMIN — POLYETHYLENE GLYCOL 3350 17 G: 17 POWDER, FOR SOLUTION ORAL at 08:19

## 2024-02-01 RX ADMIN — POTASSIUM CHLORIDE 40 MEQ: 1500 TABLET, EXTENDED RELEASE ORAL at 10:01

## 2024-02-01 RX ADMIN — SODIUM CHLORIDE 500 ML: 9 INJECTION, SOLUTION INTRAVENOUS at 23:48

## 2024-02-01 RX ADMIN — Medication 400 MG: at 21:41

## 2024-02-01 RX ADMIN — SODIUM CHLORIDE, PRESERVATIVE FREE 10 ML: 5 INJECTION INTRAVENOUS at 08:20

## 2024-02-01 RX ADMIN — GUAIFENESIN AND DEXTROMETHORPHAN HYDROBROMIDE 1 TABLET: 600; 30 TABLET, EXTENDED RELEASE ORAL at 10:01

## 2024-02-01 RX ADMIN — SODIUM CHLORIDE, PRESERVATIVE FREE 10 ML: 5 INJECTION INTRAVENOUS at 21:42

## 2024-02-01 RX ADMIN — ENOXAPARIN SODIUM 30 MG: 100 INJECTION SUBCUTANEOUS at 08:19

## 2024-02-01 RX ADMIN — PROMETHAZINE HYDROCHLORIDE AND CODEINE PHOSPHATE 5 ML: 6.25; 1 SOLUTION ORAL at 23:45

## 2024-02-01 ASSESSMENT — ENCOUNTER SYMPTOMS
BACK PAIN: 0
SINUS PAIN: 0
BLOOD IN STOOL: 0
SORE THROAT: 0
STRIDOR: 0
VOICE CHANGE: 0
SINUS PRESSURE: 0
DIARRHEA: 0
CONSTIPATION: 0
EYE DISCHARGE: 0
WHEEZING: 0
FACIAL SWELLING: 0
NAUSEA: 0
TROUBLE SWALLOWING: 0
SHORTNESS OF BREATH: 1
EYE ITCHING: 0
RHINORRHEA: 0
CHOKING: 0
COUGH: 1
COLOR CHANGE: 0
VOMITING: 0
PHOTOPHOBIA: 0

## 2024-02-01 NOTE — PROGRESS NOTES
[facility] Progress Note    2024     Itzel Fenton    MRN: [id number]    : 1960    Referring MD: No referring provider defined for this encounter.      SUBJECTIVE: Had an episode this morning of cough, followed by choking/throat constriction feeling.Suspect laryngospasm. She improved without intervention and is off O2 this morning.     ECOG PS: (1) Restricted in physically strenuous activity, ambulatory and able to do work of light nature    KPS: 80% Normal activity with effort; some signs or symptoms of disease    Isolation: None    Medications    Scheduled Meds:   magnesium oxide  400 mg Oral BID    polyethylene glycol  17 g Oral Daily    enoxaparin  30 mg SubCUTAneous Daily    budesonide  0.5 mg Nebulization BID RT    arformoterol tartrate  15 mcg Nebulization BID RT    albuterol  2.5 mg Nebulization TID RT    pantoprazole  40 mg Oral QAM AC    montelukast  10 mg Oral QPM    tiotropium  2 puff Inhalation Daily RT    sodium chloride flush  5-40 mL IntraVENous 2 times per day    Saline Mouthwash  15 mL Swish & Spit 4x Daily AC & HS    cefepime  1,000 mg IntraVENous Q12H     Continuous Infusions:   sodium chloride 100 mL/hr at 24 1516    sodium chloride      sodium chloride       PRN Meds:.perflutren lipid microspheres, diphenhydrAMINE-zinc acetate, acetaminophen, medicated lip ointment, ondansetron, albuterol, prochlorperazine **OR** prochlorperazine, promethazine-codeine, sodium chloride, sodium chloride flush, sodium chloride, magnesium hydroxide, Saline Mouthwash, alteplase (CATHFLO) 2 mg in sterile water 2 mL injection    ROS:  As noted above, otherwise remainder of 10-point ROS negative    Physical Exam:     I&O:    Intake/Output Summary (Last 24 hours) at 2024 0817  Last data filed at 2024 0656  Gross per 24 hour   Intake 4015 ml   Output 2075 ml   Net 1940 ml         Vital Signs:  BP (!) 86/59   Pulse 94   Temp 98.6 °F (37 °C) (Oral)   Resp 16   Ht 1.626 m (5' 4\")   Wt 72.4 kg  Procalcitonin 4.63 1/27/24  - RVP 1/28/24 Neg  - MRSA 1/28/24 Neg  - Rapid COVID/Flu 1/27: Neg  - Check legionella, strep pneumo 1/29 - Neg  - Procal 4.63 on admit. Elevated to 5 1/30 evening- trending down  - CT chest 1/27/24:    Mucous plugging in the lung bases most extensively the left lower lobe with patchy airspace consolidation within the inferior aspect of the left lower lobe   consistent with pneumonia. Mild airspace disease within the inferior right middle lobe.   Groundglass and tree-in-bud noncalcified nodular densities within the inferior   right upper lobe and right lower lobe, most consistent with small airways   infection   -Request pulm consult for recurrent pneumonia due to mucus plugging and bronchiectasis. Consider incentive spirometery, chest PT and follow up on Pulm recs. Continue with inhaled steroids, alb and singulair.    For possible laryngospasm or laryngitis from infection/inflammation- Has been having recurrent throat constriction/choking sensation after coughing bout- Denies any aspiration. Will get ENT and speech consult .    3. GVHD: vaginal and stable   - Continue premarin cream   - Followed by Dr. Sherman  Dry eyes: like GVHD  - cont topical eye moisturizer     3. Heme: Thrombocytopenia - chronic and stable, leukocytosis r/t infection improving- resolved  - No transfusion today        4. Metabolic/CKD: HypoK,   - Baseline Cr 2.5-2.7  - S/p lasix 20mg IV x1 1/30 evening  - Start MgOx 400mg PO BID  - Give KPhos 30mmol x1 1/31    6. GI / Nutrition:    Nutrition:    - Cont low microbial diet  - Dietician to follow closely      7. Pulmonary: Pna POA; h/o multiple pna and mucous plugging  - Encourage IS & ambulation  - Cont supplemental O2 to maintain SpO2 >92% - currently on room air  - Followed by Dr. Ma in OP  - Cont singulair  - Cont albuterol BID  - Cont Spiriva daily    8. Hypotension- Clinically asymptomatic with ECHO showing EF- 45%. Will stop fluid bolus or IVF hydration.

## 2024-02-01 NOTE — PROGRESS NOTES
Speech pathology  Contact note    Order received for eval of vocal cord function.  Messaged referring MD re need for ENT consult. Please re-refer after ENT assessment if appropriate for speech follow up      BERNICE AMBRIZ M.S./CCC-SLP #[id number]  Pg. # [phone]

## 2024-02-01 NOTE — PROGRESS NOTES
Pulmonary Medicine Follow-up Note    CC: Pneumonia and mucous plugging    SUBJECTIVE / INTERVAL HISTORY:  Doing well overall, had a rough patch in the am due to increased WOB after coughing spell and reported bronchospasm but seems she has been dealing with vocal cord problems rather than true bronchospasm.   - O2 Flow Rate (L/min): 0.5 L/min    ROS:  Denies headache, nausea or chest pain.    24HR INTAKE/OUTPUT:    Intake/Output Summary (Last 24 hours) at 2024 1210  Last data filed at 2024 0813  Gross per 24 hour   Intake 3895 ml   Output 2375 ml   Net 1520 ml          magnesium oxide  400 mg Oral BID    polyethylene glycol  17 g Oral Daily    enoxaparin  30 mg SubCUTAneous Daily    budesonide  0.5 mg Nebulization BID RT    arformoterol tartrate  15 mcg Nebulization BID RT    albuterol  2.5 mg Nebulization TID RT    pantoprazole  40 mg Oral QAM AC    montelukast  10 mg Oral QPM    tiotropium  2 puff Inhalation Daily RT    sodium chloride flush  5-40 mL IntraVENous 2 times per day    Saline Mouthwash  15 mL Swish & Spit 4x Daily AC & HS    cefepime  1,000 mg IntraVENous Q12H       PHYSICAL EXAMINATION:  /78   Pulse 92   Temp 98.6 °F (37 °C) (Oral)   Resp 16   Ht 1.626 m (5' 4\")   Wt 74.6 kg (164 lb 8 oz)   LMP 2008   SpO2 92%   BMI 28.24 kg/m²   CURRENT PULSE OXIMETRY:  SpO2: 92 %  24HR PULSE OXIMETRY RANGE:  SpO2  Av.8 %  Min: 90 %  Max: 98 %     Gen: No distress. Speaking in full sentences with no accessory muscle use  HEENT: PERRL, EOMI, OP nl  Lung: Diminished breath sounds bilaterally, no wheezing, no crackles  CV: RRR without M/R/R  Abd: +BS, soft, NT/ND  Ext: No edema.  Neuro: Alert and oriented, follows simple commands, nonfocal    DATA  CBC:   Recent Labs     24  0412 24  0349 24  0356   WBC 12.1* 7.4 6.6   HGB 13.5 13.8 12.9   HCT 42.0 42.5 40.0   MCV 84.5 84.8 84.3   * 145 171       BMP:   Recent Labs     24  0412 24  0349 24  0355

## 2024-02-01 NOTE — PLAN OF CARE
Problem: Metabolic/Fluid and Electrolytes - Adult  Goal: Electrolytes maintained within normal limits  Outcome: Progressing  Flowsheets (Taken 2/1/2024 0542)  Electrolytes maintained within normal limits:   Instruct patient on fluid and nutrition restrictions as appropriate   Fluid restriction as ordered   Monitor response to electrolyte replacements, including repeat lab results as appropriate   Monitor labs and assess patient for signs and symptoms of electrolyte imbalances   Administer electrolyte replacement as ordered  Note: Patient's electrolyte levels did not meet parameters for replacements. Place of care ongoing.        Problem: Hematologic - Adult  Goal: Maintains hematologic stability  Outcome: Progressing  Flowsheets (Taken 2/1/2024 0542)  Maintains hematologic stability:   Assess for signs and symptoms of bleeding or hemorrhage   Administer blood products/factors as ordered   Monitor labs for bleeding or clotting disorders  Note: Patient's hemoglobin this AM:   Recent Labs     02/01/24  0356   HGB 12.9     Patient's platelet count this AM:   Recent Labs     02/01/24  0356       Thrombocytopenia not present at this time.  Patient showing no signs or symptoms of active bleeding.  Transfusion not indicated at this time.  Patient verbalizes understanding of all instructions. Will continue to assess and implement POC. Call light within reach and hourly rounding in place.

## 2024-02-01 NOTE — PROGRESS NOTES
At 0606, this RN messaged Hailee Peralta NP on call Itzel Fenton in room 3526's aptt ws 111.6. I believe that lab might have drawn it incorrectly. She is on Lovenox. Her last aptt on 1/29 was 36.5. Do you want me to reorder the test? Thanks.  See new orders.

## 2024-02-01 NOTE — PLAN OF CARE
Problem: Pain  Goal: Verbalizes/displays adequate comfort level or baseline comfort level  Outcome: Progressing- patient with complaints of cough, PRN cough medication administered. Relief of discomfort reported upon reassessment. Care continues.      Problem: Safety - Adult  Goal: Free from fall injury  Outcome: Progressing  Orthostatic vital signs obtained at start of shift - see flowsheet for details.  Pt does not meet criteria for orthostasis.  Pt is a Med fall risk. See Tavares Fall Score and ABCDS Injury Risk assessments.   - Screening for Orthostasis AND not a [place] Risk per TAVARES/ABCDS: Pt bed is in low position, side rails up, call light and belongings are in reach.  Fall risk light is on outside pts room.  Pt encouraged to call for assistance as needed. Will continue with hourly rounds for PO intake, pain needs, toileting and repositioning as needed.      Problem: Respiratory - Adult  Goal: Achieves optimal ventilation and oxygenation  Outcome: Progressing- patient with sats ranging from 88-93 during shift. 0.5 L supplemental O2 placed for short period this am to keep sat at goal of 92 or above. Back to room air. Care continues.     Problem: Metabolic/Fluid and Electrolytes - Adult  Goal: Electrolytes maintained within normal limits  2/1/2024 1453 by Peyton Alejandra, RN  Outcome: Progressing- PRN potassium replacement ordered and administered.

## 2024-02-01 NOTE — CONSULTS
Pulmonary & Critical Care Medicine ICU Consultation    CHIEF COMPLAINT / HPI:    The patient is a 63 y.o. female with significant past medical history of diffuse large B-cell lymphoma status post bone marrow transplant and moderate persistent asthma the presents to the ED complaining of nausea, body aches and chills since last night.  Patient has history of previous bronchoscopies last year due to mucous plugging.  Noted to be tachypneic and hypoxic requiring oxygen supplementation at the ED, workup revealed leukocytosis, from cytopenia and high hemoglobin.  CT showed left lower lobe infiltrate with ipsilateral mucous plugging.  Admitted for further management and pulmonary consulted for further evaluation.    Past Medical History:      Diagnosis Date    Allergic rhinitis     Arthritis     CKD (chronic kidney disease)     DLBCL (diffuse large B cell lymphoma) ([facility]) 2010    non-hodgkins lymphoma    Encounter for aftercare following bone marrow transplant ([facility]) 06/10/2016    GERD (gastroesophageal reflux disease)     Hx of non-Hodgkin's lymphoma     Hypogammaglobulinemia ([facility]) 2017    MDRO (multiple drug resistant organisms) resistance 05/15/2016    E.coli MDRO in urine    Migraines     Neutropenia ([facility]) 2016    Non Hodgkin's lymphoma ([facility])     Pancytopenia ([facility]) 2016    Peripheral neuropathy     PONV (postoperative nausea and vomiting)     Thyroid disease       Past Surgical History:        Procedure Laterality Date    BONE MARROW TRANSPLANT  2016    BONE MARROW TRANSPLANT      BRONCHOSCOPY  2018    BRONCHOSCOPY THERAPUTIC ASPIRATION INITIAL WITH MUCUS PLUG SENT FOR BACTERIAL CULTURE performed by James Ma MD at [facility] ENDOSCOPY    BRONCHOSCOPY Left 2023    BRONCHOSCOPY WITH BRONCHOALVEOLAR LAVAGE OF THE LEFT LUNG performed by James Ma MD at [facility] ENDOSCOPY     SECTION      x 5    COLONOSCOPY      COLONOSCOPY N/A 2021    COLONOSCOPY POLYPECTOMY SNARE/COLD 
swelling.      Tonsils: No tonsillar abscesses.   Eyes:      General: Lids are normal.         Right eye: No discharge.         Left eye: No discharge.      Extraocular Movements:      Right eye: Normal extraocular motion and no nystagmus.      Left eye: Normal extraocular motion and no nystagmus.      Conjunctiva/sclera:      Right eye: No chemosis or exudate.     Left eye: No chemosis or exudate.  Neck:      Thyroid: No thyroid mass or thyromegaly.      Vascular: Normal carotid pulses.      Trachea: No tracheal tenderness or tracheal deviation.   Cardiovascular:      Rate and Rhythm: Normal rate and regular rhythm.   Pulmonary:      Effort: No tachypnea, bradypnea or respiratory distress.      Breath sounds: No stridor.   Musculoskeletal:      Right shoulder: Normal range of motion.      Cervical back: Neck supple.   Lymphadenopathy:      Head:      Right side of head: No submental, submandibular, tonsillar, preauricular, posterior auricular or occipital adenopathy.      Left side of head: No submental, submandibular, tonsillar, preauricular, posterior auricular or occipital adenopathy.      Cervical: No cervical adenopathy.      Right cervical: No superficial, deep or posterior cervical adenopathy.     Left cervical: No superficial, deep or posterior cervical adenopathy.   Skin:     General: Skin is warm and dry.      Findings: No bruising, erythema, laceration, lesion or rash.   Neurological:      Mental Status: She is alert and oriented to person, place, and time.      Cranial Nerves: No cranial nerve deficit.   Psychiatric:         Speech: Speech normal.         Behavior: Behavior normal.         Labs    CBC:  Recent Labs     01/30/24 0412 01/31/24 0349 02/01/24 0356   WBC 12.1* 7.4 6.6   RBC 4.97 5.01 4.75   HGB 13.5 13.8 12.9   HCT 42.0 42.5 40.0   MCV 84.5 84.8 84.3   RDW 16.8* 16.7* 16.6*   * 145 171     CHEMISTRIES:  Recent Labs     01/30/24 0412 01/31/24 0349 02/01/24  0355    138 141

## 2024-02-02 VITALS
HEIGHT: 64 IN | BODY MASS INDEX: 27.93 KG/M2 | TEMPERATURE: 98.1 F | DIASTOLIC BLOOD PRESSURE: 74 MMHG | HEART RATE: 78 BPM | OXYGEN SATURATION: 98 % | SYSTOLIC BLOOD PRESSURE: 101 MMHG | RESPIRATION RATE: 18 BRPM | WEIGHT: 163.6 LBS

## 2024-02-02 LAB
ALBUMIN SERPL-MCNC: 3.1 G/DL (ref 3.4–5)
ALP SERPL-CCNC: 95 U/L (ref 40–129)
ALT SERPL-CCNC: 25 U/L (ref 10–40)
ANION GAP SERPL CALCULATED.3IONS-SCNC: 11 MMOL/L (ref 3–16)
AST SERPL-CCNC: 21 U/L (ref 15–37)
BASOPHILS # BLD: 0.1 K/UL (ref 0–0.2)
BASOPHILS NFR BLD: 1.2 %
BILIRUB DIRECT SERPL-MCNC: <0.2 MG/DL (ref 0–0.3)
BILIRUB INDIRECT SERPL-MCNC: ABNORMAL MG/DL (ref 0–1)
BILIRUB SERPL-MCNC: 0.3 MG/DL (ref 0–1)
BUN SERPL-MCNC: 25 MG/DL (ref 7–20)
CALCIUM SERPL-MCNC: 9.7 MG/DL (ref 8.3–10.6)
CHLORIDE SERPL-SCNC: 107 MMOL/L (ref 99–110)
CO2 SERPL-SCNC: 22 MMOL/L (ref 21–32)
CREAT SERPL-MCNC: 2.4 MG/DL (ref 0.6–1.2)
DEPRECATED RDW RBC AUTO: 16.4 % (ref 12.4–15.4)
EOSINOPHIL # BLD: 0.3 K/UL (ref 0–0.6)
EOSINOPHIL NFR BLD: 4.2 %
GFR SERPLBLD CREATININE-BSD FMLA CKD-EPI: 22 ML/MIN/{1.73_M2}
GLUCOSE SERPL-MCNC: 108 MG/DL (ref 70–99)
HCT VFR BLD AUTO: 39.3 % (ref 36–48)
HGB BLD-MCNC: 12.9 G/DL (ref 12–16)
INR PPP: 1.11 (ref 0.84–1.16)
LDH SERPL L TO P-CCNC: 168 U/L (ref 100–190)
LYMPHOCYTES # BLD: 2.6 K/UL (ref 1–5.1)
LYMPHOCYTES NFR BLD: 36.5 %
MAGNESIUM SERPL-MCNC: 1.7 MG/DL (ref 1.8–2.4)
MCH RBC QN AUTO: 27.7 PG (ref 26–34)
MCHC RBC AUTO-ENTMCNC: 32.8 G/DL (ref 31–36)
MCV RBC AUTO: 84.6 FL (ref 80–100)
MONOCYTES # BLD: 0.9 K/UL (ref 0–1.3)
MONOCYTES NFR BLD: 13.4 %
NEUTROPHILS # BLD: 3.1 K/UL (ref 1.7–7.7)
NEUTROPHILS NFR BLD: 44.7 %
PHOSPHATE SERPL-MCNC: 2.5 MG/DL (ref 2.5–4.9)
PLATELET # BLD AUTO: 197 K/UL (ref 135–450)
PMV BLD AUTO: 8.8 FL (ref 5–10.5)
POTASSIUM SERPL-SCNC: 3.9 MMOL/L (ref 3.5–5.1)
PROT SERPL-MCNC: 5.9 G/DL (ref 6.4–8.2)
PROTHROMBIN TIME: 14.3 SEC (ref 11.5–14.8)
RBC # BLD AUTO: 4.65 M/UL (ref 4–5.2)
SODIUM SERPL-SCNC: 140 MMOL/L (ref 136–145)
URATE SERPL-MCNC: 4 MG/DL (ref 2.6–6)
WBC # BLD AUTO: 7 K/UL (ref 4–11)

## 2024-02-02 PROCEDURE — 6370000000 HC RX 637 (ALT 250 FOR IP): Performed by: NURSE PRACTITIONER

## 2024-02-02 PROCEDURE — 85025 COMPLETE CBC W/AUTO DIFF WBC: CPT

## 2024-02-02 PROCEDURE — 6360000002 HC RX W HCPCS: Performed by: NURSE PRACTITIONER

## 2024-02-02 PROCEDURE — 85610 PROTHROMBIN TIME: CPT

## 2024-02-02 PROCEDURE — 36415 COLL VENOUS BLD VENIPUNCTURE: CPT

## 2024-02-02 PROCEDURE — 2580000003 HC RX 258: Performed by: INTERNAL MEDICINE

## 2024-02-02 PROCEDURE — 83615 LACTATE (LD) (LDH) ENZYME: CPT

## 2024-02-02 PROCEDURE — 99231 SBSQ HOSP IP/OBS SF/LOW 25: CPT | Performed by: INTERNAL MEDICINE

## 2024-02-02 PROCEDURE — 6370000000 HC RX 637 (ALT 250 FOR IP): Performed by: INTERNAL MEDICINE

## 2024-02-02 PROCEDURE — 84550 ASSAY OF BLOOD/URIC ACID: CPT

## 2024-02-02 PROCEDURE — 80076 HEPATIC FUNCTION PANEL: CPT

## 2024-02-02 PROCEDURE — 83735 ASSAY OF MAGNESIUM: CPT

## 2024-02-02 PROCEDURE — 6360000002 HC RX W HCPCS: Performed by: INTERNAL MEDICINE

## 2024-02-02 PROCEDURE — 84100 ASSAY OF PHOSPHORUS: CPT

## 2024-02-02 PROCEDURE — 80048 BASIC METABOLIC PNL TOTAL CA: CPT

## 2024-02-02 PROCEDURE — 6370000000 HC RX 637 (ALT 250 FOR IP): Performed by: STUDENT IN AN ORGANIZED HEALTH CARE EDUCATION/TRAINING PROGRAM

## 2024-02-02 RX ORDER — FLUCONAZOLE 200 MG/1
200 TABLET ORAL DAILY
Qty: 1 TABLET | Refills: 0 | Status: SHIPPED | OUTPATIENT
Start: 2024-02-03 | End: 2024-02-04

## 2024-02-02 RX ORDER — FLUCONAZOLE 200 MG/1
200 TABLET ORAL DAILY
Status: DISCONTINUED | OUTPATIENT
Start: 2024-02-02 | End: 2024-02-02 | Stop reason: HOSPADM

## 2024-02-02 RX ADMIN — SODIUM CHLORIDE 15 ML: 900 IRRIGANT IRRIGATION at 07:24

## 2024-02-02 RX ADMIN — SODIUM CHLORIDE, PRESERVATIVE FREE 10 ML: 5 INJECTION INTRAVENOUS at 08:16

## 2024-02-02 RX ADMIN — Medication 400 MG: at 08:12

## 2024-02-02 RX ADMIN — CEFEPIME 1000 MG: 1 INJECTION, POWDER, FOR SOLUTION INTRAMUSCULAR; INTRAVENOUS at 12:52

## 2024-02-02 RX ADMIN — GUAIFENESIN AND DEXTROMETHORPHAN HYDROBROMIDE 1 TABLET: 600; 30 TABLET, EXTENDED RELEASE ORAL at 08:12

## 2024-02-02 RX ADMIN — CEFEPIME 1000 MG: 1 INJECTION, POWDER, FOR SOLUTION INTRAMUSCULAR; INTRAVENOUS at 01:07

## 2024-02-02 RX ADMIN — ACETAMINOPHEN 650 MG: 325 TABLET ORAL at 08:12

## 2024-02-02 RX ADMIN — LEVOTHYROXINE SODIUM 50 MCG: 50 TABLET ORAL at 07:24

## 2024-02-02 RX ADMIN — ENOXAPARIN SODIUM 30 MG: 100 INJECTION SUBCUTANEOUS at 08:12

## 2024-02-02 RX ADMIN — FLUCONAZOLE 200 MG: 200 TABLET ORAL at 15:06

## 2024-02-02 RX ADMIN — PANTOPRAZOLE SODIUM 40 MG: 40 TABLET, DELAYED RELEASE ORAL at 07:24

## 2024-02-02 ASSESSMENT — PAIN DESCRIPTION - FREQUENCY: FREQUENCY: INTERMITTENT

## 2024-02-02 ASSESSMENT — PAIN DESCRIPTION - ORIENTATION: ORIENTATION: ANTERIOR

## 2024-02-02 ASSESSMENT — PAIN DESCRIPTION - LOCATION: LOCATION: HEAD

## 2024-02-02 ASSESSMENT — PAIN DESCRIPTION - DESCRIPTORS: DESCRIPTORS: ACHING

## 2024-02-02 ASSESSMENT — PAIN - FUNCTIONAL ASSESSMENT: PAIN_FUNCTIONAL_ASSESSMENT: ACTIVITIES ARE NOT PREVENTED

## 2024-02-02 ASSESSMENT — PAIN SCALES - GENERAL: PAINLEVEL_OUTOF10: 3

## 2024-02-02 ASSESSMENT — PAIN DESCRIPTION - PAIN TYPE: TYPE: ACUTE PAIN

## 2024-02-02 NOTE — DISCHARGE SUMMARY
02/01/24  0355 02/02/24  0405    141 140   K 3.3* 3.3* 3.9    106 107   CO2 22 23 22   PHOS 1.9*  --  2.5   BUN 14 18 25*   CREATININE 2.5* 2.3* 2.4*   MG 1.50*  --  1.70*     LIVP:   Recent Labs     01/31/24  0349 02/02/24  0405   AST 24 21   ALT 23 25   BILIDIR 0.3 <0.2   BILITOT 1.0 0.3   ALKPHOS 118 95     Coags:   Recent Labs     01/31/24  0349 02/01/24  0355 02/02/24  0405   PROTIME 13.6  --  14.3   INR 1.04  --  1.11   APTT  --  111.9*  --      Uric Acid   Recent Labs     01/31/24  0349 02/02/24  0405   LABURIC 4.4 4.0     Tacro:  No results for input(s): \"TACROLEV\" in the last 72 hours.  CMV Quant DNA by PCR:   Lab Results   Component Value Date/Time    CMVDNAQNT <2.4 12/14/2017 11:51 AM    CMVDNAQNT Not Detected 12/14/2017 11:51 AM     IgG: No results for input(s): \"IGG\" in the last 72 hours.      PROBLEM LIST:          1. 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 Asthma  4. Chronic HFrEF      TREATMENT:            1. S/p Allo 2016      ASSESSMENT AND PLAN:           1. DLBCL:  S/p Allo transplant 5/16/16    - With no gross evidence of active dis  - Cont to follow expectantly   - CBC stable, clinically no B symptoms     2. ID: Sepsis/Pneumonia POA - gram neg vs atypical, afebrile  - s/p Cefepime Day +7/7 (1/27--2/2/24)  - s/p Azithromycin Day +5/5 (1/27-1/31/24)  - Cont prophylactic Pen-VK indefinitely once off IV abx    - Pancx 1/27/24 NG  - IgG 1/29/24 525  - RVP 1/28/24 Neg  - MRSA 1/28/24 Neg  - Rapid COVID/Flu 1/27: Neg  - Check legionella, strep pneumo 1/29 - Neg  - CT chest 1/27/24:    Mucous plugging in the lung bases most extensively the left lower lobe with patchy airspace consolidation within the inferior aspect of the left lower lobe   consistent with pneumonia. Mild airspace disease within the inferior right middle lobe.   Groundglass and tree-in-bud noncalcified nodular densities within the inferior   right upper lobe and right lower lobe, most consistent with small  or contact the physician with any unresolved nausea/vomiting/diarrhea/pain or temperature greater than 100.5 F or any other unusual symptoms.       This discharge summary and plan was discussed and agreed upon with Dr. Gan.    DWIGHT Jones - CNP

## 2024-02-02 NOTE — PROGRESS NOTES
Discharge teaching and instructions for diagnosis of pneumonitis completed with patient using teachback method. AVS reviewed. Medications, dosages, schedule, and potential side effects reviewed with patient. Patient voiced understanding regarding prescriptions, follow up appointments, and care of self at home. Discharged in a wheelchair to  independent living per family    Verified appropriate follow up appointments scheduled & pt instructed on how to schedule appts.  Diet & activity discussed.    Patient verbalized understanding and questions were answered to her satisfaction.  Signed discharge instructions were given to the patient and a copy placed in the paper-lite chart.      Evelyn Mejia RN

## 2024-02-02 NOTE — PLAN OF CARE
Problem: Pain  Goal: Verbalizes/displays adequate comfort level or baseline comfort level  2/2/2024 1429 by Evelyn eMjia RN  Outcome: Completed  Note: Pt educated on importance of calling for pain meds when in pain. Pt verbalized understanding. Pain assessment completed at least once per shift. Will continue to monitor.        Problem: ABCDS Injury Assessment  Goal: Absence of physical injury  2/2/2024 1429 by Evelyn Mejia RN  Outcome: Completed  Note: Orthostatic vital signs obtained at start of shift - see flowsheet for details.  Pt does not meet criteria for orthostasis.  Pt is a Med fall risk. See Tavares Fall Score and ABCDS Injury Risk assessments.     - Screening for Orthostasis AND not a [place] Risk per TAVARES/ABCDS: Pt bed is in low position, side rails up, call light and belongings are in reach.  Fall risk light is on outside pts room.  Pt encouraged to call for assistance as needed. Will continue with hourly rounds for PO intake, pain needs, toileting and repositioning as needed.        Problem: Safety - Adult  Goal: Free from fall injury  2/2/2024 1429 by Evelyn Mejia RN  Outcome: Completed  Note: Pt scores as medium fall risk. Pt encouraged to call for assistance when needed, verbalized understanding.         Problem: Respiratory - Adult  Goal: Achieves optimal ventilation and oxygenation  2/2/2024 1429 by Evelyn Mejia RN  Outcome: Completed  Note: Pt O2 sats remain stable on room air. No evidence of respiratory distress.        Problem: Infection - Adult  Goal: Absence of infection during hospitalization  2/2/2024 1429 by Eevlyn Mejia RN  Outcome: Completed  Note: CVC site remains free of signs/symptoms of infection. No drainage, edema, erythema, pain, or warmth noted at site. Dressing changes continue per protocol and on an as needed basis - see flowsheet.     Compliant with [facility] Bath Protocol:  Performed CHG bath today per [facility] protocol utilizing CHG solution in the shower.

## 2024-02-02 NOTE — PROGRESS NOTES
BUN 14 18 25*   CREATININE 2.5* 2.3* 2.4*       No results for input(s): \"PHART\", \"CWT0YPB\", \"PO2ART\" in the last 72 hours.  LIVER PROFILE:   Recent Labs     01/31/24  0349 02/02/24  0405   AST 24 21   ALT 23 25   BILIDIR 0.3 <0.2   BILITOT 1.0 0.3   ALKPHOS 118 95       Procalcitonin:    Lab Results   Component Value Date/Time    PROCAL 3.46 01/31/2024 04:42 PM       CXR REVIEWED BY ME AND SHOWED:  XR CHEST PORTABLE   Final Result      1. Mild bilateral basilar airspace disease, slightly improved.            XR CHEST PORTABLE   Final Result   Left basilar infiltrate and pleural effusion.      Electronically signed by Jessica Aburto      CT CHEST WO CONTRAST   Final Result      Mucous plugging in the lung bases most extensively the left lower lobe with   patchy airspace consolidation within the inferior aspect of the left lower lobe   consistent with pneumonia.      Mild airspace disease within the inferior right middle lobe.      Groundglass and tree-in-bud noncalcified nodular densities within the inferior   right upper lobe and right lower lobe, most consistent with small airways   infection.      XR CHEST PORTABLE   Final Result      No acute chest process.           ASSESSMENT:  Left lower lobe pneumonia  Bilateral left more than right mucous plugging  Moderate persistent asthma, no exacerbation  History of bone marrow transplant  Suspect vocal cord dysfunction.     PLAN:  Provide O2 supplementation to keep SpO2 between 88-92% if needed.    Antibiotics per primary team.   Follow cultures  Antipyretics   Brovana/Pulmicort/Spiriva  Albuterol   Incentive inspirometer, Flutter valve / vibraPAP device  Out of bed and up to chair as tolerated   ENT will follow as outpatient for vocal cord evaluation  She will re-schedule her pulmonary appointment with Dr. Ma    Pulmonary/Critical Care will sign off. Please call us back if there is need for re-evaluation      Filipe Infante \"Phillip\" Mary Johnson MD  Pulmonary  and Critical Care Medicine

## 2024-02-02 NOTE — DISCHARGE INSTR - COC
Continuity of Care Form    Patient Name: Itzel Fenton   :  1960  MRN:  [id number]    Admit date:  2024  Discharge date:  ***    Code Status Order: Full Code   Advance Directives:     Admitting Physician:  Bruno Dickey MD  PCP: Adria Saldivar    Discharging Nurse: ***  Discharging Hospital Unit/Room#: 3526/3526-01  Discharging Unit Phone Number: ***    Emergency Contact:   Extended Emergency Contact Information  Primary Emergency Contact: Quinton Durand (Baxter)   [facility]  Home Phone: [phone]  Mobile Phone: [phone]  Relation: Brother/Sister  Secondary Emergency Contact: MoeJosefina   [facility]  Home Phone: [phone]  Relation: Child    Past Surgical History:  Past Surgical History:   Procedure Laterality Date    BONE MARROW TRANSPLANT  2016    BONE MARROW TRANSPLANT      BRONCHOSCOPY  2018    BRONCHOSCOPY THERAPUTIC ASPIRATION INITIAL WITH MUCUS PLUG SENT FOR BACTERIAL CULTURE performed by James Ma MD at [facility] ENDOSCOPY    BRONCHOSCOPY Left 2023    BRONCHOSCOPY WITH BRONCHOALVEOLAR LAVAGE OF THE LEFT LUNG performed by James Ma MD at [facility] ENDOSCOPY     SECTION      x 5    COLONOSCOPY      COLONOSCOPY N/A 2021    COLONOSCOPY POLYPECTOMY SNARE/COLD BIOPSY performed by Dhaval Lockhart MD at [facility] END[facility]COPY    HYSTERECTOMY (CERVIX STATUS UNKNOWN)  2010    with oophorectomy    LYMPH NODE BIOPSY  2011    R external iliac node -non diagnostic    LYMPH NODE BIOPSY  2012    R external node - Dr Tolliver - [facility]    OTHER SURGICAL HISTORY  10/08/2012    INSERTION NEOSTAR CATHETER and REMOVAL        OTHER SURGICAL HISTORY Right 05/10/2016    INSERTION TRIPLE LUMEN DORANTES CENTRAL LINE    PAIN MANAGEMENT PROCEDURE Left 2021    LEFT L2, L3 TRANSFORAMINAL EPIDURAL STEROID INJECTION WITH FLUOROSCOPY (99557, 45526) performed by Vickey Urban MD at [facility]    [place] BRNCHSC W/BRNCL ALVEOLAR LAVAGE N/A  2008   SpO2 97%   BMI 28.08 kg/m²     Last documented pain score (0-10 scale): Pain Level: 3  Last Weight:   Wt Readings from Last 1 Encounters:   24 74.2 kg (163 lb 9.6 oz)     Mental Status:  {IP PT MENTAL STATUS:}    IV Access:  { NURA IV ACCESS:494352008}    Nursing Mobility/ADLs:  Walking   {CHP DME ADLs:899619033}  Transfer  {CHP DME ADLs:532035357}  Bathing  {CHP DME ADLs:067305985}  Dressing  {CHP DME ADLs:231251969}  Toileting  {CHP DME ADLs:316606651}  Feeding  {CHP DME ADLs:301258738}  Med Admin  {CHP DME ADLs:782524718}  Med Delivery   { NURA MED Delivery:356381099}    Wound Care Documentation and Therapy:        Elimination:  Continence:   Bowel: {YES / NO:}  Bladder: {YES / NO:}  Urinary Catheter: {Urinary Catheter:138550732}   Colostomy/Ileostomy/Ileal Conduit: {YES / NO:}       Date of Last BM: ***    Intake/Output Summary (Last 24 hours) at 2024 1145  Last data filed at 2024 0807  Gross per 24 hour   Intake 1830 ml   Output 3100 ml   Net -1270 ml     I/O last 3 completed shifts:  In: 3270 [P.O.:1440; I.V.:1830]  Out: 4375 [Urine:4375]    Safety Concerns:     { NURA Safety Concerns:369053001}    Impairments/Disabilities:      { NURA Impairments/Disabilities:432665271}    Nutrition Therapy:  Current Nutrition Therapy:   { NURA Diet List:418934951}    Routes of Feeding: {[facility] DME Other Feedings:468222834}  Liquids: {Slp liquid thickness:22073}  Daily Fluid Restriction: {CHP DME Yes amt example:441535201}  Last Modified Barium Swallow with Video (Video Swallowing Test): {Done Not Done Date:}    Treatments at the Time of Hospital Discharge:   Respiratory Treatments: ***  Oxygen Therapy:  {Therapy; copd oxygen:22590}  Ventilator:    { CC Vent List:965132404}    Rehab Therapies: {THERAPEUTIC INTERVENTION:0571513656}  Weight Bearing Status/Restrictions: { CC Weight Bearin}  Other Medical Equipment (for information only, NOT a DME order):

## 2024-02-02 NOTE — FLOWSHEET NOTE
02/02/24 0807   Vitals   BP Location Right upper arm   BP Upper/Lower Upper   BP Method Automatic   Patient Position Semi fowlers   Orthostatic B/P and Pulse? Yes   Blood Pressure Lying 89/69   Pulse Lying 82 PER MINUTE   Blood Pressure Sitting 102/70   Pulse Sitting 86 PER MINUTE   Blood Pressure Standing 106/81   Pulse Standing 89 PER MINUTE     Pt denies lightheadedness/dizziness, just c/o slight headache at this time. PRN tylenol administered at this time. Plan of care ongoing.

## 2024-02-02 NOTE — PLAN OF CARE
Problem: Pain  Goal: Verbalizes/displays adequate comfort level or baseline comfort level  Outcome: Progressing  Verbalizes/displays adequate comfort level or baseline comfort level:   Encourage patient to monitor pain and request assistance   Administer analgesics based on type and severity of pain and evaluate response   Consider cultural and social influences on pain and pain management   Assess pain using appropriate pain scale   Implement non-pharmacological measures as appropriate and evaluate response   Notify Licensed Independent Practitioner if interventions unsuccessful or patient reports new pain  Note: No pain noted during this shift.       Problem: ABCDS Injury Assessment  Goal: Absence of physical injury  Outcome: Progressing  Absence of Physical Injury: Implement safety measures based on patient assessment  Note: Orthostatic vital signs obtained at start of shift - see flowsheet for details.  Pt meets criteria for orthostasis.  Pt is a Med fall risk. See Tavares Fall Score and ABCDS Injury Risk assessments.   + Screening for Orthostasis and/or + High Fall Risk per TAVARES/ABCDS: Explained fall risk precautions to pt and family and rationale behind their use to keep the patient safe. Pt bed is in low position, side rails up, call light and belongings are in reach. Fall wristband applied and present on pts wrist.  Bed alarm on.  Pt encouraged to call for assistance. Will continue with hourly rounds for PO intake, pain needs, toileting and repositioning as needed.       Problem: Respiratory - Adult  Goal: Achieves optimal ventilation and oxygenation  Outcome: Progressing  Achieves optimal ventilation and oxygenation:   Position to facilitate oxygenation and minimize respiratory effort   Assess for changes in respiratory status   Initiate smoking cessation protocol as indicated   Assess the need for suctioning and aspirate as needed   Respiratory therapy support as indicated   Assess for changes in mentation  and behavior   Oxygen supplementation based on oxygen saturation or arterial blood gases   Encourage broncho-pulmonary hygiene including cough, deep breathe, incentive spirometry   Assess and instruct to report shortness of breath or any respiratory difficulty      Problem: Infection - Adult  Goal: Absence of infection during hospitalization  Outcome: Progressing  Absence of infection during hospitalization:   Assess and monitor for signs and symptoms of infection   Monitor all insertion sites i.e., indwelling lines, tubes and drains   [place] appropriate cooling/warming therapies per order   Instruct and encourage patient and family to use good hand hygiene technique   Monitor lab/diagnostic results   Monitor endotracheal (as able) and nasal secretions for changes in amount and color   Administer medications as ordered   Identify and instruct in appropriate isolation precautions for identified infection/condition      Problem: Hematologic - Adult  Goal: Maintains hematologic stability  Outcome: Progressing  Maintains hematologic stability:   Assess for signs and symptoms of bleeding or hemorrhage   Administer blood products/factors as ordered   Monitor labs for bleeding or clotting disorders  Note: Patient's hemoglobin this AM:   Recent Labs     02/02/24  0405   HGB 12.9     Patient's platelet count this AM:   Recent Labs     02/02/24  0405       Thrombocytopenia not present at this time.  Patient showing no signs or symptoms of active bleeding.  Transfusion not indicated at this time.  Patient verbalizes understanding of all instructions. Will continue to assess and implement POC. Call light within reach and hourly rounding in place.

## 2024-02-05 ENCOUNTER — CARE COORDINATION (OUTPATIENT)
Dept: CASE MANAGEMENT | Age: 64
End: 2024-02-05

## 2024-02-05 ENCOUNTER — TELEPHONE (OUTPATIENT)
Dept: PULMONOLOGY | Age: 64
End: 2024-02-05

## 2024-02-05 NOTE — TELEPHONE ENCOUNTER
Itzel called looking for a RX for a Nebulizer- she already has the solution but has never had the machine-        Itzel is calling different pharmacies to find out who has one is stock and if covered by insurance-    Isaiah had 1 not covered by insurance- cost $74    She will call back

## 2024-02-05 NOTE — TELEPHONE ENCOUNTER
Itzel called looking for a RX for a Nebulizer- she already has the solution but has never had the machine-

## 2024-02-05 NOTE — TELEPHONE ENCOUNTER
Called Itzel back- have not heard back from her yet-- she said she has an appt with her pcp tomorrow who said she would give her one  during her appt

## 2024-02-05 NOTE — CARE COORDINATION
[facility] Transitions Initial Follow Up Call    Call within 2 business days of discharge: Yes    Patient:  ADAM CASTANEDA Patient :  1960  MRN:  [id number]   Reason for Admission:  pneumonitis   Discharge Date:  24  RARS: 12      Transitions of Care Initial Call    Was this an external facility discharge? no    Discharge Facility: [facility]      Challenges to be reviewed by the provider   Additional needs identified to be addressed with provider:    no    AMB CC Provider Discharge Needs: none          24 n non-[facility] pcp, w/[facility], Dr Adria Saldivar      Non-face-to-face services provided: none  Final CTC attempt to reach Pt regarding recent hospital discharge / unable to reach / CT episode closed,       Follow up appointments:    Future Appointments   Date Time Provider Department Center   2024 10:10 AM Sharad Vicente DO [facility]       NII Carmichael, RN  Care Transition Coordinator  Contact Number:  [phone]

## 2024-02-15 ENCOUNTER — OFFICE VISIT (OUTPATIENT)
Dept: PULMONOLOGY | Age: 64
End: 2024-02-15
Payer: MEDICARE

## 2024-02-15 VITALS
DIASTOLIC BLOOD PRESSURE: 66 MMHG | TEMPERATURE: 89 F | OXYGEN SATURATION: 94 % | SYSTOLIC BLOOD PRESSURE: 100 MMHG | HEIGHT: 64 IN | BODY MASS INDEX: 27.9 KG/M2 | WEIGHT: 163.4 LBS

## 2024-02-15 DIAGNOSIS — Z94.81 STATUS POST BONE MARROW TRANSPLANT (HCC): ICD-10-CM

## 2024-02-15 DIAGNOSIS — C83.36 DIFFUSE LARGE B-CELL LYMPHOMA OF INTRAPELVIC LYMPH NODES (HCC): ICD-10-CM

## 2024-02-15 DIAGNOSIS — J45.40 ASTHMA, MODERATE PERSISTENT, WELL-CONTROLLED: ICD-10-CM

## 2024-02-15 DIAGNOSIS — J30.89 NON-SEASONAL ALLERGIC RHINITIS, UNSPECIFIED TRIGGER: ICD-10-CM

## 2024-02-15 DIAGNOSIS — T17.500A MUCUS PLUGGING OF BRONCHI: ICD-10-CM

## 2024-02-15 DIAGNOSIS — J18.9 PNEUMONIA OF BOTH LOWER LOBES DUE TO INFECTIOUS ORGANISM: Primary | ICD-10-CM

## 2024-02-15 PROCEDURE — 99214 OFFICE O/P EST MOD 30 MIN: CPT | Performed by: INTERNAL MEDICINE

## 2024-02-15 RX ORDER — ALBUTEROL SULFATE 1.25 MG/3ML
1 SOLUTION RESPIRATORY (INHALATION) EVERY 6 HOURS PRN
COMMUNITY

## 2024-02-15 NOTE — PROGRESS NOTES
[facility] Pulmonary and Critical Care    Outpatient Follow Up Note    Subjective:   CHIEF COMPLAINT / HPI:     The patient is 57 y.o. female is here for hospital follow-up of bilateral pneumonia she was admitted January 27 to approximately February 2 it was treated empirically with azithromycin and cefepime.  Infectious workup revealed no organism.  She did require oxygen during the hospitalization but was discharged on room air.    Currently she states that she feels approximately 50% better but still does have some residual dyspnea and cough.  No fevers or chills.      11/2/2023  Itzel is here for follow-up of a persistent cough with dyspnea on exertion and chest congestion in the setting of mucous plugging on CT chest.  She had a bronchoscopy on September 28 with therapeutic aspiration of thick white mucus.  All cultures, cytology, and pneumonia panel were negative.  She stated that she felt great after the bronchoscopy and now is still doing very well but has a little bit more cough with chest congestion.  She still rates herself as approximately 70% better than prior to bronchoscopy.  No significant wheezing, dyspnea exertion, or chest tightness.  He continues on Breo and Spiriva with occasional use of albuterol MDI.  He has a nebulizer at home but not much nebulizer solution and does not use it regularly.      9/27/2023  Itzel is here for an acute evaluation of a persistent cough associated with increasing dyspnea on exertion and chest congestion.  Last saw her in July I started Spiriva with thought she had suboptimal control of her asthma.  Unfortunately this did not significantly help her.  In the interim sounds like she had lower respite tract infection and was treated with azithromycin and prednisone.  She had a negative COVID test.  She got very short-term partial relief with that but symptoms quickly returned.  No fevers, chills, hemoptysis, anorexia, or weight loss.    Towards the end of August

## 2024-02-23 ENCOUNTER — OFFICE VISIT (OUTPATIENT)
Dept: ENT CLINIC | Age: 64
End: 2024-02-23

## 2024-02-23 VITALS
SYSTOLIC BLOOD PRESSURE: 91 MMHG | TEMPERATURE: 96.9 F | BODY MASS INDEX: 27.83 KG/M2 | HEIGHT: 64 IN | DIASTOLIC BLOOD PRESSURE: 64 MMHG | HEART RATE: 96 BPM | RESPIRATION RATE: 16 BRPM | WEIGHT: 163 LBS

## 2024-02-23 DIAGNOSIS — R05.3 CHRONIC COUGH: Primary | ICD-10-CM

## 2024-02-23 DIAGNOSIS — J38.5 LARYNGOSPASM: ICD-10-CM

## 2024-02-23 DIAGNOSIS — J98.4 PNEUMONITIS: ICD-10-CM

## 2024-02-23 DIAGNOSIS — K21.9 GASTROESOPHAGEAL REFLUX DISEASE, UNSPECIFIED WHETHER ESOPHAGITIS PRESENT: ICD-10-CM

## 2024-02-23 DIAGNOSIS — J30.0 VASOMOTOR RHINITIS: ICD-10-CM

## 2024-02-23 DIAGNOSIS — R05.3 CHRONIC COUGH: ICD-10-CM

## 2024-02-23 DIAGNOSIS — T17.500A MUCUS PLUGGING OF BRONCHI: ICD-10-CM

## 2024-02-23 RX ORDER — FAMOTIDINE 40 MG/1
40 TABLET, FILM COATED ORAL EVERY EVENING
Qty: 30 TABLET | Refills: 3 | Status: SHIPPED | OUTPATIENT
Start: 2024-02-23

## 2024-02-23 RX ORDER — FAMOTIDINE 40 MG/1
40 TABLET, FILM COATED ORAL EVERY EVENING
Qty: 90 TABLET | OUTPATIENT
Start: 2024-02-23

## 2024-02-23 RX ORDER — IPRATROPIUM BROMIDE 21 UG/1
2 SPRAY, METERED NASAL EVERY 8 HOURS PRN
Qty: 30 ML | Refills: 3 | Status: SHIPPED | OUTPATIENT
Start: 2024-02-23

## 2024-02-23 ASSESSMENT — ENCOUNTER SYMPTOMS
SORE THROAT: 0
NAUSEA: 0
TROUBLE SWALLOWING: 0
SINUS PAIN: 0
VOICE CHANGE: 0
STRIDOR: 0
EYE PAIN: 0
FACIAL SWELLING: 0
RHINORRHEA: 1
SHORTNESS OF BREATH: 0
EYE REDNESS: 0
EYE ITCHING: 0
COLOR CHANGE: 0
COUGH: 1
SINUS PRESSURE: 0
CHOKING: 0
PHOTOPHOBIA: 0
DIARRHEA: 0

## 2024-02-23 NOTE — PROGRESS NOTES
Systems     Review of Systems   Constitutional:  Negative for chills, fatigue and fever.   HENT:  Positive for rhinorrhea. Negative for congestion, ear discharge, ear pain, facial swelling, hearing loss, nosebleeds, postnasal drip, sinus pressure, sinus pain, sneezing, sore throat, tinnitus, trouble swallowing and voice change.    Eyes:  Negative for photophobia, pain, redness, itching and visual disturbance.   Respiratory:  Positive for cough. Negative for choking, shortness of breath and stridor.    Gastrointestinal:  Negative for diarrhea and nausea.   Musculoskeletal:  Negative for neck pain and neck stiffness.   Skin:  Negative for color change and rash.   Neurological:  Negative for dizziness, facial asymmetry and light-headedness.   Hematological:  Negative for adenopathy.   Psychiatric/Behavioral:  Negative for agitation and confusion.          PhysicalExam     Vitals:    02/23/24 1006   BP: 91/64   Pulse: 96   Resp: 16   Temp: 96.9 °F (36.1 °C)       Physical Exam  Constitutional:       Appearance: She is well-developed.   HENT:      Head: Normocephalic and atraumatic.      Jaw: No trismus.      Right Ear: Tympanic membrane, ear canal and external ear normal. No drainage. No middle ear effusion. Tympanic membrane is not perforated.      Left Ear: Tympanic membrane, ear canal and external ear normal. No drainage.  No middle ear effusion. Tympanic membrane is not perforated.      Nose: No septal deviation, mucosal edema or rhinorrhea.      Mouth/Throat:      Dentition: Normal dentition.      Pharynx: Uvula midline. No oropharyngeal exudate.   Eyes:      General: No scleral icterus.        Right eye: No discharge.         Left eye: No discharge.      Pupils: Pupils are equal, round, and reactive to light.   Neck:      Thyroid: No thyromegaly.      Trachea: Phonation normal. No tracheal deviation.   Pulmonary:      Effort: Pulmonary effort is normal. No respiratory distress.      Breath sounds: No stridor.

## 2024-04-19 LAB
ALBUMIN SERPL-MCNC: 4.3 G/DL (ref 3.4–5)
ANION GAP SERPL CALCULATED.3IONS-SCNC: 14 MMOL/L (ref 3–16)
BUN SERPL-MCNC: 24 MG/DL (ref 7–20)
CALCIUM SERPL-MCNC: 9.9 MG/DL (ref 8.3–10.6)
CHLORIDE SERPL-SCNC: 107 MMOL/L (ref 99–110)
CO2 SERPL-SCNC: 22 MMOL/L (ref 21–32)
CREAT SERPL-MCNC: 3 MG/DL (ref 0.6–1.2)
CREAT UR-MCNC: 81.3 MG/DL (ref 28–259)
GFR SERPLBLD CREATININE-BSD FMLA CKD-EPI: 17 ML/MIN/{1.73_M2}
GLUCOSE SERPL-MCNC: 88 MG/DL (ref 70–99)
PHOSPHATE SERPL-MCNC: 3.3 MG/DL (ref 2.5–4.9)
POTASSIUM SERPL-SCNC: 4.1 MMOL/L (ref 3.5–5.1)
PROT UR-MCNC: 34 MG/DL
PROT/CREAT UR-RTO: 0.4 MG/DL
SODIUM SERPL-SCNC: 143 MMOL/L (ref 136–145)

## 2024-05-08 ENCOUNTER — CLINICAL DOCUMENTATION (OUTPATIENT)
Dept: SPEECH THERAPY | Age: 64
End: 2024-05-08

## 2024-05-08 NOTE — PROGRESS NOTES
[facility] ENT SLP  Cancellation/No-show Note  Name: Itzel Fenton  YOB: 1960  MRN: [id number]  Cancelled visits to date: 0  No-shows to date: 1     For today's appointment patient:  []  Cancelled  []  Rescheduled appointment  [x]  No-show     Reason given by patient:  []  Patient ill  []  Conflicting appointment  []  No transportation                          []  Conflict with work  [x]  No reason given  []  Other:                Comments:      Thank you,    Peyton Hong MA (Katie), CCC-SLP; [id number]  Speech-Language Pathologist

## 2024-05-13 ENCOUNTER — HOSPITAL ENCOUNTER (OUTPATIENT)
Dept: CT IMAGING | Age: 64
Discharge: HOME | End: 2024-05-13
Attending: INTERNAL MEDICINE
Payer: MEDICARE

## 2024-05-13 DIAGNOSIS — J18.9 PNEUMONIA OF BOTH LOWER LOBES DUE TO INFECTIOUS ORGANISM: ICD-10-CM

## 2024-05-13 PROCEDURE — 71250 CT THORAX DX C-: CPT

## 2024-05-20 ENCOUNTER — TELEPHONE (OUTPATIENT)
Dept: PULMONOLOGY | Age: 64
End: 2024-05-20

## 2024-05-20 NOTE — TELEPHONE ENCOUNTER
Itzel will be out of town and called to reschedule her appt on   Thurs., 05/23. Imaging was done.

## 2024-05-23 ENCOUNTER — OFFICE VISIT (OUTPATIENT)
Dept: PULMONOLOGY | Age: 64
End: 2024-05-23
Payer: MEDICARE

## 2024-05-23 VITALS
OXYGEN SATURATION: 97 % | WEIGHT: 161.8 LBS | HEIGHT: 64 IN | DIASTOLIC BLOOD PRESSURE: 76 MMHG | SYSTOLIC BLOOD PRESSURE: 100 MMHG | HEART RATE: 83 BPM | BODY MASS INDEX: 27.62 KG/M2

## 2024-05-23 DIAGNOSIS — C83.36 DIFFUSE LARGE B-CELL LYMPHOMA OF INTRAPELVIC LYMPH NODES (HCC): ICD-10-CM

## 2024-05-23 DIAGNOSIS — J18.9 PNEUMONIA OF BOTH LOWER LOBES DUE TO INFECTIOUS ORGANISM: Primary | ICD-10-CM

## 2024-05-23 DIAGNOSIS — Z94.81 STATUS POST BONE MARROW TRANSPLANT (HCC): ICD-10-CM

## 2024-05-23 DIAGNOSIS — T17.500A MUCUS PLUGGING OF BRONCHI: ICD-10-CM

## 2024-05-23 DIAGNOSIS — J30.89 NON-SEASONAL ALLERGIC RHINITIS, UNSPECIFIED TRIGGER: ICD-10-CM

## 2024-05-23 DIAGNOSIS — J45.40 ASTHMA, MODERATE PERSISTENT, WELL-CONTROLLED: ICD-10-CM

## 2024-05-23 PROCEDURE — 99214 OFFICE O/P EST MOD 30 MIN: CPT | Performed by: INTERNAL MEDICINE

## 2024-05-23 RX ORDER — ALBUTEROL SULFATE 1.25 MG/3ML
1 SOLUTION RESPIRATORY (INHALATION) 3 TIMES DAILY
Qty: 360 ML | Refills: 5 | Status: SHIPPED | OUTPATIENT
Start: 2024-05-23

## 2024-05-23 NOTE — PROGRESS NOTES
sputum      6/30/2022  Itzel is here for evaluation of a cough that has been going on for approximately 6 to 8 weeks productive of green phlegm.  She has no fevers, chills, night sweats, anorexia, or weight loss.  Occasionally she has some dyspnea on exertion but no chest tightness or wheezing.  She patient was treated with a Z-Tim without improvement.  Chest x-ray showed an elevated right hemidiaphragm but clear lungs.  CT chest showed some mucous plugging in the left lower lobe otherwise unremarkable.  She is finishing up a 7-day course of Augmentin also without improvement.    11/29/2021  Itzel has past medical history of lymphoma status post bone marrow transplant with upper airway cough syndrome and asthma that presents for routine follow-up upper airway cough syndrome and mild asthma.  She was diagnosed with COVID 19 for the second time on November 15.  She had her second Pfizer vaccination August 24.  This course was much milder and did not require hospitalization like her initial Covid infection in December 2020.  She states that she feels like she is 85% back to normal.  She continues on Astelin, Zyrtec, and Flonase for upper airway cough syndrome as well as Breo for asthma.  She is asking for refills of albuterol and Astelin.  Her lymphoma appears to be in remission and she follows up with Dr. Mccloud every 6-month    5/202/2021  Itzel presents for an acute visit for 3 weeks of cough productive of yellow-tan phlegm.  She has no associated fevers, chills, night sweats, anorexia, weight loss, hemoptysis, wheezing, chest tightness, or dyspnea.  Patient recently restarted on Flonase.  She has been on her chronic Breo, Astelin, and Zyrtec without interruption.  Patient had Covid in December and was hospitalized for 2 days at [facility] but made a full recovery and did not need oxygen on discharge.    6/1/2020  tIzel has requested a virtual visit for follow-up of chronic cough due to upper airway cough syndrome

## 2024-05-31 ENCOUNTER — OFFICE VISIT (OUTPATIENT)
Dept: ENT CLINIC | Age: 64
End: 2024-05-31
Payer: MEDICARE

## 2024-05-31 VITALS
HEART RATE: 78 BPM | HEIGHT: 64 IN | DIASTOLIC BLOOD PRESSURE: 82 MMHG | SYSTOLIC BLOOD PRESSURE: 106 MMHG | RESPIRATION RATE: 16 BRPM | TEMPERATURE: 97.3 F | WEIGHT: 161 LBS | BODY MASS INDEX: 27.49 KG/M2

## 2024-05-31 DIAGNOSIS — J38.5 LARYNGOSPASM: ICD-10-CM

## 2024-05-31 DIAGNOSIS — R05.3 CHRONIC COUGH: Primary | ICD-10-CM

## 2024-05-31 DIAGNOSIS — K21.9 GASTROESOPHAGEAL REFLUX DISEASE, UNSPECIFIED WHETHER ESOPHAGITIS PRESENT: ICD-10-CM

## 2024-05-31 DIAGNOSIS — J30.0 VASOMOTOR RHINITIS: ICD-10-CM

## 2024-05-31 PROCEDURE — 99213 OFFICE O/P EST LOW 20 MIN: CPT | Performed by: OTOLARYNGOLOGY

## 2024-05-31 RX ORDER — EMPAGLIFLOZIN 10 MG/1
TABLET, FILM COATED ORAL
COMMUNITY
Start: 2024-05-23

## 2024-05-31 ASSESSMENT — ENCOUNTER SYMPTOMS
COUGH: 0
NAUSEA: 0
FACIAL SWELLING: 0
COLOR CHANGE: 0
SORE THROAT: 0
RHINORRHEA: 1
DIARRHEA: 0
TROUBLE SWALLOWING: 0
EYE ITCHING: 0
PHOTOPHOBIA: 0
EYE REDNESS: 0
VOICE CHANGE: 0
STRIDOR: 0
SHORTNESS OF BREATH: 0
SINUS PRESSURE: 0
CHOKING: 0
EYE PAIN: 0
SINUS PAIN: 0

## 2024-05-31 NOTE — PROGRESS NOTES
[facility]  [address] , [address]  [place] [address]  P: [phone]  F: [phone]       Patient     Itzel Fenton  1960    ChiefComplaint     Chief Complaint   Patient presents with    Other     Check up        History of Present Illness     Itzel Fenton is a pleasant 63 y.o. female here for follow-up for cough, LPR, vasomotor rhinitis.  Atrovent nasal spray working very well.  She is using it occasionally.  It does dry out her throat a little bit.    Since taking Pepcid, she has not had any laryngospasm symptoms.  Her voice has been relatively consistent.  She missed her SLP appointment due to being out of town.  She does still have some coughing issues.  She is following with pulmonology for her pneumonitis issues.    Past Medical History     Past Medical History:   Diagnosis Date    Allergic rhinitis     Arthritis     Asthma     CKD (chronic kidney disease)     DLBCL (diffuse large B cell lymphoma) ([facility]) 02/2010    non-hodgkins lymphoma    Encounter for aftercare following bone marrow transplant ([facility]) 06/10/2016    GERD (gastroesophageal reflux disease)     Hx of non-Hodgkin's lymphoma     Hypogammaglobulinemia ([facility]) 04/13/2017    Lung disease     MDRO (multiple drug resistant organisms) resistance 05/15/2016    E.coli MDRO in urine    Migraines     Neutropenia ([facility]) 07/20/2016    Non Hodgkin's lymphoma ([facility])     Pancytopenia ([facility]) 07/20/2016    Peripheral neuropathy     PONV (postoperative nausea and vomiting)     Thyroid disease     Tinnitus        Past Surgical History     Past Surgical History:   Procedure Laterality Date    BONE MARROW TRANSPLANT  05/16/2016    BONE MARROW TRANSPLANT      BRONCHOSCOPY  08/30/2018    BRONCHOSCOPY THERAPUTIC ASPIRATION INITIAL WITH MUCUS PLUG SENT FOR BACTERIAL CULTURE performed by James Ma MD at [facility] ENDOSCOPY    BRONCHOSCOPY Left 09/28/2023    BRONCHOSCOPY WITH BRONCHOALVEOLAR LAVAGE OF THE LEFT LUNG performed by James Ma

## 2024-06-16 LAB
ESTIMATED AVERAGE GLUCOSE: NORMAL
HBA1C MFR BLD: 5.7 %

## 2024-06-27 DIAGNOSIS — R05.3 PERSISTENT COUGH: ICD-10-CM

## 2024-06-27 RX ORDER — ALBUTEROL SULFATE 90 UG/1
AEROSOL, METERED RESPIRATORY (INHALATION)
Qty: 8.5 G | Refills: 5 | Status: SHIPPED | OUTPATIENT
Start: 2024-06-27

## 2024-06-27 NOTE — TELEPHONE ENCOUNTER
She LVM stating that she tried to get a refill on her Albuterol inhaler,  pharmacy told her it was denied.  She states that she was recently hospitalized for 7-8 days and that they want her to start taking this.  Please refill pending medication if appropriate.

## 2024-08-06 DIAGNOSIS — T78.40XD ALLERGY, SUBSEQUENT ENCOUNTER: ICD-10-CM

## 2024-08-07 RX ORDER — CETIRIZINE HYDROCHLORIDE 10 MG/1
TABLET ORAL
Qty: 90 TABLET | Refills: 3 | Status: SHIPPED | OUTPATIENT
Start: 2024-08-07

## 2024-08-26 ENCOUNTER — OFFICE VISIT (OUTPATIENT)
Age: 64
End: 2024-08-26
Payer: MEDICARE

## 2024-08-26 VITALS
WEIGHT: 154 LBS | BODY MASS INDEX: 26.29 KG/M2 | DIASTOLIC BLOOD PRESSURE: 82 MMHG | SYSTOLIC BLOOD PRESSURE: 114 MMHG | HEART RATE: 93 BPM | HEIGHT: 64 IN | OXYGEN SATURATION: 93 %

## 2024-08-26 DIAGNOSIS — J45.40 ASTHMA, MODERATE PERSISTENT, WELL-CONTROLLED: ICD-10-CM

## 2024-08-26 DIAGNOSIS — C83.36 DIFFUSE LARGE B-CELL LYMPHOMA OF INTRAPELVIC LYMPH NODES (HCC): ICD-10-CM

## 2024-08-26 DIAGNOSIS — J30.89 NON-SEASONAL ALLERGIC RHINITIS, UNSPECIFIED TRIGGER: ICD-10-CM

## 2024-08-26 DIAGNOSIS — Z94.81 STATUS POST BONE MARROW TRANSPLANT (HCC): ICD-10-CM

## 2024-08-26 DIAGNOSIS — R05.3 PERSISTENT COUGH: ICD-10-CM

## 2024-08-26 DIAGNOSIS — J18.9 MULTIFOCAL PNEUMONIA: Primary | ICD-10-CM

## 2024-08-26 PROCEDURE — 99214 OFFICE O/P EST MOD 30 MIN: CPT | Performed by: INTERNAL MEDICINE

## 2024-08-26 RX ORDER — PROMETHAZINE HYDROCHLORIDE AND CODEINE PHOSPHATE 6.25; 1 MG/5ML; MG/5ML
SOLUTION ORAL
COMMUNITY
Start: 2024-07-17

## 2024-08-26 RX ORDER — ALBUTEROL SULFATE 90 UG/1
AEROSOL, METERED RESPIRATORY (INHALATION)
Qty: 2 EACH | Refills: 11 | Status: SHIPPED | OUTPATIENT
Start: 2024-08-26

## 2024-08-26 RX ORDER — FLUTICASONE FUROATE, UMECLIDINIUM BROMIDE AND VILANTEROL TRIFENATATE 200; 62.5; 25 UG/1; UG/1; UG/1
1 POWDER RESPIRATORY (INHALATION) DAILY
Qty: 3 EACH | Refills: 3 | Status: SHIPPED | OUTPATIENT
Start: 2024-08-26

## 2024-08-26 NOTE — PROGRESS NOTES
had a bronchoscopy on September 28 with therapeutic aspiration of thick white mucus.  All cultures, cytology, and pneumonia panel were negative.  She stated that she felt great after the bronchoscopy and now is still doing very well but has a little bit more cough with chest congestion.  She still rates herself as approximately 70% better than prior to bronchoscopy.  No significant wheezing, dyspnea exertion, or chest tightness.  He continues on Breo and Spiriva with occasional use of albuterol MDI.  He has a nebulizer at home but not much nebulizer solution and does not use it regularly.      9/27/2023  Itzel is here for an acute evaluation of a persistent cough associated with increasing dyspnea on exertion and chest congestion.  Last saw her in July I started Spiriva with thought she had suboptimal control of her asthma.  Unfortunately this did not significantly help her.  In the interim sounds like she had lower respite tract infection and was treated with azithromycin and prednisone.  She had a negative COVID test.  She got very short-term partial relief with that but symptoms quickly returned.  No fevers, chills, hemoptysis, anorexia, or weight loss.    Towards the end of August she had a CT chest abdomen pelvis ordered by her oncologist for surveillance of her non-Hodgkin's lymphoma which showed multiple more peripheral mucous plugging with opacities.  She is asking for a codeine-based cough syrup as Tessalon Perles are ineffective    7/18/2023  Itzel is here for follow-up of moderate persistent asthma, upper airway cough syndrome, allergic rhinitis and a recent pneumonia for which  she did get admitted to [facility] May 17 through 21 and had a CT chest that showed bibasilar pneumonia.  She was treated with broad-spectrum antibiotics and made a full recovery.  Despite Breo and Singulair she still has a dry cough.  No significant dyspnea on exertion, wheezing, or chest tightness.    5/1/2023  Itzel is here

## 2024-08-31 DIAGNOSIS — T78.40XD ALLERGY, SUBSEQUENT ENCOUNTER: ICD-10-CM

## 2024-09-03 ENCOUNTER — HOSPITAL ENCOUNTER (OUTPATIENT)
Dept: CT IMAGING | Age: 64
Discharge: HOME | End: 2024-09-03
Attending: INTERNAL MEDICINE
Payer: MEDICARE

## 2024-09-03 DIAGNOSIS — J18.9 MULTIFOCAL PNEUMONIA: ICD-10-CM

## 2024-09-03 PROCEDURE — 71250 CT THORAX DX C-: CPT

## 2024-09-03 RX ORDER — CETIRIZINE HYDROCHLORIDE 10 MG/1
10 TABLET ORAL DAILY
Qty: 90 TABLET | Refills: 3 | OUTPATIENT
Start: 2024-09-03

## 2024-11-14 ENCOUNTER — OFFICE VISIT (OUTPATIENT)
Dept: PULMONOLOGY | Age: 64
End: 2024-11-14
Payer: MEDICARE

## 2024-11-14 VITALS
SYSTOLIC BLOOD PRESSURE: 90 MMHG | BODY MASS INDEX: 26.91 KG/M2 | HEIGHT: 64 IN | DIASTOLIC BLOOD PRESSURE: 60 MMHG | WEIGHT: 157.6 LBS | OXYGEN SATURATION: 96 % | HEART RATE: 85 BPM

## 2024-11-14 DIAGNOSIS — T17.500A MUCUS PLUGGING OF BRONCHI: ICD-10-CM

## 2024-11-14 DIAGNOSIS — J45.40 ASTHMA, MODERATE PERSISTENT, WELL-CONTROLLED: ICD-10-CM

## 2024-11-14 DIAGNOSIS — J18.9 MULTIFOCAL PNEUMONIA: ICD-10-CM

## 2024-11-14 DIAGNOSIS — D80.1 HYPOGAMMAGLOBULINEMIA (HCC): ICD-10-CM

## 2024-11-14 DIAGNOSIS — Z94.81 STATUS POST BONE MARROW TRANSPLANT (HCC): ICD-10-CM

## 2024-11-14 DIAGNOSIS — J22 LRTI (LOWER RESPIRATORY TRACT INFECTION): Primary | ICD-10-CM

## 2024-11-14 DIAGNOSIS — C83.36 DIFFUSE LARGE B-CELL LYMPHOMA OF INTRAPELVIC LYMPH NODES (HCC): ICD-10-CM

## 2024-11-14 PROCEDURE — 99214 OFFICE O/P EST MOD 30 MIN: CPT | Performed by: INTERNAL MEDICINE

## 2024-11-14 RX ORDER — LEVOFLOXACIN 500 MG/1
500 TABLET, FILM COATED ORAL EVERY OTHER DAY
Qty: 5 TABLET | Refills: 0 | Status: SHIPPED | OUTPATIENT
Start: 2024-11-14 | End: 2024-11-24

## 2024-11-14 NOTE — PROGRESS NOTES
skin color, texture, turgor, no redness, warmth, or swelling     IgE 2/2023  48    Microbiology  Fungus (Mycology) Culture 08/30/2018  8:00 AM [facility]   Fungus Stain 08/30/2018  8:00 AM [facility]   No Fungal elements seen    Organism  (Abnormal) 08/30/2018  8:00 AM [facility]   Sterile Mycelium     Fungus (Mycology) Culture 08/30/2018  8:00 AM [facility]   Isolated    This is a non-sporulating isolate.    These isolates have the inability to express diagnostic    characters under the conditions provided.    No further workup.      Serology  Results for ADAM CASTANEDA (MRN [id number]) as of 10/25/2018 15:31   Ref. Range 9/27/2018 14:38   Aspergillus Galacto AG Latest Ref Range: Negative  Negative   Aspergillus Galacto Index Unknown 0.09   (1,3)-Beta-D-Glucan (Fungitell) Interpretation Latest Ref Range: Negative  Negative   (1,3)-Beta-D-Glucan (fungitell) Latest Units: pg/mL <31   Histoplasma Antigen Urine Latest Units: ng/mL Not Detected   Coccidioides Ab,ID Latest Ref Range: None Detected  None Detected   Histoplasma Ab Mycelial CF Latest Ref Range: <1:8  <1:8   Histoplasma Ab Yeast CF Latest Ref Range: <1:8  <1:8   Histoplasma Antigen, Serum Unknown See Note   Histoplasma Ag Interp Latest Ref Range: Not Detected  Not Detected   HISTOPLASMA INTERPETATION Unknown See Note       Radiology -chest report and images reviewed with patient    CT chest  September 3, 2024     FINDINGS:     Airways, lungs, and pleura: There are secretions in the mainstem bronchi and  scattered areas of mucous plugging. Small focal area of consolidation/volume  loss in the medial right middle lobe and bandlike areas of scarring or  atelectasis in the left lower lobe. Otherwise no focal consolidation or pleural  effusion.     Nodules: No suspicious noncalcified pulmonary nodules.     Lymph nodes: No lymphadenopathy.     Heart and great vessels: Heart is normal in size. Aorta and pulmonary

## 2024-11-15 ENCOUNTER — TELEPHONE (OUTPATIENT)
Dept: PULMONOLOGY | Age: 64
End: 2024-11-15

## 2024-11-15 DIAGNOSIS — J22 LRTI (LOWER RESPIRATORY TRACT INFECTION): ICD-10-CM

## 2024-11-15 DIAGNOSIS — R05.3 REFRACTORY CHRONIC COUGH: ICD-10-CM

## 2024-11-15 RX ORDER — PROMETHAZINE HYDROCHLORIDE AND CODEINE PHOSPHATE 6.25; 1 MG/5ML; MG/5ML
5 SYRUP ORAL 4 TIMES DAILY PRN
Qty: 240 ML | Refills: 0 | Status: SHIPPED | OUTPATIENT
Start: 2024-11-15 | End: 2025-11-15

## 2024-11-15 NOTE — TELEPHONE ENCOUNTER
I talked with Itzel  IgG level is normal so no IVIG needed  Respiratory viral panel shows rhinovirus  She will be dropping off sputum culture soon  Itzel states Tessalon Perles do not help  Promethazine with codeine has helped in the past so I sent a refill to [facility] - Itzel aware

## 2024-11-15 NOTE — TELEPHONE ENCOUNTER
Itzel states she had IgG lab done at [facility], she tested positive for Rhinovirus and IgG was 942.  She is also asking for a prescription for cough. She would need this sent to the [facility]. [facility] will be faxing the results over. I will scan in as soon as we get them.

## 2024-11-16 LAB
REASON FOR REJECTION: NORMAL
REJECTED TEST: NORMAL

## 2024-12-16 NOTE — TELEPHONE ENCOUNTER
Requested Prescriptions     Pending Prescriptions Disp Refills    SPIRIVA RESPIMAT 2.5 MCG/ACT AERS inhaler [Pharmacy Med Name: SPIRIVA RESPIMAT 2.5MCG INH 4GM 60D] 4 g      Sig: INHALE 2 PUFFS INTO THE LUNGS DAILY     Last Appt  11/14/2024

## 2024-12-17 RX ORDER — TIOTROPIUM BROMIDE INHALATION SPRAY 3.12 UG/1
2 SPRAY, METERED RESPIRATORY (INHALATION) DAILY
Qty: 4 G | OUTPATIENT
Start: 2024-12-17

## 2024-12-19 ENCOUNTER — OFFICE VISIT (OUTPATIENT)
Age: 64
End: 2024-12-19
Payer: MEDICARE

## 2024-12-19 VITALS
SYSTOLIC BLOOD PRESSURE: 98 MMHG | BODY MASS INDEX: 26.84 KG/M2 | HEIGHT: 64 IN | WEIGHT: 157.19 LBS | HEART RATE: 94 BPM | OXYGEN SATURATION: 96 % | DIASTOLIC BLOOD PRESSURE: 72 MMHG

## 2024-12-19 DIAGNOSIS — J18.9 RECURRENT PNEUMONIA: Primary | ICD-10-CM

## 2024-12-19 DIAGNOSIS — J45.40 ASTHMA, MODERATE PERSISTENT, WELL-CONTROLLED: ICD-10-CM

## 2024-12-19 DIAGNOSIS — Z94.81 AUTOLOGOUS BONE MARROW TRANSPLANTATION STATUS (HCC): ICD-10-CM

## 2024-12-19 DIAGNOSIS — C83.36 DIFFUSE LARGE B-CELL LYMPHOMA OF INTRAPELVIC LYMPH NODES (HCC): ICD-10-CM

## 2024-12-19 PROCEDURE — 99214 OFFICE O/P EST MOD 30 MIN: CPT | Performed by: INTERNAL MEDICINE

## 2024-12-19 PROCEDURE — 1123F ACP DISCUSS/DSCN MKR DOCD: CPT | Performed by: INTERNAL MEDICINE

## 2024-12-19 RX ORDER — DOXYCYCLINE 100 MG/1
100 CAPSULE ORAL EVERY 12 HOURS
COMMUNITY
Start: 2024-12-13

## 2024-12-19 RX ORDER — FLUCONAZOLE 150 MG/1
150 TABLET ORAL ONCE
COMMUNITY
Start: 2024-11-11

## 2024-12-19 NOTE — PROGRESS NOTES
[facility] Pulmonary and Critical Care    Outpatient Follow Up Note    Subjective:   CHIEF COMPLAINT / HPI:     The patient is 57 y.o. female here for follow-up of recurrent LRTI, moderate persistent asthma, allergic rhinitis, and CKD.  Last visit she was having what appeared to be a lower respiratory tract infection.  I tried to obtain a sputum culture but this was unsuccessful.  I treated her with Levaquin and she did improve until mid December when she developed malaise, fatigue, headache, and chest pain.  She was admitted to [facility] from December 11 of December 13.  CT chest showed bibasilar pneumonia.  Patient was given meropenem and doxycycline and subsequently improved.  She required oxygen during the hospitalization but did not need oxygen on discharge.  She is feeling decent today but is concerned why this keeps on happening.  Last visit I checked an IgG level that was normal.  She does not cough after she eats.  She has never had an MBS.    11/14/2024  Itzel is here for follow-up of moderate persistent asthma, allergic rhinitis, recurrent LRTI, and history of allo bone marrow transplant due to lymphoma.  At last visit I ordered a CT chest that showed resolution of her pneumonia.  However since then she was admitted to [facility] for an ESBL E. coli bacteremia of unclear source.  Urine culture was negative as well as CT abdomen.  She was having no respiratory symptoms at that time.  She finished 2 weeks of meropenem/ertapenem.  However recently she developed a cough productive of white phlegm that has increased in volume and viscosity.  She was given prednisone 40 mg daily for 5 days and a 7-day course of Levaquin with initial improvement but then recurrence.  No fevers or chills.  She will be seeing her oncologist today.  She has a history of low IgG with the last 525 in January 2024.  She states that she is not getting IVIG replacement    8/26/2024  Itzel is here for follow-up of

## 2025-01-02 ENCOUNTER — HOSPITAL ENCOUNTER (OUTPATIENT)
Dept: CT IMAGING | Age: 65
Discharge: HOME | End: 2025-01-02
Attending: INTERNAL MEDICINE
Payer: MEDICARE

## 2025-01-02 DIAGNOSIS — J18.9 RECURRENT PNEUMONIA: ICD-10-CM

## 2025-01-02 PROCEDURE — 71250 CT THORAX DX C-: CPT

## 2025-01-27 ENCOUNTER — HOSPITAL ENCOUNTER (OUTPATIENT)
Dept: GENERAL RADIOLOGY | Age: 65
Discharge: HOME | End: 2025-01-27
Attending: INTERNAL MEDICINE
Payer: MEDICARE

## 2025-01-27 ENCOUNTER — HOSPITAL ENCOUNTER (OUTPATIENT)
Dept: SPEECH THERAPY | Age: 65
Setting detail: THERAPIES SERIES
Discharge: HOME | End: 2025-01-27
Attending: INTERNAL MEDICINE
Payer: MEDICARE

## 2025-01-27 DIAGNOSIS — J18.9 RECURRENT PNEUMONIA: ICD-10-CM

## 2025-01-27 PROCEDURE — 92611 MOTION FLUOROSCOPY/SWALLOW: CPT

## 2025-01-27 PROCEDURE — 74230 X-RAY XM SWLNG FUNCJ C+: CPT

## 2025-01-27 NOTE — PROCEDURES
INSTRUMENTAL SWALLOW REPORT  Outpatient MODIFIED BARIUM SWALLOW  Discharge     NAME: Itzel Fenton     : 1960  MRN: [id number]       Date of Eval: 2025     Ordering Physician: Anil   Referring Diagnosis: Dysphagia    Past Medical History:  has a past medical history of Allergic rhinitis, Arthritis, Asthma, CKD (chronic kidney disease), DLBCL (diffuse large B cell lymphoma) (HCC), Encounter for aftercare following bone marrow transplant (HCC), GERD (gastroesophageal reflux disease), Hx of non-Hodgkin's lymphoma, Hypogammaglobulinemia (HCC), Lung disease, MDRO (multiple drug resistant organisms) resistance, Migraines, Neutropenia (HCC), Non Hodgkin's lymphoma (HCC), Pancytopenia (HCC), Peripheral neuropathy, PONV (postoperative nausea and vomiting), Thyroid disease, and Tinnitus.  Past Surgical History:  has a past surgical history that includes Tonsillectomy; Hysterectomy (2010); Colonoscopy; other surgical history (10/08/2012); lymph node biopsy (2011); lymph node biopsy (2012); other surgical history (Right, 05/10/2016); bone marrow [facility] (2016[facility]); bronchoscopy (2018); bone marrow transplant; Total abdominal hysterectomy w/ bilateral salpingoophorectomy ();  section; Colonoscopy (N/A, 2021); Pain management procedure (Left, 2021); bronchoscopy (Left, 2023); and Hysterectomy, vaginal.    Most recent CT chest 25  1. Marked improvement in lower lung airspace disease compared to 2024 with  significant improvement in mucous plugging in the interval. Patchy opacities  have nearly completely resolved.  2. Linear scarring or atelectasis in the middle lobe and lingula. Findings may  reflect chronic bronchiolitis in the proper clinical setting.  3. No lymphadenopathy or suspicious abnormality otherwise.    Prior MBS Results: N/A     Patient Complaints/Reason for Referral:  Itzel CRISOSTOMO

## 2025-02-04 ENCOUNTER — TELEPHONE (OUTPATIENT)
Dept: PULMONOLOGY | Age: 65
End: 2025-02-04

## 2025-02-06 LAB
ALBUMIN: 4.1 G/DL (ref 3.5–5)
ANION GAP SERPL CALCULATED.3IONS-SCNC: 9 MMOL/L (ref 5–13)
BUN / CREAT RATIO: 9
BUN BLDV-MCNC: 32 MG/DL (ref 7–25)
CALCIUM SERPL-MCNC: 9.7 MG/DL (ref 8.5–10.5)
CHLORIDE BLD-SCNC: 108 MMOL/L (ref 98–110)
CO2: 23 MMOL/L (ref 22–29)
CREAT SERPL-MCNC: 3.56 MG/DL (ref 0.5–1.2)
EGFR (CKD-EPI): 14 SEE NOTE
GLUCOSE BLD-MCNC: 106 MG/DL (ref 71–99)
HCT VFR BLD CALC: 47.8 % (ref 35–46)
HEMOGLOBIN: 15.5 G/DL (ref 11.7–15.5)
LEUKOCYTES, BLD: 7.22 10*3/UL (ref 4–12)
MCH RBC QN AUTO: 26.6 PG (ref 27–33)
MCHC RBC AUTO-ENTMCNC: 32.4 G/DL (ref 30–36)
MCV RBC AUTO: 82.1 FL (ref 80–100)
PDW BLD-RTO: 15.3 % (ref 11–15)
PHOSPHORUS: 3.2 MG/DL (ref 2.1–4.3)
PLATELET # BLD: 150 10*3/UL (ref 140–400)
PMV BLD AUTO: 11.4 FL (ref 9–13)
POTASSIUM SERPL-SCNC: 4.1 MMOL/L (ref 3.5–5.1)
RBC # BLD: 5.82 10*6/UL (ref 3.8–5.1)
SODIUM BLD-SCNC: 140 MMOL/L (ref 135–146)

## 2025-02-14 ENCOUNTER — OFFICE VISIT (OUTPATIENT)
Dept: ENT CLINIC | Age: 65
End: 2025-02-14
Payer: MEDICARE

## 2025-02-14 VITALS
TEMPERATURE: 97.3 F | DIASTOLIC BLOOD PRESSURE: 79 MMHG | HEIGHT: 64 IN | WEIGHT: 157 LBS | RESPIRATION RATE: 16 BRPM | SYSTOLIC BLOOD PRESSURE: 118 MMHG | BODY MASS INDEX: 26.8 KG/M2 | HEART RATE: 91 BPM

## 2025-02-14 DIAGNOSIS — J30.0 VASOMOTOR RHINITIS: ICD-10-CM

## 2025-02-14 DIAGNOSIS — R13.19 ESOPHAGEAL DYSPHAGIA: Primary | ICD-10-CM

## 2025-02-14 DIAGNOSIS — K21.9 GASTROESOPHAGEAL REFLUX DISEASE, UNSPECIFIED WHETHER ESOPHAGITIS PRESENT: ICD-10-CM

## 2025-02-14 DIAGNOSIS — R05.3 CHRONIC COUGH: ICD-10-CM

## 2025-02-14 PROCEDURE — 99213 OFFICE O/P EST LOW 20 MIN: CPT | Performed by: OTOLARYNGOLOGY

## 2025-02-14 PROCEDURE — 1123F ACP DISCUSS/DSCN MKR DOCD: CPT | Performed by: OTOLARYNGOLOGY

## 2025-02-14 RX ORDER — CIPROFLOXACIN 500 MG/1
TABLET, FILM COATED ORAL
COMMUNITY
Start: 2025-01-27

## 2025-02-14 ASSESSMENT — ENCOUNTER SYMPTOMS
EYE ITCHING: 0
DIARRHEA: 0
EYE REDNESS: 0
EYE PAIN: 0
SHORTNESS OF BREATH: 0
TROUBLE SWALLOWING: 0
VOICE CHANGE: 0
SINUS PRESSURE: 0
SINUS PAIN: 0
NAUSEA: 0
STRIDOR: 0
SORE THROAT: 0
FACIAL SWELLING: 0
COUGH: 1
CHOKING: 0
RHINORRHEA: 0
PHOTOPHOBIA: 0
COLOR CHANGE: 0

## 2025-02-14 NOTE — PROGRESS NOTES
[facility]  [address] , [address]  [place] [address]  P: [phone]  F: [phone]       Patient     Itzel Fenton  1960    ChiefComplaint     Chief Complaint   Patient presents with    Other     Swallow issues        History of Present Illness     Itzel Fenton is a pleasant 64 y.o. female who notes for follow-up at the request of her pulmonologist.  Patient has suffered multiple episodes of pneumonia over the past year.  There is concern for aspiration.  She underwent a modified barium swallow.  Overall the test was normal, but there was some slow transit in the upper esophagus.  They are concerned that this could be contributing to aspiration.  She has not had an EGD.  She continues to take famotidine and Atrovent as needed.  She denies any significant swallow issues otherwise.    Past Medical History     Past Medical History:   Diagnosis Date    Allergic rhinitis     Arthritis     Asthma     CKD (chronic kidney disease)     DLBCL (diffuse large B cell lymphoma) ([facility]) 02/2010    non-hodgkins lymphoma    Encounter for aftercare following bone marrow transplant ([facility]) 06/10/2016    GERD (gastroesophageal reflux disease)     Hx of non-Hodgkin's lymphoma     Hypogammaglobulinemia ([facility]) 04/13/2017    Lung disease     MDRO (multiple drug resistant organisms) resistance 05/15/2016    E.coli MDRO in urine    Migraines     Neutropenia ([facility]) 07/20/2016    Non Hodgkin's lymphoma ([facility])     Pancytopenia ([facility]) 07/20/2016    Peripheral neuropathy     PONV (postoperative nausea and vomiting)     Thyroid disease     Tinnitus        Past Surgical History     Past Surgical History:   Procedure Laterality Date    BONE MARROW TRANSPLANT  05/16/2016    BONE MARROW TRANSPLANT      BRONCHOSCOPY  08/30/2018    BRONCHOSCOPY THERAPUTIC ASPIRATION INITIAL WITH MUCUS PLUG SENT FOR BACTERIAL CULTURE performed by James Ma MD at [facility] ENDOSCOPY    BRONCHOSCOPY Left 09/28/2023    BRONCHOSCOPY WITH

## 2025-03-10 ENCOUNTER — OFFICE VISIT (OUTPATIENT)
Age: 65
End: 2025-03-10
Payer: MEDICARE

## 2025-03-10 VITALS
DIASTOLIC BLOOD PRESSURE: 70 MMHG | OXYGEN SATURATION: 97 % | HEIGHT: 64 IN | HEART RATE: 86 BPM | BODY MASS INDEX: 26.98 KG/M2 | SYSTOLIC BLOOD PRESSURE: 116 MMHG | WEIGHT: 158 LBS

## 2025-03-10 DIAGNOSIS — J45.40 ASTHMA, MODERATE PERSISTENT, WELL-CONTROLLED: ICD-10-CM

## 2025-03-10 DIAGNOSIS — R05.3 REFRACTORY CHRONIC COUGH: ICD-10-CM

## 2025-03-10 DIAGNOSIS — C83.36 DIFFUSE LARGE B-CELL LYMPHOMA OF INTRAPELVIC LYMPH NODES (HCC): ICD-10-CM

## 2025-03-10 DIAGNOSIS — J18.9 RECURRENT PNEUMONIA: Primary | ICD-10-CM

## 2025-03-10 DIAGNOSIS — Z94.81 AUTOLOGOUS BONE MARROW TRANSPLANTATION STATUS (HCC): ICD-10-CM

## 2025-03-10 PROCEDURE — 99214 OFFICE O/P EST MOD 30 MIN: CPT | Performed by: INTERNAL MEDICINE

## 2025-03-10 PROCEDURE — 1123F ACP DISCUSS/DSCN MKR DOCD: CPT | Performed by: INTERNAL MEDICINE

## 2025-03-10 PROCEDURE — G2211 COMPLEX E/M VISIT ADD ON: HCPCS | Performed by: INTERNAL MEDICINE

## 2025-03-10 RX ORDER — CYCLOBENZAPRINE HCL 5 MG
5 TABLET ORAL 3 TIMES DAILY PRN
COMMUNITY
Start: 2025-02-27

## 2025-03-10 NOTE — PROGRESS NOTES
[facility] Pulmonary and Critical Care    Outpatient Follow Up Note    Subjective:   CHIEF COMPLAINT / HPI:     The patient is 57 y.o. female here for follow-up of recurrent LRTI, moderate persistent asthma, allergic rhinitis, and CKD.  Since last visit she has had no admissions for pneumonia.  Currently she seems to be at baseline which includes a daily cough of clear to white phlegm.  She states that flutter valve does help with airway clearance.  No fevers, chills, dyspnea on exertion, wheezing, or chest tightness.  Itzel is being evaluated for a kidney transplant    12/19/2024  Itzel here for follow-up of recurrent LRTI, moderate persistent asthma, allergic rhinitis, and CKD.  Last visit she was having what appeared to be a lower respiratory tract infection.  I tried to obtain a sputum culture but this was unsuccessful.  I treated her with Levaquin and she did improve until mid December when she developed malaise, fatigue, headache, and chest pain.  She was admitted to [facility] from December 11 of December 13.  CT chest showed bibasilar pneumonia.  Patient was given meropenem and doxycycline and subsequently improved.  She required oxygen during the hospitalization but did not need oxygen on discharge.  She is feeling decent today but is concerned why this keeps on happening.  Last visit I checked an IgG level that was normal.  She does not cough after she eats.  She has never had an MBS.    11/14/2024  Itzel is here for follow-up of moderate persistent asthma, allergic rhinitis, recurrent LRTI, and history of allo bone marrow transplant due to lymphoma.  At last visit I ordered a CT chest that showed resolution of her pneumonia.  However since then she was admitted to [facility] for an ESBL E. coli bacteremia of unclear source.  Urine culture was negative as well as CT abdomen.  She was having no respiratory symptoms at that time.  She finished 2 weeks of meropenem/ertapenem.  However

## 2025-03-11 ENCOUNTER — TELEPHONE (OUTPATIENT)
Dept: PULMONOLOGY | Age: 65
End: 2025-03-11

## 2025-03-11 NOTE — TELEPHONE ENCOUNTER
Patient would like to know if she is cleared for a kidney transplant from a pulm standpoint?  If so, would you please addend office notes from yesterday, 3/10/2025. Thank you

## 2025-03-11 NOTE — TELEPHONE ENCOUNTER
I addended my note that states Itzel is okay to proceed for kidney transplant when indicated     Patient is aware Dr Ma addended his note. She will call back with a fax number if his office note needs to be faxed anywhere.

## 2025-04-14 NOTE — PROGRESS NOTES
ENDOSCOPY PREOP INSTRUCTIONS      Please at arrival time given to you from your doctor's office.  Report to the MAIN entrance on [address] and register at the surgery center on the left-hand side of the lobby  You will need your insurance card and photo id and a list of all medications taken on a regular basis. Please include the dose/frequency.    For your procedure:     PLEASE FOLLOW ALL INSTRUCTIONS & PREPS GIVEN TO YOU BY YOUR DOCTOR'S OFFICE.    Please make sure you bring the name of bowel prep & any meds you took prior to arrival.  If you have not received these instructions yet, please call the office immediately. Make sure to read them as soon as received.   If you are taking blood thinners, Aspirin or diabetic medication, make sure to call your doctor as soon as possible for instructions prior to your procedure.  Please dress comfortably and do not wear any lotion, powders or jewelry  If you use oxygen at home, please bring your oxygen tank with you to hospital.  Arrange for someone to be with you and sign you out & drive you home after your procedure.  THIS PERSON MUST WAIT AT HOSPITAL THE ENTIRE TIME.  WOMEN ONLY OF CHILDBEARING AGE: Please make sure to be able to give a urine sample on arrival      If you have further questions, you may contact your Endoscopist's office

## 2025-04-16 ENCOUNTER — APPOINTMENT (OUTPATIENT)
Dept: ENDOSCOPY | Age: 65
End: 2025-04-16
Attending: INTERNAL MEDICINE
Payer: COMMERCIAL

## 2025-04-16 ENCOUNTER — ANESTHESIA EVENT (OUTPATIENT)
Dept: ENDOSCOPY | Age: 65
End: 2025-04-16
Payer: COMMERCIAL

## 2025-04-16 ENCOUNTER — ANESTHESIA (OUTPATIENT)
Dept: ENDOSCOPY | Age: 65
End: 2025-04-16
Payer: COMMERCIAL

## 2025-04-16 ENCOUNTER — HOSPITAL ENCOUNTER (OUTPATIENT)
Age: 65
Setting detail: OUTPATIENT SURGERY
Discharge: HOME | End: 2025-04-16
Attending: INTERNAL MEDICINE | Admitting: INTERNAL MEDICINE
Payer: COMMERCIAL

## 2025-04-16 VITALS
HEIGHT: 64 IN | BODY MASS INDEX: 26.8 KG/M2 | OXYGEN SATURATION: 96 % | SYSTOLIC BLOOD PRESSURE: 130 MMHG | DIASTOLIC BLOOD PRESSURE: 91 MMHG | HEART RATE: 79 BPM | WEIGHT: 157 LBS | TEMPERATURE: 97 F | RESPIRATION RATE: 16 BRPM

## 2025-04-16 DIAGNOSIS — R13.19 ESOPHAGEAL DYSPHAGIA: ICD-10-CM

## 2025-04-16 DIAGNOSIS — K21.9 GASTROESOPHAGEAL REFLUX DISEASE, UNSPECIFIED WHETHER ESOPHAGITIS PRESENT: ICD-10-CM

## 2025-04-16 PROCEDURE — 2720000010 HC SURG SUPPLY STERILE: Performed by: INTERNAL MEDICINE

## 2025-04-16 PROCEDURE — 3700000000 HC ANESTHESIA ATTENDED CARE: Performed by: INTERNAL MEDICINE

## 2025-04-16 PROCEDURE — 7100000010 HC PHASE II RECOVERY - FIRST 15 MIN: Performed by: INTERNAL MEDICINE

## 2025-04-16 PROCEDURE — 6360000002 HC RX W HCPCS

## 2025-04-16 PROCEDURE — 2709999900 HC NON-CHARGEABLE SUPPLY: Performed by: INTERNAL MEDICINE

## 2025-04-16 PROCEDURE — 3609012400 HC EGD TRANSORAL BIOPSY SINGLE/MULTIPLE: Performed by: INTERNAL MEDICINE

## 2025-04-16 PROCEDURE — 88305 TISSUE EXAM BY PATHOLOGIST: CPT

## 2025-04-16 PROCEDURE — 7100000011 HC PHASE II RECOVERY - ADDTL 15 MIN: Performed by: INTERNAL MEDICINE

## 2025-04-16 PROCEDURE — 3700000001 HC ADD 15 MINUTES (ANESTHESIA): Performed by: INTERNAL MEDICINE

## 2025-04-16 PROCEDURE — 2580000003 HC RX 258: Performed by: ANESTHESIOLOGY

## 2025-04-16 RX ORDER — SODIUM CHLORIDE, SODIUM LACTATE, POTASSIUM CHLORIDE, CALCIUM CHLORIDE 600; 310; 30; 20 MG/100ML; MG/100ML; MG/100ML; MG/100ML
INJECTION, SOLUTION INTRAVENOUS CONTINUOUS
Status: DISCONTINUED | OUTPATIENT
Start: 2025-04-16 | End: 2025-04-16 | Stop reason: HOSPADM

## 2025-04-16 RX ORDER — LIDOCAINE HYDROCHLORIDE 20 MG/ML
INJECTION, SOLUTION INTRAVENOUS
Status: DISCONTINUED | OUTPATIENT
Start: 2025-04-16 | End: 2025-04-16 | Stop reason: SDUPTHER

## 2025-04-16 RX ORDER — GLYCOPYRROLATE 0.2 MG/ML
INJECTION INTRAMUSCULAR; INTRAVENOUS
Status: DISCONTINUED | OUTPATIENT
Start: 2025-04-16 | End: 2025-04-16 | Stop reason: SDUPTHER

## 2025-04-16 RX ORDER — PROPOFOL 10 MG/ML
INJECTION, EMULSION INTRAVENOUS
Status: DISCONTINUED | OUTPATIENT
Start: 2025-04-16 | End: 2025-04-16 | Stop reason: SDUPTHER

## 2025-04-16 RX ADMIN — LIDOCAINE HYDROCHLORIDE 100 MG: 20 INJECTION, SOLUTION INTRAVENOUS at 13:38

## 2025-04-16 RX ADMIN — PROPOFOL 80 MG: 10 INJECTION, EMULSION INTRAVENOUS at 13:38

## 2025-04-16 RX ADMIN — GLYCOPYRROLATE 0.2 MG: 0.2 INJECTION INTRAMUSCULAR; INTRAVENOUS at 13:35

## 2025-04-16 RX ADMIN — SODIUM CHLORIDE, SODIUM LACTATE, POTASSIUM CHLORIDE, AND CALCIUM CHLORIDE: .6; .31; .03; .02 INJECTION, SOLUTION INTRAVENOUS at 13:21

## 2025-04-16 RX ADMIN — PROPOFOL 150 MCG/KG/MIN: 10 INJECTION, EMULSION INTRAVENOUS at 13:39

## 2025-04-16 ASSESSMENT — PAIN SCALES - GENERAL: PAINLEVEL_OUTOF10: 0

## 2025-04-16 ASSESSMENT — PAIN - FUNCTIONAL ASSESSMENT: PAIN_FUNCTIONAL_ASSESSMENT: 0-10

## 2025-04-16 ASSESSMENT — ENCOUNTER SYMPTOMS: SHORTNESS OF BREATH: 1

## 2025-04-16 NOTE — ANESTHESIA POSTPROCEDURE EVALUATION
Department of Anesthesiology  Postprocedure Note    Patient: Itzel Fenton  MRN: [id number]  YOB: 1960  Date of evaluation: 4/16/2025    Procedure Summary       Date: 04/16/25 Room / Location: [place] [address] / [facility]    Anesthesia Start: 1335 Anesthesia Stop: 1350    Procedure: ESOPHAGOGASTRODUODENOSCOPY BIOPSY/BALLOON DILATION Diagnosis:       Esophageal dysphagia      Gastroesophageal reflux disease, unspecified whether esophagitis present      (Esophageal dysphagia [R13.19])      (Gastroesophageal reflux disease, unspecified whether esophagitis present [K21.9])    Surgeons: Adrian Rodriguez MD Responsible Provider: Adrian Forbes DO    Anesthesia Type: general ASA Status: 3            Anesthesia Type: No value filed.    Gayla Phase I: Gayla Score: 10    Gayla Phase II: Gayla Score: 10    Vitals:    04/16/25 1429   BP: (!) 130/91   Pulse: 79   Resp: 16   Temp:    SpO2: 96%       Anesthesia Post Evaluation    Patient location during evaluation: PACU  Patient participation: complete - patient participated  Level of consciousness: awake and awake and alert  Pain score: 0  Airway patency: patent  Nausea & Vomiting: no nausea and no vomiting  Cardiovascular status: hemodynamically stable  Respiratory status: acceptable  Hydration status: euvolemic  Pain management: adequate and satisfactory to patient    No notable events documented.

## 2025-04-16 NOTE — PROGRESS NOTES
Ambulatory Surgery/Procedure Discharge Note    Vitals:    04/16/25 1429   BP: (!) 130/91   Pulse: 79   Resp: 16   Temp:    SpO2: 96%   Discharge criteria met per Gayla score. BP is slightly elevated. Patient denies any symptoms.     In: 520 [P.O.:120; I.V.:400]  Out: -     Restroom use offered before discharge.  Yes    Pain assessment:  none  Pain Level: 0, slight sore throat.    Patient discharged to home/self care. Patient discharged via wheel chair by transporter to waiting family/S.O.       4/16/2025 2:37 PMPt and S.O./family states \"ready to go home\". Pt alert and oriented x4. IV removed. Denies N/V or pain. Pt tolerating po intake. Discharge instructions given to pt and brother with pt permission. Pt and brother verbalized understanding of all instructions. Left with all belongings and discharge instructions.

## 2025-04-16 NOTE — ANESTHESIA PRE PROCEDURE
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   • Vaginal atrophy N95.2   • GVHD (graft versus host disease) ([facility]) D89.813   • Hypomagnesemia E83.42   • Status post chemotherapy Z92.21   • S/P allogeneic bone marrow transplant ([facility]) Z94.81   • Anemia due to antineoplastic chemotherapy D64.81, T45.1X5A   • Abdominal pain R10.9   • Enteritis K52.9   • Abnormal CT of the chest R93.89   • MCDONALD (dyspnea on exertion) R06.09   • Mucus plugging of bronchi T17.500A   • Mixed hyperlipidemia E78.2   • Acquired hypothyroidism E03.9   • Vitamin D deficiency E55.9   • Prediabetes R73.03   • Sensorineural hearing loss, bilateral H90.3   • Tinnitus, bilateral H93.13   • Pneumonitis J98.4   • Pneumonia of left lower lobe due to infectious organism J18.9   • Vocal cord dysfunction J38.3   • Laryngospasm J38.5   • Persistent cough R05.3       Past Medical History:        Diagnosis Date   • Allergic rhinitis    • Arthritis    • Asthma    • CKD (chronic kidney disease)    • DLBCL (diffuse large B cell lymphoma) ([facility]) 02/2010    non-hodgkins lymphoma   • Encounter for aftercare following bone marrow transplant ([facility]) 06/10/2016   • GERD (gastroesophageal reflux disease)    • Hx of non-Hodgkin's lymphoma    • Hypogammaglobulinemia 04/13/2017   • Lung disease    • MDRO (multiple drug resistant organisms) resistance 05/15/2016    E.coli MDRO in urine   • Migraines    • Neutropenia 07/20/2016   • Non Hodgkin's lymphoma ([facility])    • Pancytopenia ([facility]) 07/20/2016   • Peripheral neuropathy    • PONV (postoperative nausea and vomiting)    • Thyroid disease    • Tinnitus        Past Surgical History:        Procedure Laterality Date   • BONE MARROW TRANSPLANT  05/16/2016   • BONE MARROW

## 2025-04-16 NOTE — H&P
Pre-operative History and Physical    Patient: Itzel Fenton  : 1960     History Obtained From:  patient, electronic medical record    HISTORY OF PRESENT ILLNESS:    The patient is a 64 y.o. female who presents for an EGD for dysphagia.   Past Medical History:        Diagnosis Date    Allergic rhinitis     Arthritis     Asthma     CKD (chronic kidney disease)     DLBCL (diffuse large B cell lymphoma) 2010    non-hodgkins lymphoma    Encounter for aftercare following bone marrow transplant ([facility]) 06/10/2016    GERD (gastroesophageal reflux disease)     Hx of non-Hodgkin's lymphoma     Hypogammaglobulinemia 2017    Lung disease     MDRO (multiple drug resistant organisms) resistance 05/15/2016    E.coli MDRO in urine    Migraines     Neutropenia 2016    Non Hodgkin's lymphoma     Pancytopenia 2016    Peripheral neuropathy     PONV (postoperative nausea and vomiting)     Thyroid disease     Tinnitus      Past Surgical History:        Procedure Laterality Date    BONE MARROW TRANSPLANT  2016    BONE MARROW TRANSPLANT      BRONCHOSCOPY  2018    BRONCHOSCOPY THERAPUTIC ASPIRATION INITIAL WITH MUCUS PLUG SENT FOR BACTERIAL CULTURE performed by James Ma MD at [facility] ENDOSCOPY    BRONCHOSCOPY Left 2023    BRONCHOSCOPY WITH BRONCHOALVEOLAR LAVAGE OF THE LEFT LUNG performed by James Ma MD at [facility] ENDOSCOPY     SECTION      x 5    COLONOSCOPY      COLONOSCOPY N/A 2021    COLONOSCOPY POLYPECTOMY SNARE/COLD BIOPSY performed by Dhaval Lockhart MD at [facility] ENDOSCOPY    HYSTERECTOMY (CERVIX STATUS UNKNOWN)  2010    with oophorectomy    HYSTERECTOMY, VAGINAL      LYMPH NODE BIOPSY  2011    R external iliac node -non diagnostic    LYMPH NODE BIOPSY  2012    R external node - Dr Tolliver - DLBC NHL    OTHER SURGICAL HISTORY  10/08/2012    INSERTION NEOSTAR CATHETER and REMOVAL        OTHER SURGICAL HISTORY Right 05/10/2016    INSERTION TRIPLE LUMEN  12/26/2008   SpO2 100%   BMI 26.95 kg/m²  I        Heart:  No m/r/g +s1/s2 RRR    Lungs:  CTA bilaterally    Abdomen:  Soft, nontender, non distended; +bs    ASA Grade:  ASA 3 - Patient with moderate systemic disease with functional limitations    Mallampati Class:  Class I: Soft palate, uvula, fauces, pillars visible  __________  Class II: Soft palate, uvula, fauces visible  ___x_______   Class III: Soft palate, base of uvula visible  __________  Class IV: Hard palate only visible   __________      ASSESSMENT AND PLAN:    1.  Patient is a 64 y.o. female here for EGD with deep sedation  2.  Procedure options, risks and benefits reviewed with patient.  We specifically discussed that risks include, but are not limited to infection, bleeding, perforation, death, and missed lesions.  Patient expresses understanding.

## 2025-04-16 NOTE — PROCEDURES
EGD REPORT    Patient:  Itzel Fenton                  1960    Referring Physician:  Melina/    Endoscopist: [place]     Indication:  dysphagia     Medications:  MAC      Pre-Anesthesia Assessment:  I have reviewed and am in agreement with patient history and medication, including previous response to sedation.    Prior to the procedure, a History and Physical was performed, and patient medications and allergies were reviewed. The patient is competent. The risks and benefits of the procedure and the sedation options and risks were discussed with the patient.   Risks discussed included but were not limited to infection, bleeding, perforation, death, and missed lesions.  All questions were answered and informed consent was obtained. Patient identification and proposed procedure were verified by the physician and the nurse in the pre-procedure area in the procedure room.     Mallampatti: II  ASA Grade Assessment: 3     After reviewing the risks and benefits, the patient was deemed in satisfactory condition to undergo the procedure. The anesthesia plan was to use MAC anesthesia. Immediately prior to administration of medications, the patient was re-assessed for adequacy to receive sedatives and a time out was performed. Patient and healthcare providers were in agreement it was the correct patient and procedure. The heart rate, respiratory rate, oxygen saturations, blood pressure, adequacy of pulmonary ventilation, and response to care were monitored throughout the procedure. The physical status of the patient was re-assessed after the procedure.     After obtaining informed consent, the endoscope was passed under direct vision.   The endoscope was introduced through the mouth, and advanced to the second part of duodenum. The EGD was accomplished without difficulty. The patient tolerated the procedure well.       Duodenum: Normal major papilla; tiny erosions in the bulb; minimal narrowing at the apex of

## 2025-04-16 NOTE — DISCHARGE INSTRUCTIONS
ENDOSCOPY DISCHARGE INSTRUCTIONS:    Call the physician that did your procedure for any questions or concern:    [facility]: [phone]  DR. BINA SUAZO      ACTIVITY:    There are potential side effects to the medications used for sedation and anesthesia during your procedure.  These include:  Dizziness or light-headedness, confusion or memory loss, delayed reaction times, loss of coordination, nausea and vomiting.  Because of your increased risk for injury, we ask that you observe the following precautions:  For the next 24 hours,  DO NOT operate an automobile, bicycle, motorcycle, , power tools or large equipment of any kind.  Do not drink alcohol, sign any legal documents or make any legal decisions for 24 hours.  Do not bend your head over lower than your heart.  DO sit on the side of bed/couch awhile before getting up.  Plan on bedrest or quiet relaxation today.  You may resume normal activities in 24 hours.    DIET:    Your first meal today should be light, avoiding spicy and fatty foods.  If you tolerate this first meal, then you may advance to your regular diet unless otherwise advised by your physician.    NORMAL SYMPTOMS:  -Mild sore throat if you’ve had an EGD   -Gaseous discomfort    NOTIFY YOUR PHYSICIAN IF THESE SYMPTOMS OCCUR:  1. Fever (greater than 100)  5. Increased abdominal bloating  2. Severe pain    6. Excessive bleeding  3. Nausea and vomiting  7. Chest pain                                                                    4. Chills    8. Shortness of breath    ADDITIONAL INSTRUCTIONS:    Biopsy results: Call [facility] for biopsy results in 1 week    Educational Information:    Duodenum: Normal major papilla; tiny erosions in the bulb; minimal narrowing at the apex of the bulb which was benign and intrinsic. The scope passed easily  Stomach: Normal. Biopsies obtained throughout the stomach given the findings of duodenum to rule out H pylori.  Retroflexion did not

## 2025-05-09 RX ORDER — FLUTICASONE FUROATE, UMECLIDINIUM BROMIDE AND VILANTEROL TRIFENATATE 200; 62.5; 25 UG/1; UG/1; UG/1
1 POWDER RESPIRATORY (INHALATION) DAILY
Qty: 180 EACH | Refills: 3 | Status: SHIPPED | OUTPATIENT
Start: 2025-05-09

## 2025-05-09 NOTE — TELEPHONE ENCOUNTER
Medication:   Requested Prescriptions     Pending Prescriptions Disp Refills    TRELECARMEN ELLIPTA 200-62.5-25 MCG/ACT AEPB inhaler [Pharmacy Med Name: TRELEGY ELLIPTA 200-62.5MCG INH 30P] 180 each      Sig: INHALE 1 PUFF INTO THE LUNGS DAILY        Last Filled:  8/26/24    Patient Phone Number: [phone] (home)     Last appt: 3/10/2025   Next appt: Visit date not found    Last OARRS:        No data to display

## 2025-07-22 ENCOUNTER — OFFICE VISIT (OUTPATIENT)
Dept: PULMONOLOGY | Age: 65
End: 2025-07-22
Payer: MEDICARE

## 2025-07-22 VITALS
DIASTOLIC BLOOD PRESSURE: 70 MMHG | HEIGHT: 64 IN | OXYGEN SATURATION: 90 % | HEART RATE: 117 BPM | WEIGHT: 153 LBS | SYSTOLIC BLOOD PRESSURE: 114 MMHG | RESPIRATION RATE: 16 BRPM | BODY MASS INDEX: 26.12 KG/M2

## 2025-07-22 DIAGNOSIS — Z94.81 AUTOLOGOUS BONE MARROW TRANSPLANTATION STATUS (HCC): ICD-10-CM

## 2025-07-22 DIAGNOSIS — C83.36 DIFFUSE LARGE B-CELL LYMPHOMA OF INTRAPELVIC LYMPH NODES (HCC): ICD-10-CM

## 2025-07-22 DIAGNOSIS — J45.40 ASTHMA, MODERATE PERSISTENT, WELL-CONTROLLED: ICD-10-CM

## 2025-07-22 DIAGNOSIS — J18.9 RECURRENT PNEUMONIA: Primary | ICD-10-CM

## 2025-07-22 PROCEDURE — 99214 OFFICE O/P EST MOD 30 MIN: CPT | Performed by: INTERNAL MEDICINE

## 2025-07-22 PROCEDURE — 1123F ACP DISCUSS/DSCN MKR DOCD: CPT | Performed by: INTERNAL MEDICINE

## 2025-07-22 NOTE — PROGRESS NOTES
[facility] Pulmonary and Critical Care    Outpatient Follow Up Note    Subjective:   CHIEF COMPLAINT / HPI:      Itzel is here for follow-up of recurrent LRTI, moderate persistent asthma, allergic rhinitis and CKD.  She was admitted at [facility] July 11 through July 14 with pneumonia.  She had a cough during this for about 2 weeks.  She was treated with cefepime and doxycycline.  Respiratory panel showed rhinovirus.  CT chest showed bilateral lower lobe and right middle lobe multifocal airspace disease.    3/10/2025  Itzel is 64 y.o. female here for follow-up of recurrent LRTI, moderate persistent asthma, allergic rhinitis, and CKD.  Since last visit she has had no admissions for pneumonia.  Currently she seems to be at baseline which includes a daily cough of clear to white phlegm.  She states that flutter valve does help with airway clearance.  No fevers, chills, dyspnea on exertion, wheezing, or chest tightness.  Itzel is being evaluated for a kidney transplant    12/19/2024  Itzel here for follow-up of recurrent LRTI, moderate persistent asthma, allergic rhinitis, and CKD.  Last visit she was having what appeared to be a lower respiratory tract infection.  I tried to obtain a sputum culture but this was unsuccessful.  I treated her with Levaquin and she did improve until mid December when she developed malaise, fatigue, headache, and chest pain.  She was admitted to [facility] from December 11 of December 13.  CT chest showed bibasilar pneumonia.  Patient was given meropenem and doxycycline and subsequently improved.  She required oxygen during the hospitalization but did not need oxygen on discharge.  She is feeling decent today but is concerned why this keeps on happening.  Last visit I checked an IgG level that was normal.  She does not cough after she eats.  She has never had an MBS.    11/14/2024  Itzel is here for follow-up of moderate persistent asthma, allergic rhinitis, recurrent LRTI,

## (undated) DEVICE — STANDARD HYPODERMIC NEEDLE,POLYPROPYLENE HUB: Brand: MONOJECT

## (undated) DEVICE — Z DISCONTINUED USE 2749457 TUBING SAMP AD W12.5XH8.4IN D9.1IN NSL ORAL SMRT CAPNOLINE

## (undated) DEVICE — SYRINGE INFL 60ML DISP ALLIANCE II

## (undated) DEVICE — TRAP SPEC COLL 40CC MUCOUS CALIB UNIV CONN FOR OPN SUCT

## (undated) DEVICE — GLOVE ORANGE PI 8   MSG9080

## (undated) DEVICE — SET VLV 3 PC AWS DISPOSABLE GRDIAN SCOPEVALET

## (undated) DEVICE — BW-412T DISP COMBO CLEANING BRUSH: Brand: SINGLE USE COMBINATION CLEANING BRUSH

## (undated) DEVICE — CHLORAPREP 26ML ORANGE

## (undated) DEVICE — SOLUTION IV IRRIG WATER 500ML POUR BRL ST 2F7113

## (undated) DEVICE — PORT VLV 2 W NDL FREE SMRTSITE

## (undated) DEVICE — PROCEDURE KIT ENDOSCP CUST

## (undated) DEVICE — ESOPHAGEAL BALLOON DILATATION CATHETER: Brand: CRE FIXED WIRE

## (undated) DEVICE — Device: Brand: JELCO

## (undated) DEVICE — SYRINGE MED 50ML LUERLOCK TIP

## (undated) DEVICE — NEEDLE SPNL 22GA L3.5IN BLK HUB S STL REG WALL FIT STYL W/

## (undated) DEVICE — SYRINGE MED 10ML LUERLOCK TIP W/O SFTY DISP

## (undated) DEVICE — Device: Brand: DISPOSABLE ELECTROSURGICAL SNARE

## (undated) DEVICE — FORCEPS BX L240CM JAW DIA2.4MM ORNG L CAP W/ NDL DISP RAD

## (undated) DEVICE — STERILE POLYISOPRENE POWDER-FREE SURGICAL GLOVES: Brand: PROTEXIS

## (undated) DEVICE — MEDIA CONTRAST RX ISOVUE-300 61% 30ML VIALS

## (undated) DEVICE — TRAP SPEC RETRV CLR PLAS POLYP IN LN SUCT QUIK CTCH